# Patient Record
Sex: MALE | Race: BLACK OR AFRICAN AMERICAN | Employment: OTHER | URBAN - METROPOLITAN AREA
[De-identification: names, ages, dates, MRNs, and addresses within clinical notes are randomized per-mention and may not be internally consistent; named-entity substitution may affect disease eponyms.]

---

## 2017-01-05 ENCOUNTER — APPOINTMENT (OUTPATIENT)
Dept: GENERAL RADIOLOGY | Age: 67
End: 2017-01-05
Attending: PHYSICIAN ASSISTANT
Payer: OTHER GOVERNMENT

## 2017-01-05 LAB
ALBUMIN SERPL BCP-MCNC: 3.5 G/DL (ref 3.4–5)
ALBUMIN/GLOB SERPL: 0.8 {RATIO} (ref 0.8–1.7)
ALP SERPL-CCNC: 99 U/L (ref 45–117)
ALT SERPL-CCNC: 28 U/L (ref 16–61)
ANION GAP BLD CALC-SCNC: 9 MMOL/L (ref 3–18)
AST SERPL W P-5'-P-CCNC: 15 U/L (ref 15–37)
BASOPHILS # BLD AUTO: 0 K/UL (ref 0–0.1)
BASOPHILS # BLD: 0 % (ref 0–2)
BILIRUB SERPL-MCNC: 1.1 MG/DL (ref 0.2–1)
BNP SERPL-MCNC: 4456 PG/ML (ref 0–900)
BUN SERPL-MCNC: 13 MG/DL (ref 7–18)
BUN/CREAT SERPL: 13 (ref 12–20)
CALCIUM SERPL-MCNC: 9.7 MG/DL (ref 8.5–10.1)
CHLORIDE SERPL-SCNC: 107 MMOL/L (ref 100–108)
CK MB CFR SERPL CALC: 2.2 % (ref 0–4)
CK MB SERPL-MCNC: 1.1 NG/ML (ref 0.5–3.6)
CK SERPL-CCNC: 49 U/L (ref 39–308)
CO2 SERPL-SCNC: 26 MMOL/L (ref 21–32)
CREAT SERPL-MCNC: 0.98 MG/DL (ref 0.6–1.3)
DIFFERENTIAL METHOD BLD: ABNORMAL
EOSINOPHIL # BLD: 0.1 K/UL (ref 0–0.4)
EOSINOPHIL NFR BLD: 1 % (ref 0–5)
ERYTHROCYTE [DISTWIDTH] IN BLOOD BY AUTOMATED COUNT: 12.7 % (ref 11.6–14.5)
GLOBULIN SER CALC-MCNC: 4.5 G/DL (ref 2–4)
GLUCOSE SERPL-MCNC: 85 MG/DL (ref 74–99)
HCT VFR BLD AUTO: 41.1 % (ref 36–48)
HGB BLD-MCNC: 13.6 G/DL (ref 13–16)
INR PPP: 2.3 (ref 0.8–1.2)
LYMPHOCYTES # BLD AUTO: 22 % (ref 21–52)
LYMPHOCYTES # BLD: 1.5 K/UL (ref 0.9–3.6)
MAGNESIUM SERPL-MCNC: 1.8 MG/DL (ref 1.8–2.4)
MCH RBC QN AUTO: 31.3 PG (ref 24–34)
MCHC RBC AUTO-ENTMCNC: 33.1 G/DL (ref 31–37)
MCV RBC AUTO: 94.7 FL (ref 74–97)
MONOCYTES # BLD: 0.7 K/UL (ref 0.05–1.2)
MONOCYTES NFR BLD AUTO: 10 % (ref 3–10)
NEUTS SEG # BLD: 4.4 K/UL (ref 1.8–8)
NEUTS SEG NFR BLD AUTO: 67 % (ref 40–73)
PLATELET # BLD AUTO: 330 K/UL (ref 135–420)
PMV BLD AUTO: 12.2 FL (ref 9.2–11.8)
POTASSIUM SERPL-SCNC: 3.2 MMOL/L (ref 3.5–5.5)
PROT SERPL-MCNC: 8 G/DL (ref 6.4–8.2)
PROTHROMBIN TIME: 23.9 SEC (ref 11.5–15.2)
RBC # BLD AUTO: 4.34 M/UL (ref 4.7–5.5)
SODIUM SERPL-SCNC: 142 MMOL/L (ref 136–145)
TROPONIN I SERPL-MCNC: 0.04 NG/ML (ref 0–0.04)
WBC # BLD AUTO: 6.6 K/UL (ref 4.6–13.2)

## 2017-01-05 PROCEDURE — 85610 PROTHROMBIN TIME: CPT | Performed by: PHYSICIAN ASSISTANT

## 2017-01-05 PROCEDURE — 80053 COMPREHEN METABOLIC PANEL: CPT | Performed by: PHYSICIAN ASSISTANT

## 2017-01-05 PROCEDURE — 85025 COMPLETE CBC W/AUTO DIFF WBC: CPT | Performed by: PHYSICIAN ASSISTANT

## 2017-01-05 PROCEDURE — 82550 ASSAY OF CK (CPK): CPT | Performed by: PHYSICIAN ASSISTANT

## 2017-01-05 PROCEDURE — 83735 ASSAY OF MAGNESIUM: CPT | Performed by: PHYSICIAN ASSISTANT

## 2017-01-05 PROCEDURE — 93005 ELECTROCARDIOGRAM TRACING: CPT

## 2017-01-05 PROCEDURE — 71020 XR CHEST PA LAT: CPT

## 2017-01-05 PROCEDURE — 99285 EMERGENCY DEPT VISIT HI MDM: CPT

## 2017-01-05 PROCEDURE — 83880 ASSAY OF NATRIURETIC PEPTIDE: CPT | Performed by: PHYSICIAN ASSISTANT

## 2017-01-05 PROCEDURE — 96374 THER/PROPH/DIAG INJ IV PUSH: CPT

## 2017-01-05 NOTE — ED NOTES
I performed a brief evaluation, including history and physical, of the patient here in triage and I have determined that pt will need further treatment and evaluation from the main side ER physician. I have placed initial orders to help in expediting patients care. January 05, 2017 at 3:14 PM - MARIA DEL CARMEN Padilla        Visit Vitals    BP (!) 162/98    Pulse (!) 106    Temp 98.6 °F (37 °C)    Resp 20    Ht 6' 3\" (1.905 m)    Wt 77.6 kg (171 lb)    SpO2 100%    BMI 21.37 kg/m2        Pt with hx of HIV, COPD, HTN, CVA c/o SOB and \"not feeling right. \" Had blood thinner changed to warfarin recently, missed today's dose.

## 2017-01-05 NOTE — ED TRIAGE NOTES
C/o \"I don't feel good and I'm having some SOB and I'm tired of coming in here. \" Pt states he has hx of congestive heart failure and was seen here last week. Pt is speaking in full sentences and shows no s/s of respiratory distress.

## 2017-01-06 ENCOUNTER — HOSPITAL ENCOUNTER (EMERGENCY)
Age: 67
Discharge: HOME | End: 2017-01-06
Attending: EMERGENCY MEDICINE
Payer: OTHER GOVERNMENT

## 2017-01-06 VITALS
HEIGHT: 75 IN | SYSTOLIC BLOOD PRESSURE: 170 MMHG | RESPIRATION RATE: 29 BRPM | BODY MASS INDEX: 21.26 KG/M2 | WEIGHT: 171 LBS | DIASTOLIC BLOOD PRESSURE: 95 MMHG | TEMPERATURE: 98.4 F | HEART RATE: 103 BPM | OXYGEN SATURATION: 100 %

## 2017-01-06 DIAGNOSIS — I50.23 ACUTE ON CHRONIC SYSTOLIC CONGESTIVE HEART FAILURE (HCC): Primary | ICD-10-CM

## 2017-01-06 PROCEDURE — 74011250636 HC RX REV CODE- 250/636: Performed by: EMERGENCY MEDICINE

## 2017-01-06 RX ORDER — FUROSEMIDE 10 MG/ML
40 INJECTION INTRAMUSCULAR; INTRAVENOUS
Status: COMPLETED | OUTPATIENT
Start: 2017-01-06 | End: 2017-01-06

## 2017-01-06 RX ADMIN — FUROSEMIDE 40 MG: 10 INJECTION, SOLUTION INTRAVENOUS at 06:27

## 2017-01-06 NOTE — ED NOTES
Hourly rounding complete. Safety  Pt resting   [ x ]  On stretcher with side rails up and bed in locked position, call bell within reach  [  ]  Sitting in chair with casters locked, call bell within reach    Toileting  [ x ] pt denies need to use bathroom  [  ] pt assisted to bathroom  [  ] pt assisted with bedpan  [  ] pt independent to bathroom as needed    Ongoing Plan of Care  Plan of care and expected time for test and results reviewed with pt.     Pain Management / Comfort  [  ] dimmed lights  [  ] warm blanket provided  [x  ] pain assessed  [x  ] monitor alarms reviewed

## 2017-01-06 NOTE — ED NOTES
Pt states he has been having difficulty breathing for a few months, pt has a hx of CHF, has a defibrillator and a zoll external defibrillator.  Pt does not appear to be in distress

## 2017-01-06 NOTE — ED NOTES
Hourly rounding complete. Safety  Pt resting   [x  ]  On stretcher with side rails up and bed in locked position, call bell within reach  [  ]  Sitting in chair with casters locked, call bell within reach    Toileting  [x  ] pt denies need to use bathroom-urinal provided  [  ] pt assisted to bathroom  [  ] pt assisted with bedpan  [  ] pt independent to bathroom as needed    Ongoing Plan of Care  Plan of care and expected time for test and results reviewed with pt.     Pain Management / Comfort  [  ] dimmed lights  [  ] warm blanket provided  [x  ] pain assessed  [ x ] monitor alarms reviewed

## 2017-01-06 NOTE — ED PROVIDER NOTES
HPI Comments:   5:38 AM Arvel Paget is a 61 y.o. Male with a h/o CHF and pace maker, who presents to the ED for the evaluation of intermittent SOB, onset yesterday morning. Pt denies CP and leg swelling. There are no other reported signs or symptoms. There are no relieving or exacerbating factors. The patient has not reported taking any medication to relieve symptoms. There are no other complaints at this time. PCP: Disha Alexander MD     The history is provided by the patient. Past Medical History:   Diagnosis Date    Autoimmune disease ([address]      AIDS? HIV    Hypertension     Rectal bleeding     Stroke [facility])        No past surgical history on file. No family history on file. Social History     Social History    Marital status:      Spouse name: N/A    Number of children: N/A    Years of education: N/A     Occupational History    Not on file. Social History Main Topics    Smoking status: Current Every Day Smoker     Packs/day: 1.00     Types: Cigarettes    Smokeless tobacco: Not on file    Alcohol use 3.5 oz/week     7 Standard drinks or equivalent per week      Comment: \"drink everyday but never alone\"     Drug use: Yes     Special: Marijuana    Sexual activity: Not Currently     Other Topics Concern    Not on file     Social History Narrative         ALLERGIES: Review of patient's allergies indicates no known allergies. Review of Systems   Constitutional: Negative for activity change, appetite change, diaphoresis and fever. HENT: Negative for congestion, dental problem, ear pain, hearing loss, nosebleeds, postnasal drip, sinus pressure, sneezing and tinnitus. Eyes: Negative for photophobia, discharge, redness and visual disturbance. Respiratory: Positive for shortness of breath. Negative for cough, choking, wheezing and stridor. Cardiovascular: Negative for chest pain, palpitations and leg swelling.    Gastrointestinal: Negative for abdominal distention, abdominal pain, anal bleeding and blood in stool. Genitourinary: Negative for decreased urine volume, difficulty urinating, discharge, dysuria, frequency, hematuria, penile swelling, scrotal swelling, testicular pain and urgency. Musculoskeletal: Negative for arthralgias, back pain, gait problem, joint swelling, myalgias and neck pain. Skin: Negative for color change and pallor. Neurological: Negative for dizziness, tremors, seizures, syncope and headaches. Hematological: Negative for adenopathy. Does not bruise/bleed easily. Psychiatric/Behavioral: Negative for agitation, behavioral problems, confusion and hallucinations. The patient is not nervous/anxious. Vitals:    01/06/17 0430 01/06/17 0500 01/06/17 0600 01/06/17 0627   BP: (!) 163/95 (!) 162/108 (!) 160/109 (!) 170/95   Pulse: (!) 103 90 (!) 103 (!) 104   Resp:   28    Temp:       SpO2: 100% 100% 100%    Weight:       Height:            100% within normal limits     Physical Exam   Constitutional: He is oriented to person, place, and time. He appears well-developed and well-nourished. Life vest   HENT:   Head: Normocephalic and atraumatic. Right Ear: External ear normal.   Left Ear: External ear normal.   Nose: Nose normal.   Mouth/Throat: Oropharynx is clear and moist.   Eyes: Conjunctivae and EOM are normal. Pupils are equal, round, and reactive to light. Right eye exhibits no discharge. No scleral icterus. Neck: Normal range of motion. Neck supple. No JVD present. No thyromegaly present. Cardiovascular: Normal rate, regular rhythm and intact distal pulses. Exam reveals no gallop and no friction rub. No murmur heard. Pulmonary/Chest: No respiratory distress. He has no wheezes. He has no rales. He exhibits no tenderness. Pace maker L chest. Trace Crackles   Abdominal: He exhibits no distension and no mass. There is no tenderness. There is no rebound and no guarding. Musculoskeletal: Normal range of motion.  He exhibits no edema (trace). Lymphadenopathy:     He has no cervical adenopathy. Neurological: He is alert and oriented to person, place, and time. No cranial nerve deficit. Coordination normal.   Skin: Skin is warm and dry. No rash noted. No erythema. Psychiatric: He has a normal mood and affect. His behavior is normal. Judgment normal.   Nursing note and vitals reviewed. [facility]  ED Course       Procedures      Vitals:  Patient Vitals for the past 12 hrs:   Temp Pulse Resp BP SpO2   01/06/17 0627 - (!) 104 - (!) 170/95 -   01/06/17 0600 - (!) 103 28 (!) 160/109 100 %   01/06/17 0500 - 90 - (!) 162/108 100 %   01/06/17 0430 - (!) 103 - (!) 163/95 100 %   01/06/17 0330 - - - - 100 %   01/05/17 2318 98.4 °F (36.9 °C) 71 18 (!) 152/101 98 %   98% within normal limits     EKG interpretation by ED Physician:  Time: 1804  Rate: 105  Rhythm:Sinus Tachycardia. Pacemaker. No STEMI      X-Ray, CT or other radiology findings or impressions:  Xr Chest Pa Lat    Result Date: 1/5/2017  CHEST PA AND LATERAL CPT CODE: 66114 HISTORY: Shortness of breath, fatigue, congestive heart failure. COMPARISON: Chest x-ray December 15, 2016. FINDINGS: PA and lateral views obtained. The cardiac silhouette is mildly enlarged. No change in the right atrial nor the right ventricular leads of the left upper chest cardiac pacemaker. Cardiac monitor device projects over the lower chest. There is tortuosity of the aorta with calcified plaque. The lungs are clear. Pulmonary vascularity is normal. The costophrenic angles are sharply defined. Mild disc space narrowing with marginal spurring involves the mid and lower thoracic spine. .     IMPRESSION: Stable mild cardiomegaly. Atherosclerosis. Progress notes, Consult notes or additional Procedure notes:   6:44 AM: I have reassessed the patient and discussed their results and diagnosis. Pt states that he is breathing better after Lasix. Pt will be discharged in stable condition.   Patient understands and verbalizes agreement with plan. Disposition: Discharged home in stable ocndition  Diagnosis:   1. Acute on chronic systolic congestive heart failure ([address]        Follow Up: Follow-up Information     Follow up With Details Comments [address] MD Call in 2 days  [address] [facility] EMERGENCY DEPT  As needed, If symptoms worsen [address] [address]  [facility] [address]  Documented by: Mike Guzmán. Gini Amanda for, and in the presence of, Ezio Proctor MD 5:37 AM     Signed by: Rachel Montalvo [address], 1/6/2017   5:37 AM     PROVIDER ATTESTATION STATEMENT  I personally performed the services described in the documentation, reviewed the documentation, as recorded by the scribe in my presence, and it accurately and completely records my words and actions.   Ezio Proctor MD

## 2017-01-06 NOTE — ED NOTES
Hourly rounding complete. Safety  Pt resting   [x  ]  On stretcher with side rails up and bed in locked position, call bell within reach  [  ]  Sitting in chair with casters locked, call bell within reach    Toileting  [x  ] pt denies need to use bathroom  [  ] pt assisted to bathroom  [  ] pt assisted with bedpan  [  ] pt independent to bathroom as needed    Ongoing Plan of Care  Plan of care and expected time for test and results reviewed with pt.     Pain Management / Comfort  [  ] dimmed lights  [  ] warm blanket provided  [ x ] pain assessed  [x  ] monitor alarms reviewed

## 2017-01-06 NOTE — DISCHARGE INSTRUCTIONS
Heart Failure: Care Instructions  Your Care Instructions    Heart failure occurs when your heart does not pump as much blood as the body needs. Failure does not mean that the heart has stopped pumping but rather that it is not pumping as well as it should. Over time, this causes fluid buildup in your lungs and other parts of your body. Fluid buildup can cause shortness of breath, fatigue, swollen ankles, and other problems. By taking medicines regularly, reducing sodium (salt) in your diet, checking your weight every day, and making lifestyle changes, you can feel better and live longer. Follow-up care is a key part of your treatment and safety. Be sure to make and go to all appointments, and call your doctor if you are having problems. It's also a good idea to know your test results and keep a list of the medicines you take. How can you care for yourself at home? Medicines  · Be safe with medicines. Take your medicines exactly as prescribed. Call your doctor if you think you are having a problem with your medicine. · Do not take any vitamins, over-the-counter medicine, or herbal products without talking to your doctor first. Adilson Clement not take ibuprofen (Advil or Motrin) and naproxen (Aleve) without talking to your doctor first. They could make your heart failure worse. · You may be taking some of the following medicine. ¨ Beta-blockers can slow heart rate, decrease blood pressure, and improve your condition. Taking a beta-blocker may lower your chance of needing to be hospitalized. ¨ Angiotensin-converting enzyme inhibitors (ACEIs) reduce the heart's workload, lower blood pressure, and reduce swelling. Taking an ACEI may lower your chance of needing to be hospitalized again. ¨ Angiotensin II receptor blockers (ARBs) work like ACEIs. Your doctor may prescribe them instead of ACEIs. ¨ Diuretics, also called water pills, reduce swelling.   ¨ Potassium supplements replace this important mineral, which is sometimes lost with diuretics. ¨ Aspirin and other blood thinners prevent blood clots, which can cause a stroke or heart attack. You will get more details on the specific medicines your doctor prescribes. Diet  · Your doctor may suggest that you limit sodium to 2,000 milligrams (mg) a day or less. That is less than 1 teaspoon of salt a day, including all the salt you eat in cooking or in packaged foods. People get most of their sodium from processed foods. Fast food and restaurant meals also tend to be very high in sodium. · Ask your doctor how much liquid you can drink each day. You may have to limit liquids. Weight  · Weigh yourself without clothing at the same time each day. Record your weight. Call your doctor if you gain more than 3 pounds in 2 to 3 days. A sudden weight gain may mean that your heart failure is getting worse. Activity level  · Start light exercise (if your doctor says it is okay). Even if you can only do a small amount, exercise will help you get stronger, have more energy, and manage your weight and your stress. Walking is an easy way to get exercise. Start out by walking a little more than you did before. Bit by bit, increase the amount you walk. · When you exercise, watch for signs that your heart is working too hard. You are pushing yourself too hard if you cannot talk while you are exercising. If you become short of breath or dizzy or have chest pain, stop, sit down, and rest.  · If you feel \"wiped out\" the day after you exercise, walk slower or for a shorter distance until you can work up to a better pace. · Get enough rest at night. Sleeping with 1 or 2 pillows under your upper body and head may help you breathe easier. Lifestyle changes  · Do not smoke. Smoking can make a heart condition worse. If you need help quitting, talk to your doctor about stop-smoking programs and medicines. These can increase your chances of quitting for good.  Quitting smoking may be the most important step you can take to protect your heart. · Limit alcohol to 2 drinks a day for men and 1 drink a day for women. Too much alcohol can cause health problems. · Avoid getting sick from colds and the flu. Get a pneumococcal vaccine shot. If you have had one before, ask your doctor whether you need another dose. Get a flu shot each year. If you must be around people with colds or the flu, wash your hands often. When should you call for help? Call 911 if you have symptoms of sudden heart failure such as:  · You have severe trouble breathing. · You cough up pink, foamy mucus. · You have a new irregular or rapid heartbeat. Call your doctor now or seek immediate medical care if:  · You have new or increased shortness of breath. · You are dizzy or lightheaded, or you feel like you may faint. · You have sudden weight gain, such as 3 pounds or more in 2 to 3 days. · You have increased swelling in your legs, ankles, or feet. · You are suddenly so tired or weak that you cannot do your usual activities. Watch closely for changes in your health, and be sure to contact your doctor if:  · You develop new symptoms. Where can you learn more? Go to http://mallory-bonnie.info/. Enter Z959 in the search box to learn more about \"Heart Failure: Care Instructions. \"  Current as of: January 27, 2016  Content Version: 11.1  © 8223-6109 [employer]. Care instructions adapted under license by [employer] (which disclaims liability or warranty for this information). If you have questions about a medical condition or this instruction, always ask your healthcare professional. [place] [address] any warranty or liability for your use of this information.

## 2017-01-06 NOTE — ED NOTES
61year old gentleman with CHF, EF 15% with pacemaker and Life Vest. He presents with dyspnea. Shortness of breath noted. DDx CHF, COPD, clot, infection, hemorrhage, other etiologies. Suspect exacerbation of CHF. Will trend labs.  Lasix ordered

## 2017-01-08 LAB
ATRIAL RATE: 105 BPM
CALCULATED R AXIS, ECG10: -75 DEGREES
CALCULATED T AXIS, ECG11: 101 DEGREES
DIAGNOSIS, 93000: NORMAL
P-R INTERVAL, ECG05: 102 MS
Q-T INTERVAL, ECG07: 430 MS
QRS DURATION, ECG06: 200 MS
QTC CALCULATION (BEZET), ECG08: 568 MS
VENTRICULAR RATE, ECG03: 105 BPM

## 2017-07-02 ENCOUNTER — APPOINTMENT (OUTPATIENT)
Dept: GENERAL RADIOLOGY | Age: 67
End: 2017-07-02
Attending: EMERGENCY MEDICINE
Payer: OTHER GOVERNMENT

## 2017-07-02 ENCOUNTER — HOSPITAL ENCOUNTER (EMERGENCY)
Age: 67
Discharge: HOME | End: 2017-07-02
Attending: EMERGENCY MEDICINE
Payer: OTHER GOVERNMENT

## 2017-07-02 VITALS
TEMPERATURE: 97.6 F | HEIGHT: 74 IN | WEIGHT: 185 LBS | BODY MASS INDEX: 23.74 KG/M2 | SYSTOLIC BLOOD PRESSURE: 105 MMHG | HEART RATE: 63 BPM | DIASTOLIC BLOOD PRESSURE: 56 MMHG | OXYGEN SATURATION: 98 % | RESPIRATION RATE: 19 BRPM

## 2017-07-02 DIAGNOSIS — R06.02 SOB (SHORTNESS OF BREATH): ICD-10-CM

## 2017-07-02 DIAGNOSIS — F10.920 ACUTE ALCOHOL INTOXICATION, UNCOMPLICATED (HCC): ICD-10-CM

## 2017-07-02 DIAGNOSIS — Z91.14 NONCOMPLIANCE WITH MEDICATIONS: ICD-10-CM

## 2017-07-02 DIAGNOSIS — R55 SYNCOPE, UNSPECIFIED SYNCOPE TYPE: Primary | ICD-10-CM

## 2017-07-02 LAB
ALBUMIN SERPL BCP-MCNC: 3.5 G/DL (ref 3.4–5)
ALBUMIN/GLOB SERPL: 0.9 {RATIO} (ref 0.8–1.7)
ALP SERPL-CCNC: 132 U/L (ref 45–117)
ALT SERPL-CCNC: 26 U/L (ref 16–61)
ANION GAP BLD CALC-SCNC: 10 MMOL/L (ref 3–18)
AST SERPL W P-5'-P-CCNC: 25 U/L (ref 15–37)
ATRIAL RATE: 63 BPM
BASOPHILS # BLD AUTO: 0 K/UL (ref 0–0.1)
BASOPHILS # BLD: 0 % (ref 0–2)
BILIRUB SERPL-MCNC: 0.4 MG/DL (ref 0.2–1)
BUN SERPL-MCNC: 11 MG/DL (ref 7–18)
BUN/CREAT SERPL: 9 (ref 12–20)
CALCIUM SERPL-MCNC: 9.2 MG/DL (ref 8.5–10.1)
CALCULATED P AXIS, ECG09: -114 DEGREES
CALCULATED R AXIS, ECG10: -61 DEGREES
CALCULATED T AXIS, ECG11: 169 DEGREES
CHLORIDE SERPL-SCNC: 107 MMOL/L (ref 100–108)
CK MB CFR SERPL CALC: 1.4 % (ref 0–4)
CK MB CFR SERPL CALC: 1.4 % (ref 0–4)
CK MB CFR SERPL CALC: 2.4 % (ref 0–4)
CK MB SERPL-MCNC: 1.5 NG/ML (ref 5–25)
CK MB SERPL-MCNC: 1.5 NG/ML (ref 5–25)
CK MB SERPL-MCNC: 1.8 NG/ML (ref 5–25)
CK SERPL-CCNC: 109 U/L (ref 39–308)
CK SERPL-CCNC: 109 U/L (ref 39–308)
CK SERPL-CCNC: 74 U/L (ref 39–308)
CO2 SERPL-SCNC: 22 MMOL/L (ref 21–32)
CREAT SERPL-MCNC: 1.24 MG/DL (ref 0.6–1.3)
DIAGNOSIS, 93000: NORMAL
DIFFERENTIAL METHOD BLD: ABNORMAL
DIGOXIN SERPL-MCNC: <0.2 NG/ML (ref 0.9–2)
EOSINOPHIL # BLD: 0 K/UL (ref 0–0.4)
EOSINOPHIL NFR BLD: 0 % (ref 0–5)
ERYTHROCYTE [DISTWIDTH] IN BLOOD BY AUTOMATED COUNT: 11.7 % (ref 11.6–14.5)
ETHANOL SERPL-MCNC: 200 MG/DL (ref 0–3)
GLOBULIN SER CALC-MCNC: 3.8 G/DL (ref 2–4)
GLUCOSE SERPL-MCNC: 92 MG/DL (ref 74–99)
HCT VFR BLD AUTO: 36.8 % (ref 36–48)
HGB BLD-MCNC: 12.7 G/DL (ref 13–16)
LYMPHOCYTES # BLD AUTO: 18 % (ref 21–52)
LYMPHOCYTES # BLD: 0.9 K/UL (ref 0.9–3.6)
MAGNESIUM SERPL-MCNC: 2 MG/DL (ref 1.6–2.6)
MCH RBC QN AUTO: 31.1 PG (ref 24–34)
MCHC RBC AUTO-ENTMCNC: 34.5 G/DL (ref 31–37)
MCV RBC AUTO: 90 FL (ref 74–97)
MONOCYTES # BLD: 0.7 K/UL (ref 0.05–1.2)
MONOCYTES NFR BLD AUTO: 15 % (ref 3–10)
NEUTS SEG # BLD: 3.1 K/UL (ref 1.8–8)
NEUTS SEG NFR BLD AUTO: 67 % (ref 40–73)
P-R INTERVAL, ECG05: 294 MS
PLATELET # BLD AUTO: 257 K/UL (ref 135–420)
PMV BLD AUTO: 11.6 FL (ref 9.2–11.8)
POTASSIUM SERPL-SCNC: 3.3 MMOL/L (ref 3.5–5.5)
PROT SERPL-MCNC: 7.3 G/DL (ref 6.4–8.2)
Q-T INTERVAL, ECG07: 480 MS
QRS DURATION, ECG06: 168 MS
QTC CALCULATION (BEZET), ECG08: 491 MS
RBC # BLD AUTO: 4.09 M/UL (ref 4.7–5.5)
SODIUM SERPL-SCNC: 139 MMOL/L (ref 136–145)
TROPONIN I SERPL-MCNC: 0.04 NG/ML (ref 0–0.04)
TROPONIN I SERPL-MCNC: 0.04 NG/ML (ref 0–0.04)
TROPONIN I SERPL-MCNC: 0.05 NG/ML (ref 0–0.04)
VENTRICULAR RATE, ECG03: 63 BPM
WBC # BLD AUTO: 4.7 K/UL (ref 4.6–13.2)

## 2017-07-02 PROCEDURE — 74011250636 HC RX REV CODE- 250/636: Performed by: EMERGENCY MEDICINE

## 2017-07-02 PROCEDURE — 80307 DRUG TEST PRSMV CHEM ANLYZR: CPT | Performed by: EMERGENCY MEDICINE

## 2017-07-02 PROCEDURE — 85025 COMPLETE CBC W/AUTO DIFF WBC: CPT | Performed by: EMERGENCY MEDICINE

## 2017-07-02 PROCEDURE — 82550 ASSAY OF CK (CPK): CPT | Performed by: EMERGENCY MEDICINE

## 2017-07-02 PROCEDURE — 83735 ASSAY OF MAGNESIUM: CPT | Performed by: EMERGENCY MEDICINE

## 2017-07-02 PROCEDURE — 71010 XR CHEST PORT: CPT

## 2017-07-02 PROCEDURE — 99284 EMERGENCY DEPT VISIT MOD MDM: CPT

## 2017-07-02 PROCEDURE — 80162 ASSAY OF DIGOXIN TOTAL: CPT | Performed by: EMERGENCY MEDICINE

## 2017-07-02 PROCEDURE — 80053 COMPREHEN METABOLIC PANEL: CPT | Performed by: EMERGENCY MEDICINE

## 2017-07-02 PROCEDURE — 36415 COLL VENOUS BLD VENIPUNCTURE: CPT | Performed by: EMERGENCY MEDICINE

## 2017-07-02 PROCEDURE — 93005 ELECTROCARDIOGRAM TRACING: CPT

## 2017-07-02 RX ORDER — SODIUM CHLORIDE 9 MG/ML
100 INJECTION, SOLUTION INTRAVENOUS CONTINUOUS
Status: DISCONTINUED | OUTPATIENT
Start: 2017-07-02 | End: 2017-07-02 | Stop reason: HOSPADM

## 2017-07-02 RX ADMIN — SODIUM CHLORIDE 100 ML/HR: 9 INJECTION, SOLUTION INTRAVENOUS at 10:33
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
## 2017-07-02 NOTE — ED NOTES
Bedside shift change report given to [facility] DALIA QUIJANO RN  (oncoming nurse) by Reza Al RN  (offgoing nurse). Report included the following information SBAR, Intake/Output and Recent Results.

## 2017-07-02 NOTE — ED NOTES
Hourly rounding complete. Safety  Pt resting   [ x ]  On stretcher with side rails up and bed in locked position, call bell within reach  [  ]  Sitting in chair with casters locked, call bell within reach    Toileting  [ x ] pt denies need to use bathroom  [  ] pt assisted to bathroom  [  ] pt assisted with bedpan  [  ] pt independent to bathroom as needed    Ongoing Plan of Care  Plan of care and expected time for test and results reviewed with pt.     Pain Management / Comfort  [  ] dimmed lights  [  ] warm blanket provided  [  x] pain assessed  [ x ] monitor alarms reviewed

## 2017-07-02 NOTE — DISCHARGE INSTRUCTIONS
Resume taking your regular medications, including daily aspirin, and limit free water, salt, and alcohol intake  No strenuous activity or exercise until you complete stress testing  Return immediately for increasing pain, fever, weakness, difficulty breathing, or any other concerns  You should be called tomorrow about scheduling a stress test

## 2017-07-02 NOTE — ED NOTES
Pt in ED after passing out and loosing consciousness. Pt denies hitting head, or any head neck or chest pain.

## 2017-07-02 NOTE — ED NOTES
Hourly rounding complete. Safety  Pt resting   [  x]  On stretcher with side rails up and bed in locked position, call bell within reach  [  ]  Sitting in chair with casters locked, call bell within reach    Toileting  [ x ] pt denies need to use bathroom  [  ] pt assisted to bathroom  [  ] pt assisted with bedpan  [  ] pt independent to bathroom as needed    Ongoing Plan of Care  Plan of care and expected time for test and results reviewed with pt.     Pain Management / Comfort  [  ] dimmed lights  [  ] warm blanket provided  [x  ] pain assessed  [  x] monitor alarms reviewed
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
## 2017-07-02 NOTE — ED NOTES
PT received phone call from brother, moved stretcher in room to reach phone for patient, safety intact, will continue to monitor

## 2017-07-02 NOTE — ED NOTES
Patient turned over at 0700 pending repeat cardiac enzymes and serial examinations until clinically sober. Second set of enzyme showed minimal elevation but 3rd set was reassuring. I discussed the clinical findings and concerns with the patient and counseled him on alcohol abuse. He agreed with modifying activity until able to complete stress test and to return immediately for any worsening. Precautions given.

## 2017-07-02 NOTE — ED NOTES
Bedside report received from [facility], [facility], PT resting in bed eyes closed RR 14 even unlabored, NAD, safety intact, will continue to [facility]

## 2017-07-02 NOTE — ED NOTES
Pt provided food per MD order. Pt sitting up in bed with apple juice, jello, meatloaf and mashed potatoes. Safety intact, will continue to monitor.

## 2017-07-02 NOTE — ED NOTES
Hourly rounding complete. Safety  Pt resting   [x  ]  On stretcher with side rails up and bed in locked position, call bell within reach  [  ]  Sitting in chair with casters locked, call bell within reach    Toileting  [ x ] pt denies need to use bathroom  [  ] pt assisted to bathroom  [  ] pt assisted with bedpan  [  ] pt independent to bathroom as needed    Ongoing Plan of Care  Plan of care and expected time for test and results reviewed with pt.     Pain Management / Comfort  [  ] dimmed lights  [  ] warm blanket provided  [x  ] pain assessed  [ x ] monitor alarms reviewed

## 2017-07-15 ENCOUNTER — HOSPITAL ENCOUNTER (EMERGENCY)
Age: 67
Discharge: HOME | End: 2017-07-16
Attending: EMERGENCY MEDICINE | Admitting: EMERGENCY MEDICINE
Payer: OTHER GOVERNMENT

## 2017-07-15 ENCOUNTER — APPOINTMENT (OUTPATIENT)
Dept: GENERAL RADIOLOGY | Age: 67
End: 2017-07-15
Attending: EMERGENCY MEDICINE
Payer: OTHER GOVERNMENT

## 2017-07-15 VITALS
WEIGHT: 185 LBS | SYSTOLIC BLOOD PRESSURE: 193 MMHG | RESPIRATION RATE: 18 BRPM | HEART RATE: 96 BPM | TEMPERATURE: 99.6 F | BODY MASS INDEX: 23.75 KG/M2 | DIASTOLIC BLOOD PRESSURE: 97 MMHG | OXYGEN SATURATION: 95 %

## 2017-07-15 DIAGNOSIS — L03.113 CELLULITIS OF RIGHT UPPER EXTREMITY: ICD-10-CM

## 2017-07-15 DIAGNOSIS — M25.531 WRIST PAIN, ACUTE, RIGHT: Primary | ICD-10-CM

## 2017-07-15 LAB
ANION GAP BLD CALC-SCNC: 11 MMOL/L (ref 3–18)
BASOPHILS # BLD AUTO: 0 K/UL (ref 0–0.06)
BASOPHILS # BLD: 0 % (ref 0–3)
BUN SERPL-MCNC: 11 MG/DL (ref 7–18)
BUN/CREAT SERPL: 13 (ref 12–20)
CALCIUM SERPL-MCNC: 10.1 MG/DL (ref 8.5–10.1)
CHLORIDE SERPL-SCNC: 97 MMOL/L (ref 100–108)
CO2 SERPL-SCNC: 25 MMOL/L (ref 21–32)
CREAT SERPL-MCNC: 0.82 MG/DL (ref 0.6–1.3)
DIFFERENTIAL METHOD BLD: ABNORMAL
EOSINOPHIL # BLD: 0 K/UL (ref 0–0.4)
EOSINOPHIL NFR BLD: 0 % (ref 0–5)
ERYTHROCYTE [DISTWIDTH] IN BLOOD BY AUTOMATED COUNT: 12 % (ref 11.6–14.5)
GLUCOSE SERPL-MCNC: 82 MG/DL (ref 74–99)
HCT VFR BLD AUTO: 40.3 % (ref 36–48)
HGB BLD-MCNC: 13.9 G/DL (ref 13–16)
LYMPHOCYTES # BLD AUTO: 11 % (ref 20–51)
LYMPHOCYTES # BLD: 0.8 K/UL (ref 0.8–3.5)
MCH RBC QN AUTO: 31 PG (ref 24–34)
MCHC RBC AUTO-ENTMCNC: 34.5 G/DL (ref 31–37)
MCV RBC AUTO: 90 FL (ref 74–97)
MONOCYTES # BLD: 1.1 K/UL (ref 0–1)
MONOCYTES NFR BLD AUTO: 15 % (ref 2–9)
NEUTS SEG # BLD: 5.4 K/UL (ref 1.8–8)
NEUTS SEG NFR BLD AUTO: 74 % (ref 42–75)
PLATELET # BLD AUTO: 339 K/UL (ref 135–420)
PLATELET COMMENTS,PCOM: ABNORMAL
PMV BLD AUTO: 11.2 FL (ref 9.2–11.8)
POTASSIUM SERPL-SCNC: 3.8 MMOL/L (ref 3.5–5.5)
RBC # BLD AUTO: 4.48 M/UL (ref 4.7–5.5)
RBC MORPH BLD: ABNORMAL
SODIUM SERPL-SCNC: 133 MMOL/L (ref 136–145)
URATE SERPL-MCNC: 5.4 MG/DL (ref 2.6–7.2)
WBC # BLD AUTO: 7.3 K/UL (ref 4.6–13.2)

## 2017-07-15 PROCEDURE — 85025 COMPLETE CBC W/AUTO DIFF WBC: CPT | Performed by: EMERGENCY MEDICINE

## 2017-07-15 PROCEDURE — 99283 EMERGENCY DEPT VISIT LOW MDM: CPT

## 2017-07-15 PROCEDURE — 84550 ASSAY OF BLOOD/URIC ACID: CPT | Performed by: EMERGENCY MEDICINE

## 2017-07-15 PROCEDURE — 80048 BASIC METABOLIC PNL TOTAL CA: CPT | Performed by: EMERGENCY MEDICINE

## 2017-07-15 PROCEDURE — 74011250637 HC RX REV CODE- 250/637: Performed by: EMERGENCY MEDICINE

## 2017-07-15 PROCEDURE — 73130 X-RAY EXAM OF HAND: CPT

## 2017-07-15 RX ORDER — HYDROCODONE BITARTRATE AND ACETAMINOPHEN 5; 325 MG/1; MG/1
1 TABLET ORAL
Status: COMPLETED | OUTPATIENT
Start: 2017-07-15 | End: 2017-07-15

## 2017-07-15 RX ORDER — TRAMADOL HYDROCHLORIDE 50 MG/1
50 TABLET ORAL
Qty: 20 TAB | Refills: 0 | Status: SHIPPED | OUTPATIENT
Start: 2017-07-15 | End: 2018-02-20

## 2017-07-15 RX ORDER — CEPHALEXIN 250 MG/1
500 CAPSULE ORAL
Status: COMPLETED | OUTPATIENT
Start: 2017-07-15 | End: 2017-07-15

## 2017-07-15 RX ORDER — IBUPROFEN 600 MG/1
600 TABLET ORAL
Qty: 12 TAB | Refills: 0 | Status: SHIPPED | OUTPATIENT
Start: 2017-07-15 | End: 2017-08-08

## 2017-07-15 RX ORDER — CEPHALEXIN 500 MG/1
500 CAPSULE ORAL 2 TIMES DAILY
Qty: 14 CAP | Refills: 0 | Status: SHIPPED | OUTPATIENT
Start: 2017-07-15 | End: 2017-07-22

## 2017-07-15 RX ORDER — IBUPROFEN 400 MG/1
800 TABLET ORAL
Status: COMPLETED | OUTPATIENT
Start: 2017-07-15 | End: 2017-07-15

## 2017-07-15 RX ADMIN — IBUPROFEN 800 MG: 400 TABLET ORAL at 23:08

## 2017-07-15 RX ADMIN — CEPHALEXIN 500 MG: 250 CAPSULE ORAL at 23:08

## 2017-07-15 RX ADMIN — HYDROCODONE BITARTRATE AND ACETAMINOPHEN 1 TABLET: 5; 325 TABLET ORAL at 23:08

## 2017-07-16 NOTE — ED PROVIDER NOTES
HPI Comments: Geneva Miller is a 61 y.o. male with pertinent PMHx of pacemaker placement, HTN, and CVA, who presents to the ED c/o gradual onset non-traumatic right hand pain over the past 2 days. He states \"I think I've got gout\". He denies any previous history of gout diagnosis. He denies any fevers or chills, no wound or skin break to hand. He has not discussed his symptoms with his PMD at the [place]. No other acute symptoms or complaints were noted. The history is provided by the patient. No  was used. Past Medical History:   Diagnosis Date    Autoimmune disease ([facility] [address]     AIDS? HIV    Hypertension     Rectal bleeding     Stroke [facility])        Past Surgical History:   Procedure Laterality Date    HX ORTHOPAEDIC      back         No family history on file. Social History     Social History    Marital status:      Spouse name: N/A    Number of children: N/A    Years of education: N/A     Occupational History    Not on file. Social History Main Topics    Smoking status: Current Every Day Smoker     Packs/day: 1.00     Types: Cigarettes    Smokeless tobacco: Never Used    Alcohol use 3.5 oz/week     7 Standard drinks or equivalent per week      Comment: \"drink everyday but never alone\"     Drug use: Yes     Special: Marijuana    Sexual activity: Not Currently     Other Topics Concern    Not on file     Social History Narrative         ALLERGIES: Review of patient's allergies indicates no known allergies. Review of Systems   Constitutional: Negative for chills, diaphoresis and fever. HENT: Negative. Eyes: Negative for visual disturbance. Respiratory: Negative for shortness of breath. Cardiovascular: Negative for chest pain, palpitations and leg swelling. Gastrointestinal: Negative for abdominal pain, nausea and vomiting. Endocrine: Negative. Genitourinary: Negative for flank pain.    Musculoskeletal: Positive for arthralgias and joint swelling. Negative for back pain, myalgias and neck pain. Skin: Negative for rash and wound. Allergic/Immunologic: Negative. Neurological: Negative for headaches. Vitals:    07/15/17 2130   BP: (!) 193/97   Pulse: 96   Resp: 18   Temp: 99.6 °F (37.6 °C)   SpO2: 95%   Weight: 83.9 kg (185 lb)            Physical Exam   Constitutional: He is oriented to person, place, and time. He appears well-developed and well-nourished. No distress. Resting comfortably on stretcher   HENT:   Head: Normocephalic and atraumatic. MM moist   Eyes: Conjunctivae and EOM are normal. No scleral icterus. Sclera clear bilaterally   Neck: Normal range of motion. Neck supple. No JVD present. Non-tender to palpation   Cardiovascular: Normal rate, regular rhythm and normal heart sounds. Exam reveals no gallop and no friction rub. No murmur heard. Pulmonary/Chest: Effort normal and breath sounds normal. No respiratory distress. He has no wheezes. He has no rales. He exhibits no tenderness. No crepitance with palpation   Abdominal: Soft. He exhibits no distension. There is no tenderness. Genitourinary:   Genitourinary Comments: No CVA tenderness   Musculoskeletal: He exhibits tenderness. Erythema and mild edema noted to dorsal aspect of R hand and wrist.  No induration appreciated. No pain with flexion of wrist but some pain with extension. No pain with ROM all MCPs. No tenderness or erythema noted to flexor compartments. Lymphadenopathy:     He has no cervical adenopathy. Neurological: He is alert and oriented to person, place, and time. No cranial nerve deficit. He exhibits normal muscle tone. Normal motor and sensation bilaterally to upper and lower extremities   Skin: Skin is warm and dry. No rash noted. He is not diaphoretic. There is erythema. Psychiatric:   Normal mood and affect. Vitals reviewed.        MDM  Number of Diagnoses or Management Options  Diagnosis management comments: Pt with non-traumatic pain, erythema and edema to dorsum of R hand and wrist.  No associated infectious symptoms and no known gout history. No evidence of flexor tenosynovitis or septic arthritis on exam.  Will check labs and XR wrist/hand. Reassess. Normal XR and labs. Will Rx for keflex and pain medications. Advised pt to elevate and rest wrist, follow up with PMD on Monday. Pt is in agreement with discharge and follow up plans. Amount and/or Complexity of Data Reviewed  Clinical lab tests: ordered and reviewed  Tests in the radiology section of CPT®: ordered and reviewed  Decide to obtain previous medical records or to obtain history from someone other than the patient: yes  Obtain history from someone other than the patient: yes  Independent visualization of images, tracings, or specimens: yes    Risk of Complications, Morbidity, and/or Mortality  Presenting problems: moderate  Management options: moderate    Patient Progress  Patient progress: stable    ED Course       Procedures    XR:  No acute bony process          IAlfredito, acting as a scribe for and in the presence of Dr. Jazzmine Rowe MD    Provider Attestation  I personally performed the services described in the documentation, reviewed the documentation, as recorded by the scribe in my presence, and it accurately and completely records my words and actions.      Signed By: Alfredito Cates, scribe for Dr. Jazzmine Rowe MD

## 2017-07-16 NOTE — DISCHARGE INSTRUCTIONS
Joint Pain: Care Instructions  Your Care Instructions  Many people have small aches and pains from overuse or injury to muscles and joints. Joint injuries often happen during sports or recreation, work tasks, or projects around the home. An overuse injury can happen when you put too much stress on a joint or when you do an activity that stresses the joint over and over, such as using the computer or rowing a boat. You can take action at home to help your muscles and joints get better. You should feel better in 1 to 2 weeks, but it can take 3 months or more to heal completely. Follow-up care is a key part of your treatment and safety. Be sure to make and go to all appointments, and call your doctor if you are having problems. It's also a good idea to know your test results and keep a list of the medicines you take. How can you care for yourself at home? · Do not put weight on the injured joint for at least a day or two. · For the first day or two after an injury, do not take hot showers or baths, and do not use hot packs. The heat could make swelling worse. · Put ice or a cold pack on the sore joint for 10 to 20 minutes at a time. Try to do this every 1 to 2 hours for the next 3 days (when you are awake) or until the swelling goes down. Put a thin cloth between the ice and your skin. · Wrap the injury in an elastic bandage. Do not wrap it too tightly because this can cause more swelling. · Prop up the sore joint on a pillow when you ice it or anytime you sit or lie down during the next 3 days. Try to keep it above the level of your heart. This will help reduce swelling. · Take an over-the-counter pain medicine, such as acetaminophen (Tylenol), ibuprofen (Advil, Motrin), or naproxen (Aleve). Read and follow all instructions on the label. · After 1 or 2 days of rest, begin moving the joint gently.  While the joint is still healing, you can begin to exercise using activities that do not strain or hurt the painful joint. When should you call for help? Call your doctor now or seek immediate medical care if:  · You have signs of infection, such as:  ¨ Increased pain, swelling, warmth, and redness. ¨ Red streaks leading from the joint. ¨ A fever. Watch closely for changes in your health, and be sure to contact your doctor if:  · Your movement or symptoms are not getting better after 1 to 2 weeks of home treatment. Where can you learn more? Go to http://mallory-bonnie.info/. Enter P205 in the search box to learn more about \"Joint Pain: Care Instructions. \"  Current as of: March 21, 2017  Content Version: 11.3  © 4000-2716 [employer]. Care instructions adapted under license by [employer] (which disclaims liability or warranty for this information). If you have questions about a medical condition or this instruction, always ask your healthcare professional. [place] [address] any warranty or liability for your use of this information. Cellulitis: Care Instructions  Your Care Instructions    Cellulitis is a skin infection. It often occurs after a break in the skin from a scrape, cut, bite, or puncture, or after a rash. The doctor has checked you carefully, but problems can develop later. If you notice any problems or new symptoms, get medical treatment right away. Follow-up care is a key part of your treatment and safety. Be sure to make and go to all appointments, and call your doctor if you are having problems. It's also a good idea to know your test results and keep a list of the medicines you take. How can you care for yourself at home? · Take your antibiotics as directed. Do not stop taking them just because you feel better. You need to take the full course of antibiotics. · Prop up the infected area on pillows to reduce pain and swelling. Try to keep the area above the level of your heart as often as you can.   · If your doctor told you how to care for your wound, follow your doctor's instructions. If you did not get instructions, follow this general advice:  ¨ Wash the wound with clean water 2 times a day. Don't use hydrogen peroxide or alcohol, which can slow healing. ¨ You may cover the wound with a thin layer of petroleum jelly, such as Vaseline, and a nonstick bandage. ¨ Apply more petroleum jelly and replace the bandage as needed. · Be safe with medicines. Take pain medicines exactly as directed. ¨ If the doctor gave you a prescription medicine for pain, take it as prescribed. ¨ If you are not taking a prescription pain medicine, ask your doctor if you can take an over-the-counter medicine. To prevent cellulitis in the future  · Try to prevent cuts, scrapes, or other injuries to your skin. Cellulitis most often occurs where there is a break in the skin. · If you get a scrape, cut, mild burn, or bite, wash the wound with clean water as soon as you can to help avoid infection. Don't use hydrogen peroxide or alcohol, which can slow healing. · If you have swelling in your legs (edema), support stockings and good skin care may help prevent leg sores and cellulitis. · Take care of your feet, especially if you have diabetes or other conditions that increase the risk of infection. Wear shoes and socks. Do not go barefoot. If you have athlete's foot or other skin problems on your feet, talk to your doctor about how to treat them. When should you call for help? Call your doctor now or seek immediate medical care if:  · You have signs that your infection is getting worse, such as:  ¨ Increased pain, swelling, warmth, or redness. ¨ Red streaks leading from the area. ¨ Pus draining from the area. ¨ A fever. · You get a rash. Watch closely for changes in your health, and be sure to contact your doctor if:  · You are not getting better after 1 day (24 hours). · You do not get better as expected. Where can you learn more?   Go to http://mallory-bonnie.info/. [place] in the search box to learn more about \"Cellulitis: Care Instructions. \"  Current as of: October 13, 2016  Content Version: 11.3  © 4208-8738 [employer]. Care instructions adapted under license by [employer] (which disclaims liability or warranty for this information). If you have questions about a medical condition or this instruction, always ask your healthcare professional. [place] [address] any warranty or liability for your use of this information.

## 2017-08-08 ENCOUNTER — HOSPITAL ENCOUNTER (EMERGENCY)
Age: 67
Discharge: HOME | End: 2017-08-08
Attending: EMERGENCY MEDICINE
Payer: OTHER GOVERNMENT

## 2017-08-08 ENCOUNTER — APPOINTMENT (OUTPATIENT)
Dept: GENERAL RADIOLOGY | Age: 67
End: 2017-08-08
Attending: EMERGENCY MEDICINE
Payer: OTHER GOVERNMENT

## 2017-08-08 VITALS
DIASTOLIC BLOOD PRESSURE: 91 MMHG | OXYGEN SATURATION: 95 % | WEIGHT: 159 LBS | TEMPERATURE: 97.9 F | BODY MASS INDEX: 20.41 KG/M2 | HEART RATE: 63 BPM | HEIGHT: 74 IN | SYSTOLIC BLOOD PRESSURE: 149 MMHG | RESPIRATION RATE: 28 BRPM
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
## 2017-08-08 DIAGNOSIS — M62.838 NECK MUSCLE SPASM: Primary | ICD-10-CM

## 2017-08-08 DIAGNOSIS — S39.012A LUMBAR STRAIN, INITIAL ENCOUNTER: ICD-10-CM

## 2017-08-08 DIAGNOSIS — I10 ELEVATED BLOOD PRESSURE READING WITH DIAGNOSIS OF HYPERTENSION: ICD-10-CM

## 2017-08-08 LAB
ALBUMIN SERPL BCP-MCNC: 3.4 G/DL (ref 3.4–5)
ALBUMIN/GLOB SERPL: 0.6 {RATIO} (ref 0.8–1.7)
ALP SERPL-CCNC: 124 U/L (ref 45–117)
ALT SERPL-CCNC: 25 U/L (ref 16–61)
ANION GAP BLD CALC-SCNC: 4 MMOL/L (ref 3–18)
AST SERPL W P-5'-P-CCNC: 16 U/L (ref 15–37)
BASOPHILS # BLD AUTO: 0 K/UL (ref 0–0.1)
BASOPHILS # BLD: 0 % (ref 0–2)
BILIRUB SERPL-MCNC: 0.8 MG/DL (ref 0.2–1)
BUN SERPL-MCNC: 8 MG/DL (ref 7–18)
BUN/CREAT SERPL: 11 (ref 12–20)
CALCIUM SERPL-MCNC: 10.3 MG/DL (ref 8.5–10.1)
CHLORIDE SERPL-SCNC: 103 MMOL/L (ref 100–108)
CK MB CFR SERPL CALC: 1.9 % (ref 0–4)
CK MB SERPL-MCNC: 1.1 NG/ML (ref 5–25)
CK SERPL-CCNC: 59 U/L (ref 39–308)
CO2 SERPL-SCNC: 29 MMOL/L (ref 21–32)
CREAT SERPL-MCNC: 0.76 MG/DL (ref 0.6–1.3)
DIFFERENTIAL METHOD BLD: ABNORMAL
EOSINOPHIL # BLD: 0.1 K/UL (ref 0–0.4)
EOSINOPHIL NFR BLD: 1 % (ref 0–5)
ERYTHROCYTE [DISTWIDTH] IN BLOOD BY AUTOMATED COUNT: 11.7 % (ref 11.6–14.5)
GLOBULIN SER CALC-MCNC: 5.8 G/DL (ref 2–4)
GLUCOSE SERPL-MCNC: 75 MG/DL (ref 74–99)
HCT VFR BLD AUTO: 40.2 % (ref 36–48)
HGB BLD-MCNC: 13.8 G/DL (ref 13–16)
LYMPHOCYTES # BLD AUTO: 18 % (ref 21–52)
LYMPHOCYTES # BLD: 1.4 K/UL (ref 0.9–3.6)
MAGNESIUM SERPL-MCNC: 1.8 MG/DL (ref 1.6–2.6)
MCH RBC QN AUTO: 30.8 PG (ref 24–34)
MCHC RBC AUTO-ENTMCNC: 34.3 G/DL (ref 31–37)
MCV RBC AUTO: 89.7 FL (ref 74–97)
MONOCYTES # BLD: 0.8 K/UL (ref 0.05–1.2)
MONOCYTES NFR BLD AUTO: 11 % (ref 3–10)
NEUTS SEG # BLD: 5.2 K/UL (ref 1.8–8)
NEUTS SEG NFR BLD AUTO: 70 % (ref 40–73)
PLATELET # BLD AUTO: 318 K/UL (ref 135–420)
PMV BLD AUTO: 11.7 FL (ref 9.2–11.8)
POTASSIUM SERPL-SCNC: 3.8 MMOL/L (ref 3.5–5.5)
PROT SERPL-MCNC: 9.2 G/DL (ref 6.4–8.2)
RBC # BLD AUTO: 4.48 M/UL (ref 4.7–5.5)
SODIUM SERPL-SCNC: 136 MMOL/L (ref 136–145)
TROPONIN I SERPL-MCNC: 0.02 NG/ML (ref 0–0.04)
WBC # BLD AUTO: 7.4 K/UL (ref 4.6–13.2)

## 2017-08-08 PROCEDURE — 82550 ASSAY OF CK (CPK): CPT | Performed by: EMERGENCY MEDICINE

## 2017-08-08 PROCEDURE — 93005 ELECTROCARDIOGRAM TRACING: CPT

## 2017-08-08 PROCEDURE — 74011250636 HC RX REV CODE- 250/636: Performed by: EMERGENCY MEDICINE

## 2017-08-08 PROCEDURE — 99284 EMERGENCY DEPT VISIT MOD MDM: CPT

## 2017-08-08 PROCEDURE — 85025 COMPLETE CBC W/AUTO DIFF WBC: CPT | Performed by: EMERGENCY MEDICINE

## 2017-08-08 PROCEDURE — 71010 XR CHEST PORT: CPT

## 2017-08-08 PROCEDURE — 80053 COMPREHEN METABOLIC PANEL: CPT | Performed by: EMERGENCY MEDICINE

## 2017-08-08 PROCEDURE — 96374 THER/PROPH/DIAG INJ IV PUSH: CPT

## 2017-08-08 PROCEDURE — 72100 X-RAY EXAM L-S SPINE 2/3 VWS: CPT

## 2017-08-08 PROCEDURE — 83735 ASSAY OF MAGNESIUM: CPT | Performed by: EMERGENCY MEDICINE

## 2017-08-08 RX ORDER — METHOCARBAMOL 750 MG/1
750 TABLET, FILM COATED ORAL
Qty: 12 TAB | Refills: 0 | Status: ON HOLD | OUTPATIENT
Start: 2017-08-08 | End: 2018-06-14

## 2017-08-08 RX ORDER — DIAZEPAM 10 MG/2ML
5 INJECTION INTRAMUSCULAR ONCE
Status: COMPLETED | OUTPATIENT
Start: 2017-08-08 | End: 2017-08-08

## 2017-08-08 RX ADMIN — DIAZEPAM 5 MG: 5 INJECTION, SOLUTION INTRAMUSCULAR; INTRAVENOUS at 18:50

## 2017-08-08 NOTE — ED TRIAGE NOTES
Pt reports taking BP at home: 190/109; I just don't feel good, I don't feel right for couple of days.  My back and neck stiff x 2 days

## 2017-08-08 NOTE — ED PROVIDER NOTES
HPI Comments: 5:18 PM  Gene Espinoza is a 61 y.o. male presents to the ED C/O neck pain and right hip pain X 1 day with associated lower back pain that worsens with sitting up. States he lives alone with dog, he slipped on steps and fell in hip yesterday, was on the ground for 10-15 minutes before receiving help from neighbors, woke up this AM with right hip pain. No LOC or head injury. Pt states he has not been taking his blood pressure medication like he should PMHx include HTN, stroke, autoimmune disease, and rectal bleeding. Pt denies fever, cough. No other acute symptoms or complaints were noted. Patient is a 61 y.o. male presenting with hypertension. The history is provided by the EMS personnel. Hypertension    Associated symptoms include neck pain. Pertinent negatives include no chest pain, no headaches, no shortness of breath and no nausea. Past Medical History:   Diagnosis Date    Autoimmune disease ([facility] [address]     AIDS? HIV    Hypertension     Rectal bleeding     Stroke [facility])        Past Surgical History:   Procedure Laterality Date    HX ORTHOPAEDIC      back         History reviewed. No pertinent family history. Social History     Social History    Marital status:      Spouse name: N/A    Number of children: N/A    Years of education: N/A     Occupational History    Not on file. Social History Main Topics    Smoking status: Current Every Day Smoker     Packs/day: 1.00     Types: Cigarettes    Smokeless tobacco: Never Used    Alcohol use 3.5 oz/week     7 Standard drinks or equivalent per week      Comment: \"drink everyday but never alone\"     Drug use: Yes     Special: Marijuana    Sexual activity: Not Currently     Other Topics Concern    Not on file     Social History Narrative         ALLERGIES: Review of patient's allergies indicates no known allergies. Review of Systems   Constitutional: Negative for fever. HENT: Negative for sore throat.     Eyes: Negative for redness and visual disturbance. Respiratory: Negative for cough, shortness of breath and wheezing. Cardiovascular: Negative for chest pain. Gastrointestinal: Negative for abdominal pain and nausea. Endocrine: Negative for polyuria. Genitourinary: Negative for dysuria. Musculoskeletal: Positive for back pain and neck pain. Negative for neck stiffness. Positive right hip pain   Skin: Negative for rash. Neurological: Negative for headaches. All other systems reviewed and are negative. Vitals:    08/08/17 1730 08/08/17 1745 08/08/17 1900 08/08/17 1930   BP: 186/83 (!) 172/94 (!) 172/95 (!) 184/99   Pulse: 61 61 63 62   Resp: 23 28 (!) 31 15   Temp:       SpO2: 100% 97% 96% 96%   Weight:       Height:                Physical Exam   Constitutional: He is oriented to person, place, and time. He appears well-developed and well-nourished. No distress. HENT:   Head: Normocephalic and atraumatic. Mouth/Throat: Oropharynx is clear and moist.   Eyes: Conjunctivae are normal. Pupils are equal, round, and reactive to light. No scleral icterus. Neck: Neck supple. Has decreased ROM to the left. Better movement to right. No midline spinal tenderness to palpations. Cardiovascular: Intact distal pulses. Capillary refill < 3 seconds   Pulmonary/Chest: Effort normal and breath sounds normal. No respiratory distress. He has no wheezes. Pt has a defibrillator on left side of chest wall    Abdominal: Soft. Bowel sounds are normal. He exhibits no distension and no mass. There is no tenderness. There is no rebound and no guarding. Musculoskeletal: Normal range of motion. He exhibits no edema. Lymphadenopathy:     He has no cervical adenopathy. Neurological: He is alert and oriented to person, place, and time. He has normal reflexes. No cranial nerve deficit. He exhibits normal muscle tone. Coordination normal.   Has 5/5 stength   Skin: Skin is warm and dry. No rash noted.  He is not diaphoretic. Psychiatric: He has a normal mood and affect. His behavior is normal.   Nursing note and vitals reviewed. MDM  Number of Diagnoses or Management Options  Elevated blood pressure reading with diagnosis of hypertension:   Fall, initial encounter:   Lumbar strain, initial encounter:   Neck muscle spasm:   Diagnosis management comments: ddx strain, spasms, metabolic, HTN, cardiac, contusion    No hip tenderness, neuro grossly intact    Check labs, lumbar spine, ekg, gave valium    Wbc wnl  Cr wnl    I have reassessed the patient. I have discussed the workup, results and plan with the patient and patient is in agreement. Patient is feeling better. Patient will be prescribed robaxin. Patient was discharge in stable condition. Patient was given outpatient follow up. Patient is to return to emergency department if any new or worsening condition.         Amount and/or Complexity of Data Reviewed  Clinical lab tests: ordered and reviewed  Tests in the radiology section of CPT®: ordered and reviewed  Tests in the medicine section of CPT®: ordered and reviewed  Review and summarize past medical records: yes  Independent visualization of images, tracings, or specimens: yes    Risk of Complications, Morbidity, and/or Mortality  Presenting problems: moderate  Diagnostic procedures: low  Management options: low    Patient Progress  Patient progress: improved    ED Course       Procedures      Vitals:  Patient Vitals for the past 12 hrs:   Temp Pulse Resp BP SpO2   08/08/17 2015 - 63 28 (!) 149/91 95 %   08/08/17 1930 - 62 15 (!) 184/99 96 %   08/08/17 1900 - 63 (!) 31 (!) 172/95 96 %   08/08/17 1745 - 61 28 (!) 172/94 97 %   08/08/17 1730 - 61 23 186/83 100 %   08/08/17 1723 - 63 15 188/86 99 %   08/08/17 1527 - - - (!) 176/91 -   08/08/17 1439 97.9 °F (36.6 °C) 67 16 (!) 174/92 99 %       Medications Ordered:  Medications   diazePAM (VALIUM) injection 5 mg (5 mg IntraVENous Given 8/8/17 1850) Lab Findings:  Recent Results (from the past 12 hour(s))   EKG, 12 LEAD, INITIAL    Collection Time: 08/08/17  4:05 PM   Result Value Ref Range    Ventricular Rate 66 BPM    Atrial Rate 71 BPM    QRS Duration 170 ms    Q-T Interval 428 ms    QTC Calculation (Bezet) 448 ms    Calculated R Axis -72 degrees    Calculated T Axis 96 degrees    Diagnosis       Electronic ventricular pacemaker  When compared with ECG of 02-JUL-2017 03:09,  Vent. rate has increased BY   3 BPM     CBC WITH AUTOMATED DIFF    Collection Time: 08/08/17  5:20 PM   Result Value Ref Range    WBC 7.4 4.6 - 13.2 K/uL    RBC 4.48 (L) 4.70 - 5.50 M/uL    HGB 13.8 13.0 - 16.0 g/dL    HCT 40.2 36.0 - 48.0 %    MCV 89.7 74.0 - 97.0 FL    MCH 30.8 24.0 - 34.0 PG    MCHC 34.3 31.0 - 37.0 g/dL    RDW 11.7 11.6 - 14.5 %    PLATELET 112 755 - 320 K/uL    MPV 11.7 9.2 - 11.8 FL    NEUTROPHILS 70 40 - 73 %    LYMPHOCYTES 18 (L) 21 - 52 %    MONOCYTES 11 (H) 3 - 10 %    EOSINOPHILS 1 0 - 5 %    BASOPHILS 0 0 - 2 %    ABS. NEUTROPHILS 5.2 1.8 - 8.0 K/UL    ABS. LYMPHOCYTES 1.4 0.9 - 3.6 K/UL    ABS. MONOCYTES 0.8 0.05 - 1.2 K/UL    ABS. EOSINOPHILS 0.1 0.0 - 0.4 K/UL    ABS. BASOPHILS 0.0 0.0 - 0.1 K/UL    DF AUTOMATED     METABOLIC PANEL, COMPREHENSIVE    Collection Time: 08/08/17  5:20 PM   Result Value Ref Range    Sodium 136 136 - 145 mmol/L    Potassium 3.8 3.5 - 5.5 mmol/L    Chloride 103 100 - 108 mmol/L    CO2 29 21 - 32 mmol/L    Anion gap 4 3.0 - 18 mmol/L    Glucose 75 74 - 99 mg/dL    BUN 8 7.0 - 18 MG/DL    Creatinine 0.76 0.6 - 1.3 MG/DL    BUN/Creatinine ratio 11 (L) 12 - 20      GFR est AA >60 >60 ml/min/1.73m2    GFR est non-AA >60 >60 ml/min/1.73m2    Calcium 10.3 (H) 8.5 - 10.1 MG/DL    Bilirubin, total 0.8 0.2 - 1.0 MG/DL    ALT (SGPT) 25 16 - 61 U/L    AST (SGOT) 16 15 - 37 U/L    Alk.  phosphatase 124 (H) 45 - 117 U/L    Protein, total 9.2 (H) 6.4 - 8.2 g/dL    Albumin 3.4 3.4 - 5.0 g/dL    Globulin 5.8 (H) 2.0 - 4.0 g/dL    A-G Ratio 0.6 (L) 0.8 - 1.7     CARDIAC PANEL,(CK, CKMB & TROPONIN)    Collection Time: 08/08/17  5:20 PM   Result Value Ref Range    CK 59 39 - 308 U/L    CK - MB 1.1 <3.6 ng/ml    CK-MB Index 1.9 0.0 - 4.0 %    Troponin-I, Qt. 0.02 0.0 - 0.045 NG/ML   MAGNESIUM    Collection Time: 08/08/17  5:20 PM   Result Value Ref Range    Magnesium 1.8 1.6 - 2.6 mg/dL       EKG Interpretation by ED physician:  4:06 PM: rhythm is paced with rhythm of 66    X-ray, CT or radiology findings or impressions:  Xr Chest Port    Result Date: 8/8/2017  EXAM:  XR CHEST PORT INDICATION:  weakness COMPARISON: July 2, 2017. FINDINGS: A single frontal view of the chest was obtained. AICD is unchanged. Cardiac silhouette is borderline in size. The lungs are clear. The costophrenic angles are sharp. Bony structures appeared intact. IMPRESSION: 1. No acute infiltrates or pleural effusion. 2. Borderline cardiomegaly. 3. AICD is unchanged. XR Lumbar spine: Dr. Za Gu prelim read: No fracture. Does have degenerative changes. Diagnosis:   1. Neck muscle spasm    2. Lumbar strain, initial encounter    3. Fall, initial encounter    4. Elevated blood pressure reading with diagnosis of hypertension          Disposition: discharged    Follow-up Information     Follow up With Details Comments [address] [place] Call in 1 day  [address] [facility] EMERGENCY DEPT  As needed, If symptoms worsen [address] [place] [address] [place] [id number]  [phone]          Patient's Medications   Start Taking    METHOCARBAMOL (ROBAXIN-750) 750 MG TABLET    Take 1 Tab by mouth three (3) times daily as needed. Indications: pain; muscle spasms   Continue Taking    ALBUTEROL (PROVENTIL HFA) 90 MCG/ACTUATION INHALER    Take 1 Puff by inhalation every four (4) hours as needed for Wheezing.     ALUMINUM-MAGNESIUM HYDROXIDE (MAALOX) 200-200 MG/5 ML SUSPENSION    Take 15 mL by mouth every six (6) hours as needed for Indigestion. APIXABAN (ELIQUIS) 5 MG TABLET    Take 1 Tab by mouth every twelve (12) hours. ASPIRIN 81 MG TABLET    Take 81 mg by mouth daily. CARVEDILOL (COREG) 25 MG TABLET    Take 0.5 Tabs by mouth two (2) times daily (with meals). DARUNAVIR (PREZISTA) 600 MG TABLET    Take 600 mg by mouth. EMTRICITABINE (EMTRIVA) 200 MG CAPSULE    Take 200 mg by mouth daily. FOLIC ACID (FOLVITE) 1 MG TABLET    Take 1 mg by mouth daily. FUROSEMIDE (LASIX) 40 MG TABLET    40 mg po daily    GABAPENTIN (NEURONTIN) 300 MG CAPSULE    Take 300 mg by mouth three (3) times daily. LISINOPRIL (PRINIVIL, ZESTRIL) 10 MG TABLET    Take 1 Tab by mouth daily. MAGNESIUM OXIDE 400 MG CAP    Take 400 mg by mouth daily. OTHER    BMP, MG in 1 week  Dx: CHF  Fax results to PCP at [address] [place] [address] (K-DUR, KLOR-CON) 20 MEQ TABLET    Take 1 Tab by mouth two (2) times a day. PRAVASTATIN (PRAVACHOL) 40 MG TABLET    Take 40 mg by mouth nightly. PYRIDOXINE, VITAMIN B6, (VITAMIN B-6) 50 MG TABLET    Take 50 mg by mouth daily. RITONAVIR (NORVIR) 100 MG CAPSULE    Take 100 mg by mouth daily (with breakfast). TRAMADOL (ULTRAM) 50 MG TABLET    Take 1 Tab by mouth every six (6) hours as needed for Pain. Max Daily Amount: 200 mg. TRIMETHOPRIM-SULFAMETHOXAZOLE (BACTRIM DS) 160-800 MG PER TABLET    Take 1 Tab by mouth now. These Medications have changed    No medications on file   Stop Taking    DOXYCYCLINE (MONODOX) 100 MG CAPSULE    Take 1 Cap by mouth two (2) times a day. IBUPROFEN (MOTRIN) 600 MG TABLET    Take 1 Tab by mouth every eight (8) hours as needed for Pain. OXYCODONE-ACETAMINOPHEN (PERCOCET) 5-325 MG PER TABLET    Take 1 Tab by mouth every six (6) hours as needed for Pain. Max Daily Amount: 4 Tabs.          Scribe Attestation  Kim Keita for and in the presence of Aleks Lei DO (08/08/17)      Physician Attestation  I personally performed the services described in this documentation, reviewed and edited the documentation which was dictated to the scribe in my presence, and it accurately records my words and actions.     Judy Pineda DO (08/08/17)      Signed by: Gunnar Gallegos, August 08, 2017 at 5:21 PM

## 2017-08-08 NOTE — ED NOTES
Bedside shift change report given to Kory Spence RN (oncoming nurse) by Venkata Swenson RN (offgoing nurse). Report included the following information SBAR, ED Summary, Intake/Output, MAR and Recent Results.

## 2017-08-08 NOTE — LETTER
8/10/2017 3:55 PM 
 
Mr. [facility] [address] [facility] [address] Dear Mr. Romero Poll: The results of your lab work performed in our office were abnormal and we have had difficulty reaching you by telephone. Please contact our office as soon as possible to discuss these results. Sincerely, MARIA DEL CARMEN Jim

## 2017-08-09 LAB
ATRIAL RATE: 71 BPM
CALCULATED R AXIS, ECG10: -72 DEGREES
CALCULATED T AXIS, ECG11: 96 DEGREES
DIAGNOSIS, 93000: NORMAL
Q-T INTERVAL, ECG07: 428 MS
QRS DURATION, ECG06: 170 MS
QTC CALCULATION (BEZET), ECG08: 448 MS
VENTRICULAR RATE, ECG03: 66 BPM

## 2017-08-09 NOTE — DISCHARGE INSTRUCTIONS
Back Strain: Care Instructions  Your Care Instructions    Back strain happens when you overstretch, or pull, a muscle in your back. You may hurt your back in an accident or when you exercise or lift something. Most back pain will get better with rest and time. You can take care of yourself at home to help your back heal.  Follow-up care is a key part of your treatment and safety. Be sure to make and go to all appointments, and call your doctor if you are having problems. It's also a good idea to know your test results and keep a list of the medicines you take. How can you care for yourself at home? · Try to stay as active as you can, but stop or reduce any activity that causes pain. · Put ice or a cold pack on the sore muscle for 10 to 20 minutes at a time to stop swelling. Try this every 1 to 2 hours for 3 days (when you are awake) or until the swelling goes down. Put a thin cloth between the ice pack and your skin. · After 2 or 3 days, apply a heating pad on low or a warm cloth to your back. Some doctors suggest that you go back and forth between hot and cold treatments. · Take pain medicines exactly as directed. ¨ If the doctor gave you a prescription medicine for pain, take it as prescribed. ¨ If you are not taking a prescription pain medicine, ask your doctor if you can take an over-the-counter medicine. · Try sleeping on your side with a pillow between your legs. Or put a pillow under your knees when you lie on your back. These measures can ease pain in your lower back. · Return to your usual level of activity slowly. When should you call for help? Call 911 anytime you think you may need emergency care. For example, call if:  · You are unable to move a leg at all. Call your doctor now or seek immediate medical care if:  · You have new or worse symptoms in your legs, belly, or buttocks. Symptoms may include:  ¨ Numbness or tingling. ¨ Weakness. ¨ Pain.   · You lose bladder or bowel control. Watch closely for changes in your health, and be sure to contact your doctor if you are not getting better as expected. Where can you learn more? Go to http://mallory-bonnie.info/. Enter R966 in the search box to learn more about \"Back Strain: Care Instructions. \"  Current as of: March 21, 2017  Content Version: 11.3  © 2384-9724 [employer]. Care instructions adapted under license by [employer] (which disclaims liability or warranty for this information). If you have questions about a medical condition or this instruction, always ask your healthcare professional. [place] [address] any warranty or liability for your use of this information. High Blood Pressure: Care Instructions  Your Care Instructions  If your blood pressure is usually above 140/90, you have high blood pressure, or hypertension. That means the top number is 140 or higher or the bottom number is 90 or higher, or both. Despite what a lot of people think, high blood pressure usually doesn't cause headaches or make you feel dizzy or lightheaded. It usually has no symptoms. But it does increase your risk for heart attack, stroke, and kidney or eye damage. The higher your blood pressure, the more your risk increases. Your doctor will give you a goal for your blood pressure. Your goal will be based on your health and your age. An example of a goal is to keep your blood pressure below 140/90. Lifestyle changes, such as eating healthy and being active, are always important to help lower blood pressure. You might also take medicine to reach your blood pressure goal.  Follow-up care is a key part of your treatment and safety. Be sure to make and go to all appointments, and call your doctor if you are having problems. It's also a good idea to know your test results and keep a list of the medicines you take. How can you care for yourself at home?   Medical treatment  · If you stop taking your medicine, your blood pressure will go back up. You may take one or more types of medicine to lower your blood pressure. Be safe with medicines. Take your medicine exactly as prescribed. Call your doctor if you think you are having a problem with your medicine. · Talk to your doctor before you start taking aspirin every day. Aspirin can help certain people lower their risk of a heart attack or stroke. But taking aspirin isn't right for everyone, because it can cause serious bleeding. · See your doctor regularly. You may need to see the doctor more often at first or until your blood pressure comes down. · If you are taking blood pressure medicine, talk to your doctor before you take decongestants or anti-inflammatory medicine, such as ibuprofen. Some of these medicines can raise blood pressure. · Learn how to check your blood pressure at home. Lifestyle changes  · Stay at a healthy weight. This is especially important if you put on weight around the waist. Losing even 10 pounds can help you lower your blood pressure. · If your doctor recommends it, get more exercise. Walking is a good choice. Bit by bit, increase the amount you walk every day. Try for at least 30 minutes on most days of the week. You also may want to swim, bike, or do other activities. · Avoid or limit alcohol. Talk to your doctor about whether you can drink any alcohol. · Try to limit how much sodium you eat to less than 2,300 milligrams (mg) a day. Your doctor may ask you to try to eat less than 1,500 mg a day. · Eat plenty of fruits (such as bananas and oranges), vegetables, legumes, whole grains, and low-fat dairy products. · Lower the amount of saturated fat in your diet. Saturated fat is found in animal products such as milk, cheese, and meat. Limiting these foods may help you lose weight and also lower your risk for heart disease. · Do not smoke. Smoking increases your risk for heart attack and stroke.  If you need help quitting, talk to your doctor about stop-smoking programs and medicines. These can increase your chances of quitting for good. When should you call for help? Call 911 anytime you think you may need emergency care. This may mean having symptoms that suggest that your blood pressure is causing a serious heart or blood vessel problem. Your blood pressure may be over 180/110. For example, call 911 if:  · You have symptoms of a heart attack. These may include:  ¨ Chest pain or pressure, or a strange feeling in the chest.  ¨ Sweating. ¨ Shortness of breath. ¨ Nausea or vomiting. ¨ Pain, pressure, or a strange feeling in the back, neck, jaw, or upper belly or in one or both shoulders or arms. ¨ Lightheadedness or sudden weakness. ¨ A fast or irregular heartbeat. · You have symptoms of a stroke. These may include:  ¨ Sudden numbness, tingling, weakness, or loss of movement in your face, arm, or leg, especially on only one side of your body. ¨ Sudden vision changes. ¨ Sudden trouble speaking. ¨ Sudden confusion or trouble understanding simple statements. ¨ Sudden problems with walking or balance. ¨ A sudden, severe headache that is different from past headaches. · You have severe back or belly pain. Do not wait until your blood pressure comes down on its own. Get help right away. Call your doctor now or seek immediate care if:  · Your blood pressure is much higher than normal (such as 180/110 or higher), but you don't have symptoms. · You think high blood pressure is causing symptoms, such as:  ¨ Severe headache. ¨ Blurry vision. Watch closely for changes in your health, and be sure to contact your doctor if:  · Your blood pressure measures 140/90 or higher at least 2 times. That means the top number is 140 or higher or the bottom number is 90 or higher, or both. · You think you may be having side effects from your blood pressure medicine.   · Your blood pressure is usually normal, but it goes above normal at least 2 times. Where can you learn more? Go to http://mallory-bonnie.info/. Enter H929 in the search box to learn more about \"High Blood Pressure: Care Instructions. \"  Current as of: August 8, 2016  Content Version: 11.3  © 5783-4276 [employer]. Care instructions adapted under license by [employer] (which disclaims liability or warranty for this information). If you have questions about a medical condition or this instruction, always ask your healthcare professional. [address] any warranty or liability for your use of this information. Preventing Falls: Care Instructions  Your Care Instructions  Getting around your home safely can be a challenge if you have injuries or health problems that make it easy for you to fall. Loose rugs and furniture in walkways are among the dangers for many older people who have problems walking or who have poor eyesight. People who have conditions such as arthritis, osteoporosis, or dementia also have to be careful not to fall. You can make your home safer with a few simple measures. Follow-up care is a key part of your treatment and safety. Be sure to make and go to all appointments, and call your doctor if you are having problems. It's also a good idea to know your test results and keep a list of the medicines you take. How can you care for yourself at home? Taking care of yourself  · You may get dizzy if you do not drink enough water. To prevent dehydration, drink plenty of fluids, enough so that your urine is light yellow or clear like water. Choose water and other caffeine-free clear liquids. If you have kidney, heart, or liver disease and have to limit fluids, talk with your doctor before you increase the amount of fluids you drink. · Exercise regularly to improve your strength, muscle tone, and balance. Walk if you can. Swimming may be a good choice if you cannot walk easily.   · Have your vision and hearing checked each year or any time you notice a change. If you have trouble seeing and hearing, you might not be able to avoid objects and could lose your balance. · Know the side effects of the medicines you take. Ask your doctor or pharmacist whether the medicines you take can affect your balance. Sleeping pills or sedatives can affect your balance. · Limit the amount of alcohol you drink. Alcohol can impair your balance and other senses. · Ask your doctor whether calluses or corns on your feet need to be removed. If you wear loose-fitting shoes because of calluses or corns, you can lose your balance and fall. · Talk to your doctor if you have numbness in your feet. Preventing falls at home  · Remove raised doorway thresholds, throw rugs, and clutter. Repair loose carpet or raised areas in the floor. · Move furniture and electrical cords to keep them out of walking paths. · Use nonskid floor wax, and wipe up spills right away, especially on ceramic tile floors. · If you use a walker or cane, put rubber tips on it. If you use crutches, clean the bottoms of them regularly with an abrasive pad, such as steel wool. · Keep your house well lit, especially [facility], and outside walkways. Use night-lights in areas such as hallways and bathrooms. Add extra light switches or use remote switches (such as switches that go on or off when you clap your hands) to make it easier to turn lights on if you have to get up during the night. · Install sturdy handrails on stairways. · Move items in your cabinets so that the things you use a lot are on the lower shelves (about waist level). · Keep a cordless phone and a flashlight with new batteries by your bed. If possible, put a phone in each of the main rooms of your house, or carry a cell phone in case you fall and cannot reach a phone. Or, you can wear a device around your neck or wrist. You push a button that sends a signal for help.   · Wear low-heeled shoes that fit well and give your feet good support. Use footwear with nonskid soles. Check the heels and soles of your shoes for wear. Repair or replace worn heels or soles. · Do not wear socks without shoes on wood floors. · Walk on the grass when the sidewalks are slippery. If you live in an area that gets snow and ice in the winter, sprinkle salt on slippery steps and sidewalks. Preventing falls in the bath  · Install grab bars and nonskid mats inside and outside your shower or tub and near the toilet and sinks. · Use shower chairs and bath benches. · Use a hand-held shower head that will allow you to sit while showering. · Get into a tub or shower by putting the weaker leg in first. Get out of a tub or shower with your strong side first.  · Repair loose toilet seats and consider installing a raised toilet seat to make getting on and off the toilet easier. · Keep your bathroom door unlocked while you are in the shower. Where can you learn more? Go to http://malloryJobs2Web.info/. Enter 0476 [id number] in the search box to learn more about \"Preventing Falls: Care Instructions. \"  Current as of: August 4, 2016  Content Version: 11.3  © 8622-2801 [employer]. Care instructions adapted under license by [employer] (which disclaims liability or warranty for this information). If you have questions about a medical condition or this instruction, always ask your healthcare professional. [place] [address] any warranty or liability for your use of this information. Neck Spasm: Care Instructions  Your Care Instructions  A neck spasm is sudden tightness and pain in your neck muscles. A spasm may be caused by some activities or repeated movements. For example, you may be more likely to have a neck spasm if you slouch, paint a ceiling, work at a computer, or sleep with your neck twisted. But the cause isn't always clear.   Home treatment includes using heat or ice, taking over-the-counter (OTC) pain medicines, and avoiding activities that may lead to neck pain. Gentle stretching, or treatments such as massage or manipulation, may also help ease a neck spasm. For a neck spasm that doesn't get better with home care, your doctor may prescribe medicine. He or she may also suggest exercise or physical therapy to help strengthen or relax your neck muscles. Follow-up care is a key part of your treatment and safety. Be sure to make and go to all appointments, and call your doctor if you are having problems. It's also a good idea to know your test results and keep a list of the medicines you take. How can you care for yourself at home? · To relieve pain, use heat or ice (whichever feels better) on the affected area. ¨ Put a warm water bottle, a heating pad set on low, or a warm cloth on your neck. Put a thin cloth between the heating pad and your skin. Do not go to sleep with a heating pad on your skin. ¨ Try ice or a cold pack on the area for 10 to 20 minutes at a time. Put a thin cloth between the ice and your skin. · Ask your doctor if you can take acetaminophen (such as Tylenol) or nonsteroidal anti-inflammatory drugs, such as ibuprofen or naproxen. Your doctor can prescribe stronger medicines if needed. Be safe with medicines. Read and follow all instructions on the label. · Stretch your muscles every day, especially before and after exercise and at bedtime. Regular stretching can help relax your muscles. · Try to find a pillow and a position in bed that help improve your night's rest.  · Try to stay active. It's best to start activity slowly. If an exercise makes your pain worse, stop doing it. When should you call for help? Call 911 anytime you think you may need emergency care. For example, call if:  · You are unable to move an arm or a leg at all.   Call your doctor now or seek immediate medical care if:  · You have new or worse symptoms in your arms, legs, belly, or buttocks. Symptoms may include:  ¨ Numbness or tingling. ¨ Weakness. ¨ Pain. · You lose bladder or bowel control. Watch closely for changes in your health, and be sure to contact your doctor if:  · You do not get better as expected. Where can you learn more? Go to http://mallory-bonnie.info/. Enter L722 in the search box to learn more about \"Neck Spasm: Care Instructions. \"  Current as of: March 21, 2017  Content Version: 11.3  © 1484-6118 [employer]. Care instructions adapted under license by [employer] (which disclaims liability or warranty for this information). If you have questions about a medical condition or this instruction, always ask your healthcare professional. [address] any warranty or liability for your use of this information.

## 2017-08-10 NOTE — ED NOTES
Dr. Brooke Fung called patient regarding L spine xray read, left a message for him to return call. Letter sent.

## 2017-08-10 NOTE — ED NOTES
8/10/17  3:48 PM    I called pt on his listed cell/home phone number.  I left him a message to return to the ER for reevaluation of back pain, and that possible abnormality on lumbar xray per official read by radiologist.  Lucia Paul, DO

## 2017-08-11 ENCOUNTER — APPOINTMENT (OUTPATIENT)
Dept: CT IMAGING | Age: 67
End: 2017-08-11
Attending: EMERGENCY MEDICINE
Payer: COMMERCIAL

## 2017-08-11 ENCOUNTER — HOSPITAL ENCOUNTER (EMERGENCY)
Age: 67
Discharge: HOME | End: 2017-08-11
Attending: EMERGENCY MEDICINE
Payer: COMMERCIAL

## 2017-08-11 VITALS
HEIGHT: 74 IN | DIASTOLIC BLOOD PRESSURE: 82 MMHG | TEMPERATURE: 98.2 F | SYSTOLIC BLOOD PRESSURE: 158 MMHG | BODY MASS INDEX: 18.87 KG/M2 | OXYGEN SATURATION: 99 % | RESPIRATION RATE: 15 BRPM | WEIGHT: 147 LBS | HEART RATE: 65 BPM

## 2017-08-11 DIAGNOSIS — R51.9 HEADACHE, UNSPECIFIED HEADACHE TYPE: Primary | ICD-10-CM

## 2017-08-11 LAB
APTT PPP: 33.5 SEC (ref 23–36.4)
INR PPP: 1.1 (ref 0.8–1.2)
PROTHROMBIN TIME: 14.1 SEC (ref 11.5–15.2)

## 2017-08-11 PROCEDURE — 99285 EMERGENCY DEPT VISIT HI MDM: CPT

## 2017-08-11 PROCEDURE — 74011250637 HC RX REV CODE- 250/637: Performed by: EMERGENCY MEDICINE

## 2017-08-11 PROCEDURE — 96360 HYDRATION IV INFUSION INIT: CPT

## 2017-08-11 PROCEDURE — 93005 ELECTROCARDIOGRAM TRACING: CPT

## 2017-08-11 PROCEDURE — 85730 THROMBOPLASTIN TIME PARTIAL: CPT | Performed by: EMERGENCY MEDICINE

## 2017-08-11 PROCEDURE — 74011250636 HC RX REV CODE- 250/636: Performed by: EMERGENCY MEDICINE

## 2017-08-11 PROCEDURE — 70450 CT HEAD/BRAIN W/O DYE: CPT

## 2017-08-11 PROCEDURE — 85610 PROTHROMBIN TIME: CPT | Performed by: EMERGENCY MEDICINE

## 2017-08-11 RX ORDER — HYDROCODONE BITARTRATE AND ACETAMINOPHEN 7.5; 325 MG/1; MG/1
1 TABLET ORAL ONCE
Status: COMPLETED | OUTPATIENT
Start: 2017-08-11 | End: 2017-08-11

## 2017-08-11 RX ORDER — BUTALBITAL, ACETAMINOPHEN AND CAFFEINE 300; 40; 50 MG/1; MG/1; MG/1
1 CAPSULE ORAL
Qty: 12 CAP | Refills: 0 | Status: ON HOLD | OUTPATIENT
Start: 2017-08-11 | End: 2018-06-14

## 2017-08-11 RX ADMIN — HYDROCODONE BITARTRATE AND ACETAMINOPHEN 1 TABLET: 7.5; 325 TABLET ORAL at 19:03

## 2017-08-11 RX ADMIN — SODIUM CHLORIDE 1000 ML: 900 INJECTION, SOLUTION INTRAVENOUS at 19:04

## 2017-08-11 NOTE — PROGRESS NOTES
responded to Code S for  Aamir Martinez, who is a 61 y.o.,male,     The  provided the following Interventions:  Provided crisis pastoral care and pastoral support. Offered prayers on behalf for the patient. Chart reviewed. The following outcomes were achieved:  Patient was determined not to have experienced a stroke. Patient tearful. Seemed to benefit from sharing stories of his mother's kenan and recollections of times when he believed God had done the miraculous for him/family. Assessment:  There are no spiritual or Moravian issues which require intervention at this time. Plan:  Chaplains will continue to follow and will provide pastoral care on an as needed/requested basis.  recommends bedside caregivers page  on duty if patient shows signs of acute spiritual or emotional distress. Chasity Fonseca.  [id number] [phone]

## 2017-08-11 NOTE — DISCHARGE INSTRUCTIONS

## 2017-08-11 NOTE — ED PROVIDER NOTES
HPI Comments: 5:39 PM Rick Angelo is a 61 y.o. male who presents to the ED, via EMS,c/o HA. Pt states that he fell 4 days ago and was evaluated in the ED. Pt woke up with the HA 1 day post evaluation and notes that the sx has been gradually worsening. Pt has a L sided facial droop that he states is new. Pt is currently taking Eloquis and notes that he missed 1 dose 2 days ago. Pt has a place defibrillator and pace maker. Pt has no other sx or complaints. Pt is a poor historian and hx is limited          Past Medical History:   Diagnosis Date    Autoimmune disease ([facility] [address]     AIDS? HIV    Hypertension     Rectal bleeding     Stroke [facility])        Past Surgical History:   Procedure Laterality Date    HX ORTHOPAEDIC      back         History reviewed. No pertinent family history. Social History     Social History    Marital status:      Spouse name: N/A    Number of children: N/A    Years of education: N/A     Occupational History    Not on file. Social History Main Topics    Smoking status: Current Every Day Smoker     Packs/day: 1.00     Types: Cigarettes    Smokeless tobacco: Never Used    Alcohol use 3.5 oz/week     7 Standard drinks or equivalent per week      Comment: \"drink everyday but never alone\"     Drug use: Yes     Special: Marijuana    Sexual activity: Not Currently     Other Topics Concern    Not on file     Social History Narrative         ALLERGIES: Review of patient's allergies indicates no known allergies. Review of Systems   Unable to perform ROS: Other       Vitals:    08/11/17 1845 08/11/17 1900 08/11/17 1915 08/11/17 1930   BP: 150/76 156/78 171/75 158/82   Pulse: 60 60 61 65   Resp: 20 15 18 15   Temp:       SpO2: 96% 97% 97% 99%   Weight:       Height:                Physical Exam   Constitutional: He appears well-developed and well-nourished. No distress. HENT:   Head: Normocephalic and atraumatic.    Eyes: Conjunctivae and EOM are normal. Pupils are equal, round, and reactive to light. No scleral icterus. Neck: Normal range of motion. Neck supple. No JVD present. No thyromegaly present. Cardiovascular: Normal rate, regular rhythm, S1 normal and S2 normal.  Exam reveals no gallop and no friction rub. No murmur heard. Pulmonary/Chest: Effort normal and breath sounds normal. No accessory muscle usage. No respiratory distress. Abdominal: Soft. Normal appearance. He exhibits no distension. There is no tenderness. There is no rigidity, no rebound and no guarding. Musculoskeletal: Normal range of motion. He exhibits no edema or tenderness. Neurological: He is alert. Skin: Skin is warm and intact. No rash noted. Vitals reviewed.        [facility]  ED Course       Procedures      Vitals:  Patient Vitals for the past 12 hrs:   Temp Pulse Resp BP SpO2   08/11/17 1930 - 65 15 158/82 99 %   08/11/17 1915 - 61 18 171/75 97 %   08/11/17 1900 - 60 15 156/78 97 %   08/11/17 1845 - 60 20 150/76 96 %   08/11/17 1830 - 60 22 139/63 97 %   08/11/17 1815 - 60 15 147/76 100 %   08/11/17 1800 - 60 19 142/80 97 %   08/11/17 1534 98.2 °F (36.8 °C) 68 15 130/73 99 %       Medications ordered:   Medications   HYDROcodone-acetaminophen (NORCO) 7.5-325 mg per tablet 1 Tab (1 Tab Oral Given 8/11/17 1903)   sodium chloride 0.9 % bolus infusion 1,000 mL (0 mL IntraVENous IV Completed 8/11/17 2030)         Lab findings:  Recent Results (from the past 12 hour(s))   PTT    Collection Time: 08/11/17  5:37 PM   Result Value Ref Range    aPTT 33.5 23.0 - 36.4 SEC   PROTHROMBIN TIME + INR    Collection Time: 08/11/17  5:37 PM   Result Value Ref Range    Prothrombin time 14.1 11.5 - 15.2 sec    INR 1.1 0.8 - 1.2     EKG, 12 LEAD, INITIAL    Collection Time: 08/11/17  6:13 PM   Result Value Ref Range    Ventricular Rate 63 BPM    Atrial Rate 59 BPM    QRS Duration 170 ms    Q-T Interval 464 ms    QTC Calculation (Bezet) 474 ms    Calculated P Axis 144 degrees    Calculated R Axis -66 degrees Calculated T Axis 96 degrees    Diagnosis       Electronic ventricular pacemaker  When compared with ECG of 08-AUG-2017 16:05,  Electronic ventricular pacemaker has replaced Atrial flutter         EKG interpretation by ED Physician:    X-Ray, CT or other radiology findings or impressions:  CT HEAD WO CONT   IMPRESSION: No acute intracranial abnormalities by CT. No intracranial  hemorrhage. Advanced presumed small vessel ischemic disease. As interpreted by RAD       Progress notes, Consult notes or additional Procedure notes:   5:53 PM, 8/11/2017   Consult:  Discussed care with [facility], tele-neurology. Standard discussion; including history of patients chief complaint, available diagnostic results, and treatment course. Agrees to evaluate  6:15 PM, 8/11/2017   Consult:  Discussed care with TALIB Escobar. Standard discussion; including history of patients chief complaint, available diagnostic results, and treatment course. Negative for acute bleed  6:15 PM, 8/11/2017   Consult:  Discussed care with Dr. [facility], tele-neurology. Standard discussion; including history of patients chief complaint, available diagnostic results, and treatment course. States he does not see a need for intervention or tPA; control HA    -Re-evaluted the patient - improvement with headache sx control  -Results including reassuring CT head were discussed and reviewed with pt who understood the implications. Otherwise reassuring results     -We discussed the diagnosis, treatment, and plan. Next steps in close outpt care include: pain control as outpt and close PMD follow up at the [place]. -They verbally convey understanding and agreement of the signs, symptoms, diagnosis, treatment and prognosis and additionally agree to follow up as discussed. All questions were answered, and we reviewed pertinent return precautions as seen in the discharge paperwork.  Pt understood follow up instructions, and would return to the ED if any worsening or concerns. Jamal Aceves MD  8:35 PM      Disposition:  Diagnosis:   1. Headache, unspecified headache type        Disposition: HOME    Follow-up Information     Follow up With Details Comments Contact Info    Misty Stevenson MD Schedule an appointment as soon as possible for a visit  [address] [place],[address]  [phone]             Discharge Medication List as of 8/11/2017  7:56 PM      START taking these medications    Details   butalbital-acetaminophen-caff (FIORICET) -40 mg per capsule Take 1 Cap by mouth every four (4) hours as needed for Pain or Headache. Max Daily Amount: 6 Caps., Print, Disp-12 Cap, R-0         CONTINUE these medications which have NOT CHANGED    Details   methocarbamol (ROBAXIN-750) 750 mg tablet Take 1 Tab by mouth three (3) times daily as needed. Indications: pain; muscle spasms, Print, Disp-12 Tab, R-0      traMADol (ULTRAM) 50 mg tablet Take 1 Tab by mouth every six (6) hours as needed for Pain. Max Daily Amount: 200 mg., Print, Disp-20 Tab, R-0      albuterol (PROVENTIL HFA) 90 mcg/actuation inhaler Take 1 Puff by inhalation every four (4) hours as needed for Wheezing., Print, Disp-1 Inhaler, R-0      furosemide (LASIX) 40 mg tablet 40 mg po daily, Print, Disp-30 Tab, R-0      potassium chloride (K-DUR, KLOR-CON) 20 mEq tablet Take 1 Tab by mouth two (2) times a day., Print, Disp-30 Tab, R-0      magnesium oxide 400 mg cap Take 400 mg by mouth daily. , Print, Disp-20 Each, R-0      OTHER BMP, MG in 1 week  Dx: CHF  Fax results to PCP at [address] [facility] ASAP, Print, Disp-1 Each, R-0      apixaban (ELIQUIS) 5 mg tablet Take 1 Tab by mouth every twelve (12) hours. , Print, Disp-60 Tab, R-0      carvedilol (COREG) 25 mg tablet Take 0.5 Tabs by mouth two (2) times daily (with meals). , Print, Disp-60 Tab, R-0      lisinopril (PRINIVIL, ZESTRIL) 10 mg tablet Take 1 Tab by mouth daily. , Print, Disp-30 Tab, R-0      pyridoxine, vitamin B6, (VITAMIN B-6) 50 mg tablet Take 50 mg by mouth daily. , Historical Med      aluminum-magnesium hydroxide (MAALOX) 200-200 mg/5 mL suspension Take 15 mL by mouth every six (6) hours as needed for Indigestion. , Print, [id number]-709 mL, R-0      folic acid (FOLVITE) 1 mg tablet Take 1 mg by mouth daily. , Historical Med      aspirin 81 mg tablet Take 81 mg by mouth daily. , Historical Med      trimethoprim-sulfamethoxazole (BACTRIM DS) 160-800 mg per tablet Take 1 Tab by mouth now.  , Historical Med      gabapentin (NEURONTIN) 300 mg capsule Take 300 mg by mouth three (3) times daily. , Historical Med      Darunavir (PREZISTA) 600 mg tablet Take 600 mg by mouth.  , Historical Med      pravastatin (PRAVACHOL) 40 mg tablet Take 40 mg by mouth nightly.  , Historical Med      ritonavir (NORVIR) 100 mg capsule Take 100 mg by mouth daily (with breakfast). , Historical Med      emtricitabine (EMTRIVA) 200 mg capsule Take 200 mg by mouth daily. , Historical Med               Scribe Attestation:   Ahsan Green am scribing for and in the presence of Elaine Morales MD on this day 08/11/17 at 5:39 PM   anthony Piña    Provider Attestation:  I personally performed the services described in the documentation, reviewed the documentation, as recorded by the scribe in my presence, and it accurately and completely records my words and actions.   Ealine Morales MD. 5:39 PM      Signed by: Anthony Piña, 5:39 PM

## 2017-08-12 NOTE — ED NOTES
I have reviewed discharge instructions with the patient. The patient verbalized understanding. Discharge medications reviewed with patient and appropriate educational materials and side effects teaching were provided. Patient armband removed and shredded. Pt signed paper discharge instructions and ambulated using cane with out distress or discomfort.

## 2017-08-13 LAB
ATRIAL RATE: 59 BPM
CALCULATED P AXIS, ECG09: 144 DEGREES
CALCULATED R AXIS, ECG10: -66 DEGREES
CALCULATED T AXIS, ECG11: 96 DEGREES
DIAGNOSIS, 93000: NORMAL
Q-T INTERVAL, ECG07: 464 MS
QRS DURATION, ECG06: 170 MS
QTC CALCULATION (BEZET), ECG08: 474 MS
VENTRICULAR RATE, ECG03: 63 BPM

## 2017-11-09 ENCOUNTER — HOSPITAL ENCOUNTER (EMERGENCY)
Age: 67
Discharge: HOME | End: 2017-11-09
Attending: EMERGENCY MEDICINE
Payer: OTHER GOVERNMENT

## 2017-11-09 VITALS
DIASTOLIC BLOOD PRESSURE: 76 MMHG | TEMPERATURE: 99.1 F | SYSTOLIC BLOOD PRESSURE: 148 MMHG | HEART RATE: 66 BPM | OXYGEN SATURATION: 100 % | RESPIRATION RATE: 16 BRPM

## 2017-11-09 DIAGNOSIS — M79.10 MYALGIA: ICD-10-CM

## 2017-11-09 DIAGNOSIS — E87.6 HYPOKALEMIA: Primary | ICD-10-CM

## 2017-11-09 LAB
ANION GAP SERPL CALC-SCNC: 4 MMOL/L (ref 3–18)
BASOPHILS # BLD: 0 K/UL (ref 0–0.1)
BASOPHILS NFR BLD: 0 % (ref 0–2)
BUN SERPL-MCNC: 9 MG/DL (ref 7–18)
BUN/CREAT SERPL: 15 (ref 12–20)
CALCIUM SERPL-MCNC: 9.7 MG/DL (ref 8.5–10.1)
CHLORIDE SERPL-SCNC: 105 MMOL/L (ref 100–108)
CO2 SERPL-SCNC: 30 MMOL/L (ref 21–32)
CREAT SERPL-MCNC: 0.62 MG/DL (ref 0.6–1.3)
DIFFERENTIAL METHOD BLD: ABNORMAL
EOSINOPHIL # BLD: 0 K/UL (ref 0–0.4)
EOSINOPHIL NFR BLD: 1 % (ref 0–5)
ERYTHROCYTE [DISTWIDTH] IN BLOOD BY AUTOMATED COUNT: 11.7 % (ref 11.6–14.5)
FLUAV AG NPH QL IA: NEGATIVE
FLUBV AG NOSE QL IA: NEGATIVE
GLUCOSE SERPL-MCNC: 84 MG/DL (ref 74–99)
HCT VFR BLD AUTO: 35.3 % (ref 36–48)
HGB BLD-MCNC: 11.8 G/DL (ref 13–16)
LYMPHOCYTES # BLD: 1 K/UL (ref 0.9–3.6)
LYMPHOCYTES NFR BLD: 17 % (ref 21–52)
MAGNESIUM SERPL-MCNC: 1.7 MG/DL (ref 1.6–2.6)
MCH RBC QN AUTO: 29.6 PG (ref 24–34)
MCHC RBC AUTO-ENTMCNC: 33.4 G/DL (ref 31–37)
MCV RBC AUTO: 88.7 FL (ref 74–97)
MONOCYTES # BLD: 0.9 K/UL (ref 0.05–1.2)
MONOCYTES NFR BLD: 15 % (ref 3–10)
NEUTS SEG # BLD: 4 K/UL (ref 1.8–8)
NEUTS SEG NFR BLD: 67 % (ref 40–73)
PLATELET # BLD AUTO: 275 K/UL (ref 135–420)
PMV BLD AUTO: 11.6 FL (ref 9.2–11.8)
POTASSIUM SERPL-SCNC: 3.3 MMOL/L (ref 3.5–5.5)
RBC # BLD AUTO: 3.98 M/UL (ref 4.7–5.5)
SODIUM SERPL-SCNC: 139 MMOL/L (ref 136–145)
WBC # BLD AUTO: 5.9 K/UL (ref 4.6–13.2)

## 2017-11-09 PROCEDURE — 80048 BASIC METABOLIC PNL TOTAL CA: CPT | Performed by: EMERGENCY MEDICINE

## 2017-11-09 PROCEDURE — 74011250636 HC RX REV CODE- 250/636: Performed by: EMERGENCY MEDICINE

## 2017-11-09 PROCEDURE — 83735 ASSAY OF MAGNESIUM: CPT | Performed by: EMERGENCY MEDICINE

## 2017-11-09 PROCEDURE — 99284 EMERGENCY DEPT VISIT MOD MDM: CPT

## 2017-11-09 PROCEDURE — 74011250637 HC RX REV CODE- 250/637: Performed by: EMERGENCY MEDICINE

## 2017-11-09 PROCEDURE — 85025 COMPLETE CBC W/AUTO DIFF WBC: CPT | Performed by: EMERGENCY MEDICINE

## 2017-11-09 PROCEDURE — 96360 HYDRATION IV INFUSION INIT: CPT

## 2017-11-09 PROCEDURE — 87804 INFLUENZA ASSAY W/OPTIC: CPT | Performed by: EMERGENCY MEDICINE

## 2017-11-09 RX ORDER — POTASSIUM CHLORIDE 20 MEQ/1
40 TABLET, EXTENDED RELEASE ORAL
Status: COMPLETED | OUTPATIENT
Start: 2017-11-09 | End: 2017-11-09

## 2017-11-09 RX ADMIN — POTASSIUM CHLORIDE 40 MEQ: 20 TABLET, EXTENDED RELEASE ORAL at 17:42

## 2017-11-09 RX ADMIN — SODIUM CHLORIDE 500 ML: 9 INJECTION, SOLUTION INTRAVENOUS at 17:47

## 2017-11-09 NOTE — ED PROVIDER NOTES
HPI Comments: Bigg Seo is a 74 y.o. male with a history of HTN, CVA, and HIV, who presents to the ED via EMS with complaint of generalized body aches and fatigue which began yesterday. Patient states that his pain is similar to previous pain associated with his gout. Patient reports intermittent chills over the last few days and neck soreness. He denies recent fever, rhinorrhea, sore throat, nausea, vomiting, diarrhea, and cough. He did not get his flu shot this season. Patient has no known allergies. The history is provided by the patient. Past Medical History:   Diagnosis Date    Autoimmune disease ([facility] [address]     AIDS? HIV    Hypertension     Rectal bleeding     Stroke [facility])        Past Surgical History:   Procedure Laterality Date    HX ORTHOPAEDIC      back         No family history on file. Social History     Social History    Marital status:      Spouse name: N/A    Number of children: N/A    Years of education: N/A     Occupational History    Not on file. Social History Main Topics    Smoking status: Current Every Day Smoker     Packs/day: 1.00     Types: Cigarettes    Smokeless tobacco: Never Used    Alcohol use 3.5 oz/week     7 Standard drinks or equivalent per week      Comment: \"drink everyday but never alone\"     Drug use: Yes     Special: Marijuana    Sexual activity: Not Currently     Other Topics Concern    Not on file     Social History Narrative         ALLERGIES: Review of patient's allergies indicates no known allergies. Review of Systems   Constitutional: Positive for chills and fatigue. Negative for fever. General malaise. HENT: Negative for rhinorrhea and sore throat. Eyes: Negative for redness and visual disturbance. Respiratory: Negative for cough, shortness of breath and wheezing. Cardiovascular: Negative for chest pain. Gastrointestinal: Negative for abdominal pain, diarrhea, nausea and vomiting.    Endocrine: Negative for polyuria. Genitourinary: Negative for dysuria. Musculoskeletal: Positive for neck stiffness (\"soreness\"). Negative for arthralgias. Skin: Negative for rash. Neurological: Negative for headaches. All other systems reviewed and are negative. Vitals:    11/09/17 1415 11/09/17 1417   BP: 130/71 148/76   Pulse: 62 66   Resp: 17 16   Temp:  99.1 °F (37.3 °C)   SpO2: 100% 100%            Physical Exam   Constitutional: He is oriented to person, place, and time. He appears well-developed and well-nourished. No distress. HENT:   Head: Normocephalic and atraumatic. Mouth/Throat: Oropharynx is clear and moist.   Eyes: Conjunctivae are normal. Pupils are equal, round, and reactive to light. No scleral icterus. Neck: Normal range of motion. Neck supple. Tenderness to right trapezius. Tight, knot with palpation. Cardiovascular: Normal rate, regular rhythm and intact distal pulses. Capillary refill < 3 seconds   Pulmonary/Chest: Effort normal and breath sounds normal. No apnea. No respiratory distress. He has no wheezes. Abdominal: Soft. Bowel sounds are normal. He exhibits no distension. There is no tenderness. Musculoskeletal: Normal range of motion. He exhibits no edema. Lymphadenopathy:     He has no cervical adenopathy. Neurological: He is alert and oriented to person, place, and time. No cranial nerve deficit. Skin: Skin is warm and dry. He is not diaphoretic. Nursing note and vitals reviewed. MDM  Number of Diagnoses or Management Options  Hypokalemia:   Myalgia:   Diagnosis management comments: ddx infectious, metabolic, musculoskeletal. dehydration    Labs, IVF    K low, will replete with PO K  Labs otw reassurin  (-)influenza    Will dc home with fu. Return to ED If any worsening sx's.         Amount and/or Complexity of Data Reviewed  Clinical lab tests: ordered and reviewed  Tests in the medicine section of CPT®: ordered and reviewed    Patient Progress  Patient progress: stable    ED Course       Procedures   Vitals:  Patient Vitals for the past 12 hrs:   Temp Pulse Resp BP SpO2   11/09/17 1417 99.1 °F (37.3 °C) 66 16 148/76 100 %   11/09/17 1415 - 62 17 130/71 100 %       Medications Ordered:  Medications   sodium chloride 0.9 % bolus infusion 500 mL (not administered)   potassium chloride (K-DUR, KLOR-CON) SR tablet 40 mEq (40 mEq Oral Given 11/9/17 1742)       Lab Findings:  Recent Results (from the past 12 hour(s))   INFLUENZA A & B AG (RAPID TEST)    Collection Time: 11/09/17  4:11 PM   Result Value Ref Range    Influenza A Antigen NEGATIVE  NEG      Influenza B Antigen NEGATIVE  NEG     CBC WITH AUTOMATED DIFF    Collection Time: 11/09/17  4:21 PM   Result Value Ref Range    WBC 5.9 4.6 - 13.2 K/uL    RBC 3.98 (L) 4.70 - 5.50 M/uL    HGB 11.8 (L) 13.0 - 16.0 g/dL    HCT 35.3 (L) 36.0 - 48.0 %    MCV 88.7 74.0 - 97.0 FL    MCH 29.6 24.0 - 34.0 PG    MCHC 33.4 31.0 - 37.0 g/dL    RDW 11.7 11.6 - 14.5 %    PLATELET 448 127 - 063 K/uL    MPV 11.6 9.2 - 11.8 FL    NEUTROPHILS 67 40 - 73 %    LYMPHOCYTES 17 (L) 21 - 52 %    MONOCYTES 15 (H) 3 - 10 %    EOSINOPHILS 1 0 - 5 %    BASOPHILS 0 0 - 2 %    ABS. NEUTROPHILS 4.0 1.8 - 8.0 K/UL    ABS. LYMPHOCYTES 1.0 0.9 - 3.6 K/UL    ABS. MONOCYTES 0.9 0.05 - 1.2 K/UL    ABS. EOSINOPHILS 0.0 0.0 - 0.4 K/UL    ABS.  BASOPHILS 0.0 0.0 - 0.1 K/UL    DF AUTOMATED     METABOLIC PANEL, BASIC    Collection Time: 11/09/17  4:21 PM   Result Value Ref Range    Sodium 139 136 - 145 mmol/L    Potassium 3.3 (L) 3.5 - 5.5 mmol/L    Chloride 105 100 - 108 mmol/L    CO2 30 21 - 32 mmol/L    Anion gap 4 3.0 - 18 mmol/L    Glucose 84 74 - 99 mg/dL    BUN 9 7.0 - 18 MG/DL    Creatinine 0.62 0.6 - 1.3 MG/DL    BUN/Creatinine ratio 15 12 - 20      GFR est AA >60 >60 ml/min/1.73m2    GFR est non-AA >60 >60 ml/min/1.73m2    Calcium 9.7 8.5 - 10.1 MG/DL   MAGNESIUM    Collection Time: 11/09/17  4:21 PM   Result Value Ref Range    Magnesium 1.7 1.6 - 2.6 mg/dL Diagnosis:   1. Hypokalemia    2. Myalgia        Disposition: Discharge. Follow-up Information     Follow up With Details Comments [address] MD Schedule an appointment as soon as possible for a visit  [address] [facility] EMERGENCY DEPT  As needed, If symptoms worsen [address] [place] [address]  [phone]           Patient's Medications   Start Taking    No medications on file   Continue Taking    ALBUTEROL (PROVENTIL HFA) 90 MCG/ACTUATION INHALER    Take 1 Puff by inhalation every four (4) hours as needed for Wheezing. ALUMINUM-MAGNESIUM HYDROXIDE (MAALOX) 200-200 MG/5 ML SUSPENSION    Take 15 mL by mouth every six (6) hours as needed for Indigestion. APIXABAN (ELIQUIS) 5 MG TABLET    Take 1 Tab by mouth every twelve (12) hours. ASPIRIN 81 MG TABLET    Take 81 mg by mouth daily. BUTALBITAL-ACETAMINOPHEN-CAFF (FIORICET) -40 MG PER CAPSULE    Take 1 Cap by mouth every four (4) hours as needed for Pain or Headache. Max Daily Amount: 6 Caps. CARVEDILOL (COREG) 25 MG TABLET    Take 0.5 Tabs by mouth two (2) times daily (with meals). DARUNAVIR (PREZISTA) 600 MG TABLET    Take 600 mg by mouth. EMTRICITABINE (EMTRIVA) 200 MG CAPSULE    Take 200 mg by mouth daily. FOLIC ACID (FOLVITE) 1 MG TABLET    Take 1 mg by mouth daily. FUROSEMIDE (LASIX) 40 MG TABLET    40 mg po daily    GABAPENTIN (NEURONTIN) 300 MG CAPSULE    Take 300 mg by mouth three (3) times daily. LISINOPRIL (PRINIVIL, ZESTRIL) 10 MG TABLET    Take 1 Tab by mouth daily. MAGNESIUM OXIDE 400 MG CAP    Take 400 mg by mouth daily. METHOCARBAMOL (ROBAXIN-750) 750 MG TABLET    Take 1 Tab by mouth three (3) times daily as needed.  Indications: pain; muscle spasms    OTHER    BMP, MG in 1 week  Dx: CHF  Fax results to PCP at [address] [facility] (K-DUR, KLOR-CON) 20 MEQ TABLET    Take 1 Tab by mouth two (2) times a day. PRAVASTATIN (PRAVACHOL) 40 MG TABLET    Take 40 mg by mouth nightly. PYRIDOXINE, VITAMIN B6, (VITAMIN B-6) 50 MG TABLET    Take 50 mg by mouth daily. RITONAVIR (NORVIR) 100 MG CAPSULE    Take 100 mg by mouth daily (with breakfast). TRAMADOL (ULTRAM) 50 MG TABLET    Take 1 Tab by mouth every six (6) hours as needed for Pain. Max Daily Amount: 200 mg. TRIMETHOPRIM-SULFAMETHOXAZOLE (BACTRIM DS) 160-800 MG PER TABLET    Take 1 Tab by mouth now. These Medications have changed    No medications on file   Stop Taking    No medications on file       Scribe Attestation:     Nikolas Villalobos, acting as a scribe for and in the presence of Ramiro Moore DO      November 09, 2017 at 3:42 PM       Provider Attestation:      I personally performed the services described in the documentation, reviewed the documentation, as recorded by the scribe in my presence, and it accurately and completely records my words and actions.  November 09, 2017 at 3:42 PM - Ramiro Moore DO

## 2017-11-09 NOTE — DISCHARGE INSTRUCTIONS
Hypokalemia: Care Instructions  Your Care Instructions    Hypokalemia (say \"ae-wl-ewt-PAT-kristin-uh\") is a low level of potassium. The heart, muscles, kidneys, and nervous system all need potassium to work well. This problem has many different causes. Kidney problems, diet, and medicines like diuretics and laxatives can cause it. So can vomiting or diarrhea. In some cases, cancer is the cause. Your doctor may do tests to find the cause of your low potassium levels. You may need medicines to bring your potassium levels back to normal. You may also need regular blood tests to check your potassium. If you have very low potassium, you may need intravenous (IV) medicines. You also may need tests to check the electrical activity of your heart. Heart problems caused by low potassium levels can be very serious. Follow-up care is a key part of your treatment and safety. Be sure to make and go to all appointments, and call your doctor if you are having problems. It's also a good idea to know your test results and keep a list of the medicines you take. How can you care for yourself at home? · If your doctor recommends it, eat foods that have a lot of potassium. These include fresh fruits, juices, and vegetables. They also include nuts, beans, and milk. · Be safe with medicines. If your doctor prescribes medicines or potassium supplements, take them exactly as directed. Call your doctor if you have any problems with your medicines. · Get your potassium levels tested as often as your doctor tells you. When should you call for help? Call 911 anytime you think you may need emergency care. For example, call if:  ? · You feel like your heart is missing beats. Heart problems caused by low potassium can cause death. ? · You passed out (lost consciousness). ? · You have a seizure. ?Call your doctor now or seek immediate medical care if:  ? · You feel weak or unusually tired. ? · You have severe arm or leg cramps. ? · You have tingling or numbness. ? · You feel sick to your stomach, or you vomit. ? · You have belly cramps. ? · You feel bloated or constipated. ? · You have to urinate a lot. ? · You feel very thirsty most of the time. ? · You are dizzy or lightheaded, or you feel like you may faint. ? · You feel depressed, or you lose touch with reality. ? Watch closely for changes in your health, and be sure to contact your doctor if:  ? · You do not get better as expected. Where can you learn more? Go to http://mallory-bonnie.info/. Enter G358 in the search box to learn more about \"Hypokalemia: Care Instructions. \"  Current as of: May 12, 2017  Content Version: 11.4  © 2364-3891 [employer]. Care instructions adapted under license by [employer] (which disclaims liability or warranty for this information). If you have questions about a medical condition or this instruction, always ask your healthcare professional. [address] any warranty or liability for your use of this information. Muscle Aches: Care Instructions  Your Care Instructions    Muscle aches have many possible causes. Some common ones are overuse, tension, and injuries such as a strained muscle. An infection such as the flu can cause muscle aches. Or the aches may be caused by some medicines, such as statins. Muscle aches may also be a symptom of a disease like lupus or fibromyalgia. Myalgia is the medical term for muscle aches. The doctor will do a physical exam and ask questions to try to find what is causing your pain. You may also have tests such as blood tests or imaging tests like X-rays. These can help find or rule out serious problems. The doctor has checked you carefully, but problems can develop later. If you notice any problems or new symptoms, get medical treatment right away. Follow-up care is a key part of your treatment and safety.  Be sure to make and go to all appointments, and call your doctor if you are having problems. It's also a good idea to know your test results and keep a list of the medicines you take. How can you care for yourself at home? · Rest the area that hurts. You may need to stop or reduce the activity that causes your symptoms. Then you can return to it slowly. · Put ice or a cold pack on the area for 10 to 20 minutes at a time to ease pain. Put a thin cloth between the ice and your skin. · Take an over-the-counter pain medicine, such as acetaminophen (Tylenol), ibuprofen (Advil, Motrin), or naproxen (Aleve). Be safe with medicines. Read and follow all instructions on the label. When should you call for help? Call your doctor now or seek immediate medical care if:  ? · Your pain gets worse. ? · You have new symptoms, such as a fever, swelling, or a rash. ? Watch closely for changes in your health, and be sure to contact your doctor if:  ? · You do not get better as expected. Where can you learn more? Go to http://mallory-bonnie.info/. Enter G355 in the search box to learn more about \"Muscle Aches: Care Instructions. \"  Current as of: October 14, 2016  Content Version: 11.4  © 8749-5128 [employer]. Care instructions adapted under license by [employer] (which disclaims liability or warranty for this information). If you have questions about a medical condition or this instruction, always ask your healthcare professional. [place] [address] any warranty or liability for your use of this information.

## 2017-11-09 NOTE — ED TRIAGE NOTES
PT arrived via EMS, patient complains of all over body pain and possible gout pain to wrist, c/o of being tired and not feeling well, Ax4, safety intact, family at bedside, will continue to monitor

## 2018-02-20 ENCOUNTER — HOSPITAL ENCOUNTER (EMERGENCY)
Age: 68
Discharge: HOME | End: 2018-02-20
Attending: EMERGENCY MEDICINE
Payer: COMMERCIAL

## 2018-02-20 ENCOUNTER — APPOINTMENT (OUTPATIENT)
Dept: GENERAL RADIOLOGY | Age: 68
End: 2018-02-20
Attending: PHYSICIAN ASSISTANT
Payer: COMMERCIAL

## 2018-02-20 VITALS
TEMPERATURE: 99.4 F | DIASTOLIC BLOOD PRESSURE: 73 MMHG | RESPIRATION RATE: 18 BRPM | OXYGEN SATURATION: 97 % | SYSTOLIC BLOOD PRESSURE: 118 MMHG | HEART RATE: 63 BPM

## 2018-02-20 DIAGNOSIS — J20.9 ACUTE BRONCHITIS, UNSPECIFIED ORGANISM: Primary | ICD-10-CM

## 2018-02-20 PROCEDURE — 71046 X-RAY EXAM CHEST 2 VIEWS: CPT

## 2018-02-20 PROCEDURE — 99281 EMR DPT VST MAYX REQ PHY/QHP: CPT

## 2018-02-20 RX ORDER — BENZONATATE 100 MG/1
100 CAPSULE ORAL
Qty: 30 CAP | Refills: 0 | Status: SHIPPED | OUTPATIENT
Start: 2018-02-20 | End: 2018-02-27

## 2018-02-20 RX ORDER — ALBUTEROL SULFATE 90 UG/1
1 AEROSOL, METERED RESPIRATORY (INHALATION)
Qty: 1 INHALER | Refills: 0 | Status: ON HOLD | OUTPATIENT
Start: 2018-02-20 | End: 2018-06-14

## 2018-02-20 RX ORDER — AMOXICILLIN 500 MG/1
500 TABLET, FILM COATED ORAL 3 TIMES DAILY
Qty: 30 TAB | Refills: 0 | Status: SHIPPED | OUTPATIENT
Start: 2018-02-20 | End: 2018-06-06 | Stop reason: ALTCHOICE

## 2018-02-20 RX ORDER — GUAIFENESIN 600 MG/1
600 TABLET, EXTENDED RELEASE ORAL 2 TIMES DAILY
Qty: 12 TAB | Refills: 0 | Status: ON HOLD | OUTPATIENT
Start: 2018-02-20 | End: 2018-06-14

## 2018-02-20 NOTE — DISCHARGE INSTRUCTIONS
Bronchitis: Care Instructions  Your Care Instructions    Bronchitis is inflammation of the bronchial tubes, which carry air to the lungs. The tubes swell and produce mucus, or phlegm. The mucus and inflamed bronchial tubes make you cough. You may have trouble breathing. Most cases of bronchitis are caused by viruses like those that cause colds. Antibiotics usually do not help and they may be harmful. Bronchitis usually develops rapidly and lasts about 2 to 3 weeks in otherwise healthy people. Follow-up care is a key part of your treatment and safety. Be sure to make and go to all appointments, and call your doctor if you are having problems. It's also a good idea to know your test results and keep a list of the medicines you take. How can you care for yourself at home? · Take all medicines exactly as prescribed. Call your doctor if you think you are having a problem with your medicine. · Get some extra rest.  · Take an over-the-counter pain medicine, such as acetaminophen (Tylenol), ibuprofen (Advil, Motrin), or naproxen (Aleve) to reduce fever and relieve body aches. Read and follow all instructions on the label. · Do not take two or more pain medicines at the same time unless the doctor told you to. Many pain medicines have acetaminophen, which is Tylenol. Too much acetaminophen (Tylenol) can be harmful. · Take an over-the-counter cough medicine that contains dextromethorphan to help quiet a dry, hacking cough so that you can sleep. Avoid cough medicines that have more than one active ingredient. Read and follow all instructions on the label. · Breathe moist air from a humidifier, hot shower, or sink filled with hot water. The heat and moisture will thin mucus so you can cough it out. · Do not smoke. Smoking can make bronchitis worse. If you need help quitting, talk to your doctor about stop-smoking programs and medicines. These can increase your chances of quitting for good.   When should you call for help? Call 911 anytime you think you may need emergency care. For example, call if:  ? · You have severe trouble breathing. ?Call your doctor now or seek immediate medical care if:  ? · You have new or worse trouble breathing. ? · You cough up dark brown or bloody mucus (sputum). ? · You have a new or higher fever. ? · You have a new rash. ? Watch closely for changes in your health, and be sure to contact your doctor if:  ? · You cough more deeply or more often, especially if you notice more mucus or a change in the color of your mucus. ? · You are not getting better as expected. Where can you learn more? Go to http://mallory-bonnie.info/. Enter H333 in the search box to learn more about \"Bronchitis: Care Instructions. \"  Current as of: May 12, 2017  Content Version: 11.4  © 8953-5359 [employer]. Care instructions adapted under license by [employer] (which disclaims liability or warranty for this information). If you have questions about a medical condition or this instruction, always ask your healthcare professional. [address] any warranty or liability for your use of this information.

## 2018-02-20 NOTE — ED TRIAGE NOTES
C/o cough and generalized body aches that started this past Saturday, states coughing up yellow sputum

## 2018-02-20 NOTE — ED PROVIDER NOTES
EMERGENCY DEPARTMENT HISTORY AND PHYSICAL EXAM    Date: 2/20/2018  Patient Name: Bernardino Seo    History of Presenting Illness     Chief Complaint   Patient presents with    Cough    Generalized Body Aches         History Provided By: Patient    Chief Complaint: cough  Duration: 4-5 Days  Timing:  Gradual and Worsening  Location: chest  Quality: Tightness  Severity: Moderate  Modifying Factors: + sick contacts  Associated Symptoms: body aches, chills      Additional History (Context): Bernardino Seo is a 74 y.o. male with diabetes, hypertension, hyperlipidemia and obesity who presents with C/O cough for the past 4-5 days with associated chills and body aches. Admits to sick contact. Denies SOB, chest pain, NVD, abdominal pain. PCP: Ace Gr MD    Current Outpatient Prescriptions   Medication Sig Dispense Refill    albuterol (PROVENTIL HFA) 90 mcg/actuation inhaler Take 1 Puff by inhalation every four (4) hours as needed for Wheezing. 1 Inhaler 0    benzonatate (TESSALON PERLES) 100 mg capsule Take 1 Cap by mouth three (3) times daily as needed for Cough for up to 7 days. 30 Cap 0    amoxicillin 500 mg tab Take 500 mg by mouth three (3) times daily. 30 Tab 0    guaiFENesin ER (MUCINEX) 600 mg ER tablet Take 1 Tab by mouth two (2) times a day. 12 Tab 0    butalbital-acetaminophen-caff (FIORICET) -40 mg per capsule Take 1 Cap by mouth every four (4) hours as needed for Pain or Headache. Max Daily Amount: 6 Caps. 12 Cap 0    methocarbamol (ROBAXIN-750) 750 mg tablet Take 1 Tab by mouth three (3) times daily as needed. Indications: pain; muscle spasms 12 Tab 0    furosemide (LASIX) 40 mg tablet 40 mg po daily 30 Tab 0    potassium chloride (K-DUR, KLOR-CON) 20 mEq tablet Take 1 Tab by mouth two (2) times a day. 30 Tab 0    magnesium oxide 400 mg cap Take 400 mg by mouth daily.  20 Each 0    OTHER BMP, MG in 1 week  Dx: CHF  Fax results to PCP at [address] [facility] ASAP 1 Each 0    apixaban (ELIQUIS) 5 mg tablet Take 1 Tab by mouth every twelve (12) hours. 60 Tab 0    carvedilol (COREG) 25 mg tablet Take 0.5 Tabs by mouth two (2) times daily (with meals). 60 Tab 0    lisinopril (PRINIVIL, ZESTRIL) 10 mg tablet Take 1 Tab by mouth daily. 30 Tab 0    pyridoxine, vitamin B6, (VITAMIN B-6) 50 mg tablet Take 50 mg by mouth daily.  aluminum-magnesium hydroxide (MAALOX) 200-200 mg/5 mL suspension Take 15 mL by mouth every six (6) hours as needed for Indigestion. 928 mL 0    folic acid (FOLVITE) 1 mg tablet Take 1 mg by mouth daily.  aspirin 81 mg tablet Take 81 mg by mouth daily.  gabapentin (NEURONTIN) 300 mg capsule Take 300 mg by mouth three (3) times daily.  Darunavir (PREZISTA) 600 mg tablet Take 600 mg by mouth.  pravastatin (PRAVACHOL) 40 mg tablet Take 40 mg by mouth nightly.  ritonavir (NORVIR) 100 mg capsule Take 100 mg by mouth daily (with breakfast).  emtricitabine (EMTRIVA) 200 mg capsule Take 200 mg by mouth daily. Past History     Past Medical History:  Past Medical History:   Diagnosis Date    Autoimmune disease ([facility] [address]     AIDS? HIV    Hypertension     Rectal bleeding     Stroke [facility])        Past Surgical History:  Past Surgical History:   Procedure Laterality Date    HX ORTHOPAEDIC      back       Family History:  History reviewed. No pertinent family history. Social History:  Social History   Substance Use Topics    Smoking status: Current Every Day Smoker     Packs/day: 1.00     Types: Cigarettes    Smokeless tobacco: Never Used    Alcohol use 3.5 oz/week     7 Standard drinks or equivalent per week      Comment: \"drink everyday but never alone\"        Allergies:  No Known Allergies      Review of Systems   Review of Systems   Constitutional: Positive for chills. Negative for fever. HENT: Negative. Respiratory: Positive for cough. Negative for chest tightness, shortness of breath and wheezing.     Cardiovascular: Negative for chest pain and palpitations. Genitourinary: Negative. Musculoskeletal: Negative. All other systems reviewed and are negative. Physical Exam     Vitals:    02/20/18 1458   BP: 118/73   Pulse: 63   Resp: 18   Temp: 99.4 °F (37.4 °C)   SpO2: 97%     Physical Exam   Constitutional: He is oriented to person, place, and time. He appears well-developed and well-nourished. No distress. HENT:   Head: Normocephalic and atraumatic. Eyes: EOM are normal. Pupils are equal, round, and reactive to light. No scleral icterus. Neck: Neck supple. No tracheal deviation present. Cardiovascular: Normal rate, regular rhythm and normal heart sounds. Exam reveals no gallop and no friction rub. No murmur heard. Pulmonary/Chest: Effort normal and breath sounds normal. No respiratory distress. He has no wheezes. He has no rales. + hacking cough   Lymphadenopathy:     He has no cervical adenopathy. Neurological: He is alert and oriented to person, place, and time. No cranial nerve deficit. Skin: Skin is warm and dry. No rash noted. He is not diaphoretic. No erythema. No pallor. Psychiatric: He has a normal mood and affect. His behavior is normal.   Nursing note and vitals reviewed. Diagnostic Study Results     Labs -   No results found for this or any previous visit (from the past 12 hour(s)). Radiologic Studies -   XR CHEST PA LAT   Final Result      CHEST PA AND LATERAL     CPT CODE: 59155     HISTORY: Upper respiratory infection with cough and generalized body aches ,  cough.      COMPARISON: Chest x-ray August 8, 2017.     FINDINGS:      PA and lateral views obtained. Left upper chest AICD demonstrated with one right  atrial and 3 right ventricular leads. The cardiac and mediastinal silhouette is  normal.  The lungs are clear and mildly hyperinflated  The costophrenic angles  are sharply defined.  Pulmonary vascularity is normal. No bony abnormalities are  seen.     IMPRESSION  IMPRESSION:     Stable mild hyperinflation. CT Results  (Last 48 hours)    None        CXR Results  (Last 48 hours)    None            Medical Decision Making   I am the first provider for this patient. I reviewed the vital signs, available nursing notes, past medical history, past surgical history, family history and social history. Vital Signs-Reviewed the patient's vital signs. Records Reviewed: Nursing Notes    Procedures:  Procedures    Provider Notes (Medical Decision Making):   Pt to the ED with 4-5 days of cough, body aches, chills. He has had + sick contact. Negative CXR. Plan to cross cover for an early PNA given his symptoms and PMH. He is out of the range for treatment of tamiflu. Discussed with patient and he agrees. MARIA DEL CARMEN Gore     MED RECONCILIATION:  No current facility-administered medications for this encounter. Current Outpatient Prescriptions   Medication Sig    albuterol (PROVENTIL HFA) 90 mcg/actuation inhaler Take 1 Puff by inhalation every four (4) hours as needed for Wheezing.  benzonatate (TESSALON PERLES) 100 mg capsule Take 1 Cap by mouth three (3) times daily as needed for Cough for up to 7 days.  amoxicillin 500 mg tab Take 500 mg by mouth three (3) times daily.  guaiFENesin ER (MUCINEX) 600 mg ER tablet Take 1 Tab by mouth two (2) times a day.  butalbital-acetaminophen-caff (FIORICET) -40 mg per capsule Take 1 Cap by mouth every four (4) hours as needed for Pain or Headache. Max Daily Amount: 6 Caps.  methocarbamol (ROBAXIN-750) 750 mg tablet Take 1 Tab by mouth three (3) times daily as needed. Indications: pain; muscle spasms    furosemide (LASIX) 40 mg tablet 40 mg po daily    potassium chloride (K-DUR, KLOR-CON) 20 mEq tablet Take 1 Tab by mouth two (2) times a day.  magnesium oxide 400 mg cap Take 400 mg by mouth daily.     OTHER BMP, MG in 1 week  Dx: CHF  F[facility] results to [facility]CP at [facility]    apixaban (ELIQUIS) 5 mg tablet Take 1 Tab by mouth every twelve (12) hours.  carvedilol (COREG) 25 mg tablet Take 0.5 Tabs by mouth two (2) times daily (with meals).  lisinopril (PRINIVIL, ZESTRIL) 10 mg tablet Take 1 Tab by mouth daily.  pyridoxine, vitamin B6, (VITAMIN B-6) 50 mg tablet Take 50 mg by mouth daily.  aluminum-magnesium hydroxide (MAALOX) 200-200 mg/5 mL suspension Take 15 mL by mouth every six (6) hours as needed for Indigestion.  folic acid (FOLVITE) 1 mg tablet Take 1 mg by mouth daily.  aspirin 81 mg tablet Take 81 mg by mouth daily.  gabapentin (NEURONTIN) 300 mg capsule Take 300 mg by mouth three (3) times daily.  Darunavir (PREZISTA) 600 mg tablet Take 600 mg by mouth.  pravastatin (PRAVACHOL) 40 mg tablet Take 40 mg by mouth nightly.  ritonavir (NORVIR) 100 mg capsule Take 100 mg by mouth daily (with breakfast).  emtricitabine (EMTRIVA) 200 mg capsule Take 200 mg by mouth daily. Disposition:  Discharged    DISCHARGE NOTE:     Pt has been reexamined. Patient has no new complaints, changes, or physical findings. Care plan outlined and precautions discussed. Results of CXR were reviewed with the patient. All medications were reviewed with the patient; will d/c home with ABx, tessalon, and albuterol, Robitussin DM. All of pt's questions and concerns were addressed. Patient was instructed and agrees to follow up with PCP, as well as to return to the ED upon further deterioration. Patient is ready to go home.     Follow-up Information     Follow up With Details Comments Contact Info    [facility] EMERGENCY DEPT  If symptoms worsen [address], MD In 1 week  [address] [facility],[address]  [phone]            Discharge Medication List as of 2/20/2018  4:38 PM      START taking these medications    Details   benzonatate (TESSALON PERLES) 100 mg capsule Take 1 Cap by mouth three (3) times daily as needed for Cough for up to 7 days. , Print, Disp-30 Cap, R-0      amoxicillin 500 mg tab Take 500 mg by mouth three (3) times daily. , Print, Disp-30 Tab, R-0      guaiFENesin ER (MUCINEX) 600 mg ER tablet Take 1 Tab by mouth two (2) times a day., Print, Disp-12 Tab, R-0         CONTINUE these medications which have CHANGED    Details   albuterol (PROVENTIL HFA) 90 mcg/actuation inhaler Take 1 Puff by inhalation every four (4) hours as needed for Wheezing., Print, Disp-1 Inhaler, R-0         CONTINUE these medications which have NOT CHANGED    Details   butalbital-acetaminophen-caff (FIORICET) -40 mg per capsule Take 1 Cap by mouth every four (4) hours as needed for Pain or Headache. Max Daily Amount: 6 Caps., Print, Disp-12 Cap, R-0      methocarbamol (ROBAXIN-750) 750 mg tablet Take 1 Tab by mouth three (3) times daily as needed. Indications: pain; muscle spasms, Print, Disp-12 Tab, R-0      furosemide (LASIX) 40 mg tablet 40 mg po daily, Print, Disp-30 Tab, R-0      potassium chloride (K-DUR, KLOR-CON) 20 mEq tablet Take 1 Tab by mouth two (2) times a day., Print, Disp-30 Tab, R-0      magnesium oxide 400 mg cap Take 400 mg by mouth daily. , Print, Disp-20 Each, R-0      OTHER BMP, MG in 1 week  Dx: CHF  Fax results to PCP at [facility], Print, Disp-1 Each, R-0      apixaban (ELIQUIS) 5 mg tablet Take 1 Tab by mouth every twelve (12) hours. , Print, Disp-60 Tab, R-0      carvedilol (COREG) 25 mg tablet Take 0.5 Tabs by mouth two (2) times daily (with meals). , Print, Disp-60 Tab, R-0      lisinopril (PRINIVIL, ZESTRIL) 10 mg tablet Take 1 Tab by mouth daily. , Print, Disp-30 Tab, R-0      pyridoxine, vitamin B6, (VITAMIN B-6) 50 mg tablet Take 50 mg by mouth daily. , Historical Med      aluminum-magnesium hydroxide (MAALOX) 200-200 mg/5 mL suspension Take 15 mL by mouth every six (6) hours as needed for Indigestion. , Print, Disp-300 mL, R-0 folic acid (FOLVITE) 1 mg tablet Take 1 mg by mouth daily. , Historical Med      aspirin 81 mg tablet Take 81 mg by mouth daily. , Historical Med      gabapentin (NEURONTIN) 300 mg capsule Take 300 mg by mouth three (3) times daily. , Historical Med      Darunavir (PREZISTA) 600 mg tablet Take 600 mg by mouth.  , Historical Med      pravastatin (PRAVACHOL) 40 mg tablet Take 40 mg by mouth nightly.  , Historical Med      ritonavir (NORVIR) 100 mg capsule Take 100 mg by mouth daily (with breakfast). , Historical Med      emtricitabine (EMTRIVA) 200 mg capsule Take 200 mg by mouth daily. , Historical Med         STOP taking these medications       traMADol (ULTRAM) 50 mg tablet Comments:   Reason for Stopping:         trimethoprim-sulfamethoxazole (BACTRIM DS) 160-800 mg per tablet Comments:   Reason for Stopping:                 Diagnosis     Clinical Impression:   1.  Acute bronchitis, unspecified organism

## 2018-06-05 ENCOUNTER — HOSPITAL ENCOUNTER (INPATIENT)
Age: 68
LOS: 8 days | Discharge: HOME HEALTH | DRG: 558 | End: 2018-06-14
Attending: EMERGENCY MEDICINE | Admitting: INTERNAL MEDICINE
Payer: OTHER GOVERNMENT

## 2018-06-05 ENCOUNTER — APPOINTMENT (OUTPATIENT)
Dept: GENERAL RADIOLOGY | Age: 68
DRG: 558 | End: 2018-06-05
Attending: PHYSICIAN ASSISTANT
Payer: OTHER GOVERNMENT

## 2018-06-05 ENCOUNTER — APPOINTMENT (OUTPATIENT)
Dept: GENERAL RADIOLOGY | Age: 68
DRG: 558 | End: 2018-06-05
Attending: EMERGENCY MEDICINE
Payer: OTHER GOVERNMENT

## 2018-06-05 DIAGNOSIS — R06.09 DYSPNEA ON EXERTION: ICD-10-CM

## 2018-06-05 DIAGNOSIS — E87.6 HYPOKALEMIA: ICD-10-CM

## 2018-06-05 DIAGNOSIS — M19.90 INFLAMMATORY ARTHRITIS: Primary | ICD-10-CM

## 2018-06-05 DIAGNOSIS — R77.8 ELEVATED TROPONIN: ICD-10-CM

## 2018-06-05 DIAGNOSIS — Z91.14 NONCOMPLIANCE WITH MEDICATIONS: ICD-10-CM

## 2018-06-05 DIAGNOSIS — B20 HIV (HUMAN IMMUNODEFICIENCY VIRUS INFECTION) (HCC): ICD-10-CM

## 2018-06-05 LAB
ALBUMIN SERPL-MCNC: 3.4 G/DL (ref 3.4–5)
ALBUMIN/GLOB SERPL: 0.5 {RATIO} (ref 0.8–1.7)
ALP SERPL-CCNC: 160 U/L (ref 45–117)
ALT SERPL-CCNC: 16 U/L (ref 16–61)
ANION GAP SERPL CALC-SCNC: 2 MMOL/L (ref 3–18)
AST SERPL-CCNC: 15 U/L (ref 15–37)
BASOPHILS # BLD: 0 K/UL (ref 0–0.06)
BASOPHILS NFR BLD: 0 % (ref 0–2)
BILIRUB SERPL-MCNC: 0.6 MG/DL (ref 0.2–1)
BUN SERPL-MCNC: 10 MG/DL (ref 7–18)
BUN/CREAT SERPL: 10 (ref 12–20)
CALCIUM SERPL-MCNC: 10.2 MG/DL (ref 8.5–10.1)
CHLORIDE SERPL-SCNC: 104 MMOL/L (ref 100–108)
CK MB CFR SERPL CALC: ABNORMAL % (ref 0–4)
CK MB SERPL-MCNC: <1 NG/ML (ref 5–25)
CK SERPL-CCNC: 57 U/L (ref 39–308)
CO2 SERPL-SCNC: 34 MMOL/L (ref 21–32)
CREAT SERPL-MCNC: 0.96 MG/DL (ref 0.6–1.3)
DIFFERENTIAL METHOD BLD: ABNORMAL
EOSINOPHIL # BLD: 0 K/UL (ref 0–0.4)
EOSINOPHIL NFR BLD: 1 % (ref 0–5)
ERYTHROCYTE [DISTWIDTH] IN BLOOD BY AUTOMATED COUNT: 11.7 % (ref 11.6–14.5)
GLOBULIN SER CALC-MCNC: 6.6 G/DL (ref 2–4)
GLUCOSE SERPL-MCNC: 131 MG/DL (ref 74–99)
HCT VFR BLD AUTO: 39 % (ref 36–48)
HGB BLD-MCNC: 13 G/DL (ref 13–16)
LACTATE BLD-SCNC: 1.7 MMOL/L (ref 0.4–2)
LYMPHOCYTES # BLD: 0.8 K/UL (ref 0.9–3.6)
LYMPHOCYTES NFR BLD: 15 % (ref 21–52)
MAGNESIUM SERPL-MCNC: 1.9 MG/DL (ref 1.6–2.6)
MCH RBC QN AUTO: 29.4 PG (ref 24–34)
MCHC RBC AUTO-ENTMCNC: 33.3 G/DL (ref 31–37)
MCV RBC AUTO: 88.2 FL (ref 74–97)
MONOCYTES # BLD: 1 K/UL (ref 0.05–1.2)
MONOCYTES NFR BLD: 18 % (ref 3–10)
NEUTS SEG # BLD: 3.6 K/UL (ref 1.8–8)
NEUTS SEG NFR BLD: 66 % (ref 40–73)
PLATELET # BLD AUTO: 309 K/UL (ref 135–420)
PMV BLD AUTO: 12.1 FL (ref 9.2–11.8)
POTASSIUM SERPL-SCNC: 3.2 MMOL/L (ref 3.5–5.5)
PROT SERPL-MCNC: 10 G/DL (ref 6.4–8.2)
RBC # BLD AUTO: 4.42 M/UL (ref 4.7–5.5)
SODIUM SERPL-SCNC: 140 MMOL/L (ref 136–145)
TROPONIN I SERPL-MCNC: 0.06 NG/ML (ref 0–0.04)
URATE SERPL-MCNC: 4.4 MG/DL (ref 2.6–7.2)
WBC # BLD AUTO: 5.4 K/UL (ref 4.6–13.2)

## 2018-06-05 PROCEDURE — 74011000250 HC RX REV CODE- 250: Performed by: EMERGENCY MEDICINE

## 2018-06-05 PROCEDURE — 74011250636 HC RX REV CODE- 250/636: Performed by: EMERGENCY MEDICINE

## 2018-06-05 PROCEDURE — 85025 COMPLETE CBC W/AUTO DIFF WBC: CPT | Performed by: PHYSICIAN ASSISTANT

## 2018-06-05 PROCEDURE — 74011250637 HC RX REV CODE- 250/637: Performed by: EMERGENCY MEDICINE

## 2018-06-05 PROCEDURE — 99285 EMERGENCY DEPT VISIT HI MDM: CPT

## 2018-06-05 PROCEDURE — 83605 ASSAY OF LACTIC ACID: CPT

## 2018-06-05 PROCEDURE — 73110 X-RAY EXAM OF WRIST: CPT

## 2018-06-05 PROCEDURE — 96374 THER/PROPH/DIAG INJ IV PUSH: CPT

## 2018-06-05 PROCEDURE — 71046 X-RAY EXAM CHEST 2 VIEWS: CPT

## 2018-06-05 PROCEDURE — 87040 BLOOD CULTURE FOR BACTERIA: CPT | Performed by: EMERGENCY MEDICINE

## 2018-06-05 PROCEDURE — 80053 COMPREHEN METABOLIC PANEL: CPT | Performed by: PHYSICIAN ASSISTANT

## 2018-06-05 PROCEDURE — 96375 TX/PRO/DX INJ NEW DRUG ADDON: CPT

## 2018-06-05 PROCEDURE — 93005 ELECTROCARDIOGRAM TRACING: CPT

## 2018-06-05 PROCEDURE — 83735 ASSAY OF MAGNESIUM: CPT | Performed by: PHYSICIAN ASSISTANT

## 2018-06-05 PROCEDURE — 82550 ASSAY OF CK (CPK): CPT | Performed by: PHYSICIAN ASSISTANT

## 2018-06-05 PROCEDURE — 84550 ASSAY OF BLOOD/URIC ACID: CPT | Performed by: PHYSICIAN ASSISTANT

## 2018-06-05 RX ORDER — OXYCODONE AND ACETAMINOPHEN 5; 325 MG/1; MG/1
1 TABLET ORAL
Status: COMPLETED | OUTPATIENT
Start: 2018-06-05 | End: 2018-06-05

## 2018-06-05 RX ADMIN — VANCOMYCIN HYDROCHLORIDE 1500 MG: 500 INJECTION, POWDER, LYOPHILIZED, FOR SOLUTION INTRAVENOUS at 23:30

## 2018-06-05 RX ADMIN — WATER 2 G: 1 INJECTION INTRAMUSCULAR; INTRAVENOUS; SUBCUTANEOUS at 22:44

## 2018-06-05 RX ADMIN — OXYCODONE HYDROCHLORIDE AND ACETAMINOPHEN 1 TABLET: 5; 325 TABLET ORAL at 22:44

## 2018-06-05 NOTE — IP AVS SNAPSHOT
[address] [address] Cheryl Lawrence Patient: Jarred Licea MRN: [id number] [facility]:[birth year]/4377 A check lit indicates which time of day the medication should be taken. My Medications START taking these medications Instructions Each Dose to Equal  
 Morning Noon Evening Bedtime  
 acetaminophen 500 mg tablet Commonly known as:  TYLENOL Your last dose was: Your next dose is: Take 1 Tab by mouth every six (6) hours as needed. Indications: Pain 500 mg  
    
   
   
   
  
 azithromycin 250 mg tablet Commonly known as:  Garth Marshal Start taking on:  6/15/2018 Your last dose was: Your next dose is:    
   
   
 500 MG PO DAILY X 2 DAYS  
     
   
   
   
  
 budesonide-formoterol 160-4.5 mcg/actuation Hfaa Commonly known as:  SYMBICORT Your last dose was: Your next dose is: Take 2 Puffs by inhalation two (2) times a day. 2 Puff  
    
   
   
   
  
 diclofenac 1 % Gel Commonly known as:  VOLTAREN Your last dose was: Your next dose is:    
   
   
 Apply 2 g to affected area three (3) times daily as needed for Pain. Apply dose (2 gm or 4 gm) to each location. If treatment site is the hands, patients should wait at least one (1) hour to wash their hands. APPLY TO right wrist  
 2 g  
    
   
   
   
  
 nicotine 14 mg/24 hr patch Commonly known as:  Caio Lynch Your last dose was: Your next dose is:    
   
   
 1 Patch by TransDERmal route daily for 30 days. 1 Patch CHANGE how you take these medications Instructions Each Dose to Equal  
 Morning Noon Evening Bedtime * amLODIPine 5 mg tablet Commonly known as:  Corby Rings What changed:  Another medication with the same name was added. Make sure you understand how and when to take each. Your last dose was: Your next dose is: Take 5 mg by mouth two (2) times a day. 5 mg * amLODIPine 10 mg tablet Commonly known as:  Myra De La Vega Start taking on:  6/15/2018 What changed: You were already taking a medication with the same name, and this prescription was added. Make sure you understand how and when to take each. Your last dose was: Your next dose is: Take 1 Tab by mouth daily. 10 mg  
    
   
   
   
  
 * Notice: This list has 2 medication(s) that are the same as other medications prescribed for you. Read the directions carefully, and ask your doctor or other care provider to review them with you. CONTINUE taking these medications Instructions Each Dose to Equal  
 Morning Noon Evening Bedtime  
 albuterol 90 mcg/actuation inhaler Commonly known as:  PROVENTIL HFA Your last dose was: Your next dose is: Take 1 Puff by inhalation every four (4) hours as needed for Wheezing. 1 Puff  
    
   
   
   
  
 apixaban 5 mg tablet Commonly known as:  Lamont Vincent Your last dose was: Your next dose is: Take 1 Tab by mouth every twelve (12) hours. 5 mg  
    
   
   
   
  
 carvedilol 25 mg tablet Commonly known as:  Wesley Prieto Your last dose was: Your next dose is: Take 0.5 Tabs by mouth two (2) times daily (with meals). 12.5 mg  
    
   
   
   
  
 dapsone 100 mg tablet Your last dose was: Your next dose is: Take 1 Tab by mouth daily. 100 mg  
    
   
   
   
  
 dolutegravir 50 mg Tab tablet Commonly known as:  Steven Hendricks Your last dose was: Your next dose is: Take 1 Tab by mouth daily. 50 mg  
    
   
   
   
  
 emtricitabine-tenofovir alafen tablet Commonly known as:  DESCOVY Your last dose was: Your next dose is: Take 1 Tab by mouth daily. 1 Tab folic acid 1 mg tablet Commonly known as:  [employer] Your last dose was: Your next dose is: Take 1 Tab by mouth daily. 1 mg  
    
   
   
   
  
 magnesium oxide 400 mg Cap Your last dose was: Your next dose is: Take 400 mg by mouth daily. 400 mg  
    
   
   
   
  
 pravastatin 40 mg tablet Commonly known as:  PRAVACHOL Your last dose was: Your next dose is: Take 1 Tab by mouth nightly. 40 mg  
    
   
   
   
  
 pyridoxine (vitamin B6) 50 mg tablet Commonly known as:  VITAMIN B-6 Your last dose was: Your next dose is: Take 1 Tab by mouth daily. 50 mg  
    
   
   
   
  
  
STOP taking these medications   
 amoxicillin 500 mg Tab  
   
  
 digoxin 0.25 mg tablet Commonly known as:  LANOXIN  
   
  
 furosemide 40 mg tablet Commonly known as:  LASIX  
   
  
 lisinopril 10 mg tablet Commonly known as:  PRINIVIL, ZESTRIL  
   
  
 potassium chloride 20 mEq tablet Commonly known as:  K-DUR, KLOR-CON  
   
  
 spironolactone 25 mg tablet Commonly known as:  ALDACTONE Where to Get Your Medications Information on where to get these meds will be given to you by the nurse or doctor. ! Ask your nurse or doctor about these medications  
  acetaminophen 500 mg tablet  
 albuterol 90 mcg/actuation inhaler  
 amLODIPine 10 mg tablet  
 apixaban 5 mg tablet  
 azithromycin 250 mg tablet  
 budesonide-formoterol 160-4.5 mcg/actuation Hfaa  
 carvedilol 25 mg tablet  
 dapsone 100 mg tablet  
 diclofenac 1 % Gel  
 dolutegravir 50 mg Tab tablet  
 emtricitabine-tenofovir alafen tablet  
 folic acid 1 mg tablet  
 magnesium oxide 400 mg Cap  
 nicotine 14 mg/24 hr patch  
 pravastatin 40 mg tablet  
 pyridoxine (vitamin B6) 50 mg tablet

## 2018-06-05 NOTE — IP AVS SNAPSHOT
Summary of Care Report The Summary of Care report has been created to help improve care coordination. Users with access to [employer]Made or [facility] (Web-based application) may access additional patient information including the Discharge Summary. If you are not currently a [address] user and need more information, please call the number listed below in the [place] [address] section and ask to be connected with Medical Records. Facility Information Name Address Phone [address] [facility] [address] [phone] Patient Information Patient Name Sex  Bernie Zhao ([id number]) Male 1950 Discharge Information Admitting Provider Service Area Unit Li Everett MD / [address] [facility] / [phone] Discharge Provider Discharge Date/Time Discharge Disposition Destination (none) 2018 (Pending) [facility] (none) Patient Language Language ENGLISH [13] Hospital Problems as of 2018  Reviewed: 2018  2:56 AM by Li Everett MD  
  
  
  
 Class Noted - Resolved Last Modified POA Active Problems HIV (human immunodeficiency virus infection) ([facility] [address]  2012 - Present 2018 by Cristofer Roman MD Unknown Entered by Clementine Peoples Essential hypertension, benign  2012 - Present 2018 by Li Everett MD Yes Entered by Clementine Peoples Noncompliance  2012 - Present 2018 by Li Everett MD Yes Entered by Clementine Peoples Atrial flutter ([facility] [address]  2016 - Present 2018 by Li Everett MD Yes Entered by Avinash Nassar NP  
  Joint pain  2018 - Present 2018 by Cristofer Roman MD Unknown Entered by Cristofer Roman MD  
  Elevated troponin  2018 - Present 2018 by Cristofer Roman MD Unknown Entered by Krishna Staples MD  
  * (Principal)Right forearm cellulitis  6/6/2018 - Present 6/6/2018 by Judi Ruiz MD Unknown Entered by Judi Ruiz MD  
  Chronic anticoagulation  6/6/2018 - Present 6/6/2018 by Judi Ruiz MD Unknown Entered by Judi Ruiz MD  
  
Non-Hospital Problems as of 6/14/2018  Reviewed: 6/6/2018  2:56 AM by Judi Ruiz MD  
  
  
  
 Class Noted - Resolved Last Modified Active Problems Syncope and collapse  9/12/2012 - Present 11/2/2016 by Gómez Basurto NP Entered by Bisi Mota Pacemaker  9/12/2012 - Present 12/11/2016 by Serena Hinds MD  
  Entered by Bisi Mota History of CVA (cerebrovascular accident)  9/12/2012 - Present 12/11/2016 by Serena Hinds MD  
  Entered by Bisi Mota Special screening for malignant neoplasms, colon  4/9/2015 - Present 4/9/2015 by Avel Akins MD  
  Entered by Avel Akins MD  
  Congestive heart failure (CHF) ([facility] [address]  11/1/2016 - Present 12/11/2016 by Serena Hinds MD  
  Entered by Irma Lizarraga MD  
  Tobacco abuse  11/2/2016 - Present 12/11/2016 by Serena Hinds MD  
  Entered by Gómez Basurto NP  
  SOB (shortness of breath)  11/2/2016 - Present 11/2/2016 by Gómez Basurto NP Entered by Gómez Basurto NP Alcohol use  11/2/2016 - Present 11/2/2016 by Gómez Basurto NP Entered by Gómez Basurto NP Hx of cocaine abuse  11/2/2016 - Present 12/11/2016 by Serena Hinds MD  
  Entered by Gómez Basurto NP Defibrillator discharge  12/11/2016 - Present 12/11/2016 by Serena Hinds MD  
  Entered by Myles Wisdom MD  
  
You are allergic to the following No active allergies Current Discharge Medication List  
  
START taking these medications Dose & Instructions Dispensing Information Comments  
 acetaminophen 500 mg tablet Commonly known as:  TYLENOL  Dose:  500 mg  
 Take 1 Tab by mouth every six (6) hours as needed. Indications: Pain Quantity:  20 Tab Refills:  0  
   
 azithromycin 250 mg tablet Commonly known as:  Maria Esther Flaherty Start taking on:  6/15/2018  
 500 MG PO DAILY X 2 DAYS Quantity:  4 Tab Refills:  0  
   
 budesonide-formoterol 160-4.5 mcg/actuation Hfaa Commonly known as:  SYMBICORT Dose:  2 Puff Take 2 Puffs by inhalation two (2) times a day. Quantity:  1 Inhaler Refills:  1  
   
 diclofenac 1 % Gel Commonly known as:  VOLTAREN Dose:  2 g Apply 2 g to affected area three (3) times daily as needed for Pain. Apply dose (2 gm or 4 gm) to each location. If treatment site is the hands, patients should wait at least one (1) hour to wash their hands. APPLY TO right wrist  
 Quantity:  100 g Refills:  0  
   
 nicotine 14 mg/24 hr patch Commonly known as:  Keysha Argelia Dose:  1 Patch 1 Patch by TransDERmal route daily for 30 days. Quantity:  30 Patch Refills:  0 CONTINUE these medications which have CHANGED Dose & Instructions Dispensing Information Comments * amLODIPine 5 mg tablet Commonly known as:  Nir Garcia What changed:  Another medication with the same name was added. Make sure you understand how and when to take each. Dose:  5 mg Take 5 mg by mouth two (2) times a day. Refills:  0  
   
 * amLODIPine 10 mg tablet Commonly known as:  Nir Hailech Start taking on:  6/15/2018 What changed: You were already taking a medication with the same name, and this prescription was added. Make sure you understand how and when to take each. Dose:  10 mg Take 1 Tab by mouth daily. Quantity:  30 Tab Refills:  0  
   
 * Notice: This list has 2 medication(s) that are the same as other medications prescribed for you. Read the directions carefully, and ask your doctor or other care provider to review them with you. CONTINUE these medications which have NOT CHANGED Dose & Instructions Dispensing Information Comments  
 albuterol 90 mcg/actuation inhaler Commonly known as:  PROVENTIL HFA Dose:  1 Puff Take 1 Puff by inhalation every four (4) hours as needed for Wheezing. Quantity:  1 Inhaler Refills:  0  
   
 apixaban 5 mg tablet Commonly known as:  Valeria Ramus Dose:  5 mg Take 1 Tab by mouth every twelve (12) hours. Quantity:  60 Tab Refills:  0  
   
 carvedilol 25 mg tablet Commonly known as:  Damien Mood Dose:  12.5 mg Take 0.5 Tabs by mouth two (2) times daily (with meals). Quantity:  30 Tab Refills:  0  
   
 dapsone 100 mg tablet Dose:  100 mg Take 1 Tab by mouth daily. Quantity:  30 Tab Refills:  0  
   
 dolutegravir 50 mg Tab tablet Commonly known as:  Elena Bernard Dose:  50 mg Take 1 Tab by mouth daily. Quantity:  30 Tab Refills:  0  
   
 emtricitabine-tenofovir alafen tablet Commonly known as:  DESCOVY Dose:  1 Tab Take 1 Tab by mouth daily. Quantity:  30 Tab Refills:  0  
   
 folic acid 1 mg tablet Commonly known as:  Google Dose:  1 mg Take 1 Tab by mouth daily. Quantity:  30 Tab Refills:  0  
   
 magnesium oxide 400 mg Cap Dose:  400 mg Take 400 mg by mouth daily. Quantity:  30 Cap Refills:  0  
   
 pravastatin 40 mg tablet Commonly known as:  PRAVACHOL Dose:  40 mg Take 1 Tab by mouth nightly. Quantity:  30 Tab Refills:  0  
   
 pyridoxine (vitamin B6) 50 mg tablet Commonly known as:  VITAMIN B-6 Dose:  50 mg Take 1 Tab by mouth daily. Quantity:  30 Tab Refills:  0 STOP taking these medications Comments  
 amoxicillin 500 mg Tab  
   
   
 digoxin 0.25 mg tablet Commonly known as:  LANOXIN  
   
   
 furosemide 40 mg tablet Commonly known as:  LASIX  
   
   
 lisinopril 10 mg tablet Commonly known as:  PRINIVIL, ZESTRIL  
   
   
 potassium chloride 20 mEq tablet Commonly known as:  K-DUR, KLOR-CON  
   
   
 spironolactone 25 mg tablet Commonly known as:  ALDACTONE Follow-up Information Follow up With Details Comments Contact Info Jahaira Mireles MD  [facility] [address] 
[phone] Weston Sigala MD Go on 7/10/2018 follow up @1:45pm [address] [address] [facility] [address] 
[phone] Discharge Instructions Acquired Immunodeficiency Syndrome (AIDS): Care Instructions Your Care Instructions Human immunodeficiency virus (HIV) is a virus that attacks the body's immune system. This makes it hard for the body to fight infection and disease. HIV causes acquired immunodeficiency syndrome (AIDS). AIDS is the last and most severe stage of the HIV infection. HIV attacks and destroys a type of white blood cell called CD4+ cells, or helper cells. These cells are an important part of the immune system. You have AIDS when one or both of the following are true: 
· Your CD4+ cell count is below 200 cells per microliter (µL) of blood. · You get certain infections or cancers that are usually only seen in people who have problems with their immune system. Follow-up care is a key part of your treatment and safety. Be sure to make and go to all appointments, and call your doctor if you are having problems. It's also a good idea to know your test results and keep a list of the medicines you take. How can you care for yourself at home? · Take your medicines exactly as prescribed. Call your doctor if you think you are having a problem with your medicine. · Get the vaccines and medicine you need to prevent infections such as pneumonia or tuberculosis. · Learn more about HIV and AIDS so you can be active in your health care decisions. · Join a support group. These let you share experiences and seek support from others in the same situation. · Do not smoke. People with HIV have an increased risk of heart attacks and lung cancer. Smoking increases these risks even more. If you need help quitting, talk to your doctor about stop-smoking programs and medicines. These can increase your chances of quitting for good. · Do not use illegal drugs. And limit your use of alcohol. · Eat a healthy, balanced diet. This keeps your immune system as strong as possible. · Exercise regularly. It can reduce your stress, increase your energy, and lift your mood. · If you have not already done so, prepare a list of advance directives. These tell your doctor and family members what kind of care you want if you become unable to speak for yourself. Helping a partner with AIDS If your partner has AIDS, you can help provide emotional, physical, and medical care that will improve his or her quality of life. · Give emotional support. Listen to and encourage your partner. · Protect yourself and others against HIV infection and other infections by: ¨ Not sharing needles. ¨ Always using condoms during sex. · Protect your partner by staying away from him or her when you are sick. · Take care of yourself. Share your experiences with others and get help when you need it. · Learn how to give medicines, and know where to get help in an emergency. When should you call for help? Call 911 anytime you think you may need emergency care. For example, call if: 
? · You passed out (lost consciousness). ? · You have severe shortness of breath. ? · You have symptoms of a stroke. These may include: 
¨ Sudden numbness, tingling, weakness, or loss of movement in your face, arm, or leg, especially on only one side of your body. ¨ Sudden vision changes. ¨ Sudden trouble speaking. ¨ Sudden confusion or trouble understanding simple statements. ¨ Sudden problems with walking or balance. ¨ A sudden, severe headache that is different from past headaches. ?Call your doctor now or seek immediate medical care if: 
? · You have signs of a new or worse problem from HIV, such as: ¨ A fever. ¨ Coughing. ¨ Diarrhea. ¨ Skin changes. ¨ Bleeding. ¨ Confusion or not thinking clearly. ? Watch closely for changes in your health, and be sure to contact your doctor if you have any problems. Where can you learn more? Go to http://mallory-bonnie.info/. Enter J452 in the search box to learn more about \"Acquired Immunodeficiency Syndrome (AIDS): Care Instructions. \" Current as of: March 3, 2017 Content Version: 11.4 © 0267-2892 [employer]. Care instructions adapted under license by [employer] (which disclaims liability or warranty for this information). If you have questions about a medical condition or this instruction, always ask your healthcare professional. [address] any warranty or liability for your use of this information. Atrial Fibrillation: Care Instructions Your Care Instructions Atrial fibrillation is an irregular and often fast heartbeat. Treating this condition is important for several reasons. It can cause blood clots, which can travel from your heart to your brain and cause a stroke. If you have a fast heartbeat, you may feel lightheaded, dizzy, and weak. An irregular heartbeat can also increase your risk for heart failure. Atrial fibrillation is often the result of another heart condition, such as high blood pressure or coronary artery disease. Making changes to improve your heart condition will help you stay healthy and active. Follow-up care is a key part of your treatment and safety. Be sure to make and go to all appointments, and call your doctor if you are having problems. It's also a good idea to know your test results and keep a list of the medicines you take. How can you care for yourself at home? Medicines ? · Take your medicines exactly as prescribed. Call your doctor if you think you are having a problem with your medicine. You will get more details on the specific medicines your doctor prescribes. ? · If your doctor has given you a blood thinner to prevent a stroke, be sure you get instructions about how to take your medicine safely. Blood thinners can cause serious bleeding problems. ? · Do not take any vitamins, over-the-counter drugs, or herbal products without talking to your doctor first. ? Lifestyle changes ? · Do not smoke. Smoking can increase your chance of a stroke and heart attack. If you need help quitting, talk to your doctor about stop-smoking programs and medicines. These can increase your chances of quitting for good. ? · Eat a heart-healthy diet. ? · Stay at a healthy weight. Lose weight if you need to.  
? · Limit alcohol to 2 drinks a day for men and 1 drink a day for women. Too much alcohol can cause health problems. ? · Avoid colds and flu. Get a pneumococcal vaccine shot. If you have had one before, ask your doctor whether you need another dose. Get a flu shot every year. If you must be around people with colds or flu, wash your hands often. Activity ? · If your doctor recommends it, get more exercise. Walking is a good choice. Bit by bit, increase the amount you walk every day. Try for at least 30 minutes on most days of the week. You also may want to swim, bike, or do other activities. Your doctor may suggest that you join a cardiac rehabilitation program so that you can have help increasing your physical activity safely. ? · Start light exercise if your doctor says it is okay. Even a small amount will help you get stronger, have more energy, and manage stress. Walking is an easy way to get exercise. Start out by walking a little more than you did in the hospital. Gradually increase the amount you walk.   
? · When you exercise, watch for signs that your heart is working too hard. You are pushing too hard if you cannot talk while you are exercising. If you become short of breath or dizzy or have chest pain, sit down and rest immediately. ? · Check your pulse regularly. Place two fingers on the artery at the palm side of your wrist, in line with your thumb. If your heartbeat seems uneven or fast, talk to your doctor. When should you call for help? Call 911 anytime you think you may need emergency care. For example, call if: 
? · You have symptoms of a heart attack. These may include: ¨ Chest pain or pressure, or a strange feeling in the chest. 
¨ Sweating. ¨ Shortness of breath. ¨ Nausea or vomiting. ¨ Pain, pressure, or a strange feeling in the back, neck, jaw, or upper belly or in one or both shoulders or arms. ¨ Lightheadedness or sudden weakness. ¨ A fast or irregular heartbeat. After you call 911, the  may tell you to chew 1 adult-strength or 2 to 4 low-dose aspirin. Wait for an ambulance. Do not try to drive yourself. ? · You have symptoms of a stroke. These may include: 
¨ Sudden numbness, tingling, weakness, or loss of movement in your face, arm, or leg, especially on only one side of your body. ¨ Sudden vision changes. ¨ Sudden trouble speaking. ¨ Sudden confusion or trouble understanding simple statements. ¨ Sudden problems with walking or balance. ¨ A sudden, severe headache that is different from past headaches. ? · You passed out (lost consciousness). ?Call your doctor now or seek immediate medical care if: 
? · You have new or increased shortness of breath. ? · You feel dizzy or lightheaded, or you feel like you may faint. ? · Your heart rate becomes irregular. ? · You can feel your heart flutter in your chest or skip heartbeats. Tell your doctor if these symptoms are new or worse. ? Watch closely for changes in your health, and be sure to contact your doctor if you have any problems. Where can you learn more? Go to http://mallory-bonnie.info/. Enter U020 in the search box to learn more about \"Atrial Fibrillation: Care Instructions. \" Current as of: September 21, 2016 Content Version: 11.4 © 0598-7138 [employer]. Care instructions adapted under license by [employer] (which disclaims liability or warranty for this information). If you have questions about a medical condition or this instruction, always ask your healthcare professional. [address] [place] [address] any warranty or liability for your use of this information. Learning About Atrial Fibrillation What is atrial fibrillation? Atrial fibrillation (say \"AY-tree-caleb zhn-texr-KLU-shun\") is the most common type of irregular heartbeat (arrhythmia). Normally, the heart beats in a strong, steady rhythm. In atrial fibrillation, a problem with the heart's electrical system causes the two upper parts of the heart (the atria) to quiver, or fibrillate. Your heart rate also may be faster than normal. 
Atrial fibrillation can be dangerous because if the heartbeat isn't strong and steady, blood can collect, or pool, in the atria. And pooled blood is more likely to form clots. Clots can travel to the brain, block blood flow, and cause a stroke. Atrial fibrillation can also lead to heart failure. Treatment for atrial fibrillation helps prevent stroke and heart failure. It also helps relieve symptoms. Atrial fibrillation is often caused by another heart problem. It may happen after heart surgery. It may also be caused by other problems, such as an overactive thyroid gland or lung disease. Many people with atrial fibrillation are able to live full and active lives. What are the symptoms? Some people feel symptoms when they have episodes of atrial fibrillation. But other people don't notice any symptoms. If you have symptoms, you may feel: · A fluttering, racing, or pounding feeling in your chest called palpitations. · Weak or tired. · Dizzy or lightheaded. · Short of breath. · Chest pain. · Confused. You may notice signs of atrial fibrillation when you check your pulse. Your pulse may seem uneven or fast. 
What can you expect when you have atrial fibrillation? At first, spells of atrial fibrillation may come on suddenly and last a short time. It may go away on its own or it goes away after treatment. This is called paroxysmal atrial fibrillation. Over time, the spells may last longer and occur more often. They often don't go away on their own. How is it treated? Treatments can help you feel better and prevent future problems, especially stroke and heart failure. The main types of treatment slow the heart rate, control the heart rhythm, and help prevent stroke. Your treatment will depend on the cause of your atrial fibrillation, your symptoms, and your risk for stroke. · Heart rate treatment. Medicine may be used to slow your heart rate. Your heartbeat may still be irregular. But these medicines keep your heart from beating too fast. They may also help relieve your symptoms. · Heart rhythm treatment. Different treatments may be used to try to stop atrial fibrillation and keep it from returning. They can also relieve symptoms. These treatments include medicine, electrical cardioversion to shock the heart back to a normal rhythm, a procedure called catheter ablation, and heart surgery. · Stroke prevention. You and your doctor can decide how to lower your risk. You may decide to take a blood-thinning medicine, such as aspirin or an anticoagulant. How can you live well with it? You can live well and help manage atrial fibrillation by having a heart-healthy lifestyle. To have a heart-healthy lifestyle: · Don't smoke. · Eat heart-healthy foods. · Be active. Talk to your doctor about what type and level of exercise is safe for you. · Stay at a healthy weight. Lose weight if you need to. · Manage stress. · Avoid alcohol if it triggers symptoms. · Manage other health problems such as high blood pressure, high cholesterol, and diabetes. · Avoid getting sick from the flu. Get a flu shot every year. Where can you learn more? Go to http://mallory-bonnie.info/. Enter [phone] in the search box to learn more about \"Learning About Atrial Fibrillation. \" Current as of: September 21, 2016 Content Version: 11.4 © 0404-1750 [employer]. Care instructions adapted under license by [employer] (which disclaims liability or warranty for this information). If you have questions about a medical condition or this instruction, always ask your healthcare professional. [address] any warranty or liability for your use of this information. Atrial Flutter: Care Instructions Your Care Instructions Atrial flutter is a type of heartbeat problem (arrhythmia) that usually causes a fast heart rate. In atrial flutter, a problem with the heart's electrical system causes the two upper parts of the heart (the right atrium and the left atrium) to flutter, or beat very fast. Atrial flutter might be diagnosed using an an electrocardiogram (EKG). An EKG translates the heart's electrical activity into line tracings on paper. Treating atrial flutter is important for several reasons. The change in heartbeat can cause blood clots. The clots can travel from your heart to your brain and cause a stroke. A fast heartbeat can make you feel lightheaded, dizzy, and weak. And over time, it can also increase your risk for heart failure. Atrial flutter is often the result of another heart condition, such as coronary artery disease or some other heart rhythm problems. Making changes to improve your heart health will help you stay healthy and active. Your doctor may prescribe medicines to help slow down your heartbeat.  You may also take medicine to help prevent a stroke. In some cases, a procedure called catheter ablation is done to stop atrial flutter. Follow-up care is a key part of your treatment and safety. Be sure to make and go to all appointments, and call your doctor if you are having problems. It's also a good idea to know your test results and keep a list of the medicines you take. How can you care for yourself at home? Medicines ? · Take your medicines exactly as prescribed. Call your doctor if you think you are having a problem with your medicine. You will get more details on the specific medicines your doctor prescribes. ? · If your doctor has given you a blood thinner to prevent a stroke, be sure you get instructions about how to take your medicine safely. Blood thinners can cause serious bleeding problems. ? · Do not take any vitamins, over-the-counter drugs, or herbal products without talking to your doctor first. ? Lifestyle changes ? · Do not smoke. Smoking can increase your chance of a stroke and heart attack. If you need help quitting, talk to your doctor about stop-smoking programs and medicines. These can increase your chances of quitting for good. ? · Eat a heart-healthy diet. ? · Stay at a healthy weight. Lose weight if you need to.  
? · Limit alcohol to 2 drinks a day for men and 1 drink a day for women. Too much alcohol can cause health problems. ? · Avoid colds and flu. Get a pneumococcal vaccine shot. If you have had one before, ask your doctor whether you need another dose. Get a flu shot every year. If you must be around people with colds or flu, wash your hands often. Activity ? · Talk to your doctor about what type and level of exercise is safe for you. Start light exercise if your doctor says it is okay. Walking is a good choice. Try for at least 30 minutes on most days of the week. You also may want to swim, bike, or do other activities. ? · When you exercise, watch for signs that your heart is working too hard. You are pushing too hard if you can't talk while you exercise. If you become short of breath or dizzy or have chest pain, sit down and rest right away. When should you call for help? Call 911 anytime you think you may need emergency care. For example, call if: 
? · You have symptoms of a stroke. These may include: 
¨ Sudden numbness, tingling, weakness, or loss of movement in your face, arm, or leg, especially on only one side of your body. ¨ Sudden vision changes. ¨ Sudden trouble speaking. ¨ Sudden confusion or trouble understanding simple statements. ¨ Sudden problems with walking or balance. ¨ A sudden, severe headache that is different from past headaches. ? · You passed out (lost consciousness). ?Call your doctor now or seek immediate medical care if: 
? · You have new or increased shortness of breath. ? · You feel dizzy or lightheaded, or you feel like you may faint. ? · Your heart rate becomes irregular. ? · You can feel your heart flutter in your chest or skip heartbeats. Tell your doctor if these symptoms are new or worse. ? Watch closely for changes in your health, and be sure to contact your doctor if you have any problems. Where can you learn more? Go to http://mallory-bonnie.info/. Enter P251 in the search box to learn more about \"Atrial Flutter: Care Instructions. \" Current as of: September 21, 2016 Content Version: 11.4 © 7913-2279 [employer]. Care instructions adapted under license by [employer] (which disclaims liability or warranty for this information). If you have questions about a medical condition or this instruction, always ask your healthcare professional. [address] any warranty or liability for your use of this information. Arthritis: Care Instructions Your Care Instructions Arthritis, also called osteoarthritis, is a breakdown of the cartilage that cushions your joints. When the cartilage wears down, your bones rub against each other. This causes pain and stiffness. Many people have some arthritis as they age. Arthritis most often affects the joints of the spine, hands, hips, knees, or feet. You can take simple measures to protect your joints, ease your pain, and help you stay active. Follow-up care is a key part of your treatment and safety. Be sure to make and go to all appointments, and call your doctor if you are having problems. It's also a good idea to know your test results and keep a list of the medicines you take. How can you care for yourself at home? · Stay at a healthy weight. Being overweight puts extra strain on your joints. · Talk to your doctor or physical therapist about exercises that will help ease joint pain. ¨ Stretch. You may enjoy gentle forms of yoga to help keep your joints and muscles flexible. ¨ Walk instead of jog. Other types of exercise that are less stressful on the joints include riding a bicycle, swimming, holly chi, or water exercise. ¨ Lift weights. Strong muscles help reduce stress on your joints. Stronger thigh muscles, for example, take some of the stress off of the knees and hips. Learn the right way to lift weights so you do not make joint pain worse. · Take your medicines exactly as prescribed. Call your doctor if you think you are having a problem with your medicine. · Take pain medicines exactly as directed. ¨ If the doctor gave you a prescription medicine for pain, take it as prescribed. ¨ If you are not taking a prescription pain medicine, ask your doctor if you can take an over-the-counter medicine. · Use a cane, crutch, walker, or another device if you need help to get around. These can help rest your joints. You also can use other things to make life easier, such as a higher toilet seat and padded handles on kitchen utensils. · Do not sit in low chairs, which can make it hard to get up. · Put heat or cold on your sore joints as needed. Use whichever helps you most. You also can take turns with hot and cold packs. ¨ Apply heat 2 or 3 times a day for 20 to 30 minutes-using a heating pad, hot shower, or hot pack-to relieve pain and stiffness. ¨ Put ice or a cold pack on your sore joint for 10 to 20 minutes at a time. Put a thin cloth between the ice and your skin. When should you call for help? Call your doctor now or seek immediate medical care if: 
? · You have sudden swelling, warmth, or pain in any joint. ? · You have joint pain and a fever or rash. ? · You have such bad pain that you cannot use a joint. ? Watch closely for changes in your health, and be sure to contact your doctor if: 
? · You have mild joint symptoms that continue even with more than 6 weeks of care at home. ? · You have stomach pain or other problems with your medicine. Where can you learn more? Go to http://mallory-bonnie.info/. Enter C330 in the search box to learn more about \"Arthritis: Care Instructions. \" Current as of: October 31, 2016 Content Version: 11.4 © 9185-7438 [employer]. Care instructions adapted under license by [employer] (which disclaims liability or warranty for this information). If you have questions about a medical condition or this instruction, always ask your healthcare professional. [place] [address] any warranty or liability for your use of this information. DASH Diet: Care Instructions Your Care Instructions The DASH diet is an eating plan that can help lower your blood pressure. DASH stands for Dietary Approaches to Stop Hypertension. Hypertension is high blood pressure. The DASH diet focuses on eating foods that are high in calcium, potassium, and magnesium. These nutrients can lower blood pressure.  The foods that are highest in these nutrients are fruits, vegetables, low-fat dairy products, nuts, seeds, and legumes. But taking calcium, potassium, and magnesium supplements instead of eating foods that are high in those nutrients does not have the same effect. The DASH diet also includes whole grains, fish, and poultry. The DASH diet is one of several lifestyle changes your doctor may recommend to lower your high blood pressure. Your doctor may also want you to decrease the amount of sodium in your diet. Lowering sodium while following the DASH diet can lower blood pressure even further than just the DASH diet alone. Follow-up care is a key part of your treatment and safety. Be sure to make and go to all appointments, and call your doctor if you are having problems. It's also a good idea to know your test results and keep a list of the medicines you take. How can you care for yourself at home? Following the DASH diet · Eat 4 to 5 servings of fruit each day. A serving is 1 medium-sized piece of fruit, ½ cup chopped or canned fruit, 1/4 cup dried fruit, or 4 ounces (½ cup) of fruit juice. Choose fruit more often than fruit juice. · Eat 4 to 5 servings of vegetables each day. A serving is 1 cup of lettuce or raw leafy vegetables, ½ cup of chopped or cooked vegetables, or 4 ounces (½ cup) of vegetable juice. Choose vegetables more often than vegetable juice. · Get 2 to 3 servings of low-fat and fat-free dairy each day. A serving is 8 ounces of milk, 1 cup of yogurt, or 1 ½ ounces of cheese. · Eat 6 to 8 servings of grains each day. A serving is 1 slice of bread, 1 ounce of dry cereal, or ½ cup of cooked rice, pasta, or cooked cereal. Try to choose whole-grain products as much as possible. · Limit lean meat, poultry, and fish to 2 servings each day. A serving is 3 ounces, about the size of a deck of cards.  
· Eat 4 to 5 servings of nuts, seeds, and legumes (cooked dried beans, lentils, and split peas) each week. A serving is 1/3 cup of nuts, 2 tablespoons of seeds, or ½ cup of cooked beans or peas. · Limit fats and oils to 2 to 3 servings each day. A serving is 1 teaspoon of vegetable oil or 2 tablespoons of salad dressing. · Limit sweets and added sugars to 5 servings or less a week. A serving is 1 tablespoon jelly or jam, ½ cup sorbet, or 1 cup of lemonade. · Eat less than 2,300 milligrams (mg) of sodium a day. If you limit your sodium to 1,500 mg a day, you can lower your blood pressure even more. Tips for success · Start small. Do not try to make dramatic changes to your diet all at once. You might feel that you are missing out on your favorite foods and then be more likely to not follow the plan. Make small changes, and stick with them. Once those changes become habit, add a few more changes. · Try some of the following: ¨ Make it a goal to eat a fruit or vegetable at every meal and at snacks. This will make it easy to get the recommended amount of fruits and vegetables each day. ¨ Try yogurt topped with fruit and nuts for a snack or healthy dessert. ¨ Add lettuce, tomato, cucumber, and onion to sandwiches. ¨ Combine a ready-made pizza crust with low-fat mozzarella cheese and lots of vegetable toppings. Try using tomatoes, squash, spinach, broccoli, carrots, cauliflower, and onions. ¨ Have a variety of cut-up vegetables with a low-fat dip as an appetizer instead of chips and dip. ¨ Sprinkle sunflower seeds or chopped almonds over salads. Or try adding chopped walnuts or almonds to cooked vegetables. ¨ Try some vegetarian meals using beans and peas. Add garbanzo or kidney beans to salads. Make burritos and tacos with mashed bautista beans or black beans. Where can you learn more? Go to http://mallory-bonnie.info/. Enter F031 in the search box to learn more about \"DASH Diet: Care Instructions. \" Current as of: September 21, 2016 Content Version: 11.4 © 7430-6357 "[employer]. Care instructions adapted under license by [employer] (which disclaims liability or warranty for this information). If you have questions about a medical condition or this instruction, always ask your healthcare professional. [address] any warranty or liability for your use of this information. Electrolyte Imbalance: Care Instructions Your Care Instructions Electrolytes are minerals in your blood. They include sodium, potassium, calcium, and magnesium. When they are not at the right levels, you can feel very ill. You may not know what is causing it, but you know something is wrong. You may feel weak or numb, have muscle spasms, or twitch. Your heart may beat fast. Symptoms are different with each mineral. Too much is as bad as too little. Minerals help keep your body working as it should. Vomiting, diarrhea, and fever can cause you to lose minerals. A problem with your kidneys can tip a mineral out of balance. So can taking certain medicines. Your doctor may do more tests. He or she may change your medicine and diet. If you are low in one or more minerals, they may be given through a tube into your vein (IV). Your doctor may have you take or drink special fluids or pills. If your kidneys are failing, your blood may be filtered. This is called dialysis. It can put the minerals back in balance. Follow-up care is a key part of your treatment and safety. Be sure to make and go to all appointments, and call your doctor if you are having problems. It's also a good idea to know your test results and keep a list of the medicines you take. How can you care for yourself at home? · Take your medicines exactly as prescribed. Call your doctor if you have any problems with your medicines. You will get more details on the specific medicines your doctor prescribes.  
· Do not take any medicine without talking to your doctor first. This includes prescription, over-the-counter, and herbal medicines. · If you have kidney, heart, or liver disease and have to limit fluids, talk with your doctor before you increase the amount of fluids you drink. · Your doctor or dietitian may give you a diet plan to help balance your minerals. Follow the diet carefully. When should you call for help? Call 911 anytime you think you may need emergency care. For example, call if: 
? · You passed out (lost consciousness). ? · Your heartbeat seems to be irregular. It might be speeding up and then slowing down or skipping beats. ?Call your doctor now or seek immediate medical care if: 
? · You have muscle aches. ? · You feel very weak. ? · You are confused or cannot think clearly. ? Watch closely for changes in your health, and be sure to contact your doctor if: 
? · You do not get better as expected. Where can you learn more? Go to http://mallory-bonnie.info/. Enter U344 in the search box to learn more about \"Electrolyte Imbalance: Care Instructions. \" Current as of: May 12, 2017 Content Version: 11.4 © 6304-4995 [employer]. Care instructions adapted under license by [employer] (which disclaims liability or warranty for this information). If you have questions about a medical condition or this instruction, always ask your healthcare professional. [address] any warranty or liability for your use of this information. Osteoarthritis: Care Instructions Your Care Instructions Arthritis is a common health problem in which the joints are inflamed. There are several kinds of arthritis. Osteoarthritis is caused by a breakdown of cartilage, the hard, thick tissue that cushions the joints. It causes pain, stiffness, and swelling, often in the spine, fingers, hips, and knees. Osteoarthritis can happen at any age, but it is most common in older people. Osteoarthritis never goes away completely, but it can be controlled. Medicine and home treatment can reduce the pain and prevent the arthritis from getting worse. Follow-up care is a key part of your treatment and safety. Be sure to make and go to all appointments, and call your doctor if you are having problems. It's also a good idea to know your test results and keep a list of the medicines you take. How can you care for yourself at home? · Take a warm shower or bath in the morning to relieve stiffness. Avoid sitting still afterwards. · If the joint is not swollen, use moist heat, like a warm, damp towel, for 20 to 30 minutes, 2 or 3 times a day. Do not use heat on a swollen joint. · If the joint is swollen, use ice or cold packs for 10 to 20 minutes, once an hour. Cold will help relieve pain and reduce inflammation. Put a thin cloth between the ice and your skin. · To prevent stiffness, gently move the joint through its full range of motion several times a day. · If the joint hurts, avoid activities that put a strain on it for a few days. Take rest breaks throughout the day. · Get regular exercise. Walking, swimming, yoga, biking, holly chi, and water aerobics are good exercises that are gentle on the joints. · Reach and stay at a healthy weight. If you need to lose or maintain weight, regular exercise and a healthy diet will help. Extra weight can strain the joints, especially the knees and hips, and make the pain worse. Losing even a few pounds may help. · Take pain medicines exactly as directed. ¨ If the doctor gave you a prescription medicine for pain, take it as prescribed. ¨ If you are not taking a prescription pain medicine, ask your doctor if you can take an over-the-counter medicine. When should you call for help? Call your doctor now or seek immediate medical care if: 
? · The pain is so bad that you cannot use the joint. ? · You have sudden back pain with weakness in your legs or loss of bowel or bladder control. ? · Your stools are black and tarlike or have streaks of blood. ? · You have severe pain and swelling in more than one joint. ? Watch closely for changes in your health, and be sure to contact your doctor if: 
? · You have side effects from the medicines, like belly pain, ongoing heartburn, or nausea. ? · Joint pain continues for more than 6 weeks, and home treatment is not helping. Where can you learn more? Go to http://mallory-bonnie.info/. Enter S884 in the search box to learn more about \"Osteoarthritis: Care Instructions. \" Current as of: October 31, 2016 Content Version: 11.4 © 4505-5294 [employer]. Care instructions adapted under license by [employer] (which disclaims liability or warranty for this information). If you have questions about a medical condition or this instruction, always ask your healthcare professional. [place] [address] any warranty or liability for your use of this information. Chart Review Routing History Recipient Method Report Sent By Cezar Quinonez MD [[id number]] 2/1/2012  4:20 PM 02/01/2012 Tremayne Woodward MD  
Phone: [phone] In Kaycee Aktino, [address] [place] [address] [[id number]] 9/14/2012  7:43 PM 09/14/2012 Nicanor Fuentes MD  
Phone: [phone] In [place], [address] [place] [address] [[id number]] 9/14/2012  7:43 PM 09/14/2012 Capri Reyes MD  
[facility] [address] Phone: [phone] Mail IP Auto Routed [place]  [id number] 11/4/2016  8:09 AM 11/04/2016 Saima Weaver MD  
Phone: [phone] In Basket IP Auto Routed [place] MD [[id number]] 11/4/2016  8:09 AM 11/04/2016 Izzy Montgomery MD  
Phone: [phone] In Basket IP Auto Routed Em Christensen MD [[id number]] 11/4/2016  8:09 AM 11/04/2016 Amador Murillo MD  
Phone: [phone] In Basket IP Auto Routed Em Christensen MD [[id number]] 11/4/2016  8:09 AM 11/04/2016 Kita Blackwood MD  
[address] [employer]  [address] [employer] [address] [facility] Phone: [phone] Mail IP Auto Routed MD Joe [[id number]] 12/23/2016  5:43 PM 12/23/2016 Jean-Claude Moran MD  
Phone: [phone] In Basket IP Auto Routed [employer], MD [[id number]] 6/11/2018  1:58 PM 06/11/2018 Zan Lezama MD  
Phone: [phone] In Basket IP Auto Routed Em Christensen MD [[id number]] 6/11/2018  1:58 PM 06/11/2018 Paula Hdz MD  
Phone: [phone] In Basket IP Auto Routed [employer], MD [[id number]] 6/11/2018  1:58 PM 06/11/2018 Kita Blackwood MD  
[address] [employer]  [address] [employer] [address] [place] Phone: [phone] Mail Cezar Christensen  [id number] 6/11/2018  1:58 PM 06/11/2018

## 2018-06-05 NOTE — IP AVS SNAPSHOT
[address] [id number] [facility] [address] Patient: Aamir Martinez MRN: [id number] DO About your hospitalization You were admitted on:  2018 You last received care in the:  [facility] [address] You were discharged on:  2018 Why you were hospitalized Your primary diagnosis was:  Right Forearm Cellulitis Your diagnoses also included:  Hiv (Human Immunodeficiency Virus Infection) (Hcc), Joint Pain, Elevated Troponin, Essential Hypertension, Benign, Noncompliance, Chronic Anticoagulation, Atrial Flutter (Hcc) Follow-up Information Follow up With Details Comments Contact Info Radhika Pastor MD  [facility] is a [facility] patient [facility] [email or web address] [address] [facility] 
[phone] Tata More MD Go on 7/10/2018 follow up @1:45pm [address] [place] [address] [address] [facility] [address] [phone] Your Scheduled Appointments Tuesday July 10, 2018  1:45 PM EDT Office Visit with Tata More MD  
[facility] [address] [place] [address] 102 [facility] [id number] [phone] Discharge Orders None A check lit indicates which time of day the medication should be taken. My Medications START taking these medications Instructions Each Dose to Equal  
 Morning Noon Evening Bedtime  
 acetaminophen 500 mg tablet Commonly known as:  TYLENOL Your last dose was: Your next dose is: Take 1 Tab by mouth every six (6) hours as needed. Indications: Pain 500 mg  
    
   
   
   
  
 azithromycin 250 mg tablet Commonly known as:  Mahala Radha Start taking on:  6/15/2018 Your last dose was: Your next dose is:    
   
   
 500 MG PO DAILY X 2 DAYS budesonide-formoterol 160-4.5 mcg/actuation Hfaa Commonly known as:  SYMBICORT Your last dose was: Your next dose is: Take 2 Puffs by inhalation two (2) times a day. 2 Puff  
    
   
   
   
  
 diclofenac 1 % Gel Commonly known as:  VOLTAREN Your last dose was: Your next dose is:    
   
   
 Apply 2 g to affected area three (3) times daily as needed for Pain. Apply dose (2 gm or 4 gm) to each location. If treatment site is the hands, patients should wait at least one (1) hour to wash their hands. APPLY TO right wrist  
 2 g  
    
   
   
   
  
 nicotine 14 mg/24 hr patch Commonly known as:  Yajaira De Los Santos Your last dose was: Your next dose is:    
   
   
 1 Patch by TransDERmal route daily for 30 days. 1 Patch CHANGE how you take these medications Instructions Each Dose to Equal  
 Morning Noon Evening Bedtime * amLODIPine 5 mg tablet Commonly known as:  Jalil Josee What changed:  Another medication with the same name was added. Make sure you understand how and when to take each. Your last dose was: Your next dose is: Take 5 mg by mouth two (2) times a day. 5 mg * amLODIPine 10 mg tablet Commonly known as:  Jalil Josee Start taking on:  6/15/2018 What changed: You were already taking a medication with the same name, and this prescription was added. Make sure you understand how and when to take each. Your last dose was: Your next dose is: Take 1 Tab by mouth daily. 10 mg  
    
   
   
   
  
 * Notice: This list has 2 medication(s) that are the same as other medications prescribed for you. Read the directions carefully, and ask your doctor or other care provider to review them with you. CONTINUE taking these medications Instructions Each Dose to Equal  
 Morning Noon Evening Bedtime albuterol 90 mcg/actuation inhaler Commonly known as:  PROVENTIL HFA Your last dose was: Your next dose is: Take 1 Puff by inhalation every four (4) hours as needed for Wheezing. 1 Puff  
    
   
   
   
  
 apixaban 5 mg tablet Commonly known as:  Rula Yarbrough Your last dose was: Your next dose is: Take 1 Tab by mouth every twelve (12) hours. 5 mg  
    
   
   
   
  
 carvedilol 25 mg tablet Commonly known as:  Dom [place] Your last dose was: Your next dose is: Take 0.5 Tabs by mouth two (2) times daily (with meals). 12.5 mg  
    
   
   
   
  
 dapsone 100 mg tablet Your last dose was: Your next dose is: Take 1 Tab by mouth daily. 100 mg  
    
   
   
   
  
 dolutegravir 50 mg Tab tablet Commonly known as:  Janeen Soria Your last dose was: Your next dose is: Take 1 Tab by mouth daily. 50 mg  
    
   
   
   
  
 emtricitabine-tenofovir alafen tablet Commonly known as:  DESCOVY Your last dose was: Your next dose is: Take 1 Tab by mouth daily. 1 Tab  
    
   
   
   
  
 folic acid 1 mg tablet Commonly known as:  Chary Your last dose was: Your next dose is: Take 1 Tab by mouth daily. 1 mg  
    
   
   
   
  
 magnesium oxide 400 mg Cap Your last dose was: Your next dose is: Take 400 mg by mouth daily. 400 mg  
    
   
   
   
  
 pravastatin 40 mg tablet Commonly known as:  PRAVACHOL Your last dose was: Your next dose is: Take 1 Tab by mouth nightly. 40 mg  
    
   
   
   
  
 pyridoxine (vitamin B6) 50 mg tablet Commonly known as:  VITAMIN B-6 Your last dose was: Your next dose is: Take 1 Tab by mouth daily. 50 mg  
    
   
   
   
  
  
STOP taking these medications   
 amoxicillin 500 mg Tab digoxin 0.25 mg tablet Commonly known as:  LANOXIN  
   
  
 furosemide 40 mg tablet Commonly known as:  LASIX  
   
  
 lisinopril 10 mg tablet Commonly known as:  PRINIVIL, ZESTRIL  
   
  
 potassium chloride 20 mEq tablet Commonly known as:  K-DUR KLOR-CON  
   
  
 spironolactone 25 mg tablet Commonly known as:  ALDACTONE Where to Get Your Medications Information on where to get these meds will be given to you by the nurse or doctor. ! Ask your nurse or doctor about these medications  
  acetaminophen 500 mg tablet  
 albuterol 90 mcg/actuation inhaler  
 amLODIPine 10 mg tablet  
 apixaban 5 mg tablet  
 azithromycin 250 mg tablet  
 budesonide-formoterol 160-4.5 mcg/actuation Hfaa  
 carvedilol 25 mg tablet  
 dapsone 100 mg tablet  
 diclofenac 1 % Gel  
 dolutegravir 50 mg Tab tablet  
 emtricitabine-tenofovir alafen tablet  
 folic acid 1 mg tablet  
 magnesium oxide 400 mg Cap  
 nicotine 14 mg/24 hr patch  
 pravastatin 40 mg tablet  
 pyridoxine (vitamin B6) 50 mg tablet Discharge Instructions Acquired Immunodeficiency Syndrome (AIDS): Care Instructions Your Care Instructions Human immunodeficiency virus (HIV) is a virus that attacks the body's immune system. This makes it hard for the body to fight infection and disease. HIV causes acquired immunodeficiency syndrome (AIDS). AIDS is the last and most severe stage of the HIV infection. HIV attacks and destroys a type of white blood cell called CD4+ cells, or helper cells. These cells are an important part of the immune system. You have AIDS when one or both of the following are true: 
· Your CD4+ cell count is below 200 cells per microliter (µL) of blood. · You get certain infections or cancers that are usually only seen in people who have problems with their immune system. Follow-up care is a key part of your treatment and safety.  Be sure to make and go to all appointments, and call your doctor if you are having problems. It's also a good idea to know your test results and keep a list of the medicines you take. How can you care for yourself at home? · Take your medicines exactly as prescribed. Call your doctor if you think you are having a problem with your medicine. · Get the vaccines and medicine you need to prevent infections such as pneumonia or tuberculosis. · Learn more about HIV and AIDS so you can be active in your health care decisions. · Join a support group. These let you share experiences and seek support from others in the same situation. · Do not smoke. People with HIV have an increased risk of heart attacks and lung cancer. Smoking increases these risks even more. If you need help quitting, talk to your doctor about stop-smoking programs and medicines. These can increase your chances of quitting for good. · Do not use illegal drugs. And limit your use of alcohol. · Eat a healthy, balanced diet. This keeps your immune system as strong as possible. · Exercise regularly. It can reduce your stress, increase your energy, and lift your mood. · If you have not already done so, prepare a list of advance directives. These tell your doctor and family members what kind of care you want if you become unable to speak for yourself. Helping a partner with AIDS If your partner has AIDS, you can help provide emotional, physical, and medical care that will improve his or her quality of life. · Give emotional support. Listen to and encourage your partner. · Protect yourself and others against HIV infection and other infections by: ¨ Not sharing needles. ¨ Always using condoms during sex. · Protect your partner by staying away from him or her when you are sick. · Take care of yourself. Share your experiences with others and get help when you need it. · Learn how to give medicines, and know where to get help in an emergency. When should you call for help? Call 911 anytime you think you may need emergency care. For example, call if: 
? · You passed out (lost consciousness). ? · You have severe shortness of breath. ? · You have symptoms of a stroke. These may include: 
¨ Sudden numbness, tingling, weakness, or loss of movement in your face, arm, or leg, especially on only one side of your body. ¨ Sudden vision changes. ¨ Sudden trouble speaking. ¨ Sudden confusion or trouble understanding simple statements. ¨ Sudden problems with walking or balance. ¨ A sudden, severe headache that is different from past headaches. ?Call your doctor now or seek immediate medical care if: 
? · You have signs of a new or worse problem from HIV, such as: ¨ A fever. ¨ Coughing. ¨ Diarrhea. ¨ Skin changes. ¨ Bleeding. ¨ Confusion or not thinking clearly. ? Watch closely for changes in your health, and be sure to contact your doctor if you have any problems. Where can you learn more? Go to http://mallory-bonnie.info/. Enter Q948 in the search box to learn more about \"Acquired Immunodeficiency Syndrome (AIDS): Care Instructions. \" Current as of: March 3, 2017 Content Version: 11.4 © 6585-5493 [employer]ard[employer]Way. Care instructions adapted under license by [employer] (which disclaims liability or warranty for this information). If you have questions about a medical condition or this instruction, always ask your healthcare professional. [place] [address] any warranty or liability for your use of this information. Atrial Fibrillation: Care Instructions Your Care Instructions Atrial fibrillation is an irregular and often fast heartbeat. Treating this condition is important for several reasons. It can cause blood clots, which can travel from your heart to your brain and cause a stroke. If you have a fast heartbeat, you may feel lightheaded, dizzy, and weak.  An irregular heartbeat can also increase your risk for heart failure. Atrial fibrillation is often the result of another heart condition, such as high blood pressure or coronary artery disease. Making changes to improve your heart condition will help you stay healthy and active. Follow-up care is a key part of your treatment and safety. Be sure to make and go to all appointments, and call your doctor if you are having problems. It's also a good idea to know your test results and keep a list of the medicines you take. How can you care for yourself at home? Medicines ? · Take your medicines exactly as prescribed. Call your doctor if you think you are having a problem with your medicine. You will get more details on the specific medicines your doctor prescribes. ? · If your doctor has given you a blood thinner to prevent a stroke, be sure you get instructions about how to take your medicine safely. Blood thinners can cause serious bleeding problems. ? · Do not take any vitamins, over-the-counter drugs, or herbal products without talking to your doctor first. ? Lifestyle changes ? · Do not smoke. Smoking can increase your chance of a stroke and heart attack. If you need help quitting, talk to your doctor about stop-smoking programs and medicines. These can increase your chances of quitting for good. ? · Eat a heart-healthy diet. ? · Stay at a healthy weight. Lose weight if you need to.  
? · Limit alcohol to 2 drinks a day for men and 1 drink a day for women. Too much alcohol can cause health problems. ? · Avoid colds and flu. Get a pneumococcal vaccine shot. If you have had one before, ask your doctor whether you need another dose. Get a flu shot every year. If you must be around people with colds or flu, wash your hands often. Activity ? · If your doctor recommends it, get more exercise. Walking is a good choice. Bit by bit, increase the amount you walk every day.  Try for at least 30 minutes on most days of the week. You also may want to swim, bike, or do other activities. Your doctor may suggest that you join a cardiac rehabilitation program so that you can have help increasing your physical activity safely. ? · Start light exercise if your doctor says it is okay. Even a small amount will help you get stronger, have more energy, and manage stress. Walking is an easy way to get exercise. Start out by walking a little more than you did in the hospital. Gradually increase the amount you walk. ? · When you exercise, watch for signs that your heart is working too hard. You are pushing too hard if you cannot talk while you are exercising. If you become short of breath or dizzy or have chest pain, sit down and rest immediately. ? · Check your pulse regularly. Place two fingers on the artery at the palm side of your wrist, in line with your thumb. If your heartbeat seems uneven or fast, talk to your doctor. When should you call for help? Call 911 anytime you think you may need emergency care. For example, call if: 
? · You have symptoms of a heart attack. These may include: ¨ Chest pain or pressure, or a strange feeling in the chest. 
¨ Sweating. ¨ Shortness of breath. ¨ Nausea or vomiting. ¨ Pain, pressure, or a strange feeling in the back, neck, jaw, or upper belly or in one or both shoulders or arms. ¨ Lightheadedness or sudden weakness. ¨ A fast or irregular heartbeat. After you call 911, the  may tell you to chew 1 adult-strength or 2 to 4 low-dose aspirin. Wait for an ambulance. Do not try to drive yourself. ? · You have symptoms of a stroke. These may include: 
¨ Sudden numbness, tingling, weakness, or loss of movement in your face, arm, or leg, especially on only one side of your body. ¨ Sudden vision changes. ¨ Sudden trouble speaking. ¨ Sudden confusion or trouble understanding simple statements. ¨ Sudden problems with walking or balance. ¨ A sudden, severe headache that is different from past headaches. ? · You passed out (lost consciousness). ?Call your doctor now or seek immediate medical care if: 
? · You have new or increased shortness of breath. ? · You feel dizzy or lightheaded, or you feel like you may faint. ? · Your heart rate becomes irregular. ? · You can feel your heart flutter in your chest or skip heartbeats. Tell your doctor if these symptoms are new or worse. ? Watch closely for changes in your health, and be sure to contact your doctor if you have any problems. Where can you learn more? Go to http://mlalory-bonnie.info/. Enter U020 in the search box to learn more about \"Atrial Fibrillation: Care Instructions. \" Current as of: September 21, 2016 Content Version: 11.4 © 2737-3764 [employer]opp[employer]. Care instructions adapted under license by [employer] (which disclaims liability or warranty for this information). If you have questions about a medical condition or this instruction, always ask your healthcare professional. [address] [address] any warranty or liability for your use of this information. Learning About Atrial Fibrillation What is atrial fibrillation? Atrial fibrillation (say \"AY-tree-caleb hvg-xhfn-DNL-shun\") is the most common type of irregular heartbeat (arrhythmia). Normally, the heart beats in a strong, steady rhythm. In atrial fibrillation, a problem with the heart's electrical system causes the two upper parts of the heart (the atria) to quiver, or fibrillate. Your heart rate also may be faster than normal. 
Atrial fibrillation can be dangerous because if the heartbeat isn't strong and steady, blood can collect, or pool, in the atria. And pooled blood is more likely to form clots. Clots can travel to the brain, block blood flow, and cause a stroke. Atrial fibrillation can also lead to heart failure. Treatment for atrial fibrillation helps prevent stroke and heart failure. It also helps relieve symptoms. Atrial fibrillation is often caused by another heart problem. It may happen after heart surgery. It may also be caused by other problems, such as an overactive thyroid gland or lung disease. Many people with atrial fibrillation are able to live full and active lives. What are the symptoms? Some people feel symptoms when they have episodes of atrial fibrillation. But other people don't notice any symptoms. If you have symptoms, you may feel: · A fluttering, racing, or pounding feeling in your chest called palpitations. · Weak or tired. · Dizzy or lightheaded. · Short of breath. · Chest pain. · Confused. You may notice signs of atrial fibrillation when you check your pulse. Your pulse may seem uneven or fast. 
What can you expect when you have atrial fibrillation? At first, spells of atrial fibrillation may come on suddenly and last a short time. It may go away on its own or it goes away after treatment. This is called paroxysmal atrial fibrillation. Over time, the spells may last longer and occur more often. They often don't go away on their own. How is it treated? Treatments can help you feel better and prevent future problems, especially stroke and heart failure. The main types of treatment slow the heart rate, control the heart rhythm, and help prevent stroke. Your treatment will depend on the cause of your atrial fibrillation, your symptoms, and your risk for stroke. · Heart rate treatment. Medicine may be used to slow your heart rate. Your heartbeat may still be irregular. But these medicines keep your heart from beating too fast. They may also help relieve your symptoms. · Heart rhythm treatment. Different treatments may be used to try to stop atrial fibrillation and keep it from returning. They can also relieve symptoms.  These treatments include medicine, electrical cardioversion to shock the heart back to a normal rhythm, a procedure called catheter ablation, and heart surgery. · Stroke prevention. You and your doctor can decide how to lower your risk. You may decide to take a blood-thinning medicine, such as aspirin or an anticoagulant. How can you live well with it? You can live well and help manage atrial fibrillation by having a heart-healthy lifestyle. To have a heart-healthy lifestyle: · Don't smoke. · Eat heart-healthy foods. · Be active. Talk to your doctor about what type and level of exercise is safe for you. · Stay at a healthy weight. Lose weight if you need to. · Manage stress. · Avoid alcohol if it triggers symptoms. · Manage other health problems such as high blood pressure, high cholesterol, and diabetes. · Avoid getting sick from the flu. Get a flu shot every year. Where can you learn more? Go to http://malloryDKT Technologybonnie.info/. Enter [phone] in the search box to learn more about \"Learning About Atrial Fibrillation. \" Current as of: September 21, 2016 Content Version: 11.4 © 0089-7672 ChatLingual. Care instructions adapted under license by Origo.by (which disclaims liability or warranty for this information). If you have questions about a medical condition or this instruction, always ask your healthcare professional. [address] any warranty or liability for your use of this information. Atrial Flutter: Care Instructions Your Care Instructions Atrial flutter is a type of heartbeat problem (arrhythmia) that usually causes a fast heart rate. In atrial flutter, a problem with the heart's electrical system causes the two upper parts of the heart (the right atrium and the left atrium) to flutter, or beat very fast. Atrial flutter might be diagnosed using an an electrocardiogram (EKG). An EKG translates the heart's electrical activity into line tracings on paper. Treating atrial flutter is important for several reasons. The change in heartbeat can cause blood clots. The clots can travel from your heart to your brain and cause a stroke. A fast heartbeat can make you feel lightheaded, dizzy, and weak. And over time, it can also increase your risk for heart failure. Atrial flutter is often the result of another heart condition, such as coronary artery disease or some other heart rhythm problems. Making changes to improve your heart health will help you stay healthy and active. Your doctor may prescribe medicines to help slow down your heartbeat. You may also take medicine to help prevent a stroke. In some cases, a procedure called catheter ablation is done to stop atrial flutter. Follow-up care is a key part of your treatment and safety. Be sure to make and go to all appointments, and call your doctor if you are having problems. It's also a good idea to know your test results and keep a list of the medicines you take. How can you care for yourself at home? Medicines ? · Take your medicines exactly as prescribed. Call your doctor if you think you are having a problem with your medicine. You will get more details on the specific medicines your doctor prescribes. ? · If your doctor has given you a blood thinner to prevent a stroke, be sure you get instructions about how to take your medicine safely. Blood thinners can cause serious bleeding problems. ? · Do not take any vitamins, over-the-counter drugs, or herbal products without talking to your doctor first. ? Lifestyle changes ? · Do not smoke. Smoking can increase your chance of a stroke and heart attack. If you need help quitting, talk to your doctor about stop-smoking programs and medicines. These can increase your chances of quitting for good. ? · Eat a heart-healthy diet. ? · Stay at a healthy weight. Lose weight if you need to.  
? · Limit alcohol to 2 drinks a day for men and 1 drink a day for women. Too much alcohol can cause health problems. ? · Avoid colds and flu. Get a pneumococcal vaccine shot. If you have had one before, ask your doctor whether you need another dose. Get a flu shot every year. If you must be around people with colds or flu, wash your hands often. Activity ? · Talk to your doctor about what type and level of exercise is safe for you. Start light exercise if your doctor says it is okay. Walking is a good choice. Try for at least 30 minutes on most days of the week. You also may want to swim, bike, or do other activities. ? · When you exercise, watch for signs that your heart is working too hard. You are pushing too hard if you can't talk while you exercise. If you become short of breath or dizzy or have chest pain, sit down and rest right away. When should you call for help? Call 911 anytime you think you may need emergency care. For example, call if: 
? · You have symptoms of a stroke. These may include: 
¨ Sudden numbness, tingling, weakness, or loss of movement in your face, arm, or leg, especially on only one side of your body. ¨ Sudden vision changes. ¨ Sudden trouble speaking. ¨ Sudden confusion or trouble understanding simple statements. ¨ Sudden problems with walking or balance. ¨ A sudden, severe headache that is different from past headaches. ? · You passed out (lost consciousness). ?Call your doctor now or seek immediate medical care if: 
? · You have new or increased shortness of breath. ? · You feel dizzy or lightheaded, or you feel like you may faint. ? · Your heart rate becomes irregular. ? · You can feel your heart flutter in your chest or skip heartbeats. Tell your doctor if these symptoms are new or worse. ? Watch closely for changes in your health, and be sure to contact your doctor if you have any problems. Where can you learn more? Go to http://mallory-bonnie.info/. Enter S996 in the search box to learn more about \"Atrial Flutter: Care Instructions. \" Current as of: September 21, 2016 Content Version: 11.4 © 0462-8410 Post-A-Vox. Care instructions adapted under license by Form[employer] (which disclaims liability or warranty for this information). If you have questions about a medical condition or this instruction, always ask your healthcare professional. [place] [address] any warranty or liability for your use of this information. Arthritis: Care Instructions Your Care Instructions Arthritis, also called osteoarthritis, is a breakdown of the cartilage that cushions your joints. When the cartilage wears down, your bones rub against each other. This causes pain and stiffness. Many people have some arthritis as they age. Arthritis most often affects the joints of the spine, hands, hips, knees, or feet. You can take simple measures to protect your joints, ease your pain, and help you stay active. Follow-up care is a key part of your treatment and safety. Be sure to make and go to all appointments, and call your doctor if you are having problems. It's also a good idea to know your test results and keep a list of the medicines you take. How can you care for yourself at home? · Stay at a healthy weight. Being overweight puts extra strain on your joints. · Talk to your doctor or physical therapist about exercises that will help ease joint pain. ¨ Stretch. You may enjoy gentle forms of yoga to help keep your joints and muscles flexible. ¨ Walk instead of jog. Other types of exercise that are less stressful on the joints include riding a bicycle, swimming, holly chi, or water exercise. ¨ Lift weights. Strong muscles help reduce stress on your joints. Stronger thigh muscles, for example, take some of the stress off of the knees and hips. Learn the right way to lift weights so you do not make joint pain worse. · Take your medicines exactly as prescribed. Call your doctor if you think you are having a problem with your medicine. · Take pain medicines exactly as directed. ¨ If the doctor gave you a prescription medicine for pain, take it as prescribed. ¨ If you are not taking a prescription pain medicine, ask your doctor if you can take an over-the-counter medicine. · Use a cane, crutch, walker, or another device if you need help to get around. These can help rest your joints. You also can use other things to make life easier, such as a higher toilet seat and padded handles on kitchen utensils. · Do not sit in low chairs, which can make it hard to get up. · Put heat or cold on your sore joints as needed. Use whichever helps you most. You also can take turns with hot and cold packs. ¨ Apply heat 2 or 3 times a day for 20 to 30 minutes-using a heating pad, hot shower, or hot pack-to relieve pain and stiffness. ¨ Put ice or a cold pack on your sore joint for 10 to 20 minutes at a time. Put a thin cloth between the ice and your skin. When should you call for help? Call your doctor now or seek immediate medical care if: 
? · You have sudden swelling, warmth, or pain in any joint. ? · You have joint pain and a fever or rash. ? · You have such bad pain that you cannot use a joint. ? Watch closely for changes in your health, and be sure to contact your doctor if: 
? · You have mild joint symptoms that continue even with more than 6 weeks of care at home. ? · You have stomach pain or other problems with your medicine. Where can you learn more? Go to http://mallory-bonnie.info/. Enter V857 in the search box to learn more about \"Arthritis: Care Instructions. \" Current as of: October 31, 2016 Content Version: 11.4 © 9946-4652 avocadostore.  Care instructions adapted under license by [employer] Folio (which disclaims liability or warranty for this information). If you have questions about a medical condition or this instruction, always ask your healthcare professional. [address] any warranty or liability for your use of this information. DASH Diet: Care Instructions Your Care Instructions The DASH diet is an eating plan that can help lower your blood pressure. DASH stands for Dietary Approaches to Stop Hypertension. Hypertension is high blood pressure. The DASH diet focuses on eating foods that are high in calcium, potassium, and magnesium. These nutrients can lower blood pressure. The foods that are highest in these nutrients are fruits, vegetables, low-fat dairy products, nuts, seeds, and legumes. But taking calcium, potassium, and magnesium supplements instead of eating foods that are high in those nutrients does not have the same effect. The DASH diet also includes whole grains, fish, and poultry. The DASH diet is one of several lifestyle changes your doctor may recommend to lower your high blood pressure. Your doctor may also want you to decrease the amount of sodium in your diet. Lowering sodium while following the DASH diet can lower blood pressure even further than just the DASH diet alone. Follow-up care is a key part of your treatment and safety. Be sure to make and go to all appointments, and call your doctor if you are having problems. It's also a good idea to know your test results and keep a list of the medicines you take. How can you care for yourself at home? Following the DASH diet · Eat 4 to 5 servings of fruit each day. A serving is 1 medium-sized piece of fruit, ½ cup chopped or canned fruit, 1/4 cup dried fruit, or 4 ounces (½ cup) of fruit juice. Choose fruit more often than fruit juice. · Eat 4 to 5 servings of vegetables each day.  A serving is 1 cup of lettuce or raw leafy vegetables, ½ cup of chopped or cooked vegetables, or 4 ounces (½ cup) of vegetable juice. Choose vegetables more often than vegetable juice. · Get 2 to 3 servings of low-fat and fat-free dairy each day. A serving is 8 ounces of milk, 1 cup of yogurt, or 1 ½ ounces of cheese. · Eat 6 to 8 servings of grains each day. A serving is 1 slice of bread, 1 ounce of dry cereal, or ½ cup of cooked rice, pasta, or cooked cereal. Try to choose whole-grain products as much as possible. · Limit lean meat, poultry, and fish to 2 servings each day. A serving is 3 ounces, about the size of a deck of cards. · Eat 4 to 5 servings of nuts, seeds, and legumes (cooked dried beans, lentils, and split peas) each week. A serving is 1/3 cup of nuts, 2 tablespoons of seeds, or ½ cup of cooked beans or peas. · Limit fats and oils to 2 to 3 servings each day. A serving is 1 teaspoon of vegetable oil or 2 tablespoons of salad dressing. · Limit sweets and added sugars to 5 servings or less a week. A serving is 1 tablespoon jelly or jam, ½ cup sorbet, or 1 cup of lemonade. · Eat less than 2,300 milligrams (mg) of sodium a day. If you limit your sodium to 1,500 mg a day, you can lower your blood pressure even more. Tips for success · Start small. Do not try to make dramatic changes to your diet all at once. You might feel that you are missing out on your favorite foods and then be more likely to not follow the plan. Make small changes, and stick with them. Once those changes become habit, add a few more changes. · Try some of the following: ¨ Make it a goal to eat a fruit or vegetable at every meal and at snacks. This will make it easy to get the recommended amount of fruits and vegetables each day. ¨ Try yogurt topped with fruit and nuts for a snack or healthy dessert. ¨ Add lettuce, tomato, cucumber, and onion to sandwiches. ¨ Combine a ready-made pizza crust with low-fat mozzarella cheese and lots of vegetable toppings.  Try using tomatoes, squash, spinach, broccoli, carrots, cauliflower, and onions. ¨ Have a variety of cut-up vegetables with a low-fat dip as an appetizer instead of chips and dip. ¨ Sprinkle sunflower seeds or chopped almonds over salads. Or try adding chopped walnuts or almonds to cooked vegetables. ¨ Try some vegetarian meals using beans and peas. Add garbanzo or kidney beans to salads. Make burritos and tacos with mashed bautista beans or black beans. Where can you learn more? Go to http://mallorySocial Pulsebonnie.info/. Enter W238 in the search box to learn more about \"DASH Diet: Care Instructions. \" Current as of: September 21, 2016 Content Version: 11.4 © 7660-0985 RF nano. Care instructions adapted under license by NoDaysOff (which disclaims liability or warranty for this information). If you have questions about a medical condition or this instruction, always ask your healthcare professional. [place] [address] any warranty or liability for your use of this information. Electrolyte Imbalance: Care Instructions Your Care Instructions Electrolytes are minerals in your blood. They include sodium, potassium, calcium, and magnesium. When they are not at the right levels, you can feel very ill. You may not know what is causing it, but you know something is wrong. You may feel weak or numb, have muscle spasms, or twitch. Your heart may beat fast. Symptoms are different with each mineral. Too much is as bad as too little. Minerals help keep your body working as it should. Vomiting, diarrhea, and fever can cause you to lose minerals. A problem with your kidneys can tip a mineral out of balance. So can taking certain medicines. Your doctor may do more tests. He or she may change your medicine and diet. If you are low in one or more minerals, they may be given through a tube into your vein (IV).  Your doctor may have you take or drink special fluids or pills. If your kidneys are failing, your blood may be filtered. This is called dialysis. It can put the minerals back in balance. Follow-up care is a key part of your treatment and safety. Be sure to make and go to all appointments, and call your doctor if you are having problems. It's also a good idea to know your test results and keep a list of the medicines you take. How can you care for yourself at home? · Take your medicines exactly as prescribed. Call your doctor if you have any problems with your medicines. You will get more details on the specific medicines your doctor prescribes. · Do not take any medicine without talking to your doctor first. This includes prescription, over-the-counter, and herbal medicines. · If you have kidney, heart, or liver disease and have to limit fluids, talk with your doctor before you increase the amount of fluids you drink. · Your doctor or dietitian may give you a diet plan to help balance your minerals. Follow the diet carefully. When should you call for help? Call 911 anytime you think you may need emergency care. For example, call if: 
? · You passed out (lost consciousness). ? · Your heartbeat seems to be irregular. It might be speeding up and then slowing down or skipping beats. ?Call your doctor now or seek immediate medical care if: 
? · You have muscle aches. ? · You feel very weak. ? · You are confused or cannot think clearly. ? Watch closely for changes in your health, and be sure to contact your doctor if: 
? · You do not get better as expected. Where can you learn more? Go to http://mallory-bonnie.info/. Enter F817 in the search box to learn more about \"Electrolyte Imbalance: Care Instructions. \" Current as of: May 12, 2017 Content Version: 11.4 © 7260-7985 Rhapsody.  Care instructions adapted under license by Actimize (which disclaims liability or warranty for this information). If you have questions about a medical condition or this instruction, always ask your healthcare professional. [address] any warranty or liability for your use of this information. Osteoarthritis: Care Instructions Your Care Instructions Arthritis is a common health problem in which the joints are inflamed. There are several kinds of arthritis. Osteoarthritis is caused by a breakdown of cartilage, the hard, thick tissue that cushions the joints. It causes pain, stiffness, and swelling, often in the spine, fingers, hips, and knees. Osteoarthritis can happen at any age, but it is most common in older people. Osteoarthritis never goes away completely, but it can be controlled. Medicine and home treatment can reduce the pain and prevent the arthritis from getting worse. Follow-up care is a key part of your treatment and safety. Be sure to make and go to all appointments, and call your doctor if you are having problems. It's also a good idea to know your test results and keep a list of the medicines you take. How can you care for yourself at home? · Take a warm shower or bath in the morning to relieve stiffness. Avoid sitting still afterwards. · If the joint is not swollen, use moist heat, like a warm, damp towel, for 20 to 30 minutes, 2 or 3 times a day. Do not use heat on a swollen joint. · If the joint is swollen, use ice or cold packs for 10 to 20 minutes, once an hour. Cold will help relieve pain and reduce inflammation. Put a thin cloth between the ice and your skin. · To prevent stiffness, gently move the joint through its full range of motion several times a day. · If the joint hurts, avoid activities that put a strain on it for a few days. Take rest breaks throughout the day. · Get regular exercise. Walking, swimming, yoga, biking, holly chi, and water aerobics are good exercises that are gentle on the joints. · Reach and stay at a healthy weight. If you need to lose or maintain weight, regular exercise and a healthy diet will help. Extra weight can strain the joints, especially the knees and hips, and make the pain worse. Losing even a few pounds may help. · Take pain medicines exactly as directed. ¨ If the doctor gave you a prescription medicine for pain, take it as prescribed. ¨ If you are not taking a prescription pain medicine, ask your doctor if you can take an over-the-counter medicine. When should you call for help? Call your doctor now or seek immediate medical care if: 
? · The pain is so bad that you cannot use the joint. ? · You have sudden back pain with weakness in your legs or loss of bowel or bladder control. ? · Your stools are black and tarlike or have streaks of blood. ? · You have severe pain and swelling in more than one joint. ? Watch closely for changes in your health, and be sure to contact your doctor if: 
? · You have side effects from the medicines, like belly pain, ongoing heartburn, or nausea. ? · Joint pain continues for more than 6 weeks, and home treatment is not helping. Where can you learn more? Go to http://mallory-bonnie.info/. Enter B749 in the search box to learn more about \"Osteoarthritis: Care Instructions. \" Current as of: October 31, 2016 Content Version: 11.4 © 7691-2674 Deep Fiber Solutions. Care instructions adapted under license by b3 bio (which disclaims liability or warranty for this information). If you have questions about a medical condition or this instruction, always ask your healthcare professional. [place] [address] any warranty or liability for your use of this information. [email or web address] Announcement We are excited to announce that we are making your provider's discharge notes available to you in [email or web address].   You will see these notes when they are completed and signed by the physician that discharged you from your recent hospital stay. If you have any questions or concerns about any information you see in NanoCompound, please call the Health Information Department where you were seen or reach out to your Primary Care Provider for more information about your plan of care. Introducing [facility]! Evangelina Aden introduces NanoCompound patient portal. Now you can access parts of your medical record, email your doctor's office, and request medication refills online. 1. In your internet browser, go to [email or web address] 2. Click on the First Time User? Click Here link in the Sign In box. You will see the New Member Sign Up page. 3. Enter your NanoCompound Access Code exactly as it appears below. You will not need to use this code after youve completed the sign-up process. If you do not sign up before the expiration date, you must request a new code. · NanoCompound Access Code: [email or web address] Expires: 9/3/2018  6:13 PM 
 
4. Enter the last four digits of your Social Security Number (xxxx) and Date of Birth (mm/dd/yyyy) as indicated and click Submit. You will be taken to the next sign-up page. 5. Create a NanoCompound ID. This will be your NanoCompound login ID and cannot be changed, so think of one that is secure and easy to remember. 6. Create a NanoCompound password. You can change your password at any time. 7. Enter your Password Reset Question and Answer. This can be used at a later time if you forget your password. 8. Enter your e-mail address. You will receive e-mail notification when new information is available in [address] 9. Click Sign Up. You can now view and download portions of your medical record. 10. Click the Download Summary menu link to download a portable copy of your medical information.  
 
If you have questions, please visit the Frequently Asked Questions section of the Rhythm NewMedia. Remember, MyChart is NOT to be used for urgent needs. For medical emergencies, dial 911. Now available from your iPhone and Android! Introducing Yash Duron As a [employer] patient, I wanted to make you aware of our electronic visit tool called Yash Duron. [employer] 24/7 allows you to connect within minutes with a medical provider 24 hours a day, seven days a week via a mobile device or tablet or logging into a secure website from your computer. You can access Yash Duron from anywhere in the [place]. A virtual visit might be right for you when you have a simple condition and feel like you just dont want to get out of bed, or cant get away from work for an appointment, when your regular [employer] provider is not available (evenings, weekends or holidays), or when youre out of town and need minor care. Electronic visits cost only $49 and if the [employer] 24/7 provider determines a prescription is needed to treat your condition, one can be electronically transmitted to a nearby pharmacy*. Please take a moment to enroll today if you have not already done so. The enrollment process is free and takes just a few minutes. To enroll, please download the [employer] 24/7 rochelle to your tablet or phone, or visit [email or web address] to enroll on your computer. And, as an [address] [place] [address] patient with a Moximed account, the results of your visits will be scanned into your electronic medical record and your primary care provider will be able to view the scanned results. We urge you to continue to see your regular [employer] provider for your ongoing medical care. And while your primary care provider may not be the one available when you seek a Yash Duron virtual visit, the peace of mind you get from getting a real diagnosis real time can be priceless. For more information on Yash Duron, view our Frequently Asked Questions (FAQs) at [email or web address]. Sincerely, 
 
Jaylin Lebron MD 
Chief Medical Officer Kaylee Yan *:  certain medications cannot be prescribed via Yash Duron Unresulted tests-please follow up with your PCP on these results Procedure/Test Authorizing Provider  2D ECHO COMPLETE ADULT (TTE) W OR WO CONTR Moises Austin MD  
 C. DIFFICILE/EPI PCR Lars Jurado MD  
 CANNABINOIDS CONFIRM., UR Lars Jurado MD  
 CARDIAC PANEL,(CK, CKMB & TROPONIN) Lars Jurado MD  
 CARDIAC PANEL,(CK, CKMB & TROPONIN) Krishna Staples MD  
 CARDIAC PANEL,(CK, CKMB & TROPONIN) Judi Ruiz MD  
 CARDIAC PANEL,(CK, CKMB & TROPONIN) Bossman Rocha PA-C  
 CBC WITH AUTOMATED DIFF Lars Jurado MD  
 CBC WITH AUTOMATED DIFF Toney [place] MD  
 CBC WITH AUTOMATED DIFF Bossman Rocha PA-C  
 COCAINE, UR, CONFIRM Moises Austin MD  
 CREATININE, UR, RANDOM Toney [place] MD  
 CT CHEST WO CONT Lars Jurado MD  
 CT HEAD WO CONT Lars Jurado MD  
 CULTURE, BLOOD Krishna Staples MD  
 CULTURE, BLOOD Krishna Staples MD  
 CULTURE, URINE Toney [place] MD  
 [facility], MD  
 [facility] [address], MD  
 DRUG [facility] Odilia Austin MD  
 EKG, 12 LEAD, INITIAL Bossman Rocha PA-C  
 EKG, 12 LEAD, Bev Galvez MD  
 EKG, 12 LEAD, SUBSEQUENT MD Angela Crenshaw MD Marcelline Rong, MD  
 HGB & HCT Candido Puls, MD Rosena Manzanilla, MD Herbert Goodpasture, MD Herbert Goodpasture, MD Herbert Goodpasture, MD Herbert Goodpasture, MD Orpha Irwin, MD Stefano Peal, MD  
 Kash Quarles PA-C  
 METABOLIC PANEL, BASIC Izzy Montgomery MD  
 METABOLIC PANEL, BASIC Izzy Montgomery MD  
 METABOLIC PANEL, [id number] MD Javier  
 METABOLIC PANEL, BASIC Izzy Montgomery MD  
 METABOLIC PANEL, [address] MD Javier  
 METABOLIC PANEL, BASIC Fidel Leal MD  
 METABOLIC PANEL, COMPREHENSIVE EMILY LiuC O+P EXAM, FORMALIN ONLY Izzy Montgomery MD  
 OVA AND PARASITE, STOOL, REFLEX Izzy Montgomery MD  
 PHOSPHORUS Izzy Montgomery MD  
 PHOSPHORUS Fidel Leal MD  
 Regkirby Rodriguez MD  
 POTASSIUM Izzy Montgomery MD  
 POTASSIUM Izzy Montgomery MD  
 PROTEIN URINE, RANDOM Fidel Leal MD  
 PTH INTACT Fidel Leal MD  
 SODIUM, UR, Db Dougherty MD  
 SPEP AND JEFE, SERUM Fidel Leal MD  
 T4, Angela Oreilly MD  
 TSH 3RD Tony Rodriguez MD  
 URIC ACID Jaqueline White PA-C  
 URINALYSIS W/ RFLX MICROSCOPIC Izzy Montgomery MD  
 URINALYSIS W/MICROSCOPIC Fidel Leal MD  
 URINE MICROSCOPIC ONLY Izzy Montgomery MD  
 US EXT Soco James MD  
 US RETROPERITONEUM COMP MD Dean Paredes, [facility], MD  
 VANCOMYCIN, Liam Diamond MD  
 XR CHEST PA LAT Jaqueline White PA-C  
 XR WRIST RT AP/LAT/OBL MIN 3V Td Lowe MD  
  
Providers Seen During Your Hospitalization Provider Specialty Primary office phone Td Lowe MD Emergency Medicine [phone] Kristen Lara MD Hospitalist [phone] Izzy Montgomery MD Internal Medicine [phone] Your Primary Care Physician (PCP) Primary Care Physician Office Phone Office Fax Ginonikki Gordon [phone] You are allergic to the following No active allergies Recent Documentation Height Weight BMI Smoking Status 1.905 m 76.1 kg 20.97 kg/m2 Current Every Day Smoker Emergency Contacts Name Discharge Info Relation Home Work Mobile Gage Watters DISCHARGE CAREGIVER [3] Other Relative [6] [phone] [id number] Patient Belongings The following personal items are in your possession at time of discharge: 
  Dental Appliances: None  Visual Aid: None      Home Medications: Kept at bedside   Jewelry: None  Clothing: At bedside, Shirt, Slippers, Pants, Jacket/Coat    Other Valuables: [employer], Cigarettes, JULIEN, At bedside Discharge Instructions Attachments/References SOB (SHORTNESS OF BREATH) (ENGLISH) ABSCESS: SKIN (ENGLISH) CELLULITIS (ENGLISH) ACETAMINOPHEN (BY MOUTH) (ENGLISH) AZITHROMYCIN (BY MOUTH) (ENGLISH) BUDESONIDE (BY BREATHING) (ENGLISH) DICLOFENAC (ON THE SKIN) (ENGLISH) NICOTINE (ABSORBED THROUGH THE SKIN) (ENGLISH) Patient Handouts Shortness of Breath: Care Instructions Your Care Instructions Shortness of breath has many causes. Sometimes conditions such as anxiety can lead to shortness of breath. Some people get mild shortness of breath when they exercise. Trouble breathing also can be a symptom of a serious problem, such as asthma, lung disease, emphysema, heart problems, and pneumonia. If your shortness of breath continues, you may need tests and treatment. Watch for any changes in your breathing and other symptoms. Follow-up care is a key part of your treatment and safety. Be sure to make and go to all appointments, and call your doctor if you are having problems. It's also a good idea to know your test results and keep a list of the medicines you take. How can you care for yourself at home? · Do not smoke or allow others to smoke around you. If you need help quitting, talk to your doctor about stop-smoking programs and medicines. These can increase your chances of quitting for good. · Get plenty of rest and sleep. · Take your medicines exactly as prescribed. Call your doctor if you think you are having a problem with your medicine. · Find healthy ways to deal with stress. ¨ Exercise daily. ¨ Get plenty of sleep. ¨ Eat regularly and well. When should you call for help? Call 911 anytime you think you may need emergency care. For example, call if: 
? · You have severe shortness of breath. ? · You have symptoms of a heart attack. These may include: ¨ Chest pain or pressure, or a strange feeling in the chest. 
¨ Sweating. ¨ Shortness of breath. ¨ Nausea or vomiting. ¨ Pain, pressure, or a strange feeling in the back, neck, jaw, or upper belly or in one or both shoulders or arms. ¨ Lightheadedness or sudden weakness. ¨ A fast or irregular heartbeat. After you call 911, the  may tell you to chew 1 adult-strength or 2 to 4 low-dose aspirin. Wait for an ambulance. Do not try to drive yourself. ?Call your doctor now or seek immediate medical care if: 
? · Your shortness of breath gets worse or you start to wheeze. Wheezing is a high-pitched sound when you breathe. ? · You wake up at night out of breath or have to prop your head up on several pillows to breathe. ? · You are short of breath after only light activity or while at rest. ? Watch closely for changes in your health, and be sure to contact your doctor if: 
? · You do not get better over the next 1 to 2 days. Where can you learn more? Go to http://mallory-bonnie.info/. Enter S780 in the search box to learn more about \"Shortness of Breath: Care Instructions. \" Current as of: May 12, 2017 Content Version: 11.4 © 0562-7395 [employer]. Care instructions adapted under license by [employer] (which disclaims liability or warranty for this information).  If you have questions about a medical condition or this instruction, always ask your healthcare professional. Julienne Nassar, [facility] disclaims any warranty or liability for your use of this information. Skin Abscess: Care Instructions Your Care Instructions A skin abscess is a bacterial infection that forms a pocket of pus. A boil is a kind of skin abscess. The doctor may have cut an opening in the abscess so that the pus can drain out. You may have gauze in the cut so that the abscess will stay open and keep draining. You may need antibiotics. You will need to follow up with your doctor to make sure the infection has gone away. The doctor has checked you carefully, but problems can develop later. If you notice any problems or new symptoms, get medical treatment right away. Follow-up care is a key part of your treatment and safety. Be sure to make and go to all appointments, and call your doctor if you are having problems. It's also a good idea to know your test results and keep a list of the medicines you take. How can you care for yourself at home? · Apply warm and dry compresses, a heating pad set on low, or a hot water bottle 3 or 4 times a day for pain. Keep a cloth between the heat source and your skin. · If your doctor prescribed antibiotics, take them as directed. Do not stop taking them just because you feel better. You need to take the full course of antibiotics. · Take pain medicines exactly as directed. ¨ If the doctor gave you a prescription medicine for pain, take it as prescribed. ¨ If you are not taking a prescription pain medicine, ask your doctor if you can take an over-the-counter medicine. · Keep your bandage clean and dry. Change the bandage whenever it gets wet or dirty, or at least one time a day. · If the abscess was packed with gauze: 
¨ Keep follow-up appointments to have the gauze changed or removed. If the doctor instructed you to remove the gauze, gently pull out all of the gauze when your doctor tells you to. ¨ After the gauze is removed, soak the area in warm water for 15 to 20 minutes 2 times a day, until the wound closes. When should you call for help? Call your doctor now or seek immediate medical care if: 
? · You have signs of worsening infection, such as: 
¨ Increased pain, swelling, warmth, or redness. ¨ Red streaks leading from the infected skin. ¨ Pus draining from the wound. ¨ A fever. ? Watch closely for changes in your health, and be sure to contact your doctor if: 
? · You do not get better as expected. Where can you learn more? Go to http://mallory-bonnie.info/. Enter D671 in the search box to learn more about \"Skin Abscess: Care Instructions. \" Current as of: October 13, 2016 Content Version: 11.4 © 8224-5829 [employer]. Care instructions adapted under license by [employer] (which disclaims liability or warranty for this information). If you have questions about a medical condition or this instruction, always ask your healthcare professional. [place] [address] any warranty or liability for your use of this information. Cellulitis: Care Instructions Your Care Instructions Cellulitis is a skin infection. It often occurs after a break in the skin from a scrape, cut, bite, or puncture, or after a rash. The doctor has checked you carefully, but problems can develop later. If you notice any problems or new symptoms, get medical treatment right away. Follow-up care is a key part of your treatment and safety. Be sure to make and go to all appointments, and call your doctor if you are having problems. It's also a good idea to know your test results and keep a list of the medicines you take. How can you care for yourself at home? · Take your antibiotics as directed. Do not stop taking them just because you feel better. You need to take the full course of antibiotics. · Prop up the infected area on pillows to reduce pain and swelling. Try to keep the area above the level of your heart as often as you can. · If your doctor told you how to care for your wound, follow your doctor's instructions. If you did not get instructions, follow this general advice: ¨ Wash the wound with clean water 2 times a day. Don't use hydrogen peroxide or alcohol, which can slow healing. ¨ You may cover the wound with a thin layer of petroleum jelly, such as Vaseline, and a nonstick bandage. ¨ Apply more petroleum jelly and replace the bandage as needed. · Be safe with medicines. Take pain medicines exactly as directed. ¨ If the doctor gave you a prescription medicine for pain, take it as prescribed. ¨ If you are not taking a prescription pain medicine, ask your doctor if you can take an over-the-counter medicine. To prevent cellulitis in the future · Try to prevent cuts, scrapes, or other injuries to your skin. Cellulitis most often occurs where there is a break in the skin. · If you get a scrape, cut, mild burn, or bite, wash the wound with clean water as soon as you can to help avoid infection. Don't use hydrogen peroxide or alcohol, which can slow healing. · If you have swelling in your legs (edema), support stockings and good skin care may help prevent leg sores and cellulitis. · Take care of your feet, especially if you have diabetes or other conditions that increase the risk of infection. Wear shoes and socks. Do not go barefoot. If you have athlete's foot or other skin problems on your feet, talk to your doctor about how to treat them. When should you call for help? Call your doctor now or seek immediate medical care if: 
? · You have signs that your infection is getting worse, such as: 
¨ Increased pain, swelling, warmth, or redness. ¨ Red streaks leading from the area. ¨ Pus draining from the area. ¨ A fever. ? · You get a rash. ?Watch closely for changes in your health, and be sure to contact your doctor if: 
? · You are not getting better after 1 day (24 hours). ? · You do not get better as expected. Where can you learn more? Go to http://mallory-bonnie.info/. Che [place] in the search box to learn more about \"Cellulitis: Care Instructions. \" Current as of: October 13, 2016 Content Version: 11.4 © 1639-8366 [employer]. Care instructions adapted under license by [employer] (which disclaims liability or warranty for this information). If you have questions about a medical condition or this instruction, always ask your healthcare professional. [place] [address] any warranty or liability for your use of this information. Acetaminophen (By mouth) Acetaminophen (x-sbmf-s-MIN-oh-fen) Treats minor aches and pain and reduces fever. Brand Name(s): 8 Hour Pain Relief, Acetaminophen Children's, Acetaminophen Infant's, Arthritis Pain Formula, Arthritis Pain Relief, Cetafen, Cetafen Extra, Children's Acetaminophen, Children's Dye Free Pain and Fever, Children's Mapap, Children's Pain & Fever, Children's Pain Relief, Children's Pain Reliever, Children's Tylenol, Comtrex Sore Throat Relief There may be other brand names for this medicine. When This Medicine Should Not Be Used: This medicine is not right for everyone. Do not use it if you had an allergic reaction to acetaminophen. How to Use This Medicine:  
Capsule, Liquid Filled Capsule, Liquid, Powder, Tablet, Chewable Tablet, Dissolving Tablet, Fizzy Tablet, Long Acting Tablet · Your doctor will tell you how much medicine to use. Do not use more than directed. · If you are taking this medicine without the advice of your doctor, carefully read and follow the Drug Facts label and dosing instructions on the medicine package. Ask your doctor or pharmacist if you are not sure how to use this medicine. · Do not take this medicine for more than 10 days in a row, unless directed by your doctor. · The chewable tablet should be chewed or crushed before you swallow it. · Oral liquids: Measure the oral liquid medicine with a marked measuring spoon, oral syringe, or medicine cup. Do not use a spoon, syringe, or cup that came with a different medicine. · Oral liquid (with syringe): ¨ Shake the bottle well before each use. ¨ Remove the cap. Attach the syringe to the flow restrictor. Turn the bottle upside down. ¨ Pull back the syringe plunger until it is filled with the correct dose. Ask your doctor or pharmacist if you are not sure how much medicine to use. ¨ Slowly give the medicine into your child's mouth. Point the syringe so the medicine goes toward the inner cheek. · Oral liquid (with dropper): ¨ Shake the bottle well before each use. ¨ Remove the cap. Insert the dropper into the bottle. Withdraw the correct dose. Ask your doctor or pharmacist if you are not sure how much medicine to use. ¨ Slowly give the medicine into your child's mouth. Point the dropper so the medicine goes toward the inner cheek. · Extended-release tablet: Swallow the extended-release tablet whole. Do not crush, break, or chew it. Take this medicine with a full glass of water. · Use only the brand of medicine your doctor prescribed. Other brands may not work the same way. · Missed dose: Take a dose as soon as you remember. If it is almost time for your next dose, wait until then and take a regular dose. Do not take extra medicine to make up for a missed dose. · Store the medicine in a closed container at room temperature, away from heat, moisture, and direct light. Drugs and Foods to Avoid: Ask your doctor or pharmacist before using any other medicine, including over-the-counter medicines, vitamins, and herbal products. · Some medicines and foods can affect how acetaminophen works.  Tell your doctor if you are taking a blood thinner, such as warfarin. · Do not drink alcohol while you are using this medicine. Acetaminophen can damage your liver, and alcohol can increase this risk. Do not take acetaminophen without asking your doctor if you have 3 or more drinks of alcohol every day. Warnings While Using This Medicine: · Tell your doctor if you are pregnant or breastfeeding, or if you have kidney or liver disease. Tell your doctor if you have phenylketonuria (PKU). Some brands of acetaminophen contain aspartame, which can make PKU worse. · This medicine contains acetaminophen. Read the labels of all other medicines you are using to see if they also contain acetaminophen, or ask your doctor or pharmacist. Raciel Renteriat not use more than 4 grams (4,000 milligrams) total of acetaminophen in one day. For Tylenol® Extra Strength, it is not safe to take more than 3 grams (3,000 milligrams) in 1 day. · Call your doctor if your symptoms do not improve or if they get worse. · Tell any doctor or dentist who treats you that you are using this medicine. This medicine may affect certain medical test results. · Keep all medicine out of the reach of children. Never share your medicine with anyone. Possible Side Effects While Using This Medicine:  
Call your doctor right away if you notice any of these side effects: · Allergic reaction: Itching or hives, swelling in your face or hands, swelling or tingling in your mouth or throat, chest tightness, trouble breathing · Bloody or black, tarry stools · Dark urine or pale stools, nausea, vomiting, loss of appetite, severe stomach pain, yellow skin or eyes · Fever or a sore throat that lasts longer than 3 days, or pain that lasts longer than 5 days · Lightheadedness, fainting, sweating, or weakness · Unusual bleeding or bruising · Vomiting blood or material that looks like coffee grounds If you notice other side effects that you think are caused by this medicine, tell your doctor. Call your doctor for medical advice about side effects. You may report side effects to FDA at 5-884-UBO-4311 © 2017 [address] Batista Information is for End User's use only and may not be sold, redistributed or otherwise used for commercial purposes. The above information is an  only. It is not intended as medical advice for individual conditions or treatments. Talk to your doctor, nurse or pharmacist before following any medical regimen to see if it is safe and effective for you. Azithromycin (By mouth) Azithromycin (xt-sjkr-fdd-MYE-sin) Treats infections. This medicine is a macrolide antibiotic. Brand Name(s): Zithromax, Zithromax Tri-Diego, Zithromax Z-Diego, Zmax There may be other brand names for this medicine. When This Medicine Should Not Be Used: This medicine is not right for everyone. Do not use it if you had an allergic reaction to azithromycin, erythromycin, or similar medicines, or you have a history of liver problems caused by azithromycin. How to Use This Medicine:  
Capsule, Liquid, Packet, Powder, Tablet · Your doctor will tell you how much medicine to use. Do not use more than directed. · Take all of the medicine in your prescription to clear up your infection, even if you feel better after the first few doses. · Read and follow the patient instructions that come with this medicine. Talk to your doctor or pharmacist if you have any questions. · Multiple dose (Zithromax® oral liquid or tablets): ¨ You may take this medicine with or without food. ¨ Shake the bottle well before you measure the medicine. Measure the oral liquid medicine with a marked measuring spoon, oral syringe, or medicine cup. · Single dose (Zmax® extended-release oral liquid or powder):  
¨ Liquid: § Take this medicine on an empty stomach at least 1 hour before you eat, or 2 hours after you eat. § Call your doctor right away if you vomit within 1 hour after you take the medicine. § You must take the liquid within 12 hours after the pharmacist gives it to you. § Shake the bottle well before you measure the medicine. Measure your dose with a marked measuring spoon, cup, or syringe. ¨ Powder:  
§ Open 1 packet and pour all of the medicine into a glass with about 2 ounces (¼ cup) of water. Mix well and drink the medicine right away. Pour another 2 ounces of water into the same glass, and drink the remaining medicine. · Missed dose: If you are taking multiple doses, take the dose as soon as you remember. If it is almost time for your next dose, wait until then to take a regular dose. Do not use extra medicine to make up for a missed dose. · Store the medicine in a closed container at room temperature, away from heat, moisture, and direct light. · Extended-release oral liquid: Do not refrigerate or freeze. · Oral liquid for 1 dose only: Store at room temperature. Do not store in the refrigerator or allow the medicine to freeze. · Oral liquid for multiple doses: Store at room temperature or in the refrigerator. Use it within 10 days of filling the prescription. Drugs and Foods to Avoid: Ask your doctor or pharmacist before using any other medicine, including over-the-counter medicines, vitamins, and herbal products. · Some medicines can affect how azithromycin works. Tell your doctor if you are also using any of the following: ¨ Cyclosporine, digoxin, nelfinavir, or phenytoin ¨ Blood thinner ¨ Ergot medicine ¨ Medicine for a heart rhythm problem (including amiodarone, dofetilide, procainamide, quinidine, sotalol) · Zithromax® for multiple doses: Do not take an antacid that contains magnesium or aluminum at the same time you take Zithromax®. An antacid will affect how the medicine works. Antacids will not affect Zmax® for single dose. Warnings While Using This Medicine: · Tell your doctor if you are pregnant or breastfeeding, or if you have kidney disease, liver disease, heart disease, heart rhythm problems, heart failure, or myasthenia gravis. Tell your doctor if anyone in your family has heart rhythm problems. · This medicine may cause the following problems:  
¨ Serious skin reactions ¨ Liver problems ¨ Infantile hypertrophic pyloric stenosis ¨ Heart rhythm problems · This medicine can cause diarrhea. Call your doctor if the diarrhea becomes severe, does not stop, or is bloody. Do not take any medicine to stop diarrhea until you have talked to your doctor. Diarrhea can occur 2 months or more after you stop taking this medicine. It may occur 2 months or more after you stop using this medicine. · Call your doctor if your symptoms do not improve or if they get worse. · Keep all medicine out of the reach of children. Never share your medicine with anyone. Possible Side Effects While Using This Medicine:  
Call your doctor right away if you notice any of these side effects: · Allergic reaction: Itching or hives, swelling in your face or hands, swelling or tingling in your mouth or throat, chest tightness, trouble breathing · Blistering, peeling, red skin rash · Dark urine, pale stools, nausea, vomiting, loss of appetite, stomach pain, yellow skin or eyes · Double vision, tiredness or weakness · Fainting, dizziness, lightheadedness · Fast, pounding, or uneven heartbeat, chest pain · Feeling irritable or vomits after feeding (in babies) · Severe diarrhea that may contain blood, stomach cramps, fever If you notice these less serious side effects, talk with your doctor: · Mild diarrhea, nausea, vomiting, stomach pain If you notice other side effects that you think are caused by this medicine, tell your doctor. Call your doctor for medical advice about side effects. You may report side effects to FDA at 7-961-PBG-2001 © 2017 26[address] Zack  Information is for End User's use only and may not be sold, redistributed or otherwise used for commercial purposes. The above information is an  only. It is not intended as medical advice for individual conditions or treatments. Talk to your doctor, nurse or pharmacist before following any medical regimen to see if it is safe and effective for you. Budesonide (By breathing) Budesonide (fls-VMG-pz-nide) Prevents asthma attacks. This medicine is a steroid. Brand Name(s): Pulmicort Flex, Pulmicort Flexhaler, Pulmicort Respules There may be other brand names for this medicine. When This Medicine Should Not Be Used: You should not use this medicine if you or your child have had an allergic reaction to budesonide. This medicine is not to be used during an asthma attack. How to Use This Medicine:  
Liquid Under Pressure, Powder Under Pressure, Liquid · Take your medicine as directed. Your dose may need to be changed several times to find what works best for you. · Pulmicort Respules®: 
¨ This medicine is a liquid that is used in children who are 12 months to 6years of age. The medicine is breathed in with a device called a nebulizer. The nebulizer turns the medicine into a fine mist that the child breathes in through the mouth and into the lungs. Your child's doctor will show you how to use a nebulizer. ¨ Pulmicort Respules® is provided as a strip of five small plastic containers with sealed caps. Each container holds one dose. The strip of containers is sealed inside a foil pouch. ¨ Wash your child's face after using the Pulmicort Respules® to avoid skin irritation. ¨ Read and follow the patient instructions that come with this medicine. Talk to your doctor or pharmacist if you have any questions.  
· Pulmicort Flexhaler: 
¨ This medicine is a powder that is used in adults and children who are 6 years of age and older. It is breathed in with a special inhaler device that comes with the medicine. The powder is stored inside this inhaler. Before each use, you will turn the brown  at the bottom of the inhaler to load a new dose of the powder into the air chamber. Do not use a spacer with the Pulmicort Flexhaler. ¨ When you use the Pulmicort Flexhaler for the first time, it may not deliver the right amount of medicine with the first puff. Before using this medicine, test or prime it. Hold the inhaler so that the white cover points up, then twist the cover and lift it off. Hold the inhaler upright (mouthpiece up) using the brown . Twist the middle of the inhaler fully in one direction as far as it will go, and then back again in the other direction. You will hear a click. Repeat this process one more time. You do not have to prime it again after this, even if you have not used it for a long time. ¨ The Pulmicort Flexhaler has a dose indicator that keeps track of how many times you can use the inhaler before you need to open a new one. Check the dose indicator just below the mouthpiece. The dose indicator usually starts with either the number 60 or 120 when full. ¨ When you have finished all your inhalations, rinse your mouth out with water. Do not swallow the water after rinsing. ¨ Read and follow the patient instructions that come with this medicine. Talk to your doctor or pharmacist if you have any questions. ¨ Keep the inhaler clean and dry at all times. Carefully follow the patient instructions about cleaning and caring for your inhaler. If a dose is missed: · Take a dose as soon as you remember. If it is almost time for your next dose, wait until then and take a regular dose. Do not take extra medicine to make up for a missed dose. How to Store and Dispose of This Medicine:  
· Pulmicort Respules®: Keep any unused containers inside the pouch.  Store the pouch in an upright position at room temperature, away from heat and direct light. Do not freeze. Once you have opened a foil pouch, the containers will only be good for 2 weeks. Throw away any unused containers if it has been longer than 2 weeks since you opened the pouch they came in. · Pulmicort Flexhaler: Store the inhaler at room temperature, away from heat, moisture, and direct light. Throw away the whole inhaler when all doses have been used. The number zero will appear in the dose indicator window. You will be given a new inhaler each time you buy more medicine. · Ask your pharmacist, doctor, or health caregiver about the best way to dispose of the used medicine container and any leftover medicine. You will also need to throw away old medicine after the expiration date has passed. · Keep all medicine out of the reach of children. Never share your medicine with anyone. Drugs and Foods to Avoid: Ask your doctor or pharmacist before using any other medicine, including over-the-counter medicines, vitamins, and herbal products. · Make sure your doctor knows if you are also using cimetidine (Tagamet®), clarithromycin (Biaxin®), erythromycin (Eryc®, Parveen-Tab®), itraconazole (Sporanox®), or ketoconazole ([place] Dante). Tell your doctor if you are also using other steroid medicines (such as dexamethasone, prednisolone, prednisone, or Medrol®). Warnings While Using This Medicine: · Make sure your doctor knows if you are pregnant or breastfeeding, or if you have bone problems (osteoporosis), cataracts, glaucoma, or any type of infection, especially a lung infection such as tuberculosis (TB) or a herpes infection in the eye. · Tell your doctor if you or your child have an allergy to milk proteins. This medicine contains small amounts of lactose, which has milk sugar with milk proteins. · This medicine will not stop an asthma attack that has already started. Your doctor may prescribe another medicine for you to use in case of an acute asthma attack. · If any of your asthma medicines do not seem to be working as well as usual, call your doctor right away. Do not change your doses or stop using your medicines without asking your doctor. · You may get infections more easily while using this medicine. Avoid people who are sick or have infections. Tell your doctor right away if you or your child have been exposed to someone with chickenpox or measles. · If you or your child develop a skin rash, hives, or any allergic reaction (including anaphylaxis) to this medicine, stop using the medicine and check with your doctor as soon as possible. · This medicine may also increase your risk of having infections or sores in your mouth or throat. Check with your doctor right away if you or your child notice any signs of a throat infection. · This medicine may decrease bone mineral density when used for a long time. A low bone mineral density can cause weak bones or osteoporosis. If you have any questions about this, ask your doctor. · This medicine may cause children to grow more slowly than normal. This would cause a child not to gain weight or get taller. Talk with your doctor if you think this is a problem. · This medicine may increase your risk of having an adrenal gland that is less active than normal. This is more likely for people who use steroids for a long time. Check with your doctor right away if you or your child have more than one of the following symptoms: darkening of the skin, diarrhea, dizziness, fainting, loss of appetite, mental depression, nausea, skin rash, unusual tiredness or weakness, or vomiting. · Check with your doctor right away if you or your child have blurred vision, difficulty with reading, or any other change in vision while using this medicine. Your doctor may want you to have your eyes checked by an ophthalmologist (eye doctor). · Your doctor may want you to carry a medical identification card stating that you or your child are using this medicine and that you or your child may need additional medicine during an emergency, a severe asthma attack or other illness, or unusual stress. · Your doctor will check your progress and the effects of this medicine at regular visits. Keep all appointments. Eye exams may be needed to check for unwanted effects. · Do not stop using this medicine suddenly. Your doctor will need to slowly decrease your dose before you stop it completely. · Call your doctor if your symptoms do not improve or if they get worse. Possible Side Effects While Using This Medicine:  
Call your doctor right away if you notice any of these side effects: · Allergic reaction: Itching or hives, swelling in your face or hands, swelling or tingling in your mouth or throat, chest tightness, trouble breathing · Changes in vision. · Chest pain or tightness. · Fever, chills, sore throat, cough, runny or stuffy nose, and body aches. · Lightheadedness, dizziness, or fainting. · Sores or white patches in your mouth or throat. · Unusual tiredness or weakness. · Worsening of asthma symptoms. If you notice these less serious side effects, talk with your doctor: · Dry mouth, hoarseness, or voice changes. · Joint pain. · Pain, redness, or swelling in the ear. · Upset stomach, diarrhea, nausea, or vomiting. If you notice other side effects that you think are caused by this medicine, tell your doctor. Call your doctor for medical advice about side effects. You may report side effects to FDA at 6-983-MIT-4568 © 2017 [facility] Information is for End User's use only and may not be sold, redistributed or otherwise used for commercial purposes. The above information is an  only. It is not intended as medical advice for individual conditions or treatments.  Talk to your doctor, nurse or pharmacist before following any medical regimen to see if it is safe and effective for you. Diclofenac (On the skin) Diclofenac (dye-KLOE-fen-ak) Treats actinic keratoses. Also treats pain and swelling caused by arthritis. This is an NSAID. Brand Name(s): DS Prep Diego, DST Plus Diego, DermacinRx [employer], Diclo Gel, Diclofex DC, Diclozor, Inflamma-K, Klofensaid II, Lexixryl, Lorvatus, NuDiclo SoluPAK, Pennsaid, Solaraze, Sure Result DSS Premium Pack, Voltaren Gel There may be other brand names for this medicine. When This Medicine Should Not Be Used: This medicine is not right for everyone. Do not use it if you had an allergic reaction to diclofenac, aspirin or another NSAID medication. How to Use This Medicine:  
Gel/Jelly, Liquid · Use your medicine as directed. There are several brands of this medicine. Make sure you understand how to use the brand you have been prescribed. Ask your doctor if you have any questions. · The Voltaren® gel comes with a dosing card to measure the correct dose. If you do not receive or misplace your dosing card, call your pharmacist to ask for a new one. · Use this medicine only on your skin. Rinse it off right away if it gets on a cut or scrape. Do not get the medicine in your eyes, nose, or mouth. · Wash your hands with soap and water before and after you use this medicine. · Apply a thin layer of the medicine to the affected area. Rub it in gently. · Do not shower, bathe, or wash the affected area for at least 30 minutes after you apply Pennsaid® or Solaraze® or 1 hour after you apply Voltaren®. · Wait until the medicine dries before you cover the treated skin with gloves or clothing. Do not let the treated skin touch any other person's skin until the medicine is completely dry. · Do not use external heat or bandages on the treated skin or joint. · This medicine should come with a Medication Guide.  Ask your pharmacist for a copy if you do not have one. · Missed dose: Apply a dose as soon as you can. If it is almost time for your next dose, wait until then and apply a regular dose. Do not apply extra medicine to make up for a missed dose. · Store the medicine in a closed container at room temperature, away from heat, moisture, and direct light. Drugs and Foods to Avoid: Ask your doctor or pharmacist before using any other medicine, including over-the-counter medicines, vitamins, and herbal products. · Do not use any other NSAID unless your doctor says it is okay. Some other NSAIDs are aspirin, diflunisal, ibuprofen, naproxen, or salsalate. · Some foods and medicines can affect how diclofenac works. Tell your doctor if you are using any of the following: ¨ Acetaminophen, cyclosporine, digoxin, lithium, methotrexate, pemetrexed ¨ Blood pressure medicine ¨ Blood thinner (including warfarin) ¨ Diuretic (water pill) ¨ Medicine to treat depression ¨ Steroids (including dexamethasone, hydrocortisone, methylprednisolone, prednisolone, prednisone) · Do not put cosmetics or skin care products on the treated skin. You may use sunscreen, insect repellant, lotion, or other topical medicines after using Pennsaid®. However, wait until the medicine is completely dry before you apply anything else. Warnings While Using This Medicine: · Tell your doctor if you are pregnant. It is not safe to use this medicine during the later part of pregnancy. · Tell your doctor if you are breastfeeding, or if you have kidney disease, liver disease, asthma, bleeding problems, heart failure, high blood pressure, other heart or blood vessel problems, a recent heart attack, or a history of stomach ulcers or bleeding. Tell your doctor if you drink alcohol. · This medicine may cause the following problems: 
¨ Higher risk of blood clot, heart attack, heart failure, or stroke ¨ Bleeding in your stomach or intestines ¨ Liver problems ¨ Kidney problems and high potassium levels ¨ Serious skin reactions · This medicine may make your skin more sensitive to sunlight. Wear sunscreen. Do not use sunlamps or tanning beds. · Your doctor will do lab tests at regular visits to check on the effects of this medicine. Keep all appointments. · Keep all medicine out of the reach of children. Never share your medicine with anyone. Possible Side Effects While Using This Medicine:  
Call your doctor right away if you notice any of these side effects: · Allergic reaction: Itching or hives, swelling in your face or hands, swelling or tingling in your mouth or throat, chest tightness, trouble breathing · Blistering, peeling, or red skin rash · Bloody or black, tarry stools, severe stomach pain, vomiting blood or material that looks like coffee grounds · Change in how much or how often you urinate · Chest pain that may spread to your arms, jaw, back, or neck, trouble breathing, unusual sweating, faintness · Dark urine or pale stools, nausea, vomiting, loss of appetite, stomach pain, yellow skin or eyes · Numbness or weakness in your arm or leg, or on one side of your body, pain in your lower leg, sudden or severe headache, or problems with vision, speech, or walking · Rapid weight gain, swelling in your hands, ankles, or feet · Unusual bleeding, bruising, or weakness If you notice these less serious side effects, talk with your doctor: · Dry, flaky, or scaly skin · Mild headache · Mild skin rash, itching, or redness If you notice other side effects that you think are caused by this medicine, tell your doctor. Call your doctor for medical advice about side effects. You may report side effects to FDA at 9-469-FDA-9439 © 2017 [address] Information is for End User's use only and may not be sold, redistributed or otherwise used for commercial purposes. The above information is an  only.  It is not intended as medical advice for individual conditions or treatments. Talk to your doctor, nurse or pharmacist before following any medical regimen to see if it is safe and effective for you. Nicotine (Absorbed through the skin) Nicotine (ASHLEIGH-oh-teen) Helps you quit smoking. Brand Name(s): [employer] Nicotine Transdermal System, [employer] Nicotine Transdermal System, Leader Nicotine Transdermal System, Nicoderm CQ, Nicoderm CQ Clear, Nicotrol, [employer] Nicotine, [employer] Nicotine Transdermal System There may be other brand names for this medicine. When This Medicine Should Not Be Used: This medicine is not right for everyone. Do not use it if you had an allergic reaction to nicotine. How to Use This Medicine:  
Patch · Follow the instructions on the medicine label if you are using this medicine without a prescription. · Read and follow the patient instructions that come with this medicine. Talk to your doctor or pharmacist if you have any questions. · Wash your hands with soap and water before and after applying a patch. · Leave the patch in its sealed wrapper until you are ready to put it on. Tear the wrapper open carefully. NEVER CUT the wrapper or the patch with scissors. Do not use any patch that has been cut by accident. · NicoDerm® CQ:  
¨ This is a 3-step program. If you smoke more than 10 cigarettes per day, start with step 1 followed by step 2 and step 3. If you smoke 10 or fewer cigarettes per day, start with step 2 followed by step 3. ¨ Begin using the patch on the morning of your quit day, even if you are not able to stop smoking immediately. ¨ Apply the patch at about the same time every day to skin that is clean, dry, and free of hair. ¨ The patient instructions will show the body areas where you can wear the patch. When putting on each new patch, choose a different place within these areas.  Do not put the new patch on the same place you wore the last one. Be sure to remove the old patch before applying a new one. ¨ Do not put the patch over irritated skin. Do not use creams or lotions, including sunscreen, on the skin where you apply the patch, because it may not stick well. ¨ Apply a new patch if one falls off. ¨ If you have vivid dreams or sleep problems, remove the patch at bedtime and apply a new one in the morning. · Keep using this medicine for the full treatment time. If you feel you need to use this medicine for a longer period of time, talk to your doctor. · Store the patches at room temperature in a closed container, away from heat, moisture, and direct light. · Fold the used patch in half with the sticky sides together. Throw any used patch away so that children or pets cannot get to it. You will also need to throw away old patches after the expiration date has passed. Drugs and Foods to Avoid: Ask your doctor or pharmacist before using any other medicine, including over-the-counter medicines, vitamins, and herbal products. Warnings While Using This Medicine: · Pregnant or breastfeeding women should only use this medicine as directed by a doctor. Smoking can seriously harm your unborn child. Try to stop smoking without using medicine. Although this medicine is believed to be safer than smoking, the risks of its use during pregnancy are not fully known. · Tell your doctor if you have heart disease, heart rhythm problems, high blood pressure, or you had a recent heart attack. Tell your doctor if you have an allergy to adhesive tape. · This medicine may cause the following problems: 
¨ High blood pressure ¨ Increase in heart rate · The opaque NicoDerm® CQ patch may cause skin burns if you have a procedure called a magnetic resonance imaging (MRI) scan. You must remove the patch before an MRI. · Keep all medicine out of the reach of children. Never share your medicine with anyone. Possible Side Effects While Using This Medicine: Call your doctor right away if you notice any of these side effects: · Allergic reaction: Itching or hives, swelling in your face or hands, swelling or tingling in your mouth or throat, chest tightness, trouble breathing · Dizziness, headache, upset stomach, drooling, vomiting, diarrhea, cold sweats, blurred vision, trouble hearing, confusion, fainting, or weakness · Fast, slow, pounding, or uneven heartbeat If you notice these less serious side effects, talk with your doctor: · Mild skin redness, itching, burning, or tingling where you wear the patch · Vivid dreams or trouble sleeping If you notice other side effects that you think are caused by this medicine, tell your doctor. Call your doctor for medical advice about side effects. You may report side effects to FDA at 3-431-FDA-6860 © 2017 [address] Information is for End User's use only and may not be sold, redistributed or otherwise used for commercial purposes. The above information is an  only. It is not intended as medical advice for individual conditions or treatments. Talk to your doctor, nurse or pharmacist before following any medical regimen to see if it is safe and effective for you. Please provide this summary of care documentation to your next provider. Signatures-by signing, you are acknowledging that this After Visit Summary has been reviewed with you and you have received a copy. Patient Signature:  ____________________________________________________________ Date:  ____________________________________________________________  
  
Ely Gotti Provider Signature:  ____________________________________________________________ Date:  ____________________________________________________________

## 2018-06-06 ENCOUNTER — APPOINTMENT (OUTPATIENT)
Dept: ULTRASOUND IMAGING | Age: 68
DRG: 558 | End: 2018-06-06
Attending: INTERNAL MEDICINE
Payer: OTHER GOVERNMENT

## 2018-06-06 PROBLEM — M25.50 JOINT PAIN: Status: ACTIVE | Noted: 2018-06-06

## 2018-06-06 PROBLEM — R77.8 ELEVATED TROPONIN: Status: ACTIVE | Noted: 2018-06-06

## 2018-06-06 PROBLEM — L03.113 RIGHT FOREARM CELLULITIS: Status: ACTIVE | Noted: 2018-06-06

## 2018-06-06 PROBLEM — Z79.01 CHRONIC ANTICOAGULATION: Status: ACTIVE | Noted: 2018-06-06

## 2018-06-06 LAB
APPEARANCE UR: ABNORMAL
ATRIAL RATE: 105 BPM
ATRIAL RATE: 139 BPM
ATRIAL RATE: 74 BPM
BACTERIA URNS QL MICRO: ABNORMAL /HPF
BILIRUB UR QL: ABNORMAL
CALCULATED P AXIS, ECG09: 85 DEGREES
CALCULATED P AXIS, ECG09: 93 DEGREES
CALCULATED R AXIS, ECG10: -65 DEGREES
CALCULATED R AXIS, ECG10: -73 DEGREES
CALCULATED R AXIS, ECG10: -78 DEGREES
CALCULATED T AXIS, ECG11: -104 DEGREES
CALCULATED T AXIS, ECG11: 165 DEGREES
CALCULATED T AXIS, ECG11: 95 DEGREES
CK MB CFR SERPL CALC: ABNORMAL % (ref 0–4)
CK MB CFR SERPL CALC: ABNORMAL % (ref 0–4)
CK MB SERPL-MCNC: <1 NG/ML (ref 5–25)
CK MB SERPL-MCNC: <1 NG/ML (ref 5–25)
CK SERPL-CCNC: 47 U/L (ref 39–308)
CK SERPL-CCNC: 49 U/L (ref 39–308)
COLOR UR: ABNORMAL
DIAGNOSIS, 93000: NORMAL
DIGOXIN SERPL-MCNC: <0.2 NG/ML (ref 0.9–2)
EPITH CASTS URNS QL MICRO: ABNORMAL /LPF (ref 0–5)
GLUCOSE BLD STRIP.AUTO-MCNC: 89 MG/DL (ref 70–110)
GLUCOSE UR STRIP.AUTO-MCNC: NEGATIVE MG/DL
HGB UR QL STRIP: ABNORMAL
KETONES UR QL STRIP.AUTO: ABNORMAL MG/DL
LEUKOCYTE ESTERASE UR QL STRIP.AUTO: ABNORMAL
NITRITE UR QL STRIP.AUTO: POSITIVE
P-R INTERVAL, ECG05: 168 MS
P-R INTERVAL, ECG05: 260 MS
PH UR STRIP: 6 [PH] (ref 5–8)
PROT UR STRIP-MCNC: 100 MG/DL
Q-T INTERVAL, ECG07: 310 MS
Q-T INTERVAL, ECG07: 402 MS
Q-T INTERVAL, ECG07: 444 MS
QRS DURATION, ECG06: 162 MS
QRS DURATION, ECG06: 162 MS
QRS DURATION, ECG06: 168 MS
QTC CALCULATION (BEZET), ECG08: 471 MS
QTC CALCULATION (BEZET), ECG08: 492 MS
QTC CALCULATION (BEZET), ECG08: 526 MS
RBC #/AREA URNS HPF: ABNORMAL /HPF (ref 0–5)
SP GR UR REFRACTOMETRY: 1.03 (ref 1–1.03)
TROPONIN I SERPL-MCNC: 0.08 NG/ML (ref 0–0.04)
TROPONIN I SERPL-MCNC: 0.09 NG/ML (ref 0–0.04)
UROBILINOGEN UR QL STRIP.AUTO: 1 EU/DL (ref 0.2–1)
VENTRICULAR RATE, ECG03: 103 BPM
VENTRICULAR RATE, ECG03: 139 BPM
VENTRICULAR RATE, ECG03: 74 BPM
WBC URNS QL MICRO: ABNORMAL /HPF (ref 0–4)

## 2018-06-06 PROCEDURE — 74011250636 HC RX REV CODE- 250/636: Performed by: INTERNAL MEDICINE

## 2018-06-06 PROCEDURE — 94640 AIRWAY INHALATION TREATMENT: CPT

## 2018-06-06 PROCEDURE — 65660000000 HC RM CCU STEPDOWN

## 2018-06-06 PROCEDURE — 77030010545

## 2018-06-06 PROCEDURE — 93005 ELECTROCARDIOGRAM TRACING: CPT

## 2018-06-06 PROCEDURE — 74011250636 HC RX REV CODE- 250/636: Performed by: EMERGENCY MEDICINE

## 2018-06-06 PROCEDURE — 74011250637 HC RX REV CODE- 250/637: Performed by: INTERNAL MEDICINE

## 2018-06-06 PROCEDURE — 80353 DRUG SCREENING COCAINE: CPT | Performed by: INTERNAL MEDICINE

## 2018-06-06 PROCEDURE — 82550 ASSAY OF CK (CPK): CPT | Performed by: EMERGENCY MEDICINE

## 2018-06-06 PROCEDURE — 74011000250 HC RX REV CODE- 250: Performed by: EMERGENCY MEDICINE

## 2018-06-06 PROCEDURE — 80307 DRUG TEST PRSMV CHEM ANLYZR: CPT | Performed by: INTERNAL MEDICINE

## 2018-06-06 PROCEDURE — 74011000250 HC RX REV CODE- 250: Performed by: INTERNAL MEDICINE

## 2018-06-06 PROCEDURE — 82962 GLUCOSE BLOOD TEST: CPT

## 2018-06-06 PROCEDURE — 76882 US LMTD JT/FCL EVL NVASC XTR: CPT

## 2018-06-06 PROCEDURE — 82542 COL CHROMOTOGRAPHY QUAL/QUAN: CPT | Performed by: INTERNAL MEDICINE

## 2018-06-06 PROCEDURE — 74011250637 HC RX REV CODE- 250/637

## 2018-06-06 PROCEDURE — 36415 COLL VENOUS BLD VENIPUNCTURE: CPT | Performed by: INTERNAL MEDICINE

## 2018-06-06 PROCEDURE — 81001 URINALYSIS AUTO W/SCOPE: CPT | Performed by: INTERNAL MEDICINE

## 2018-06-06 PROCEDURE — 77030027138 HC INCENT SPIROMETER -A

## 2018-06-06 PROCEDURE — 80162 ASSAY OF DIGOXIN TOTAL: CPT | Performed by: INTERNAL MEDICINE

## 2018-06-06 RX ORDER — SPIRONOLACTONE 25 MG/1
37.5 TABLET ORAL DAILY
COMMUNITY
End: 2018-06-14

## 2018-06-06 RX ORDER — EMTRICITABINE 200 MG/1
200 CAPSULE ORAL DAILY
Status: DISCONTINUED | OUTPATIENT
Start: 2018-06-06 | End: 2018-06-07

## 2018-06-06 RX ORDER — RITONAVIR 100 MG/1
100 TABLET ORAL
Status: DISCONTINUED | OUTPATIENT
Start: 2018-06-06 | End: 2018-06-07

## 2018-06-06 RX ORDER — AMLODIPINE BESYLATE 5 MG/1
5 TABLET ORAL 2 TIMES DAILY
COMMUNITY
End: 2021-12-28

## 2018-06-06 RX ORDER — DIGOXIN 250 MCG
TABLET ORAL DAILY
COMMUNITY
End: 2018-06-14

## 2018-06-06 RX ORDER — DILTIAZEM HYDROCHLORIDE 5 MG/ML
5 INJECTION INTRAVENOUS ONCE
Status: COMPLETED | OUTPATIENT
Start: 2018-06-06 | End: 2018-06-06

## 2018-06-06 RX ORDER — CARVEDILOL 6.25 MG/1
12.5 TABLET ORAL 2 TIMES DAILY WITH MEALS
Status: DISCONTINUED | OUTPATIENT
Start: 2018-06-06 | End: 2018-06-14 | Stop reason: HOSPADM

## 2018-06-06 RX ORDER — PRAVASTATIN SODIUM 20 MG/1
40 TABLET ORAL
Status: DISCONTINUED | OUTPATIENT
Start: 2018-06-06 | End: 2018-06-14 | Stop reason: HOSPADM

## 2018-06-06 RX ORDER — OXYCODONE AND ACETAMINOPHEN 5; 325 MG/1; MG/1
1 TABLET ORAL
Status: DISCONTINUED | OUTPATIENT
Start: 2018-06-06 | End: 2018-06-11

## 2018-06-06 RX ORDER — ACETAMINOPHEN 325 MG/1
650 TABLET ORAL
Status: DISCONTINUED | OUTPATIENT
Start: 2018-06-06 | End: 2018-06-14 | Stop reason: HOSPADM

## 2018-06-06 RX ORDER — DILTIAZEM HYDROCHLORIDE 5 MG/ML
10 INJECTION INTRAVENOUS ONCE
Status: DISCONTINUED | OUTPATIENT
Start: 2018-06-06 | End: 2018-06-06

## 2018-06-06 RX ORDER — ASPIRIN 325 MG
TABLET ORAL
Status: COMPLETED
Start: 2018-06-06 | End: 2018-06-06

## 2018-06-06 RX ORDER — SODIUM CHLORIDE 9 MG/ML
100 INJECTION, SOLUTION INTRAVENOUS CONTINUOUS
Status: DISCONTINUED | OUTPATIENT
Start: 2018-06-06 | End: 2018-06-06

## 2018-06-06 RX ORDER — DILTIAZEM HYDROCHLORIDE 30 MG/1
5 TABLET, FILM COATED ORAL
Status: DISCONTINUED | OUTPATIENT
Start: 2018-06-06 | End: 2018-06-06

## 2018-06-06 RX ORDER — ONDANSETRON 2 MG/ML
4 INJECTION INTRAMUSCULAR; INTRAVENOUS
Status: DISCONTINUED | OUTPATIENT
Start: 2018-06-06 | End: 2018-06-14 | Stop reason: HOSPADM

## 2018-06-06 RX ORDER — LANOLIN ALCOHOL/MO/W.PET/CERES
50 CREAM (GRAM) TOPICAL DAILY
Status: DISCONTINUED | OUTPATIENT
Start: 2018-06-06 | End: 2018-06-07

## 2018-06-06 RX ORDER — SPIRONOLACTONE 25 MG/1
25 TABLET ORAL DAILY
Status: DISCONTINUED | OUTPATIENT
Start: 2018-06-07 | End: 2018-06-07

## 2018-06-06 RX ORDER — NITROGLYCERIN 0.4 MG/1
TABLET SUBLINGUAL
Status: DISPENSED
Start: 2018-06-06 | End: 2018-06-06

## 2018-06-06 RX ORDER — AMLODIPINE BESYLATE 5 MG/1
5 TABLET ORAL DAILY
Status: DISCONTINUED | OUTPATIENT
Start: 2018-06-06 | End: 2018-06-06

## 2018-06-06 RX ORDER — IBUPROFEN 200 MG
1 TABLET ORAL DAILY
Status: DISCONTINUED | OUTPATIENT
Start: 2018-06-07 | End: 2018-06-14 | Stop reason: HOSPADM

## 2018-06-06 RX ORDER — POTASSIUM CHLORIDE 20 MEQ/1
20 TABLET, EXTENDED RELEASE ORAL 2 TIMES DAILY
Status: DISCONTINUED | OUTPATIENT
Start: 2018-06-06 | End: 2018-06-07

## 2018-06-06 RX ORDER — FOLIC ACID 1 MG/1
1 TABLET ORAL DAILY
Status: DISCONTINUED | OUTPATIENT
Start: 2018-06-06 | End: 2018-06-14 | Stop reason: HOSPADM

## 2018-06-06 RX ORDER — SODIUM CHLORIDE 9 MG/ML
50 INJECTION, SOLUTION INTRAVENOUS CONTINUOUS
Status: DISCONTINUED | OUTPATIENT
Start: 2018-06-06 | End: 2018-06-09

## 2018-06-06 RX ORDER — FUROSEMIDE 40 MG/1
40 TABLET ORAL DAILY
Status: DISCONTINUED | OUTPATIENT
Start: 2018-06-06 | End: 2018-06-07

## 2018-06-06 RX ORDER — IPRATROPIUM BROMIDE AND ALBUTEROL SULFATE 2.5; .5 MG/3ML; MG/3ML
3 SOLUTION RESPIRATORY (INHALATION)
Status: DISCONTINUED | OUTPATIENT
Start: 2018-06-06 | End: 2018-06-10

## 2018-06-06 RX ORDER — DOCUSATE SODIUM 100 MG/1
100 CAPSULE, LIQUID FILLED ORAL
Status: DISCONTINUED | OUTPATIENT
Start: 2018-06-06 | End: 2018-06-14 | Stop reason: HOSPADM

## 2018-06-06 RX ORDER — DILTIAZEM HYDROCHLORIDE 5 MG/ML
5 INJECTION INTRAVENOUS
Status: COMPLETED | OUTPATIENT
Start: 2018-06-06 | End: 2018-06-06

## 2018-06-06 RX ORDER — DICLOFENAC SODIUM 10 MG/G
2 GEL TOPICAL 3 TIMES DAILY
Status: DISCONTINUED | OUTPATIENT
Start: 2018-06-06 | End: 2018-06-08

## 2018-06-06 RX ORDER — MORPHINE SULFATE 4 MG/ML
2 INJECTION, SOLUTION INTRAMUSCULAR; INTRAVENOUS
Status: DISCONTINUED | OUTPATIENT
Start: 2018-06-06 | End: 2018-06-06

## 2018-06-06 RX ORDER — SPIRONOLACTONE 25 MG/1
37.5 TABLET ORAL DAILY
Status: DISCONTINUED | OUTPATIENT
Start: 2018-06-06 | End: 2018-06-06

## 2018-06-06 RX ORDER — NALOXONE HYDROCHLORIDE 0.4 MG/ML
0.4 INJECTION, SOLUTION INTRAMUSCULAR; INTRAVENOUS; SUBCUTANEOUS AS NEEDED
Status: DISCONTINUED | OUTPATIENT
Start: 2018-06-06 | End: 2018-06-14 | Stop reason: HOSPADM

## 2018-06-06 RX ORDER — DILTIAZEM HYDROCHLORIDE 5 MG/ML
5 INJECTION INTRAVENOUS
Status: DISCONTINUED | OUTPATIENT
Start: 2018-06-06 | End: 2018-06-06

## 2018-06-06 RX ORDER — ASPIRIN 81 MG/1
81 TABLET ORAL DAILY
Status: DISCONTINUED | OUTPATIENT
Start: 2018-06-06 | End: 2018-06-07

## 2018-06-06 RX ORDER — ASPIRIN 325 MG
325 TABLET ORAL
Status: COMPLETED | OUTPATIENT
Start: 2018-06-06 | End: 2018-06-06

## 2018-06-06 RX ORDER — DIGOXIN 125 MCG
0.25 TABLET ORAL DAILY
Status: DISCONTINUED | OUTPATIENT
Start: 2018-06-06 | End: 2018-06-08

## 2018-06-06 RX ORDER — LISINOPRIL 10 MG/1
10 TABLET ORAL DAILY
Status: DISCONTINUED | OUTPATIENT
Start: 2018-06-06 | End: 2018-06-07

## 2018-06-06 RX ADMIN — POTASSIUM CHLORIDE 20 MEQ: 20 TABLET, EXTENDED RELEASE ORAL at 18:12

## 2018-06-06 RX ADMIN — EMTRICITABINE 200 MG: 200 CAPSULE ORAL at 10:20

## 2018-06-06 RX ADMIN — SODIUM CHLORIDE 100 ML/HR: 900 INJECTION, SOLUTION INTRAVENOUS at 04:57

## 2018-06-06 RX ADMIN — APIXABAN 5 MG: 5 TABLET, FILM COATED ORAL at 21:57

## 2018-06-06 RX ADMIN — FUROSEMIDE 40 MG: 40 TABLET ORAL at 10:20

## 2018-06-06 RX ADMIN — DARUNAVIR 600 MG: 600 TABLET, FILM COATED ORAL at 10:25

## 2018-06-06 RX ADMIN — CARVEDILOL 12.5 MG: 6.25 TABLET, FILM COATED ORAL at 10:24

## 2018-06-06 RX ADMIN — APIXABAN 5 MG: 5 TABLET, FILM COATED ORAL at 10:24

## 2018-06-06 RX ADMIN — IPRATROPIUM BROMIDE AND ALBUTEROL SULFATE 3 ML: 2.5; .5 SOLUTION RESPIRATORY (INHALATION) at 20:39

## 2018-06-06 RX ADMIN — CARVEDILOL 12.5 MG: 6.25 TABLET, FILM COATED ORAL at 18:12

## 2018-06-06 RX ADMIN — POTASSIUM CHLORIDE 20 MEQ: 20 TABLET, EXTENDED RELEASE ORAL at 10:20

## 2018-06-06 RX ADMIN — PYRIDOXINE HCL TAB 50 MG 50 MG: 50 TAB at 10:23

## 2018-06-06 RX ADMIN — WATER 2 G: 1 INJECTION INTRAMUSCULAR; INTRAVENOUS; SUBCUTANEOUS at 13:36

## 2018-06-06 RX ADMIN — Medication 325 MG: at 06:08

## 2018-06-06 RX ADMIN — ACETAMINOPHEN 650 MG: 325 TABLET ORAL at 10:19

## 2018-06-06 RX ADMIN — LISINOPRIL 10 MG: 10 TABLET ORAL at 10:38

## 2018-06-06 RX ADMIN — SODIUM CHLORIDE 50 ML/HR: 900 INJECTION, SOLUTION INTRAVENOUS at 18:53

## 2018-06-06 RX ADMIN — WATER 2 G: 1 INJECTION INTRAMUSCULAR; INTRAVENOUS; SUBCUTANEOUS at 04:54

## 2018-06-06 RX ADMIN — IPRATROPIUM BROMIDE AND ALBUTEROL SULFATE 3 ML: 2.5; .5 SOLUTION RESPIRATORY (INHALATION) at 16:03

## 2018-06-06 RX ADMIN — ASPIRIN 325 MG ORAL TABLET 325 MG: 325 PILL ORAL at 06:08

## 2018-06-06 RX ADMIN — DICLOFENAC 2 G: 10 GEL TOPICAL at 21:58

## 2018-06-06 RX ADMIN — RITONAVIR 100 MG: 100 TABLET, FILM COATED ORAL at 10:21

## 2018-06-06 RX ADMIN — DICLOFENAC 2 G: 10 GEL TOPICAL at 16:02

## 2018-06-06 RX ADMIN — ASPIRIN 81 MG: 81 TABLET, COATED ORAL at 10:23

## 2018-06-06 RX ADMIN — SODIUM CHLORIDE 1000 MG: 900 INJECTION, SOLUTION INTRAVENOUS at 22:06

## 2018-06-06 RX ADMIN — AMLODIPINE BESYLATE 5 MG: 5 TABLET ORAL at 10:21

## 2018-06-06 RX ADMIN — DILTIAZEM HYDROCHLORIDE 5 MG: 5 INJECTION INTRAVENOUS at 06:06

## 2018-06-06 RX ADMIN — DILTIAZEM HYDROCHLORIDE 5 MG: 5 INJECTION INTRAVENOUS at 06:31

## 2018-06-06 RX ADMIN — ACETAMINOPHEN 650 MG: 325 TABLET ORAL at 18:11

## 2018-06-06 RX ADMIN — SODIUM CHLORIDE 1000 MG: 900 INJECTION, SOLUTION INTRAVENOUS at 10:25

## 2018-06-06 RX ADMIN — SPIRONOLACTONE 37.5 MG: 25 TABLET ORAL at 10:21

## 2018-06-06 RX ADMIN — PRAVASTATIN SODIUM 40 MG: 20 TABLET ORAL at 22:05

## 2018-06-06 RX ADMIN — FOLIC ACID 1 MG: 1 TABLET ORAL at 10:23

## 2018-06-06 RX ADMIN — DIGOXIN 0.25 MG: 0.12 TABLET ORAL at 10:23

## 2018-06-06 NOTE — ROUTINE PROCESS
TRANSFER - OUT REPORT:    Verbal report given to Aliya Trujillo RN(name) on Jodell Romberg  being transferred to [address] [place] [address](unit) for routine progression of care       Report consisted of patients Situation, Background, Assessment and   Recommendations(SBAR). Information from the following report(s) SBAR, ED Summary, Procedure Summary, Intake/Output, MAR and Recent Results was reviewed with the receiving nurse. Lines:       Opportunity for questions and clarification was provided.       Patient transported with:   Monitor  Registered Nurse

## 2018-06-06 NOTE — ED NOTES
Pt accidentally pulled out IV from left ac, pt was attempting to climb out of bed to use the urinal.

## 2018-06-06 NOTE — ROUTINE PROCESS
TRANSFER - IN REPORT:    Verbal report received from Krishan(name) on Manjit Purvis  being received from ED(unit) for routine progression of care      Report consisted of patients Situation, Background, Assessment and   Recommendations(SBAR). Information from the following report(s) SBAR, ED Summary, Intake/Output, MAR, Recent Results and Cardiac Rhythm . was reviewed with the receiving nurse. Opportunity for questions and clarification was provided. Assessment completed upon patients arrival to unit and care assumed.

## 2018-06-06 NOTE — PROGRESS NOTES
06/06/18 0518   Vitals   Temp (!) 102.6 °F (39.2 °C)   Temp Source Oral   Pulse (Heart Rate) 98   Heart Rate Source Brachial   Resp Rate 24   O2 Sat (%) 94 %   Level of Consciousness Alert   /58   MAP (Calculated) 77   Cardiac Rhythm Paced   MEWS Score 4     Pt has a MEWS score of a 4 due to elevated temperature. Pt is complaining of pain and was admitted with fever. Pt has tylenol on med list [facility]. Will continue to modify. Writer and primary nurse at bedside.

## 2018-06-06 NOTE — H&P
History & Physical    Patient: Geneva Miller MRN: [id number]  CSN: [id number]    YOB: 1950  Age: 74 y.o. Sex: male      DOA: 6/5/2018    Chief Complaint:   Chief Complaint   Patient presents with    Back Pain    Shortness of Breath    Wrist Pain          HPI:     Geneva Miller is a 74 y.o.  male who has PMH of HIV, HTN, a-fib/flutter on Eliquis presents to ER with Rt dorsal wrest pain, swelling, induration and a fever. Pt looked generally weak and fragile and started to feel better on sepsis protocol ( Hypotensive and tachycardiac )  Several attempts to withdraw fluid from his hand failed and might need IR if must done as per Ortho. Pt states that swelling started 4-5 days ago and denies Hx of trauma or injections in his hand   While Being evaluated, pt developed an episode of a-fib with RVR/flutter with HR > 140. Pt responded well to Cardizem IV and EKG normalized  Denies CP PTA and during the episodes. Denies HIV related infection and has no rash/oral thrush. Stated that he ran out of meds last week and awaiting his meds by mail. Found to have subtherapeutic Digoxin level      Past Medical History:   Diagnosis Date    Autoimmune disease ([facility] [address]     AIDS? HIV    Hypertension     Rectal bleeding     Stroke [facility])        Past Surgical History:   Procedure Laterality Date    HX ORTHOPAEDIC      back       No family history on file.     Social History     Social History    Marital status:      Spouse name: N/A    Number of children: N/A    Years of education: N/A     Social History Main Topics    Smoking status: Current Every Day Smoker     Packs/day: 1.00     Types: Cigarettes    Smokeless tobacco: Never Used    Alcohol use 3.5 oz/week     7 Standard drinks or equivalent per week      Comment: \"drink everyday but never alone\"     Drug use: Yes     Special: Marijuana    Sexual activity: Not Currently     Other Topics Concern    Not on file     Social History Narrative       Prior to Admission medications    Medication Sig Start Date End Date Taking? Authorizing Provider   spironolactone (ALDACTONE) 25 mg tablet Take 37.5 mg by mouth daily. Yes Historical Provider   amLODIPine (NORVASC) 5 mg tablet Take 5 mg by mouth daily. Yes Historical Provider   digoxin (LANOXIN) 0.25 mg tablet Take  by mouth daily. Yes Historical Provider   albuterol (PROVENTIL HFA) 90 mcg/actuation inhaler Take 1 Puff by inhalation every four (4) hours as needed for Wheezing. 2/20/18  Yes MARIA DEL CARMEN Aburto   guaiFENesin ER (MUCINEX) 600 mg ER tablet Take 1 Tab by mouth two (2) times a day. 2/20/18  Yes MARIA DEL CARMEN Aburto   butalbital-acetaminophen-caff (FIORICET) -40 mg per capsule Take 1 Cap by mouth every four (4) hours as needed for Pain or Headache. Max Daily Amount: 6 Caps. 8/11/17  Yes Doug Lopez MD   methocarbamol (ROBAXIN-750) 750 mg tablet Take 1 Tab by mouth three (3) times daily as needed. Indications: pain; muscle spasms 8/8/17  Yes Piero Wooten DO   furosemide (LASIX) 40 mg tablet 40 mg po daily 12/13/16  Yes Hari Arreola MD   potassium chloride (K-DUR, KLOR-CON) 20 mEq tablet Take 1 Tab by mouth two (2) times a day. 12/13/16  Yes Hari Arreola MD   magnesium oxide 400 mg cap Take 400 mg by mouth daily. 12/13/16  Yes Hari Arreola MD   OTHER BMP, MG in 1 week  Dx: CHF  Fax results to PCP at [facility] 12/13/16  Yes Hari Arreola MD   apixaban (ELIQUIS) 5 mg tablet Take 1 Tab by mouth every twelve (12) hours. 11/3/16  Yes Hamlet Dc MD   carvedilol (COREG) 25 mg tablet Take 0.5 Tabs by mouth two (2) times daily (with meals). 11/3/16  Yes Hamlet Dc MD   lisinopril (PRINIVIL, ZESTRIL) 10 mg tablet Take 1 Tab by mouth daily. 11/3/16  Yes Hamlet Dc MD   pyridoxine, vitamin B6, (VITAMIN B-6) 50 mg tablet Take 50 mg by mouth daily.    Yes Historical Provider   aluminum-magnesium hydroxide (MAALOX) 200-200 mg/5 mL suspension Take 15 mL by mouth every six (6) hours as needed for Indigestion. 1/28/16  Yes FRANCINE Mendenhall   folic acid (FOLVITE) 1 mg tablet Take 1 mg by mouth daily. Yes Historical Provider   aspirin 81 mg tablet Take 81 mg by mouth daily. Yes Historical Provider   gabapentin (NEURONTIN) 300 mg capsule Take 300 mg by mouth three (3) times daily. Yes Historical Provider   Darunavir (PREZISTA) 600 mg tablet Take 600 mg by mouth. Yes Historical Provider   pravastatin (PRAVACHOL) 40 mg tablet Take 40 mg by mouth nightly. Yes Historical Provider   ritonavir (NORVIR) 100 mg capsule Take 100 mg by mouth daily (with breakfast). Yes Historical Provider   emtricitabine (EMTRIVA) 200 mg capsule Take 200 mg by mouth daily. Yes Historical Provider       No Known Allergies      Review of Systems  GENERAL: Patient alert, awake and oriented times 3, able to communicate full sentences. Distressed   HEENT: No change in vision, no earache, tinnitus, sore throat or sinus congestion. NECK: No pain or stiffness. PULMONARY: No shortness of breath, cough or wheeze. Cardiovascular: no pnd / orthopnea, no CP  GASTROINTESTINAL: No abdominal pain, nausea, vomiting or diarrhea, melena or bright red blood per rectum. GENITOURINARY: No urinary frequency, urgency, hesitancy or dysuria. MUSCULOSKELETAL: Rt wrist pain and swelling   DERMATOLOGIC: as above  ENDOCRINE: No polyuria, polydipsia, no heat or cold intolerance. No recent change in weight. HEMATOLOGICAL: No anemia or easy bruising or bleeding. NEUROLOGIC: No headache, seizures, numbness, tingling .  Generally weak    Physical Exam:     Physical Exam:  Visit Vitals    /77 (BP 1 Location: Left arm, BP Patient Position: At rest)    Pulse 99    Temp (!) 101.9 °F (38.8 °C)    Resp 20    Ht 6' 3\" (1.905 m)    Wt 72 kg (158 lb 11.2 oz)    SpO2 96%    BMI 19.84 kg/m2      O2 Device: Room air    Temp (24hrs), Av.2 °F (38.4 °C), Min:99.4 °F (37.4 °C), Max:102.6 °F (39.2 °C)    701 - 1900  In: -   Out: 225 [Urine:225]   1901 -  07  In: -   Out: 200 [Urine:200]    General:  Alert, cooperative, no distress, appears stated age. Looks disheveled with poor hygiene               Head: Normocephalic, without obvious abnormality, atraumatic. Eyes:  Conjunctivae/corneas clear. PERRL, EOMs intact. Nose: Nares normal. No drainage or sinus tenderness. Neck: Supple, symmetrical, trachea midline, no adenopathy, thyroid: no enlargement, no carotid bruit and no JVD. Lungs:   Decrease BS w/out wheezing   Heart:  Regular rate and rhythm, S1, S2 normal.     Abdomen: Soft, non-tender. Bowel sounds normal.    Extremities: Dorsum of Rt hand swelling with induration and tenderness    Pulses: 2+ and symmetric all extremities. Skin:  No rashes or lesions   Neurologic: AAOx3, No focal motor or sensory deficit. Labs Reviewed: All lab results for the last 24 hours reviewed.   CXR and EKG    Procedures/imaging: see electronic medical records for all procedures/Xrays and details which were not copied into this note but were reviewed prior to creation of Plan      Assessment/Plan     Principal Problem:    Right forearm cellulitis (2018)    Active Problems:    HIV (human immunodeficiency virus infection) ([facility] [address] (2012)      Essential hypertension, benign (2012)      Noncompliance (2012)      Atrial flutter ([address] (2016)      Joint pain (2018)      Elevated troponin (2018)      Chronic anticoagulation (2018)       Pt is admitted for multiple morbid conditions including sepsis with high grade fever mostly 2 ry to Rt hand cellulitis and possible abscess   immunosuppression 2ry to HIV and non compliance with meds >> stated that he takes his HIV meds but ran out x 1 week     Case d/W Ortho >> no intervention >> might need aspiration for wound aspiration   Developed mild elevation of troponin 0.09 with a-flutter episode that showed LBBB w/out pacing (Pt has AICD)  HR was controlled and Pt was pacing and LBBB resolved   HIV with Unknown Viral load    Will start Pt on Vanc and Cefepime  Continue home HIV meds recorded in EPic   ID consult in AM    A-fib/Fllutter with mild elevation of troponi w/out Chest pain   Brief LBBB that returned to normal pacing after  Continue Coreg and Digoxin (very low level)  Continue Eliquis     Cardiology consult    HTN >> continue Norvasc and Lisinopril     Home meds are reconciled      DVT/GI Prophylaxis: Eliquis     Plan of care is discussed in details with Patient/Family at bedside and agreed upon    Reese Youssef MD  6/6/2018 2:55 AM

## 2018-06-06 NOTE — PROGRESS NOTES
06/06/18 0557   Vitals   Temp (!) 101.3 °F (38.5 °C)   Temp Source Oral   Pulse (Heart Rate) (!) 140   Heart Rate Source Brachial   Resp Rate 22   O2 Sat (%) 95 %   Level of Consciousness Alert   /76   MAP (Calculated) 89   BP 1 Location Left arm   BP 1 Method Automatic   MEWS Score 7   Pt has a MEWS score of a 7 due to elevated temperature, abnormal rhythm and heart rate. Pt has a pacemaker. Rapid response called, then cancelled per physician recommendation. Physician at bedside with primary nurse and writer (MD Shemar Monroe). MD assessing pt and verbalizing orders, ekg complete. Intervention and orders being complete with MD at bedside. Will continue to monitor. Underlying rhythm appears to be aflutter per MD and tele tech. MD will speak with cardiology. Pt has no chest pain.

## 2018-06-06 NOTE — CDMP QUERY
Patient is noted to have a BMI of 19.8. Please clarify if this patient is:     =>Underweight  =>Cachexia  =>Failure to Thrive  => Normal Habitus   =>Other explanation of clinical findings  =>Clinically Undetermined (no explanation for clinical findings)    Presentation: , 6'3\"158 lbs = BMI 19.8    Please clarify and document your clinical opinion in the progress notes and discharge summary, including the definitive and or presumptive diagnosis, (suspected or probable), related to the above clinical findings. Please include clinical findings supporting your diagnosis.    Thank Kaylin Carmona RN ext [phone]

## 2018-06-06 NOTE — CONSULTS
Patient: Greyson Galeano                MRN:@           SSN: [id number]   YOB: 1950         AGE: 74 y.o. SEX: male       Greyson Galeano seen and examined this AM.    DDx:    1. Inflammatory OA right wrist (as he as h/o right wrist gout also)  2. Septic OA right wrist    Plan:    1. I recommend: US guided right wrist assessment for fluid.  Send fluid for cell count/gram stain/cultures (bacterial/fungal)        Pravin Franco MD  6/6/2018  1:12 AM

## 2018-06-06 NOTE — PROGRESS NOTES
conducted an initial consultation and Spiritual Assessment for Constance Nelson, who is a 74 y. o.,male. Patients Primary Language is: [place]. According to the patients EMR Adventism Affiliation is: [place]. The reason the Patient came to the hospital is:   Patient Active Problem List    Diagnosis Date Noted    Joint pain 06/06/2018    Elevated troponin 06/06/2018    Right forearm cellulitis 06/06/2018    Chronic anticoagulation 06/06/2018    Defibrillator discharge 12/11/2016    Tobacco abuse 11/02/2016    SOB (shortness of breath) 11/02/2016    Alcohol use 11/02/2016    Hx of cocaine abuse 11/02/2016    Atrial flutter ([facility] [address] 11/02/2016    Congestive heart failure (CHF) ([facility] [address] 11/01/2016    Special screening for malignant neoplasms, colon 04/09/2015    Syncope and collapse 09/12/2012    HIV (human immunodeficiency virus infection) ([facility] [address] 09/12/2012    Pacemaker 09/12/2012    Essential hypertension, benign 09/12/2012    History of CVA (cerebrovascular accident) 09/12/2012    Noncompliance 09/12/2012        The  provided the following Interventions:  Initiated a relationship of care and support. Explored issues of kenan, spirituality and/or Orthodoxy needs while hospitalized. Listened empathically. Provided chaplaincy education. Provided information about Spiritual Care Services. Offered prayer and assurance of continued prayers on patient's behalf. Chart reviewed. The following outcomes were achieved:  Patient shared some information about their medical narrative and spiritual journey/beliefs. Patient processed feeling about current hospitalization. Patient expressed gratitude for the 's visit. Assessment:  Patient did not indicate any spiritual or Orthodoxy issues which require Spiritual Care Services interventions at this time.    Patient does not have any Orthodoxy/cultural needs that will affect patients preferences in health care.    Plan:  Chaplains will continue to follow and will provide pastoral care on an as needed or requested basis.  recommends bedside caregivers page  on duty if patient shows signs of acute spiritual or emotional distress.   Brendan Grande, [address] [place] [address]  Spiritual Care  [phone]

## 2018-06-06 NOTE — ED PROVIDER NOTES
HPI Comments: Janis Taylor is a 74 y.o. male with a history of HTN, CVA, defibrillator placement, gout, and HIV/AIDS, who presents to the ED with complaint of right wrist pain and left lower back pain onset two days. Patient describes his wrist pain as \"hot,\" with limited range of motion. He reports that his pain is similar to gout flare ups. Patient also reports non-radiating left lower back pain which he describes as \"terrible. \" Patient reports chronic, intermittent shortness of breath, but denies new or worsening shortness of breath. Patient denies associated fever, chills, cough, other joint pain, nausea, vomiting, diarrhea, chest pain, saddle numbness, focal weakness, headache, or vision changes. Patient reports that he has been off his HIV medications since 5/31/18 because he needs refills. Patient cannot remember his last CD4 count, but states that it was \"low. \" Patient denies history of DVT and PE. Patient denies recent sick contact. He denies IV drug use. Patient is followed by Dr. Lupillo Buckley (Infectious Disease) at the [facility].    The history is provided by the patient. Past Medical History:   Diagnosis Date    Autoimmune disease ([facility] [address]     AIDS? HIV    Hypertension     Rectal bleeding     Stroke [facility])        Past Surgical History:   Procedure Laterality Date    HX ORTHOPAEDIC      back         No family history on file. Social History     Social History    Marital status:      Spouse name: N/A    Number of children: N/A    Years of education: N/A     Occupational History    Not on file.      Social History Main Topics    Smoking status: Current Every Day Smoker     Packs/day: 1.00     Types: Cigarettes    Smokeless tobacco: Never Used    Alcohol use 3.5 oz/week     7 Standard drinks or equivalent per week      Comment: \"drink everyday but never alone\"     Drug use: Yes     Special: Marijuana    Sexual activity: Not Currently     Other Topics Concern    Not on file Social History Narrative         ALLERGIES: Review of patient's allergies indicates no known allergies. Review of Systems   Constitutional: Negative. Negative for chills and fever. HENT: Negative. Negative for congestion. Eyes: Negative. Negative for visual disturbance. Respiratory: Positive for shortness of breath. Cardiovascular: Negative. Negative for chest pain and leg swelling. Gastrointestinal: Negative for abdominal pain, diarrhea, nausea and vomiting. Genitourinary: Negative. Negative for dysuria. Musculoskeletal: Positive for arthralgias (right wrist) and back pain (left lower). Negative for myalgias. Skin: Negative. Negative for rash and wound. Neurological: Negative. Negative for dizziness, weakness, light-headedness, numbness and headaches. Psychiatric/Behavioral: Negative. Negative for self-injury. All other systems reviewed and are negative. Vitals:    06/05/18 2030 06/05/18 2130 06/05/18 2200 06/05/18 2245   BP: (!) 180/152 149/68 (!) 157/105 155/88   Pulse: 84  67 74   Resp: 26 18 22 28   Temp: 99.6 °F (37.6 °C)      SpO2: 98% 98% 99% 98%   Weight:       Height:                Physical Exam   Constitutional: He is oriented to person, place, and time. He appears well-developed and well-nourished. No distress. Thin. HENT:   Head: Normocephalic and atraumatic. Mouth/Throat: Mucous membranes are dry. Eyes: Conjunctivae and EOM are normal. Pupils are equal, round, and reactive to light. No scleral icterus. Neck: Normal range of motion. Neck supple. No JVD present. No thyromegaly present. Cardiovascular: Normal rate, regular rhythm, S1 normal and S2 normal.  Exam reveals no gallop and no friction rub. No murmur heard. Pulmonary/Chest: Effort normal and breath sounds normal. No accessory muscle usage. No respiratory distress. Abdominal: Soft. Normal appearance. He exhibits no distension. There is no tenderness.  There is no rigidity, no rebound and no guarding. Musculoskeletal: Normal range of motion. He exhibits no edema or tenderness. Reproducible tenderness of the left lateral lumbar musculature. No midline point tenderness, step-offs, or deformity. Redness and warm right wrist with swelling and decreased ROM secondary to pain. Neurovascularly intact distally. Neurological: He is alert and oriented to person, place, and time. He has normal strength. No cranial nerve deficit or sensory deficit. Coordination normal.   Skin: Skin is warm and intact. No rash noted. Psychiatric: He has a normal mood and affect. His speech is normal and behavior is normal.   Vitals reviewed. MDM  Number of Diagnoses or Management Options  Dyspnea on exertion:   Elevated troponin:   HIV (human immunodeficiency virus infection) ([facility] [address]): Hypokalemia:   Inflammatory arthritis:   Noncompliance with medications:   Diagnosis management comments: Martina Barnett is a 74 y.o. Male with HIV and medication noncompliance coming in with an inflamed right wrist. Ddx includes gout and septic arthritis. Tmax here was 99.9 orally. ALC is 810- 96% specific for AIDS. Do not think that I will be able to preform arthrocentesis, ortho consulted and covered empirically for septic arthritis. Does not appear to be septic or systemically affected. Lumbar pain appears muscular in nature. No red flags for discitis or epidural abscess. Trop elevated, EKG only shows V paced. No CP recently or here. SOB chronic in nature, clear CXR and normal cardiorespiratory exam. No evidence of PE or aortic pathology. Will have cardiology see as an inpatient as well.          ED Course       Procedures    Vitals:  Patient Vitals for the past 12 hrs:   Temp Pulse Resp BP SpO2   06/05/18 2245 - 74 28 155/88 98 %   06/05/18 2200 - 67 22 (!) 157/105 99 %   06/05/18 2130 - - 18 149/68 98 %   06/05/18 2030 99.6 °F (37.6 °C) 84 26 (!) 180/152 98 %   06/05/18 2015 - 84 26 (!) 190/107 98 %   06/05/18 1817 99.4 °F (37.4 °C) 76 20 (!) 197/108 97 %       Medications Ordered:  Medications   cefepime (MAXIPIME) 2 g in sterile water (preservative free) 10 mL IV syringe (2 g IntraVENous Given 6/5/18 2244)   vancomycin (VANCOCIN) 1,000 mg in 0.9% sodium chloride (MBP/ADV) 250 mL adv (not administered)   oxyCODONE-acetaminophen (PERCOCET) 5-325 mg per tablet 1 Tab (1 Tab Oral Given 6/5/18 2244)   vancomycin (VANCOCIN) 1,500 mg in 0.9% sodium chloride 500 mL IVPB (1,500 mg IntraVENous New Bag 6/5/18 2330)       Lab Findings:  Recent Results (from the past 12 hour(s))   EKG, 12 LEAD, INITIAL    Collection Time: 06/05/18  7:54 PM   Result Value Ref Range    Ventricular Rate 74 BPM    Atrial Rate 74 BPM    P-R Interval 260 ms    QRS Duration 162 ms    Q-T Interval 444 ms    QTC Calculation (Bezet) 492 ms    Calculated P Axis 93 degrees    Calculated R Axis -73 degrees    Calculated T Axis -104 degrees    Diagnosis       Electronic ventricular pacemaker  When compared with ECG of 11-AUG-2017 18:13,  Electronic ventricular pacemaker has replaced Atrial flutter     CBC WITH AUTOMATED DIFF    Collection Time: 06/05/18  8:34 PM   Result Value Ref Range    WBC 5.4 4.6 - 13.2 K/uL    RBC 4.42 (L) 4.70 - 5.50 M/uL    HGB 13.0 13.0 - 16.0 g/dL    HCT 39.0 36.0 - 48.0 %    MCV 88.2 74.0 - 97.0 FL    MCH 29.4 24.0 - 34.0 PG    MCHC 33.3 31.0 - 37.0 g/dL    RDW 11.7 11.6 - 14.5 %    PLATELET 110 614 - 801 K/uL    MPV 12.1 (H) 9.2 - 11.8 FL    NEUTROPHILS 66 40 - 73 %    LYMPHOCYTES 15 (L) 21 - 52 %    MONOCYTES 18 (H) 3 - 10 %    EOSINOPHILS 1 0 - 5 %    BASOPHILS 0 0 - 2 %    ABS. NEUTROPHILS 3.6 1.8 - 8.0 K/UL    ABS. LYMPHOCYTES 0.8 (L) 0.9 - 3.6 K/UL    ABS. MONOCYTES 1.0 0.05 - 1.2 K/UL    ABS. EOSINOPHILS 0.0 0.0 - 0.4 K/UL    ABS.  BASOPHILS 0.0 0.0 - 0.06 K/UL    DF AUTOMATED     METABOLIC PANEL, COMPREHENSIVE    Collection Time: 06/05/18  8:34 PM   Result Value Ref Range    Sodium 140 136 - 145 mmol/L    Potassium 3.2 (L) 3.5 - 5.5 mmol/L    Chloride 104 100 - 108 mmol/L    CO2 34 (H) 21 - 32 mmol/L    Anion gap 2 (L) 3.0 - 18 mmol/L    Glucose 131 (H) 74 - 99 mg/dL    BUN 10 7.0 - 18 MG/DL    Creatinine 0.96 0.6 - 1.3 MG/DL    BUN/Creatinine ratio 10 (L) 12 - 20      GFR est AA >60 >60 ml/min/1.73m2    GFR est non-AA >60 >60 ml/min/1.73m2    Calcium 10.2 (H) 8.5 - 10.1 MG/DL    Bilirubin, total 0.6 0.2 - 1.0 MG/DL    ALT (SGPT) 16 16 - 61 U/L    AST (SGOT) 15 15 - 37 U/L    Alk.  phosphatase 160 (H) 45 - 117 U/L    Protein, total 10.0 (H) 6.4 - 8.2 g/dL    Albumin 3.4 3.4 - 5.0 g/dL    Globulin 6.6 (H) 2.0 - 4.0 g/dL    A-G Ratio 0.5 (L) 0.8 - 1.7     CARDIAC PANEL,(CK, CKMB & TROPONIN)    Collection Time: 06/05/18  8:34 PM   Result Value Ref Range    CK 57 39 - 308 U/L    CK - MB <1.0 <3.6 ng/ml    CK-MB Index  0.0 - 4.0 %     CALCULATION NOT PERFORMED WHEN RESULT IS BELOW LINEAR LIMIT    Troponin-I, Qt. 0.06 (H) 0.0 - 0.045 NG/ML   URIC ACID    Collection Time: 06/05/18  8:34 PM   Result Value Ref Range    Uric acid 4.4 2.6 - 7.2 MG/DL   MAGNESIUM    Collection Time: 06/05/18  8:34 PM   Result Value Ref Range    Magnesium 1.9 1.6 - 2.6 mg/dL   POC LACTIC ACID    Collection Time: 06/05/18  8:50 PM   Result Value Ref Range    Lactic Acid (POC) 1.7 0.4 - 2.0 mmol/L   CARDIAC PANEL,(CK, CKMB & TROPONIN)    Collection Time: 06/06/18 12:38 AM   Result Value Ref Range    CK 47 39 - 308 U/L    CK - MB <1.0 <3.6 ng/ml    CK-MB Index  0.0 - 4.0 %     CALCULATION NOT PERFORMED WHEN RESULT IS BELOW LINEAR LIMIT    Troponin-I, Qt. 0.08 (H) 0.0 - 0.045 NG/ML       EKG Interpretation by ED physician:  Ventricular paced, rate 74 bpm. No STEMI. 7:54 PM    X-ray, CT or radiology findings or impressions:  XR WRIST RT AP/LAT/OBL MIN 3V   Final Result      XR CHEST PA LAT   Final Result      US GUIDE INJ/ASP/ARTHRO  INTERMED JNT / BURSA    (Results Pending)     Radiologist's interpretation of Chest X-Ray (Read by Dr. Arlyn Jeffery):  No acute finding    Radiologist's interpretation of Right Wrist X-Ray (Read by Dr. Laureen Valdovinos):  Mild dorsal soft tissue swelling at the wrist. Mild degenerative arthritis at  the proximal joint    Progress notes, consult notes, or additional procedure notes:  Consult:  Discussed care with Dr. [place] Cl[place], [facility]. Standard discussion; including history of patients chief complaint, available diagnostic results, and treatment course. Will check for bed availability and return call. 10:11 PM, 6/5/2018      Discussed with Dr. Saint Clair, [facility] to accept patient for transfer as there are no Step-Down beds available and feel that patient is not stable for the floor. 10:21 PM     Consult:  Discussed care with Dr. Tatyana Wynne. Standard discussion; including history of patients chief complaint, available diagnostic results, and treatment course. Will come down to attempt aspiration of the wrist and evaluate the patient. 12:16 AM, 6/5/2018      Discussed with Dr. Tatyana Wynne, Orthopedics. Evaluated patient; feels that IR should consult for imaging-guided aspiration. Recommending admission to Hospitalist. Will evaluate in the morning. 12:49 AM     Consult:  Discussed care with Dr. Joseline Vargas, Hospitalist. Standard discussion; including history of patients chief complaint, available diagnostic results, and treatment course. Accepts patient for admission. 1:41 AM, 6/5/2018      Diagnosis:   1. Inflammatory arthritis    2. Dyspnea on exertion    3. Elevated troponin    4. HIV (human immunodeficiency virus infection) ([facility] [address]    5. Noncompliance with medications    6.  Hypokalemia        Disposition: Admission      Scribe Attestation:     Roberto Bess, acting as a scribe for and in the presence of Kay Serrano MD      June 05, 2018 at 8:06 PM       Provider Attestation:      I personally performed the services described in the documentation, reviewed the documentation, as recorded by the scribe in my presence, and it accurately and completely records my words and actions.  June 05, 2018 at 8:06 PM - Pavithra Faith MD

## 2018-06-06 NOTE — PROGRESS NOTES
06/06/18 0703   Vital Signs   Temp (!) 101.9 °F (38.8 °C)   Temp Source Oral   Pulse (Heart Rate) 99   Heart Rate Source Monitor   Resp Rate 20   O2 Sat (%) 96 %   Level of Consciousness Alert   /77   MAP (Calculated) 96   BP 1 Method Automatic   BP 1 Location Left arm   BP Patient Position At rest   MEWS Score 3       Patient MEWS score 3 related to temp. MD notified. No new orders received, patient already on Abx therapy.

## 2018-06-06 NOTE — PROGRESS NOTES
conducted an initial consultation and Spiritual Assessment for Gene Espinoza, who is a 74 y. o.,male. Patients Primary Language is: [place]. According to the patients EMR Sabianism Affiliation is: Ashley [place]. The reason the Patient came to the hospital is:   Patient Active Problem List    Diagnosis Date Noted    Joint pain 06/06/2018    Elevated troponin 06/06/2018    Right forearm cellulitis 06/06/2018    Chronic anticoagulation 06/06/2018    Defibrillator discharge 12/11/2016    Tobacco abuse 11/02/2016    SOB (shortness of breath) 11/02/2016    Alcohol use 11/02/2016    Hx of cocaine abuse 11/02/2016    Atrial flutter ([facility] [address] 11/02/2016    Congestive heart failure (CHF) ([facility] [address] 11/01/2016    Special screening for malignant neoplasms, colon 04/09/2015    Syncope and collapse 09/12/2012    HIV (human immunodeficiency virus infection) ([facility] [address] 09/12/2012    Pacemaker 09/12/2012    Essential hypertension, benign 09/12/2012    History of CVA (cerebrovascular accident) 09/12/2012    Noncompliance 09/12/2012        The  provided the following Interventions:  Initiated a relationship of care and support. Explored issues of kenan, spirituality and/or Synagogue needs while hospitalized. Listened empathically. Provided chaplaincy education. Provided information about Spiritual Care Services. Offered prayer and assurance of continued prayers on patient's behalf. Chart reviewed. The following outcomes were achieved:  Patient shared some information about their medical narrative and spiritual journey/beliefs. Patient processed feeling about current hospitalization. Patient expressed gratitude for the 's visit. Assessment:  Patient did not indicate any spiritual or Synagogue issues which require Spiritual Care Services interventions at this time.    Patient does not have any Synagogue/cultural needs that will affect patients preferences in health care.    Plan:  Chaplains will continue to follow and will provide pastoral care on an as needed or requested basis.  recommends bedside caregivers page  on duty if patient shows signs of acute spiritual or emotional distress.   Ana Rosa Hoffman, [address] [place] [address]  Spiritual Care  [phone]

## 2018-06-06 NOTE — PROGRESS NOTES
Reason for Admission:   Joint pain, elevated troponin, HIV                  RRAT Score:     14             Do you (patient/family) have any concerns for transition/discharge? no                 Plan for utilizing home health:     no    Likelihood of readmission? Yellow/moderate            Transition of Care Plan:      Pt is a . Will need to fax the [place] transfer form. Pt states he is waiting for his son to see the form and sign for him. Left the form with pt to give to son. 1525:  Pt states his son had come and gone. 1640:  Called pt's son Bev [place] [phone] about the [facility] form and he states he will come back to sign it.

## 2018-06-06 NOTE — CONSULTS
Infectious Disease Consultation Note    Requested by; Dr. Paula gonzalez    Reason: right wrist septic arthritis/sepsis    Current abx Prior abx   Cefepime, vancomycin since 6/6/18      Lines:       Assessment :    74 y.o.  male who has PMH of HIV/AIDS (on emtricitabine/tenofovir/dolutegravir), HTN, a-fib/flutter on Eliquis presented to ER on 6/6/18 with Rt dorsal wrist pain, swelling, induration and a fever. Highly complex clinical picture. Difficult to determine exact etiology of right wrist pain since presentation is atypical. It could be due to evolving right wrist cellulitis versus gout. High grade fevers, a.fib with RVR and generalized malaise on admission concerning for sepsis due to right wrist cellulitis. Hence, agree with abx use. Immunosuppressed state can mask the full clinical presentation. No clinical evidence to suggest right wrist septic arthritis on today's exam.     Recommendations:    1. Continue vancomycin. D/c cefepime since acuity of illness would support gram positive infection  2. F/u blood cultures  3. F/u ortho recommendations  4. Resume antiretrovirals as outpatient - we don't have dolutegravir in house which is part of patient's outpatient ART regimen. Advance Care planning: full code: discussed  with patient/surrogate decision maker:Gage Watters: [phone]    Thank you for consultation request. Above plan was discussed in details with patient,  and dr Jad Mcnair. Please call me if any further questions or concerns. Will continue to participate in the care of this patient. HPI:    74 y.o.  male who has PMH of HIV/AIDS (on emtricitabine/tenofovir/dolutegravir), HTN, a-fib/flutter on Eliquis presented to ER on 6/6/18 with Rt dorsal wrist pain, swelling, induration and a fever. Pt states that he hasn't taken his HIV meds for about a week since he ran out of it. [facility] Doesn't recollect his last cd4 count.  He started having increased pain and swelling of right wrist about 2 days ago. He came to ed on 6/6/18 with these complaints. Per ed notes, he looked generally weak and fragile and started to feel better on sepsis protocol ( Hypotensive and tachycardiac ). Several attempts to withdraw fluid from his hand failed. While Being evaluated, pt developed an episode of a-fib with RVR/flutter with HR > 140. Pt responded well to Cardizem IV and EKG normalized. He was started on cefepime/vancomycin since he had temp of 102 and concerns for sepsis. I have been consulted for further recommendations. Of note, patient had USG right wrist which showed minimal effusion, no soft tissue collection. Xray 6/5 revealed Mild dorsal soft tissue swelling at the wrist. Patient states that his right wrist pain is 100% better today. Patient denies subjective fever, headaches, visual disturbances, sore throat, runny nose, earaches, cp, sob, chills, cough, abdominal pain, diarrhea, burning micturition, pain or weakness in extremities. He denies back pain/flank pain. No known h/o MRSA colonization or infection in the past.             Past Medical History:   Diagnosis Date    Autoimmune disease ([facility] [address]     AIDS? HIV    Hypertension     Rectal bleeding     Stroke [facility])        Past Surgical History:   Procedure Laterality Date    HX ORTHOPAEDIC      back       home Medication List    Details   spironolactone (ALDACTONE) 25 mg tablet Take 37.5 mg by mouth daily. amLODIPine (NORVASC) 5 mg tablet Take 5 mg by mouth daily. digoxin (LANOXIN) 0.25 mg tablet Take  by mouth daily. albuterol (PROVENTIL HFA) 90 mcg/actuation inhaler Take 1 Puff by inhalation every four (4) hours as needed for Wheezing. Qty: 1 Inhaler, Refills: 0      guaiFENesin ER (MUCINEX) 600 mg ER tablet Take 1 Tab by mouth two (2) times a day.   Qty: 12 Tab, Refills: 0      butalbital-acetaminophen-caff (FIORICET) -40 mg per capsule Take 1 Cap by mouth every four (4) hours as needed for Pain or Headache. Max Daily Amount: 6 Caps. Qty: 12 Cap, Refills: 0      methocarbamol (ROBAXIN-750) 750 mg tablet Take 1 Tab by mouth three (3) times daily as needed. Indications: pain; muscle spasms  Qty: 12 Tab, Refills: 0      furosemide (LASIX) 40 mg tablet 40 mg po daily  Qty: 30 Tab, Refills: 0      potassium chloride (K-DUR, KLOR-CON) 20 mEq tablet Take 1 Tab by mouth two (2) times a day. Qty: 30 Tab, Refills: 0      magnesium oxide 400 mg cap Take 400 mg by mouth daily. Qty: 20 Each, Refills: 0      OTHER BMP, MG in 1 week  Dx: CHF  Fax results to PCP at [facility]  Qty: 1 Each, Refills: 0      apixaban (ELIQUIS) 5 mg tablet Take 1 Tab by mouth every twelve (12) hours. Qty: 60 Tab, Refills: 0      carvedilol (COREG) 25 mg tablet Take 0.5 Tabs by mouth two (2) times daily (with meals). Qty: 60 Tab, Refills: 0      lisinopril (PRINIVIL, ZESTRIL) 10 mg tablet Take 1 Tab by mouth daily. Qty: 30 Tab, Refills: 0      pyridoxine, vitamin B6, (VITAMIN B-6) 50 mg tablet Take 50 mg by mouth daily. aluminum-magnesium hydroxide (MAALOX) 200-200 mg/5 mL suspension Take 15 mL by mouth every six (6) hours as needed for Indigestion. Qty: 300 mL, Refills: 0      folic acid (FOLVITE) 1 mg tablet Take 1 mg by mouth daily. aspirin 81 mg tablet Take 81 mg by mouth daily. gabapentin (NEURONTIN) 300 mg capsule Take 300 mg by mouth three (3) times daily. Darunavir (PREZISTA) 600 mg tablet Take 600 mg by mouth.        pravastatin (PRAVACHOL) 40 mg tablet Take 40 mg by mouth nightly. ritonavir (NORVIR) 100 mg capsule Take 100 mg by mouth daily (with breakfast). emtricitabine (EMTRIVA) 200 mg capsule Take 200 mg by mouth daily.          Current Facility-Administered Medications   Medication Dose Route Frequency    apixaban (ELIQUIS) tablet 5 mg  5 mg Oral Q12H    aspirin delayed-release tablet 81 mg  81 mg Oral DAILY    carvedilol (COREG) tablet 12.5 mg  12.5 mg Oral BID WITH MEALS    darunavir ethanolate (PREZISTA) tablet 600 mg  600 mg Oral DAILY WITH BREAKFAST    digoxin (LANOXIN) tablet 0.25 mg  0.25 mg Oral DAILY    emtricitabine (EMTRIVA) capsule 200 mg  200 mg Oral DAILY    folic acid (FOLVITE) tablet 1 mg  1 mg Oral DAILY    furosemide (LASIX) tablet 40 mg  40 mg Oral DAILY    lisinopril (PRINIVIL, ZESTRIL) tablet 10 mg  10 mg Oral DAILY    potassium chloride (K-DUR, KLOR-CON) SR tablet 20 mEq  20 mEq Oral BID    pravastatin (PRAVACHOL) tablet 40 mg  40 mg Oral QHS    pyridoxine (vitamin B6) (VITAMIN B-6) tablet 50 mg  50 mg Oral DAILY    acetaminophen (TYLENOL) tablet 650 mg  650 mg Oral Q6H PRN    oxyCODONE-acetaminophen (PERCOCET) 5-325 mg per tablet 1 Tab  1 Tab Oral Q6H PRN    naloxone (NARCAN) injection 0.4 mg  0.4 mg IntraVENous PRN    ondansetron (ZOFRAN) injection 4 mg  4 mg IntraVENous Q6H PRN    docusate sodium (COLACE) capsule 100 mg  100 mg Oral BID PRN    nitroglycerin (NITROSTAT) 0.4 mg tablet        ritonavir (NORVIR) tablet 100 mg  100 mg Oral DAILY WITH BREAKFAST    [START ON 6/7/2018] VANCOMYCIN TROUGH DUE   Other Daily    [START ON 6/7/2018] spironolactone (ALDACTONE) tablet 25 mg  25 mg Oral DAILY    albuterol-ipratropium (DUO-NEB) 2.5 MG-0.5 MG/3 ML  3 mL Nebulization Q6H RT    diclofenac (VOLTAREN) 1 % topical gel 2 g  2 g Topical TID    [START ON 6/7/2018] nicotine (NICODERM CQ) 14 mg/24 hr patch 1 Patch  1 Patch TransDERmal DAILY    cefepime (MAXIPIME) 2 g in sterile water (preservative free) 10 mL IV syringe  2 g IntraVENous Q8H    vancomycin (VANCOCIN) 1,000 mg in 0.9% sodium chloride (MBP/ADV) 250 mL adv  1,000 mg IntraVENous Q12H       Allergies: Review of patient's allergies indicates no known allergies. No family history on file.   Social History     Social History    Marital status:      Spouse name: N/A    Number of children: N/A    Years of education: N/A     Occupational History  Not on file. Social History Main Topics    Smoking status: Current Every Day Smoker     Packs/day: 1.00     Types: Cigarettes    Smokeless tobacco: Never Used    Alcohol use 3.5 oz/week     7 Standard drinks or equivalent per week      Comment: \"drink everyday but never alone\"     Drug use: Yes     Special: Marijuana    Sexual activity: Not Currently     Other Topics Concern    Not on file     Social History Narrative     History   Smoking Status    Current Every Day Smoker    Packs/day: 1.00    Types: Cigarettes   Smokeless Tobacco    Never Used        Temp (24hrs), Av.7 °F (38.2 °C), Min:97.6 °F (36.4 °C), Max:102.6 °F (39.2 °C)    Visit Vitals    /63 (BP 1 Location: Left arm, BP Patient Position: Sitting)    Pulse 70    Temp 97.6 °F (36.4 °C)    Resp 18    Ht 6' 3\" (1.905 m)    Wt 72 kg (158 lb 11.2 oz)    SpO2 94%    BMI 19.84 kg/m2       ROS: 12 point ROS obtained in details. Pertinent positives as mentioned in HPI,   otherwise negative    Physical Exam:    Constitutional: He is oriented to person, place, and time. He appears well-developed and well-nourished. No distress. Thin. HENT:   Head: Normocephalic and atraumatic. Mouth/Throat: Mucous membranes moist   Eyes: Conjunctivae and EOM are normal. Pupils are equal, round, and reactive to light. No scleral icterus. Neck: Normal range of motion. Neck supple. No JVD present. No thyromegaly present. Cardiovascular: Normal rate, regular rhythm, S1 normal and S2 normal.  Exam reveals no gallop and no friction rub. No murmur heard. Pulmonary/Chest: Effort normal and breath sounds normal. No accessory muscle usage. No respiratory distress. Abdominal: Soft. Normal appearance. He exhibits no distension. There is no tenderness. There is no rigidity, no rebound and no guarding. Musculoskeletal: Normal range of motion. He exhibits no edema or tenderness. Reproducible tenderness of the left lateral lumbar musculature. No midline point tenderness, step-offs, or deformity. Present   warmth right wrist without overlying tenderness. No pain on active/passive movements of right wrist.   Neurological: He is alert and oriented to person, place, and time. He has normal strength. No cranial nerve deficit or sensory deficit. Coordination normal.   Skin: Skin is warm and intact. No rash noted. Psychiatric: He has a normal mood and affect. His speech is normal and behavior is normal.   Vitals reviewed.          Labs: Results:   Chemistry Recent Labs      06/05/18 2034   GLU  131*   NA  140   K  3.2*   CL  104   CO2  34*   BUN  10   CREA  0.96   CA  10.2*   AGAP  2*   BUCR  10*   AP  160*   TP  10.0*   ALB  3.4   GLOB  6.6*   AGRAT  0.5*      CBC w/Diff Recent Labs      06/05/18 2034   WBC  5.4   RBC  4.42*   HGB  13.0   HCT  39.0   PLT  309   GRANS  66   LYMPH  15*   EOS  1      Microbiology Recent Labs      06/05/18 2034 06/05/18 2019   CULT  NO GROWTH AFTER 11 HOURS  NO GROWTH AFTER 11 HOURS          RADIOLOGY:    All available imaging studies/reports in [facility] for this admission were reviewed    Dr. Angelica Moseley, Infectious Disease Specialist  [phone]  June 6, 2018  1:36 PM

## 2018-06-06 NOTE — ROUTINE PROCESS
Bedside shift change report given to Linda (oncoming nurse) by Lilia Officer (offgoing nurse). Report included the following information SBAR, Intake/Output, MAR, Recent Results and Cardiac Rhythm . Rosa Cuellar

## 2018-06-06 NOTE — CONSULTS
[facility] - Consult Note    Date of  Admission: 6/5/2018  7:26 PM     Primary Care Physician:  Sharonda Landaverde MD     Plan:     1. Sepsis - right forearm cellulitis - improving, treatment per primary team  2. Non ischemic cardiomyopathy - Echo 2016 with EF 20% - continue optimum medical therapy with Coreg, lisinopril, aldactone, digoxin. Repeat echo  3. AICD -CRT in place - denies discharge. Check with [facility]  4. HIV - medications per ID  5. Atrial flutter - rates controlled with Coreg and has demand V Pacing  6. Chronic anticoagulation - continue Eliquis  7. Hypertension - controlled   8. Hypokalemia - replete  9. H/O non-compliance - encourage compliance with medications and f/u    D/w pt & son in room  Get ICD check  incr coreg and acei as tolerated. Consider entresto as he stabilizes       Assessment:     Hospital Problems  Date Reviewed: 6/6/2018          Codes Class Noted POA    Joint pain ICD-10-CM: M25.50  ICD-9-CM: 719.40  6/6/2018 Unknown        Elevated troponin ICD-10-CM: R74.8  ICD-9-CM: 790.6  6/6/2018 Unknown        * (Principal)Right forearm cellulitis ICD-10-CM: R76.010  ICD-9-CM: 682.3  6/6/2018 Unknown        Chronic anticoagulation ICD-10-CM: Z79.01  ICD-9-CM: V58.61  6/6/2018 Unknown        Atrial flutter ([facility] ICD-10-CM: I48.92  ICD-9-CM: 427.32  11/2/2016 Yes        HIV (human immunodeficiency virus infection) ([facility] ICD-10-CM: B20  ICD-9-CM: V08  9/12/2012 Unknown        Essential hypertension, benign ICD-10-CM: I10  ICD-9-CM: 401.1  9/12/2012 Yes        Noncompliance ICD-10-CM: Z91.19  ICD-9-CM: V15.81  9/12/2012 Yes                 History of Present Illness:     Mr. Remi Carlos is a 73 y/o male seen in consult for optimization of cardiac medications. He has PMH of nonischemic cardiomyopathy with EF 20%, AICD, HIV, Hypertension, CVA, atrial flutter. [facility] but [facility] not [facility] cardi[facility] in over 6 months. He has been out of all medications x 1-2 weeks. He presented to ER with c/o right wrist pain. The right wrist developed warmth with swelling over the last 2 days. He c/o associated fever, and generalized weakness. He denies chest pain, sob, recent orthopnea, palpitations or dizziness. In ER he developed AFlutter with RVR and is now rate controlled with Cardizem. Past Medical History:     Past Medical History:   Diagnosis Date    Autoimmune disease ([address]     AIDS? HIV    Hypertension     Rectal bleeding     Stroke [facility])          Social History:     Social History     Social History    Marital status:      Spouse name: N/A    Number of children: N/A    Years of education: N/A     Social History Main Topics    Smoking status: Current Every Day Smoker     Packs/day: 1.00     Types: Cigarettes    Smokeless tobacco: Never Used    Alcohol use 3.5 oz/week     7 Standard drinks or equivalent per week      Comment: \"drink everyday but never alone\"     Drug use: Yes     Special: Marijuana    Sexual activity: Not Currently     Other Topics Concern    Not on file     Social History Narrative        Family History:   No family history on file.      Medications:   No Known Allergies     Current Facility-Administered Medications   Medication Dose Route Frequency    apixaban (ELIQUIS) tablet 5 mg  5 mg Oral Q12H    aspirin delayed-release tablet 81 mg  81 mg Oral DAILY    carvedilol (COREG) tablet 12.5 mg  12.5 mg Oral BID WITH MEALS    darunavir ethanolate (PREZISTA) tablet 600 mg  600 mg Oral DAILY WITH BREAKFAST    digoxin (LANOXIN) tablet 0.25 mg  0.25 mg Oral DAILY    emtricitabine (EMTRIVA) capsule 200 mg  200 mg Oral DAILY    folic acid (FOLVITE) tablet 1 mg  1 mg Oral DAILY    furosemide (LASIX) tablet 40 mg  40 mg Oral DAILY    lisinopril (PRINIVIL, ZESTRIL) tablet 10 mg  10 mg Oral DAILY    potassium chloride (K-DUR, KLOR-CON) SR tablet 20 mEq  20 mEq Oral BID    pravastatin (PRAVACHOL) tablet 40 mg  40 mg Oral QHS    pyridoxine (vitamin B6) (VITAMIN B-6) tablet 50 mg  50 mg Oral DAILY    acetaminophen (TYLENOL) tablet 650 mg  650 mg Oral Q6H PRN    oxyCODONE-acetaminophen (PERCOCET) 5-325 mg per tablet 1 Tab  1 Tab Oral Q6H PRN    naloxone (NARCAN) injection 0.4 mg  0.4 mg IntraVENous PRN    ondansetron (ZOFRAN) injection 4 mg  4 mg IntraVENous Q6H PRN    docusate sodium (COLACE) capsule 100 mg  100 mg Oral BID PRN    nitroglycerin (NITROSTAT) 0.4 mg tablet        ritonavir (NORVIR) tablet 100 mg  100 mg Oral DAILY WITH BREAKFAST    [START ON 6/7/2018] VANCOMYCIN TROUGH DUE   Other Daily    [START ON 6/7/2018] spironolactone (ALDACTONE) tablet 25 mg  25 mg Oral DAILY    albuterol-ipratropium (DUO-NEB) 2.5 MG-0.5 MG/3 ML  3 mL Nebulization Q6H RT    diclofenac (VOLTAREN) 1 % topical gel 2 g  2 g Topical TID    [START ON 6/7/2018] nicotine (NICODERM CQ) 14 mg/24 hr patch 1 Patch  1 Patch TransDERmal DAILY    cefepime (MAXIPIME) 2 g in sterile water (preservative free) 10 mL IV syringe  2 g IntraVENous Q8H    vancomycin (VANCOCIN) 1,000 mg in 0.9% sodium chloride (MBP/ADV) 250 mL adv  1,000 mg IntraVENous Q12H        Review Of Systems:       Constitutional: + fever, weakness no chills, no weight loss, no night sweats   HEENT: No epistaxis, no nasal drainage, no difficulty in swallowing, no redness in eyes  Respiratory: Negative for cough, sputum, hemoptysis, pleurisy/chest pain, wheezing, dyspnea on exertion or emphysema  Cardiovascular: no chest pain, no chest pressure, no dyspnea, no pnd, no claudication no palpitations, no chronic leg edema, no syncope  Gastrointestinal: no abd pain, no vomiting, no diarrhea, no bleeding symptoms  Genitourinary: No urinary symptoms or hematuria  Integument/breast: No ulcers or rashes  Musculoskeletal: + right wrist pain and swelling  Neurological: No focal weakness, no seizures, no headaches  Behvioral/Psych: No anxiety, no depression         Physical Exam:     Visit Vitals    /63 (BP 1 Location: Left arm, BP Patient Position: Sitting)    Pulse 70    Temp 97.6 °F (36.4 °C)    Resp 18    Ht 6' 3\" (1.905 m)    Wt 72 kg (158 lb 11.2 oz)    SpO2 94%    BMI 19.84 kg/m2     BP Readings from Last 3 Encounters:   06/06/18 116/63   02/20/18 118/73   11/09/17 148/76     Pulse Readings from Last 3 Encounters:   06/06/18 70   02/20/18 63   11/09/17 66     Wt Readings from Last 3 Encounters:   06/06/18 72 kg (158 lb 11.2 oz)   08/11/17 66.7 kg (147 lb)   08/08/17 72.1 kg (159 lb)       General:  alert, cooperative, no distress, appears stated age  Skin: Warm and dry, acyanotic, normal color. Head: Normocephalic, atraumatic. Eyes: Sclerae anicteric, conjunctivae without injection. Neck:  nontender, no nuchal rigidity, no masses, no stridor, no carotid bruit, no JVD  Lungs:  clear to auscultation bilaterally, rhonchi , diminished breath sounds R base, L base  Heart:  regular rate and rhythm, S1, S2 normal, no murmur, click, rub or gallop  Abdomen:  abdomen is soft without significant tenderness, masses, organomegaly or guarding  Extremities:  extremities normal, atraumatic, no cyanosis or edema  Neurological: grossly intact. No focal abnormalities, moves all extremities well. Psychiatric Affect: The patient is awake, alert and oriented x3. Bassam Zavala is interactive and appropriate.    Data Review:     Recent Results (from the past 48 hour(s))   EKG, 12 LEAD, INITIAL    Collection Time: 06/05/18  7:54 PM   Result Value Ref Range    Ventricular Rate 74 BPM    Atrial Rate 74 BPM    P-R Interval 260 ms    QRS Duration 162 ms    Q-T Interval 444 ms    QTC Calculation (Bezet) 492 ms    Calculated P Axis 93 degrees    Calculated R Axis -73 degrees    Calculated T Axis -104 degrees    Diagnosis       Electronic ventricular pacemaker  When compared with ECG of 11-AUG-2017 18:13,  Electronic ventricular pacemaker has replaced Atrial flutter     CULTURE, BLOOD    Collection Time: 06/05/18  8:19 PM   Result Value Ref Range    Special Requests: NO SPECIAL REQUESTS      Culture result: NO GROWTH AFTER 11 HOURS     CBC WITH AUTOMATED DIFF    Collection Time: 06/05/18  8:34 PM   Result Value Ref Range    WBC 5.4 4.6 - 13.2 K/uL    RBC 4.42 (L) 4.70 - 5.50 M/uL    HGB 13.0 13.0 - 16.0 g/dL    HCT 39.0 36.0 - 48.0 %    MCV 88.2 74.0 - 97.0 FL    MCH 29.4 24.0 - 34.0 PG    MCHC 33.3 31.0 - 37.0 g/dL    RDW 11.7 11.6 - 14.5 %    PLATELET 530 381 - 107 K/uL    MPV 12.1 (H) 9.2 - 11.8 FL    NEUTROPHILS 66 40 - 73 %    LYMPHOCYTES 15 (L) 21 - 52 %    MONOCYTES 18 (H) 3 - 10 %    EOSINOPHILS 1 0 - 5 %    BASOPHILS 0 0 - 2 %    ABS. NEUTROPHILS 3.6 1.8 - 8.0 K/UL    ABS. LYMPHOCYTES 0.8 (L) 0.9 - 3.6 K/UL    ABS. MONOCYTES 1.0 0.05 - 1.2 K/UL    ABS. EOSINOPHILS 0.0 0.0 - 0.4 K/UL    ABS. BASOPHILS 0.0 0.0 - 0.06 K/UL    DF AUTOMATED     METABOLIC PANEL, COMPREHENSIVE    Collection Time: 06/05/18  8:34 PM   Result Value Ref Range    Sodium 140 136 - 145 mmol/L    Potassium 3.2 (L) 3.5 - 5.5 mmol/L    Chloride 104 100 - 108 mmol/L    CO2 34 (H) 21 - 32 mmol/L    Anion gap 2 (L) 3.0 - 18 mmol/L    Glucose 131 (H) 74 - 99 mg/dL    BUN 10 7.0 - 18 MG/DL    Creatinine 0.96 0.6 - 1.3 MG/DL    BUN/Creatinine ratio 10 (L) 12 - 20      GFR est AA >60 >60 ml/min/1.73m2    GFR est non-AA >60 >60 ml/min/1.73m2    Calcium 10.2 (H) 8.5 - 10.1 MG/DL    Bilirubin, total 0.6 0.2 - 1.0 MG/DL    ALT (SGPT) 16 16 - 61 U/L    AST (SGOT) 15 15 - 37 U/L    Alk.  phosphatase 160 (H) 45 - 117 U/L    Protein, total 10.0 (H) 6.4 - 8.2 g/dL    Albumin 3.4 3.4 - 5.0 g/dL    Globulin 6.6 (H) 2.0 - 4.0 g/dL    A-G Ratio 0.5 (L) 0.8 - 1.7     CARDIAC PANEL,(CK, CKMB & TROPONIN)    Collection Time: 06/05/18  8:34 PM   Result Value Ref Range    CK 57 39 - 308 U/L    CK - MB <1.0 <3.6 ng/ml    CK-MB Index  0.0 - 4.0 %     CALCULATION NOT PERFORMED WHEN RESULT IS BELOW LINEAR LIMIT    Troponin-I, Qt. 0.06 (H) 0.0 - 0.045 NG/ML   URIC ACID    Collection Time: 06/05/18 8:34 PM   Result Value Ref Range    Uric acid 4.4 2.6 - 7.2 MG/DL   CULTURE, BLOOD    Collection Time: 06/05/18  8:34 PM   Result Value Ref Range    Special Requests: NO SPECIAL REQUESTS      Culture result: NO GROWTH AFTER 11 HOURS     MAGNESIUM    Collection Time: 06/05/18  8:34 PM   Result Value Ref Range    Magnesium 1.9 1.6 - 2.6 mg/dL   POC LACTIC ACID    Collection Time: 06/05/18  8:50 PM   Result Value Ref Range    Lactic Acid (POC) 1.7 0.4 - 2.0 mmol/L   CARDIAC PANEL,(CK, CKMB & TROPONIN)    Collection Time: 06/06/18 12:38 AM   Result Value Ref Range    CK 47 39 - 308 U/L    CK - MB <1.0 <3.6 ng/ml    CK-MB Index  0.0 - 4.0 %     CALCULATION NOT PERFORMED WHEN RESULT IS BELOW LINEAR LIMIT    Troponin-I, Qt. 0.08 (H) 0.0 - 0.045 NG/ML   GLUCOSE, POC    Collection Time: 06/06/18  5:36 AM   Result Value Ref Range    Glucose (POC) 89 70 - 110 mg/dL   EKG, 12 LEAD, SUBSEQUENT    Collection Time: 06/06/18  5:41 AM   Result Value Ref Range    Ventricular Rate 139 BPM    Atrial Rate 139 BPM    P-R Interval 168 ms    QRS Duration 162 ms    Q-T Interval 310 ms    QTC Calculation (Bezet) 471 ms    Calculated P Axis 85 degrees    Calculated R Axis -65 degrees    Calculated T Axis 165 degrees    Diagnosis       Sinus tachycardia  Left axis deviation  Left bundle branch block  Abnormal ECG  When compared with ECG of 05-JUN-2018 19:54,  Sinus rhythm has replaced Electronic ventricular pacemaker  Vent.  rate has increased BY  65 BPM     DIGOXIN    Collection Time: 06/06/18  5:50 AM   Result Value Ref Range    Digoxin level <0.2 (L) 0.9 - 2.0 NG/ML   CARDIAC PANEL,(CK, CKMB & TROPONIN)    Collection Time: 06/06/18  5:50 AM   Result Value Ref Range    CK 49 39 - 308 U/L    CK - MB <1.0 <3.6 ng/ml    CK-MB Index  0.0 - 4.0 %     CALCULATION NOT PERFORMED WHEN RESULT IS BELOW LINEAR LIMIT    Troponin-I, Qt. 0.09 (H) 0.0 - 0.045 NG/ML   EKG, 12 LEAD, SUBSEQUENT    Collection Time: 06/06/18  6:14 AM   Result Value Ref Range Ventricular Rate 103 BPM    Atrial Rate 105 BPM    QRS Duration 168 ms    Q-T Interval 402 ms    QTC Calculation (Bezet) 526 ms    Calculated R Axis -78 degrees    Calculated T Axis 95 degrees    Diagnosis       Poor data quality, interpretation may be adversely affected  Electronic ventricular pacemaker  When compared with ECG of 06-JUN-2018 05:41,  Electronic ventricular pacemaker has replaced Sinus rhythm           Intake/Output Summary (Last 24 hours) at 06/06/18 1318  Last data filed at 06/06/18 0811   Gross per 24 hour   Intake                0 ml   Output              525 ml   Net             -525 ml       Cardiographics:     ECG: V paced    Echocardiogram: 11/2016  SUMMARY:  Left ventricle: Systolic function was severely reduced by EF (biplane  method of disks). Ejection fraction was estimated in the range of 15 % to  20 %. There was severe diffuse hypokinesis with regional variations. Wall  thickness was moderately increased. Left atrium: The atrium was severely dilated. Mitral valve: There was mild regurgitation. COMPARISONS:  Comparison was made with the previous study of 13-Sep-2012. LV overall  function has decreased from 60 % to 20 %. Signed By: Nikolay Leal NP     June 6, 2018      Patient seen independently  Discussed the details with NP and patient.  Please see orders & recommendations  Lorna San MD

## 2018-06-06 NOTE — PROGRESS NOTES
0067:  Patient arrived to unit. CHG bath given in preparation for procedure. 0518:  MEWS 4     0528:  MEWS score 8. HR now sustained in 140's  BP 88/49 then 91/53 RRT called    0539:  RRT canceled by Hospitalist who came to room. 0541:  EKG, O2 applied    0608:  5 mg Cardizem IV and 325 mg Aspirin per MD    0267:  EKG    0631:  Another 5 mg Cardizem per MD    0703:  MEWS score 3 temp still elevated 101.9, MD notified, no new orders.

## 2018-06-06 NOTE — PROGRESS NOTES
Patient: Elsie Patten                MRN:@           SSN: [id number]   YOB: 1950         AGE: 74 y.o. SEX: male          DIAGNOSTIC IMAGING         . Final result (Exam End: 6/6/2018 11:36 AM) Open    Study Result   EXAMINATION: Ultrasound right upper extremity     INDICATION: Wrist swelling     COMPARISON: Radiographs 6/5/2018     TECHNIQUE: Grayscale sonographic images of the right wrist obtained, targeting  area of interest.     FINDINGS:     In the area of interest, the dorsal wrist, there is evidence of small joint  effusion and/or synovial thickening. No definable rounded or convex border fluid  collection to suggest ganglion cyst or other fluid collection.     IMPRESSION  IMPRESSION:     1. No definable/drainable fluid collection identified. Suspected small joint  effusion and/or synovial thickening in the dorsal wrist.  -From an imaging perspective, MRI of the wrist may provide further  characterization, and contrast enhancement should be considered if there is  concern for infection or mass. 1. OT  FOR WRIST SPLINT  2. REC: TREAT FOR GOUT.     IF HIS SYMPTOMS WORSEN, THEN MRI WITH/WITHOUT CONTRAST      Maryuri Hollingsworth MD  6/6/2018  1:11 PM

## 2018-06-06 NOTE — PROGRESS NOTES
06/06/18 0528   Vitals   Temp (!) 102.6 °F (39.2 °C)   Temp Source Rectal   Pulse (Heart Rate) (!) 142   Heart Rate Source Brachial   Resp Rate 28   O2 Sat (%) 94 %   Level of Consciousness Alert   BP 91/53   MAP (Calculated) 66   BP 1 Location Left arm   BP 1 Method Automatic   MEWS Score 8   Pt has a MEWS score of a 8 due to elevated temperature, abnormal rhythm and heart rate. Pt has a pacemaker. Rapid response called, then cancelled per physician recommendation. Physician at bedside with primary nurse and writer (MD Nannette Cook). MD assessing pt and verbalizing orders, ekg complete. Intervention and orders being complete with MD at bedside. Will continue to monitor. Underlying rhythm appears to be aflutter per MD and tele tech. MD will speak with cardiology. Pt has no chest pain. Tylenol will be given and cardizem.

## 2018-06-06 NOTE — CONSULTS
[address]  MR#: [id number]  : 1950  ACCOUNT #: [de-identified]   DATE OF SERVICE: 2018    REASON FOR CONSULTATION:  Right dorsal wrist pain, redness. HISTORY OF PRESENT ILLNESS:  The patient is a 59-year-old male with a history of hypertension, history of stroke, history of cardiac arrhythmia requiring defibrillator placement, a history of gout, as well as HIV positive status, HIV/AIDS. He presented to the emergency room complaining of right wrist pain and back pain onset for the last 2 days. ER physician called me to assess this patient's wrist.  The patient tells me he has had a history of gout about a year ago when he was drinking alcohol quite significantly, he states, he developed redness and pain in his right wrist.  He states the pain he is having in his right wrist feels just about exactly the same as he had when he has had his prior right wrist gouty attack. He has denied any fever, shakes, chills, night sweats, cough or any other joint pain, i.e., to his knees, elbows, left wrist, left hand and/or his shoulders. He denies having any vomiting, diarrhea, chest pain or numbness to his legs. No change in his bowel or bladder habits. No visual changes. He states that he has been off of his HIV meds since 2018. The medical records indicate that he does need a refill. Next, his infectious disease specialist is at the [facility], Dr. Freddy Augustine. He denies any history of DVT or PE and denies any IV drug use and denies any contact with any sick individuals. PAST MEDICAL HISTORY:  Significant for hypertension, history of rectal bleeding, history of stroke, history of HIV positive status. PAST SURGICAL HISTORY:  He apparently has had surgery on his back. That is what the medical records indicate. SOCIAL HISTORY:  The patient is . He smokes cigarettes, at least 1 pack of cigarettes every day.   He has smoked for many years now. He drinks alcohol, at least 3.5 ounces per week. He does use marijuana occasionally. He is currently not sexually active. ALLERGIES:  NONE. REVIEW OF SYSTEMS:  CONSTITUTIONAL:  Negative for fever, shakes, chills, night sweats. ENT:  Negative for congestion. EYES:  Negative for visual disturbance. RESPIRATORY:  Negative for shortness of breath. CARDIOVASCULAR:  Negative for chest pain. GASTROINTESTINAL:  Negative for abdominal pain, diarrhea, nausea, or vomiting. GENITOURINARY:  Negative for dysuria. MUSCULOSKELETAL:  Positive for right wrist pain, for the last several days, as well as left lower back pain. Negative for myalgias. SKIN:  Positive for redness in the dorsal part of his right wrist, that began a few days ago. NEUROLOGIC:  Negative for dizziness, weakness, lightheadedness. LABORATORY DATA:  Lactic acid level is 1.7. White count is 5.4, hemoglobin 13.0, hematocrit 39.0. Differential with 66% neutrophils, 15% lymphocytes. Chemistry:  Sodium 140, potassium 3.2, chloride 104, bicarb 34, glucose 131, BUN 10, creatinine 0.96. GFR greater than 60. Albumin 3.4, ALT 16, AST 15, alkaline phosphatase 160, elevated uric acid 4.4. CK 57, CK-MB  less than 1.0, troponin level of 0.06. Blood cultures are pending. X-rays right wrist, shows right dorsal soft tissue wrist swelling, mild; mild degenerative changes are also seen at the proximal row at the radial proximal carpal joints. No erosions. Normal mineralization seen, no foreign bodies. PHYSICAL EXAMINATION:  VITAL SIGNS:  In the emergency room, temperature 99.6. At 2030 hours, on 09/05/2018:  Blood pressure 180/152, blood pressure 157/105 at 2200 hours. Pulse rate 67. GENERAL:  The patient is alert, follows commands and he is in no acute distress. He is lying supine, in bed 12, in the emergency room. EXTREMITIES:  His right wrist was examined.   There is some warmth to the right wrist.  There is some slight redness in coloration when comparing the right wrist to the left wrist.  There are less wrinkles to the skin on the dorsal part of the right wrist, compared to that of the left wrist, indicating swelling. I can passively flex and extend his wrist, and he can actively flex and extend his wrist, through a short range of motion, does cause him some discomfort, to the dorsal part of his hand. There is no severe swelling to the right wrist.  He can flex and extend his fingers slowly and there is no stiffness of his digits. He has intact radial artery pulse. He has excellent cap refill to his fingers. His right forearm remains nontender. Right elbow is nontender    His back was examined by the ER physician. IMPRESSION:  A 44-year-old male who had a history of gout in the past who has spontaneous swelling to the right wrist.  I had a lengthy discussion with him. He is immunocompromised, so I explained the redness in his wrist could be the following differential diagnosis:  1. Inflammatory arthritis, such as gout. 2.  Infection, septic arthritis, right wrist.      In individual, who is immunocompromised, he may not present the same way as someone who is nonimmunocompromised. I explained this to him. After a lengthy discussion, my plan of action is going to be:  Recommendation is going to be for ultrasound of his right wrist, bedside ultrasound, or ultrasound in the radiology department to assess and quantify any wrist collection or fluid collection, that may need to be aspirated. If it is a fluid collection, recommend Radiology to perform an aspiration of the right wrist and also to send for synovial fluid, total cell count with cell count differential, as well as cultures. Should there be toy pus, the recommendation will be for open irrigation, lavage, and debridement of the right wrist.    All of this, I explained to him in detail.       MD DANNI Kelly / Camilo Francois  D: 06/06/2018 01:06     T: 06/06/2018 04:20  JOB #: [id number]

## 2018-06-07 ENCOUNTER — APPOINTMENT (OUTPATIENT)
Dept: ULTRASOUND IMAGING | Age: 68
DRG: 558 | End: 2018-06-07
Attending: INTERNAL MEDICINE
Payer: OTHER GOVERNMENT

## 2018-06-07 LAB
ANION GAP SERPL CALC-SCNC: 10 MMOL/L (ref 3–18)
BACTERIA SPEC CULT: NORMAL
BASOPHILS # BLD: 0 K/UL (ref 0–0.06)
BASOPHILS NFR BLD: 0 % (ref 0–2)
BUN SERPL-MCNC: 29 MG/DL (ref 7–18)
BUN/CREAT SERPL: 16 (ref 12–20)
CALCIUM SERPL-MCNC: 9.2 MG/DL (ref 8.5–10.1)
CALCIUM SERPL-MCNC: 9.4 MG/DL (ref 8.5–10.1)
CHLORIDE SERPL-SCNC: 106 MMOL/L (ref 100–108)
CK MB CFR SERPL CALC: ABNORMAL % (ref 0–4)
CK MB SERPL-MCNC: <1 NG/ML (ref 5–25)
CK SERPL-CCNC: 53 U/L (ref 39–308)
CO2 SERPL-SCNC: 23 MMOL/L (ref 21–32)
CREAT SERPL-MCNC: 1.85 MG/DL (ref 0.6–1.3)
DATE LAST DOSE: NORMAL
DIFFERENTIAL METHOD BLD: ABNORMAL
DIGOXIN SERPL-MCNC: 0.8 NG/ML (ref 0.9–2)
EOSINOPHIL # BLD: 0.1 K/UL (ref 0–0.4)
EOSINOPHIL NFR BLD: 1 % (ref 0–5)
ERYTHROCYTE [DISTWIDTH] IN BLOOD BY AUTOMATED COUNT: 11.8 % (ref 11.6–14.5)
GLUCOSE SERPL-MCNC: 93 MG/DL (ref 74–99)
HCT VFR BLD AUTO: 31.1 % (ref 36–48)
HGB BLD-MCNC: 10.3 G/DL (ref 13–16)
LYMPHOCYTES # BLD: 0.4 K/UL (ref 0.9–3.6)
LYMPHOCYTES NFR BLD: 3 % (ref 21–52)
MAGNESIUM SERPL-MCNC: 1.5 MG/DL (ref 1.6–2.6)
MCH RBC QN AUTO: 29 PG (ref 24–34)
MCHC RBC AUTO-ENTMCNC: 33.1 G/DL (ref 31–37)
MCV RBC AUTO: 87.6 FL (ref 74–97)
MONOCYTES # BLD: 0.8 K/UL (ref 0.05–1.2)
MONOCYTES NFR BLD: 7 % (ref 3–10)
NEUTS SEG # BLD: 10.3 K/UL (ref 1.8–8)
NEUTS SEG NFR BLD: 89 % (ref 40–73)
PHOSPHATE SERPL-MCNC: 2.6 MG/DL (ref 2.5–4.9)
PLATELET # BLD AUTO: 236 K/UL (ref 135–420)
PMV BLD AUTO: 12.5 FL (ref 9.2–11.8)
POTASSIUM SERPL-SCNC: 3.6 MMOL/L (ref 3.5–5.5)
PTH-INTACT SERPL-MCNC: 74 PG/ML (ref 18.4–88)
RBC # BLD AUTO: 3.55 M/UL (ref 4.7–5.5)
REPORTED DOSE,DOSE: NORMAL UNITS
REPORTED DOSE/TIME,TMG: 2300
SERVICE CMNT-IMP: NORMAL
SODIUM SERPL-SCNC: 139 MMOL/L (ref 136–145)
T4 FREE SERPL-MCNC: 1.2 NG/DL (ref 0.7–1.5)
TROPONIN I SERPL-MCNC: 0.15 NG/ML (ref 0–0.04)
TSH SERPL DL<=0.05 MIU/L-ACNC: 0.16 UIU/ML (ref 0.36–3.74)
VANCOMYCIN TROUGH SERPL-MCNC: 15.5 UG/ML (ref 10–20)
WBC # BLD AUTO: 11.6 K/UL (ref 4.6–13.2)

## 2018-06-07 PROCEDURE — 87329 GIARDIA AG IA: CPT | Performed by: INTERNAL MEDICINE

## 2018-06-07 PROCEDURE — 65660000000 HC RM CCU STEPDOWN

## 2018-06-07 PROCEDURE — 87177 OVA AND PARASITES SMEARS: CPT | Performed by: INTERNAL MEDICINE

## 2018-06-07 PROCEDURE — 80048 BASIC METABOLIC PNL TOTAL CA: CPT | Performed by: INTERNAL MEDICINE

## 2018-06-07 PROCEDURE — 77010033678 HC OXYGEN DAILY

## 2018-06-07 PROCEDURE — 87493 C DIFF AMPLIFIED PROBE: CPT | Performed by: INTERNAL MEDICINE

## 2018-06-07 PROCEDURE — 74011250637 HC RX REV CODE- 250/637: Performed by: INTERNAL MEDICINE

## 2018-06-07 PROCEDURE — 36415 COLL VENOUS BLD VENIPUNCTURE: CPT | Performed by: INTERNAL MEDICINE

## 2018-06-07 PROCEDURE — 94640 AIRWAY INHALATION TREATMENT: CPT

## 2018-06-07 PROCEDURE — 93306 TTE W/DOPPLER COMPLETE: CPT

## 2018-06-07 PROCEDURE — 84100 ASSAY OF PHOSPHORUS: CPT | Performed by: INTERNAL MEDICINE

## 2018-06-07 PROCEDURE — 84439 ASSAY OF FREE THYROXINE: CPT | Performed by: INTERNAL MEDICINE

## 2018-06-07 PROCEDURE — 83970 ASSAY OF PARATHORMONE: CPT | Performed by: INTERNAL MEDICINE

## 2018-06-07 PROCEDURE — 97763 ORTHC/PROSTC MGMT SBSQ ENC: CPT

## 2018-06-07 PROCEDURE — 84443 ASSAY THYROID STIM HORMONE: CPT | Performed by: INTERNAL MEDICINE

## 2018-06-07 PROCEDURE — 74011250636 HC RX REV CODE- 250/636: Performed by: EMERGENCY MEDICINE

## 2018-06-07 PROCEDURE — L3809 WHFO W/O JOINTS PRE OTS: HCPCS

## 2018-06-07 PROCEDURE — 97535 SELF CARE MNGMENT TRAINING: CPT

## 2018-06-07 PROCEDURE — 76770 US EXAM ABDO BACK WALL COMP: CPT

## 2018-06-07 PROCEDURE — 82784 ASSAY IGA/IGD/IGG/IGM EACH: CPT | Performed by: INTERNAL MEDICINE

## 2018-06-07 PROCEDURE — 97162 PT EVAL MOD COMPLEX 30 MIN: CPT

## 2018-06-07 PROCEDURE — 74011250636 HC RX REV CODE- 250/636: Performed by: INTERNAL MEDICINE

## 2018-06-07 PROCEDURE — 85025 COMPLETE CBC W/AUTO DIFF WBC: CPT | Performed by: INTERNAL MEDICINE

## 2018-06-07 PROCEDURE — 82550 ASSAY OF CK (CPK): CPT | Performed by: INTERNAL MEDICINE

## 2018-06-07 PROCEDURE — 83735 ASSAY OF MAGNESIUM: CPT | Performed by: INTERNAL MEDICINE

## 2018-06-07 PROCEDURE — 74011000250 HC RX REV CODE- 250: Performed by: INTERNAL MEDICINE

## 2018-06-07 PROCEDURE — 80162 ASSAY OF DIGOXIN TOTAL: CPT | Performed by: INTERNAL MEDICINE

## 2018-06-07 PROCEDURE — 80202 ASSAY OF VANCOMYCIN: CPT | Performed by: EMERGENCY MEDICINE

## 2018-06-07 PROCEDURE — 97530 THERAPEUTIC ACTIVITIES: CPT

## 2018-06-07 PROCEDURE — 97166 OT EVAL MOD COMPLEX 45 MIN: CPT

## 2018-06-07 RX ORDER — MAGNESIUM SULFATE 1 G/100ML
1 INJECTION INTRAVENOUS ONCE
Status: COMPLETED | OUTPATIENT
Start: 2018-06-07 | End: 2018-06-07

## 2018-06-07 RX ADMIN — FUROSEMIDE 40 MG: 40 TABLET ORAL at 11:36

## 2018-06-07 RX ADMIN — ACETAMINOPHEN 650 MG: 325 TABLET ORAL at 07:18

## 2018-06-07 RX ADMIN — MAGNESIUM SULFATE IN DEXTROSE 1 G: 10 INJECTION, SOLUTION INTRAVENOUS at 19:18

## 2018-06-07 RX ADMIN — DARUNAVIR 600 MG: 600 TABLET, FILM COATED ORAL at 11:33

## 2018-06-07 RX ADMIN — IPRATROPIUM BROMIDE AND ALBUTEROL SULFATE 3 ML: 2.5; .5 SOLUTION RESPIRATORY (INHALATION) at 20:28

## 2018-06-07 RX ADMIN — DICLOFENAC 2 G: 10 GEL TOPICAL at 11:45

## 2018-06-07 RX ADMIN — PRAVASTATIN SODIUM 40 MG: 20 TABLET ORAL at 22:14

## 2018-06-07 RX ADMIN — CARVEDILOL 12.5 MG: 6.25 TABLET, FILM COATED ORAL at 17:11

## 2018-06-07 RX ADMIN — SODIUM CHLORIDE 50 ML/HR: 900 INJECTION, SOLUTION INTRAVENOUS at 11:50

## 2018-06-07 RX ADMIN — SODIUM CHLORIDE 1000 MG: 900 INJECTION, SOLUTION INTRAVENOUS at 13:32

## 2018-06-07 RX ADMIN — RITONAVIR 100 MG: 100 TABLET, FILM COATED ORAL at 11:33

## 2018-06-07 RX ADMIN — EMTRICITABINE 200 MG: 200 CAPSULE ORAL at 11:36

## 2018-06-07 RX ADMIN — APIXABAN 5 MG: 5 TABLET, FILM COATED ORAL at 11:36

## 2018-06-07 RX ADMIN — DICLOFENAC 2 G: 10 GEL TOPICAL at 17:14

## 2018-06-07 RX ADMIN — SPIRONOLACTONE 25 MG: 25 TABLET, FILM COATED ORAL at 11:36

## 2018-06-07 RX ADMIN — DIGOXIN 0.25 MG: 0.12 TABLET ORAL at 11:35

## 2018-06-07 RX ADMIN — APIXABAN 5 MG: 5 TABLET, FILM COATED ORAL at 22:14

## 2018-06-07 RX ADMIN — PYRIDOXINE HCL TAB 50 MG 50 MG: 50 TAB at 11:35

## 2018-06-07 RX ADMIN — CARVEDILOL 12.5 MG: 6.25 TABLET, FILM COATED ORAL at 11:35

## 2018-06-07 RX ADMIN — IPRATROPIUM BROMIDE AND ALBUTEROL SULFATE 3 ML: 2.5; .5 SOLUTION RESPIRATORY (INHALATION) at 01:14

## 2018-06-07 RX ADMIN — POTASSIUM CHLORIDE 20 MEQ: 20 TABLET, EXTENDED RELEASE ORAL at 11:34

## 2018-06-07 RX ADMIN — FOLIC ACID 1 MG: 1 TABLET ORAL at 11:36

## 2018-06-07 RX ADMIN — LISINOPRIL 10 MG: 10 TABLET ORAL at 11:51

## 2018-06-07 NOTE — PROGRESS NOTES
Bedside and Verbal shift change report given to [id number] [facility] (oncoming nurse) by Lavell Cantor RN   (offgoing nurse). Report given with SBAR, Kardex, MAR and Recent Results.

## 2018-06-07 NOTE — PROGRESS NOTES
PT orders received, chart reviewed. Pt unable to participate with PT due to:  []  Nausea/vomiting  []  Eating  []  Pain  []  Pt lethargic  [x]  Off Unit for ECHO   Will f/u later as patient's schedule allows. Thank you.   Ramsey Beard PT, DPT

## 2018-06-07 NOTE — PROGRESS NOTES
SUBJECTIVE:    BACH present. No chest or abdominal pain. Right wrist pain present but better  No nausea or vomiting. No cough. Diarrhea present . OBJECTIVE:    BP 95/52 (BP 1 Location: Right arm, BP Patient Position: At rest)  Pulse 60  Temp 97.9 °F (36.6 °C)  Resp 17  Ht 6' 3\" (1.905 m)  Wt 71.9 kg (158 lb 7 oz)  SpO2 100%  BMI 19.8 kg/m2    HEENT:  No pallor. No icterus  Neck: no JVD  CVS: RRR, AICD +  RS: Diminished in bases, no wheezes  GI: NT, BS +  Extremities: no pedal edema  Musculoskeletal: right wrist tenderness present but no erythema or increased warmth  CNS: moves both LEs well while in bed  General: NAD, Follows commands    ASSESSMENT:    1. SIRS versus Sepsis sec to # 2.  2. Possible inflammatory versus septic right wrist arthritis  3. Chronic systolic heart failure with EF of around 15%. S/p AICD. 4. Hypokalemia and hypomagnesemia. 5. BACH could be from CHD and/or presumed COPD  6. Human immunodeficiency virus. 7. Paroxysmal Atrial fibrillation on Eliquis. 8. Hypertension. 9. Noncompliance with the medications and diet compliance. 10. History of cerebrovascular accident in the past.  11. History of nonsustained ventricular tachycardia in the past.  12. History of tuberculosis, status post treatment. 13. Dyslipidemia. 14. Cocaine and marijuana use. 15. Diarrhea   16. Indeterminate elevation of troponin due to demand ischemia sec to sepsis   17. Underweight  18.  IRAIDA    PLAN:    Cont current management  D/w ID - advised to stop HAART medications  Nephrology consulted - will increase IVF   Asked CM to start [facility] transfer process if possible     CMP:   Lab Results   Component Value Date/Time     06/07/2018 01:11 AM    K 3.6 06/07/2018 01:11 AM     06/07/2018 01:11 AM    CO2 23 06/07/2018 01:11 AM    AGAP 10 06/07/2018 01:11 AM    GLU 93 06/07/2018 01:11 AM    BUN 29 (H) 06/07/2018 01:11 AM    CREA 1.85 (H) 06/07/2018 01:11 AM    GFRAA 45 (L) 06/07/2018 01:11 AM GFRNA 37 (L) 06/07/2018 01:11 AM    CA 9.2 06/07/2018 01:11 AM    MG 1.5 (L) 06/07/2018 01:11 AM    PHOS 2.6 06/07/2018 01:11 AM     CBC:   Lab Results   Component Value Date/Time    WBC 11.6 06/07/2018 01:11 AM    HGB 10.3 (L) 06/07/2018 01:11 AM    HCT 31.1 (L) 06/07/2018 01:11 AM     06/07/2018 01:11 AM

## 2018-06-07 NOTE — ROUTINE PROCESS
Bedside, Verbal and Written shift change report given to RIZWANA Suh RN (oncoming nurse) by Jaida HERNÁNDEZ(offgoing nurse). Report included the following information SBAR, Kardex, and MAR. Hourly rounding and  chart checks completed.

## 2018-06-07 NOTE — PROGRESS NOTES
Paged Dr Arvin Denver pt is not eating or drinking any fluids new orders to start IVF at 50 ml will continue to monitor for fluid overload or respiratory distress

## 2018-06-07 NOTE — PROGRESS NOTES
Patient: Janis Taylor                MRN:@           N: [id number]   YOB: 1950         AGE: 74 y.o. SEX: male         Assessment     Janis Taylor resting at this time. Janis Taylor is a 74 y.o. male with less right wrist pain. Impression  Right dorsal wrist cellulitis vs inflammatory OA (gout): improving. He reports no pain this AM to right wrist    Plan    DDx: gout vs dorsal hand cellulitis    1. OT consult made by me 6/4/18  2. ID: imaging AB therapy: Vancomycin  3. No orthopaedic intervention at this time; the dorsal redness present in ED has resolved/slightly warm this AM.    Examination       Visit Vitals    BP 95/49 (BP 1 Location: Left arm, BP Patient Position: At rest)    Pulse 68    Temp 99.3 °F (37.4 °C)    Resp 18    Ht 6' 3\" (1.905 m)    Wt 158 lb 11.2 oz (72 kg)    SpO2 97%    BMI 19.84 kg/m2      Appearance: Alert, well appearing and pleasant patient who is in no distress, oriented to person, place/time, and who follows commands. Thin appearing man     Respiratory: Breathing is unlabored without accessory chest muscle use  Cardiovascular/Peripheral Vascular: Normal Pulses to each hand and foot    HAND, WRIST, FOREARM:  right    Integumentary: No rashes, skin patches, wounds, blisters, or abrasions    Warm and normal color. No regions of expressible drainage       Deformities:  Is present as he hoild wrist flexed posture most of the time         Swelling: Regions of abnormal swelling : much less dorsal wrist swelling       Tenderness: There is not  regions of tenderness : wrist right       Motor/Strength/Tone Exam: no involuntary movements       Sensory Exam:  normal light touch sensation       Stability Testing: NONE       ROM:   diminished range of motion        Vascular : Normal capillary refill and digital coloration   No embolic phenomena to the toes or hands       Past Medical History:   Diagnosis Date    Autoimmune disease ([address]     AIDS? HIV    Hypertension     Rectal bleeding     Stroke [facility])      Past Surgical History:   Procedure Laterality Date    HX ORTHOPAEDIC      back     The patient has a family history of  No Known Allergies       Current Facility-Administered Medications:     apixaban (ELIQUIS) tablet 5 mg, 5 mg, Oral, Q12H, Genet Bañuelos MD, 5 mg at 06/06/18 2157    aspirin delayed-release tablet 81 mg, 81 mg, Oral, DAILY, Genet Bañuelos MD, 81 mg at 06/06/18 1023    carvedilol (COREG) tablet 12.5 mg, 12.5 mg, Oral, BID WITH MEALS, Genet Bañuelos MD, 12.5 mg at 06/06/18 1812    darunavir ethanolate (PREZISTA) tablet 600 mg, 600 mg, Oral, DAILY WITH BREAKFAST, Genet Bañuelos MD, 600 mg at 06/06/18 1025    digoxin (LANOXIN) tablet 0.25 mg, 0.25 mg, Oral, DAILY, Genet Bañuelos MD, 0.25 mg at 06/06/18 1023    emtricitabine (EMTRIVA) capsule 200 mg, 200 mg, Oral, DAILY, Genet Bañuelos MD, 200 mg at 12/14/86 5969    folic acid (FOLVITE) tablet 1 mg, 1 mg, Oral, DAILY, Genet Bañuelos MD, 1 mg at 06/06/18 1023    furosemide (LASIX) tablet 40 mg, 40 mg, Oral, DAILY, Genet Bañuelos MD, 40 mg at 06/06/18 1020    lisinopril (PRINIVIL, ZESTRIL) tablet 10 mg, 10 mg, Oral, DAILY, Genet Bañuelos MD, 10 mg at 06/06/18 1038    potassium chloride (K-DUR, KLOR-CON) SR tablet 20 mEq, 20 mEq, Oral, BID, Genet Bañuelos MD, 20 mEq at 06/06/18 1812    pravastatin (PRAVACHOL) tablet 40 mg, 40 mg, Oral, QHS, Genet Bañuelos MD, 40 mg at 06/06/18 2205    pyridoxine (vitamin B6) (VITAMIN B-6) tablet 50 mg, 50 mg, Oral, DAILY, Genet Bañuelos MD, 50 mg at 06/06/18 1023    acetaminophen (TYLENOL) tablet 650 mg, 650 mg, Oral, Q6H PRN, Genet Bañuelos MD, 650 mg at 06/06/18 1811    oxyCODONE-acetaminophen (PERCOCET) 5-325 mg per tablet 1 Tab, 1 Tab, Oral, Q6H PRN, Genet Bañuelos MD    naloxone [facility]) injection 0.4 mg, 0.4 mg, IntraVENous, PRN, Genet Bañuelos MD    ondansetron (ZOFRAN) injection 4 mg, 4 mg, IntraVENous, Q6H PRN, Reese Youssef MD    docusate sodium (COLACE) capsule 100 mg, 100 mg, Oral, BID PRN, Reese Youssef MD    ritonavir (NORVIR) tablet 100 mg, 100 mg, Oral, DAILY WITH BREAKFAST, Cait Chowdhury MD, 100 mg at 06/06/18 1021    VANCOMYCIN, BODØ, , , ONE TIME **AND** VANCOMYCIN TROUGH DUE, , Other, Daily, Vlad Leach MD    spironolactone (ALDACTONE) tablet 25 mg, 25 mg, Oral, DAILY, Irma Garcia MD    albuterol-ipratropium (DUO-NEB) 2.5 MG-0.5 MG/3 ML, 3 mL, Nebulization, Q6H RT, Irma Garcia MD, 3 mL at 06/07/18 0114    diclofenac (VOLTAREN) 1 % topical gel 2 g, 2 g, Topical, TID, Irma Garcia MD, 2 g at 06/06/18 2158    nicotine (NICODERM CQ) 14 mg/24 hr patch 1 Patch, 1 Patch, TransDERmal, DAILY, Irma Garcia MD    0.9% sodium chloride infusion, 50 mL/hr, IntraVENous, CONTINUOUS, Irma Garcia MD, Last Rate: 50 mL/hr at 06/06/18 1853, 50 mL/hr at 06/06/18 1853    vancomycin (VANCOCIN) 1,000 mg in 0.9% sodium chloride (MBP/ADV) 250 mL adv, 1,000 mg, IntraVENous, Q12H, Vlad Leach MD, Last Rate: 125 mL/hr at 06/06/18 2206, 1,000 mg at 06/06/18 2206       Gloria Giles MD  6/7/2018  6:44 AM

## 2018-06-07 NOTE — ROUTINE PROCESS
Bedside and Verbal shift change report given to EDGAR Gallegos (oncoming nurse) by [facility], RN (offgoing nurse). Report included the following information SBAR, Kardex and Recent Results.

## 2018-06-07 NOTE — PROGRESS NOTES
Problem: Mobility Impaired (Adult and Pediatric)  Goal: *Acute Goals and Plan of Care (Insert Text)  Physical Therapy Goals  Initiated 6/7/2018 and to be accomplished within 7 day(s)  1. Patient will move from supine to sit and sit to supine , scoot up and down and roll side to side in bed with supervision/set-up. 2.  Patient will transfer from bed to chair and chair to bed with supervision/set-up using the least restrictive device. 3.  Patient will perform sit to stand with supervision/set-up. 4.  Patient will ambulate with supervision/set-up for >50 feet with the least restrictive device. 5.  Patient will ascend/descend 3 stairs with B handrail(s) with minimal assistance/contact guard assist.  Outcome: Progressing Towards Goal  physical Therapy EVALUATION    Patient: Jodell Romberg (47 y.o. male)  Date: 6/7/2018  Primary Diagnosis: Joint pain  Elevated troponin  HIV (human immunodeficiency virus infection) ([facility] [address]  Precautions: Fall, Skin    OBJECTIVE/ASSESSMENT :  Based on the objective data described below, the patient presents with impaired functional mobility including bed mobility, transfers, ambulation, and general activity tolerance following admission for R wrist pain. Patient presents today semi-reclined in bed, alert and agreeable to PT evaluation. He has a history of HIV and has been non-compliant with antiretroviral medications for past week. Patient seen with OT to maximize patient and therapist safety. Patient currently on bedpan, refusing OOB activity at this time d/t persistent diarrhea, however agreeable to sitting EOB. Patient transferred to EOB with supervision and additional time to complete, demonstrated good sitting balance during dynamic reaching with OT. Patient then performed scooting along EOB with SBA, assisted back to bed with Erik and left with transport arriving to take patient for ultrasound.   Education: bed mobility, transfers, activity pacing, body mechanics, position change -- patient verbalized/demonstrated understanding    Patient demonstrates decreased strength and independence with functional mobility, will benefit from skilled intervention to address the above impairments. Patients rehabilitation potential is considered to be Fair  Factors which may influence rehabilitation potential include:   []         None noted  []         Mental ability/status  [x]         Medical condition  [x]         Home/family situation and support systems  [x]         Safety awareness  []         Pain tolerance/management  []         Other:      PLAN :  Recommendations and Planned Interventions:  [x]           Bed Mobility Training             [x]    Neuromuscular Re-Education  [x]           Transfer Training                   []    Orthotic/Prosthetic Training  [x]           Gait Training                          []    Modalities  [x]           Therapeutic Exercises          []    Edema Management/Control  [x]           Therapeutic Activities            [x]    Patient and Family Training/Education  []           Other (comment):    Frequency/Duration: Patient will be followed by physical therapy 1-2 times per day, 4-7 days per week to address goals. Discharge Recommendations: Yobani Lawrence  Further Equipment Recommendations for Discharge: TBD     SUBJECTIVE:   Patient stated I'm on the bedpan.     OBJECTIVE DATA SUMMARY:     Past Medical History:   Diagnosis Date    Autoimmune disease ([facility] [address]     AIDS? HIV    Hypertension     Rectal bleeding     Stroke [facility])      Past Surgical History:   Procedure Laterality Date    HX ORTHOPAEDIC      back     Barriers to Learning/Limitations: None  Compensate with: N/A  Prior Level of Function/Home Situation: Patient lives alone in single story home, reports ambulating with [facility] prior to admission.   Home Situation  Home Environment: Private residence  # Steps to Enter: 4  Rails to Enter: Yes  Hand Rails : Bilateral  One/Two Story Residence: One story  Living Alone: Yes  Support Systems: None  Patient Expects to be Discharged to[de-identified] Unknown  Current DME Used/Available at Home: Cane, straight, Walker, rolling  Tub or Shower Type:  (Pt sponge bathes at home.)  Critical Behavior:  Neurologic State: Alert  Psychosocial  Patient Behaviors: Calm; Cooperative  Purposeful Interaction: Yes  Pt Identified Daily Priority: Clinical issues (comment)  [facility] Process: Teaching/learning  Caring Interventions: Therapeutic modalities  Reassure: Therapeutic listening  Therapeutic Modalities: Deep breathing  Strength:    Strength: Generally decreased, functional (BLE)  Tone & Sensation:   Tone: Normal (BLE)  Sensation: Intact (BLE)   Range Of Motion:  AROM: Generally decreased, functional (BLE)  Functional Mobility:  Bed Mobility:  Supine to Sit: Supervision  Sit to Supine: Minimum assistance  Scooting: Stand-by assistance  Transfers:  Sit to Stand:  (N/A)  Balance:   Sitting: Intact  Ambulation/Gait Training:   N/A  Pain:  Pre session: 0/10  Post session: 0/10  Activity Tolerance:   Fair  Please refer to the flowsheet for vital signs taken during this treatment. After treatment:   [] Patient left in no apparent distress sitting up in chair  [] Patient left sitting on EOB  [x] Patient left in no apparent distress in bed  [] Patient declined to be OOB at this time due to  [x] Call bell left within reach  [x] Nursing notified(Milka)  [] Caregiver present  [] Bed alarm activated  [] SCDs in place    COMMUNICATION/EDUCATION:   [x]         Fall prevention education was provided and the patient/caregiver indicated understanding. [x]         Patient/family have participated as able in goal setting and plan of care. [x]         Patient/family agree to work toward stated goals and plan of care. []         Patient understands intent and goals of therapy, but is neutral about his/her participation. []         Patient is unable to participate in goal setting and plan of care.     Thank you for this referral.  Valerie Sepulveda   Time Calculation: 23 mins      Mobility [id number] Current  CJ= 20-39%   Goal  CI= 1-19%. The severity rating is based on the Level of Assistance required for Functional Mobility and ADLs.     Eval Complexity: History: MEDIUM  Complexity : 1-2 comorbidities / personal factors will impact the outcome/ POC Exam:MEDIUM Complexity : 3 Standardized tests and measures addressing body structure, function, activity limitation and / or participation in recreation  Presentation: MEDIUM Complexity : Evolving with changing characteristics  Clinical Decision Making:Medium Complexity   Overall Complexity:MEDIUM

## 2018-06-07 NOTE — CONSULTS
Consult Note  Consult requested by: dr Lexis Woody is a 74 y.o. male [address] who is being seen on consult for arf  Chief Complaint   Patient presents with    Back Pain    Shortness of Breath    Wrist Pain     Admission diagnosis: Right forearm cellulitis     HPI: 74 y o  [place] male admitted with wrist pain,asked to evaluate for arf. pt has hx of hiv,severe cardiomyopathy,followed at the [facility],on numerous meds as oupatient,however he had run out few days ago. hx of cocaine abuse,denies taking nsaids. c/o wrist,back pain and diarrhea.noted to have atrial flutter in er.started on antibiotics for possible sepsis  Past Medical History:   Diagnosis Date    Autoimmune disease ([facility] [address]     AIDS? HIV    Hypertension     Rectal bleeding     Stroke [facility])       Past Surgical History:   Procedure Laterality Date    HX ORTHOPAEDIC      back       Social History     Social History    Marital status:      Spouse name: N/A    Number of children: N/A    Years of education: N/A     Occupational History    Not on file. Social History Main Topics    Smoking status: Current Every Day Smoker     Packs/day: 1.00     Types: Cigarettes    Smokeless tobacco: Never Used    Alcohol use 3.5 oz/week     7 Standard drinks or equivalent per week      Comment: \"drink everyday but never alone\"     Drug use: Yes     Special: Marijuana    Sexual activity: Not Currently     Other Topics Concern    Not on file     Social History Narrative       No family history on file. No Known Allergies     Home Medications:     Prior to Admission Medications   Prescriptions Last Dose Informant Patient Reported? Taking? Darunavir (PREZISTA) 600 mg tablet 5/30/2018 at Unknown time  Yes Yes   Sig: Take 600 mg by mouth.      OTHER 5/30/2018 at Unknown time  No Yes   Sig: BMP, MG in 1 week  Dx: CHF  Fax results to PCP at [facility] ASAP   albuterol (PROVENTIL HFA) 90 mcg/actuation inhaler 2018 at Unknown time  No Yes   Sig: Take 1 Puff by inhalation every four (4) hours as needed for Wheezing. aluminum-magnesium hydroxide (MAALOX) 200-200 mg/5 mL suspension 2018 at Unknown time  No Yes   Sig: Take 15 mL by mouth every six (6) hours as needed for Indigestion. amLODIPine (NORVASC) 5 mg tablet 2018 at Unknown time  Yes Yes   Sig: Take 5 mg by mouth daily. apixaban (ELIQUIS) 5 mg tablet 2018 at Unknown time  No Yes   Sig: Take 1 Tab by mouth every twelve (12) hours. aspirin 81 mg tablet 2018 at Unknown time  Yes Yes   Sig: Take 81 mg by mouth daily. butalbital-acetaminophen-caff (FIORICET) -40 mg per capsule 2018 at Unknown time  No Yes   Sig: Take 1 Cap by mouth every four (4) hours as needed for Pain or Headache. Max Daily Amount: 6 Caps.   carvedilol (COREG) 25 mg tablet 2018 at Unknown time  No Yes   Sig: Take 0.5 Tabs by mouth two (2) times daily (with meals). digoxin (LANOXIN) 0.25 mg tablet 2018 at Unknown time  Yes Yes   Sig: Take  by mouth daily. emtricitabine (EMTRIVA) 200 mg capsule 2018 at Unknown time  Yes Yes   Sig: Take 200 mg by mouth daily. folic acid (FOLVITE) 1 mg tablet 2018 at Unknown time  Yes Yes   Sig: Take 1 mg by mouth daily. furosemide (LASIX) 40 mg tablet 2018 at Unknown time  No Yes   Si mg po daily   gabapentin (NEURONTIN) 300 mg capsule 2018 at Unknown time  Yes Yes   Sig: Take 300 mg by mouth three (3) times daily. guaiFENesin ER (MUCINEX) 600 mg ER tablet 2018 at Unknown time  No Yes   Sig: Take 1 Tab by mouth two (2) times a day. lisinopril (PRINIVIL, ZESTRIL) 10 mg tablet 2018 at Unknown time  No Yes   Sig: Take 1 Tab by mouth daily. magnesium oxide 400 mg cap 2018 at Unknown time  No Yes   Sig: Take 400 mg by mouth daily.    methocarbamol (ROBAXIN-750) 750 mg tablet 2018 at Unknown time  No Yes   Sig: Take 1 Tab by mouth three (3) times daily as needed. Indications: pain; muscle spasms   potassium chloride (K-DUR, KLOR-CON) 20 mEq tablet 5/30/2018 at Unknown time  No Yes   Sig: Take 1 Tab by mouth two (2) times a day. pravastatin (PRAVACHOL) 40 mg tablet 5/30/2018 at Unknown time  Yes Yes   Sig: Take 40 mg by mouth nightly. pyridoxine, vitamin B6, (VITAMIN B-6) 50 mg tablet 5/30/2018 at Unknown time  Yes Yes   Sig: Take 50 mg by mouth daily. ritonavir (NORVIR) 100 mg capsule 5/30/2018 at Unknown time  Yes Yes   Sig: Take 100 mg by mouth daily (with breakfast). spironolactone (ALDACTONE) 25 mg tablet 5/30/2018 at Unknown time  Yes Yes   Sig: Take 37.5 mg by mouth daily.       Facility-Administered Medications: None       Current Facility-Administered Medications   Medication Dose Route Frequency    VANCOMYCIN INFORMATION NOTE   Other Rx Dosing/Monitoring    apixaban (ELIQUIS) tablet 5 mg  5 mg Oral Q12H    carvedilol (COREG) tablet 12.5 mg  12.5 mg Oral BID WITH MEALS    darunavir ethanolate (PREZISTA) tablet 600 mg  600 mg Oral DAILY WITH BREAKFAST    digoxin (LANOXIN) tablet 0.25 mg  0.25 mg Oral DAILY    emtricitabine (EMTRIVA) capsule 200 mg  200 mg Oral DAILY    folic acid (FOLVITE) tablet 1 mg  1 mg Oral DAILY    pravastatin (PRAVACHOL) tablet 40 mg  40 mg Oral QHS    pyridoxine (vitamin B6) (VITAMIN B-6) tablet 50 mg  50 mg Oral DAILY    acetaminophen (TYLENOL) tablet 650 mg  650 mg Oral Q6H PRN    oxyCODONE-acetaminophen (PERCOCET) 5-325 mg per tablet 1 Tab  1 Tab Oral Q6H PRN    naloxone (NARCAN) injection 0.4 mg  0.4 mg IntraVENous PRN    ondansetron (ZOFRAN) injection 4 mg  4 mg IntraVENous Q6H PRN    docusate sodium (COLACE) capsule 100 mg  100 mg Oral BID PRN    ritonavir (NORVIR) tablet 100 mg  100 mg Oral DAILY WITH BREAKFAST    albuterol-ipratropium (DUO-NEB) 2.5 MG-0.5 MG/3 ML  3 mL Nebulization Q6H RT    diclofenac (VOLTAREN) 1 % topical gel 2 g  2 g Topical TID    nicotine (NICODERM CQ) 14 mg/24 hr patch 1 Patch  1 Patch TransDERmal DAILY    0.9% sodium chloride infusion  50 mL/hr IntraVENous CONTINUOUS       Review of Systems:   A comprehensive review of systems was negative except for that written in the HPI. Data Review:    Labs: Results:       Chemistry Recent Labs      06/07/18 0111 06/05/18 2034   GLU  93  131*   NA  139  140   K  3.6  3.2*   CL  106  104   CO2  23  34*   BUN  29*  10   CREA  1.85*  0.96   CA  9.2  10.2*   AGAP  10  2*   BUCR  16  10*   AP   --   160*   TP   --   10.0*   ALB   --   3.4   GLOB   --   6.6*   AGRAT   --   0.5*      PTH  Lab Results   Component Value Date/Time    Calcium 9.2 06/07/2018 01:11 AM    Phosphorus 2.6 06/07/2018 01:11 AM      CBC w/Diff Recent Labs      06/07/18 0111 06/05/18 2034   WBC  11.6  5.4   RBC  3.55*  4.42*   HGB  10.3*  13.0   HCT  31.1*  39.0   PLT  236  309   GRANS  89*  66   LYMPH  3*  15*   EOS  1  1      Coagulation No results for input(s): PTP, INR, APTT in the last 72 hours. No lab exists for component: INREXT    Iron/Ferritin No results for input(s): IRON in the last 72 hours. No lab exists for component: TIBCCALC   BNP No results for input(s): BNPP in the last 72 hours.    Cardiac Enzymes Recent Labs      06/07/18 0111 06/06/18   0550   CPK  53  49   CKND1  CALCULATION NOT PERFORMED WHEN RESULT IS BELOW LINEAR LIMIT  CALCULATION NOT PERFORMED WHEN RESULT IS BELOW LINEAR LIMIT      Liver Enzymes Recent Labs      06/05/18 2034   TP  10.0*   ALB  3.4   AP  160*   SGOT  15      Thyroid Studies Lab Results   Component Value Date/Time    TSH 0.16 (L) 06/07/2018 01:11 AM        Urinalysis Lab Results   Component Value Date/Time    Color DARK YELLOW 06/06/2018 04:05 PM    Appearance TURBID 06/06/2018 04:05 PM    Specific gravity 1.027 06/06/2018 04:05 PM    pH (UA) 6.0 06/06/2018 04:05 PM    Protein 100 (A) 06/06/2018 04:05 PM    Glucose NEGATIVE  06/06/2018 04:05 PM    Ketone TRACE (A) 06/06/2018 04:05 PM    Bilirubin SMALL (A) 06/06/2018 04:05 PM    Urobilinogen 1.0 06/06/2018 04:05 PM    Nitrites POSITIVE (A) 06/06/2018 04:05 PM    Leukocyte Esterase LARGE (A) 06/06/2018 04:05 PM    Epithelial cells  06/06/2018 04:05 PM     UNABLE TO QUANTITATE MICROSCOPIC PARAMETERS DUE TO EXCESSIVE WBCS    Bacteria (A) 06/06/2018 04:05 PM     UNABLE TO QUANTITATE MICROSCOPIC PARAMETERS DUE TO EXCESSIVE WBCS    WBC TOO NUMEROUS TO COUNT 06/06/2018 04:05 PM    RBC  06/06/2018 04:05 PM     UNABLE TO QUANTITATE MICROSCOPIC PARAMETERS DUE TO EXCESSIVE WBCS         IMAGES:   XR Results (maximum last 3): Results from [place] encounter on 06/05/18   XR WRIST RT AP/LAT/OBL MIN 3V   Narrative Right Wrist Three Views    CPT CODE: 46682    HISTORY: Right wrist pain and swelling, shortness of breath, back pain, . Pain. COMPARISON: Right hand July 15, 2017. FINDINGS:    Three views have been obtained. There is no soft tissue swelling. There is no  evidence of fracture or dislocation. There is mild narrowing of the radial  proximal carpal row joints. No erosions are demonstrated. There is mild dorsal  soft tissue swelling. Mineralization is normal.  There is no radiopaque foreign  body. Impression IMPRESSION:    Mild dorsal soft tissue swelling at the wrist. Mild degenerative arthritis at  the proximal joint      XR CHEST PA LAT   Narrative CHEST PA AND LATERAL    CPT CODE: [id number]    HISTORY: Back pain , SOB. , Markedly hypertensive    COMPARISON: Chest x-ray February 20, 2018. FINDINGS:     PA and lateral views obtained. 4 leads of the left upper chest AICD are in  stable position. The cardiac and mediastinal silhouette is normal for age. The  lungs are clear. The costophrenic angles are sharply defined. Pulmonary  vascularity is normal. No bony abnormalities are seen.          Impression IMPRESSION:    No acute finding      Results from Hospital Encounter encounter on 02/20/18   XR CHEST PA LAT   Narrative CHEST PA AND LATERAL    CPT CODE: [id number]    HISTORY: Upper respiratory infection with cough and generalized body aches ,  cough. COMPARISON: Chest x-ray August 8, 2017. FINDINGS:     PA and lateral views obtained. Left upper chest AICD demonstrated with one right  atrial and 3 right ventricular leads. The cardiac and mediastinal silhouette is  normal.  The lungs are clear and mildly hyperinflated  The costophrenic angles  are sharply defined. Pulmonary vascularity is normal. No bony abnormalities are  seen. Impression IMPRESSION:    Stable mild hyperinflation. CT Results (maximum last 3): Results from [place] encounter on 08/11/17   CT HEAD WO CONT   Narrative CT head without IV contrast    Comparison December 9, 2015    INDICATION: Stroke alert. Facial muscle weakness and paralysis. All CT scans at this facility are performed using dose optimization technique as  appropriate to a performed exam, to include automated exposure control,  adjustment of the mA and/or kV according to patient size (including appropriate  matching for site specific examination) or use of iterative reconstruction  technique. No midline shift or extra-axial collection. Presumed small vessel ischemic  disease of white matter, advanced and stable. Old infarct in the left thalamus. Heterogeneous low densities in the basal ganglia. No intracranial hemorrhage,  mass lesions or cortical infarct involving a major vessel. Visualized paranasal  sinuses and mastoid air cells are clear. Impression IMPRESSION: No acute intracranial abnormalities by CT. No intracranial  hemorrhage. Advanced presumed small vessel ischemic disease.     Discussed with Dr Reina Barber [id number]      Results from [place] encounter on 12/09/15   CT SPINE CERV WO CONT   Narrative CT cervical spine     HISTORY: Headache, neck pain after fall 2 days ago    COMPARISON: None    TECHNIQUE: 2.5 mm helically acquired images from the base of skull to the lung  apex. Sagittal and coronal reformations were obtained for better evaluation of  alignment, disk space height, interfacet relations, and vertebral integrity. FINDINGS:     Postsurgical changes of fusion from C4-C6. Marked degenerative disc space  narrowing at C6/C7. There are multilevel posterior disc osteophyte complexes  with associated mild spinal canal narrowing and multilevel neuroforaminal  narrowing. Sagittal reformations show normal cervical alignment. The  atlantoaxial interspace and craniocervical junction are maintained. Coronal  reformations show normal alignment of articular pillars. The dens is intact. No  acute fracture. No prevertebral soft tissue edema or subcutaneous air. Impression IMPRESSION:    No evidence of fracture or dislocation of the cervical spine. Multilevel degenerative changes of the cervical spine. CT HEAD WO CONT   Narrative CT of the head without contrast    HISTORY: Fall 2 days ago, now with headache and pain    COMPARISON: February 2015    Technique: Multiple axial CT images of the head were obtained extending from the  skull base to the vertex. FINDINGS:     There is no acute intracranial hemorrhage, shift of midline structures,  extraaxial fluid collections or mass effect. Ventricles and basal cisterns are  normal in size and appearance. The visualized osseous structures are  unremarkable. There is periventricular white matter hypoattenuation suggestive  of chronic small vessel ischemic disease. There is a remote infarct in the left  thalamus. Impression Impression:    No acute intracranial abnormalities identified. MRI Results (maximum last 3): No results found for this or any previous visit. Nuclear Medicine Results (maximum last 3): No results found for this or any previous visit. US Results (maximum last 3):     Results from [place] encounter on 06/05/18   US EXT NONVAS RT LTD   Narrative EXAMINATION: Ultrasound right upper extremity    INDICATION: Wrist swelling    COMPARISON: Radiographs 6/5/2018    TECHNIQUE: Grayscale sonographic images of the right wrist obtained, targeting  area of interest.    FINDINGS:    In the area of interest, the dorsal wrist, there is evidence of small joint  effusion and/or synovial thickening. No definable rounded or convex border fluid  collection to suggest ganglion cyst or other fluid collection. Impression IMPRESSION:    1. No definable/drainable fluid collection identified. Suspected small joint  effusion and/or synovial thickening in the dorsal wrist.  -From an imaging perspective, MRI of the wrist may provide further  characterization, and contrast enhancement should be considered if there is  concern for infection or mass. DEXA Results (maximum last 3): No results found for this or any previous visit. LASHONDA Results (maximum last 3): No results found for this or any previous visit. IR Results (maximum last 3): No results found for this or any previous visit. VAS/US Results (maximum last 3): No results found for this or any previous visit. PET Results (maximum last 3): No results found for this or any previous visit. No results found for this or any previous visit.   @[facility]([id number])    CULTURE:   )  Recent Labs      06/05/18 2034 06/05/18 2019   CULT  NO GROWTH 2 DAYS  NO GROWTH 2 DAYS     Recent Labs      06/05/18 2034 06/05/18 2019   CULT  NO GROWTH 2 DAYS  NO GROWTH 2 DAYS       Physical Assessment:     Visit Vitals    BP 95/52 (BP 1 Location: Right arm, BP Patient Position: At rest)    Pulse 60    Temp 97.9 °F (36.6 °C)    Resp 17    Ht 6' 3\" (1.905 m)    Wt 71.9 kg (158 lb 7 oz)    SpO2 100%    BMI 19.8 kg/m2     Last 3 Recorded Weights in this Encounter    06/05/18 1817 06/06/18 0604 06/07/18 0716   Weight: 72.6 kg (160 lb) 72 kg (158 lb 11.2 oz) 71.9 kg (158 lb 7 oz)       Intake/Output Summary (Last 24 hours) at 06/07/18 1402  Last data filed at 06/07/18 0830   Gross per 24 hour   Intake              250 ml   Output                1 ml   Net              249 ml       Physial Exam:  General appearance:  mild distress  Skin: normal coloration and turgor, no rashes, no suspicious skin lesions noted. HEENT: Head; normocephalic, atraumatic. ANTHONY. ENT- ENT exam normal, no neck nodes or sinus tenderness. Lungs: clear to auscultation bilaterally  Heart: regular rate and rhythm, S1, S2 normal, no murmur, click, rub or gallop  Abdomen: soft, non-tender. Bowel sounds normal. No masses,  no organomegaly  Extremities: extremities normal, atraumatic, no cyanosis or edema    PLAN / RECOMMENDATION:    Arf,looks dry . probably related to diarrhea.hold lasix ,acei,aldactone. continue gentle hydration. check renal ultrasound  Abnormal urinalysis,proteinuria. check urine cultures,urine protein/creatinine,urine lytes. will need further followup for possible hiv nephropathy  Cellulitis vs gout. adjust antibiotics. discussed with dr Alex gonzalez  High total protein,probably related to dehydration,recheck and check serum protein electropheresis  Recheck potassium,magnesium ,phos      Thank you for the consultation to participate in patient's care. I have personally discussed my plan with the referring physician.      Jose Dumont MD  June 7, 2018

## 2018-06-07 NOTE — PROGRESS NOTES
NUTRITION    Nursing Referral: [facility]   Nutrition Consult: General Nutrition Management & Supplements      RECOMMENDATIONS / PLAN:     - Add supplements: Ensure Enlive TID  - Continue RD inpatient monitoring and evaluation. NUTRITION INTERVENTIONS & DIAGNOSIS:     [x] Meals/snacks: modify composition  [x] Medical food supplement therapy: initiate    Nutrition Diagnosis:  Inadequate oral intake related to decreased appetite as evidenced by poor meal intake PTA    ASSESSMENT:     Pt unavailable at time of visit. Needs assistance with meals per nursing report; pt has difficulty lifting arms up. Only consumed 20-25% of breakfast today per report.  Per chart review, pt reported decreased appetite and meal intake and unplanned wt loss PTA    Average po intake adequate to meet patients estimated nutritional needs:   [] Yes     [x] No   [] Unable to determine at this time    Diet: DIET CARDIAC Regular      Food Allergies:  None known   Current Appetite:   [] Good     [] Fair     [] Poor     [x] Other: unknown   Appetite/meal intake prior to admission:   [] Good     [] Fair     [x] Poor: per nursing screen     [] Other:  Feeding Limitations:  [] Swallowing difficulty    [] Chewing difficulty    [] Other:  Current Meal Intake: Patient Vitals for the past 100 hrs:   % Diet Eaten   06/07/18 0830 20 %       BM:  6/7 - loose  Skin Integrity:  No pressure injury or wound noted  Edema: none    Pertinent Medications: Reviewed: NS at 50 mL/hr, folic acid, lasix, KCl, vitamin B6    Recent Labs      06/07/18   0111  06/05/18   2034   NA  139  140   K  3.6  3.2*   CL  106  104   CO2  23  34*   GLU  93  131*   BUN  29*  10   CREA  1.85*  0.96   CA  9.2  10.2*   MG  1.5*  1.9   PHOS  2.6   --    ALB   --   3.4   SGOT   --   15   ALT   --   16       Intake/Output Summary (Last 24 hours) at 06/07/18 1248  Last data filed at 06/07/18 0830   Gross per 24 hour   Intake              250 ml   Output                1 ml   Net              249 ml Anthropometrics:  Ht Readings from Last 1 Encounters:   06/05/18 6' 3\" (1.905 m)     Last 3 Recorded Weights in this Encounter    06/05/18 1817 06/06/18 0604 06/07/18 0716   Weight: 72.6 kg (160 lb) 72 kg (158 lb 11.2 oz) 71.9 kg (158 lb 7 oz)     Body mass index is 19.8 kg/(m^2). Weight History:   Pt reported unplanned wt loss PTA per chart hx. Noted weight fluctuations PTA with net wt loss of 27 lb (14.6%) x 10-11 months per chart hx    Weight Metrics 6/7/2018 8/11/2017 8/8/2017 7/15/2017 7/2/2017 1/6/2017 1/5/2017   Weight 158 lb 7 oz 147 lb 159 lb 185 lb 185 lb - 171 lb   BMI 19.8 kg/m2 18.87 kg/m2 20.41 kg/m2 23.75 kg/m2 23.75 kg/m2 21.37 kg/m2 -        Admitting Diagnosis: Joint pain  Elevated troponin  HIV (human immunodeficiency virus infection) ([facility] [address]  Pertinent PMHx:  HTN, HIV, stroke    Education Needs:        [x] None identified  [] Identified - Not appropriate at this time  []  Identified and addressed - refer to education log  Learning Limitations:   [x] None identified  [] Identified    Cultural, Muslim & ethnic food preferences:  [x] None identified    [] Identified and addressed     ESTIMATED NUTRITION NEEDS:     Calories: 5676-2607 kcal (MSJx1.2-1.3) based on  [] Actual BW      [] IBW   Protein: 64-80 gm (0.8-1 gm/kg) based on  [] Actual BW      [] IBW   Fluid: 1 mL/kcal     MONITORING & EVALUATION:     Nutrition Goal(s):   1. Po intake of meals will meet >75% of patient estimated nutritional needs within the next 7 days.   Outcome:  [] Met/Ongoing    []  Not Met    [x] New/Initial Goal     Monitoring:   [x] Food and beverage intake   [x] Diet order   [x] Nutrition-focused physical findings   [x] Treatment/therapy   [] Weight   [] Enteral nutrition intake        Previous Recommendations (for follow-up assessments only):     []   Implemented       []   Not Implemented (RD to address)      [] No Longer Appropriate     [] No Recommendation Made     Discharge Planning:  Cardiac diet [x] Participated in care planning, discharge planning, & interdisciplinary rounds as appropriate      Kyra [place], [address] [place] [address]   Pager: [phone]

## 2018-06-07 NOTE — PROGRESS NOTES
Problem: Self Care Deficits Care Plan (Adult)  Goal: *Acute Goals and Plan of Care (Insert Text)  Occupational Therapy Goals  Initiated 6/7/2018 within 7 day(s). 1.  Patient will perform self-feeding with supervision/set-up. 2.  Patient will perform grooming with supervision/set-up while seated on EOB. 3.  Patient will perform upper body dressing with minimal assistance/contact guard assist.  4.  Patient will perform toilet transfers with minimal assistance/contact guard assist.  5.  Patient will participate in upper extremity therapeutic exercise/activities with supervision/set-up for 8 minutes to increase strength/endurance for ADLs. Outcome: Progressing Towards Goal  Occupational Therapy EVALUATION    Patient: Jarred Licea (70 y.o. male)  Date: 6/7/2018  Primary Diagnosis: Joint pain  Elevated troponin  HIV (human immunodeficiency virus infection) ([facility] [address]        Precautions:   Fall (R resting hand splint); enteric      ASSESSMENT :  Based on the objective data described below, the patient presents with decreased ADLs, decreased functional mobility and muscle weakness/poor endurance. Patient admitted for right wrist discomfort/edema. Wrist support applied to RUE to provide neutral to slight extension of wrist.  No discomfort noted and no skin breakdown noted when skin checked an hour after application. Patient able to perform functional tasks using his RUE with support in place. He requires extra time for all activities and mod to max assist secondary to generalized weakness. He was on the bed pan with continuous loose stools and functional standing deferred. Also transport arrived to take patient off the floor. Educated patient on need to wear wrist support for at least 6 hours daily. Recommend continued therapy at SNF to maximize his functional independence for safe return to home alone. Patient will benefit from skilled intervention to address the above impairments.   Patients rehabilitation potential is considered to be Good  Factors which may influence rehabilitation potential include:   []             None noted  []             Mental ability/status  [x]             Medical condition  []             Home/family situation and support systems  []             Safety awareness  []             Pain tolerance/management  []             Other:      PLAN :  Recommendations and Planned Interventions:  [x]               Self Care Training                  [x]        Therapeutic Activities  [x]               Functional Mobility Training    []        Cognitive Retraining  [x]               Therapeutic Exercises           [x]        Endurance Activities  [x]               Balance Training                   []        Neuromuscular Re-Education  []               Visual/Perceptual Training     [x]   Home Safety Training  [x]               Patient Education                 [x]        Family Training/Education  []               Other (comment):    Frequency/Duration: Patient will be followed by occupational therapy 1-2 times per day/4-7 days per week to address goals. Discharge Recommendations: Yobani Lawrence  Further Equipment Recommendations for Discharge: TBD at next level of care     Barriers to Learning/Limitations: None  Compensate with: visual, verbal, tactile, kinesthetic cues/model     PATIENT COMPLEXITY      Eval Complexity: History: MEDIUM Complexity : Expanded review of history including physical, cognitive and psychosocial  history ; Examination: MEDIUM Complexity : 3-5 performance deficits relating to physical, cognitive , or psychosocial skils that result in activity limitations and / or participation restrictions; Decision Making:MEDIUM Complexity : Patient may present with comorbidities that affect occupational performnce.  Miniml to moderate modification of tasks or assistance (eg, physical or verbal ) with assesment(s) is necessary to enable patient to complete evaluation Assessment: Moderate Complexity     G-CODES:     Self Care  Current  CL= 60-79%   Goal  CK= 40-59%. The severity rating is based on the Level of Assistance required for Functional Mobility and ADLs. SUBJECTIVE:   Patient stated I do everything for myself.     OBJECTIVE DATA SUMMARY:     Past Medical History:   Diagnosis Date    Autoimmune disease ([facility] [address]     AIDS? HIV    Hypertension     Rectal bleeding     Stroke [facility])      Past Surgical History:   Procedure Laterality Date    HX ORTHOPAEDIC      back     Prior Level of Function/Home Situation: Pt was modified independent with basic self care tasks and used a RW/SPC for functional mobility PTA. Home Situation  Home Environment: Private residence  One/Two Story Residence: One story  Living Alone: Yes  Support Systems: Child(cheryl)  Patient Expects to be Discharged to[de-identified] Unknown  Current DME Used/Available at Home: Cane, straight, Walker  Tub or Shower Type:  (Pt sponge bathes at home.)  [x]  Right hand dominant   []  Left hand dominant  Cognitive/Behavioral Status:  Neurologic State: Alert  Orientation Level: Oriented X4  Cognition: Follows commands  Safety/Judgement: Awareness of environment; Fall prevention    Skin: Intact on UEs    Edema: None noted in UEs    Vision/Perceptual:    Acuity: Within Defined Limits      Coordination:  Fine Motor Skills-Upper: Left Intact; Right Intact    Gross Motor Skills-Upper: Left Intact; Right Intact    Balance:  Sitting: Intact    Strength:  Strength: Generally decreased, functional (UEs; R 3+/5, L 4/5)    Tone & Sensation:  Tone: Normal (UEs)  Sensation: Intact (UEs)    Range of Motion:  AROM: Generally decreased, functional (UEs; RUE secondary to weakness)    Functional Mobility and Transfers for ADLs:  Bed Mobility:  Supine to Sit: Supervision  Sit to Supine: Minimum assistance  Transfers: Toilet Transfer :  (NT; pt with frequent, runny stool on bed pan)    ADL Assessment:  Feeding:  Moderate assistance    Oral Facial Hygiene/Grooming: Moderate assistance    Bathing: Moderate assistance    Upper Body Dressing: Moderate assistance    Lower Body Dressing: Maximum assistance    Toileting: Maximum assistance    ADL Intervention:  Patient practiced LB dressing while seated on EOB with no LOB noted. He required assist to bring BLEs up to his body and then he required extra time to push socks off his feet. Assist given to get socks over his toes but he assisted in pulling them up over his heels. Cognitive Retraining  Safety/Judgement: Awareness of environment; Fall prevention    Pain:  Pt reports 0/10 pain or discomfort prior to treatment.    Pt reports 0/10 pain or discomfort post treatment. Activity Tolerance:   Good    Please refer to the flowsheet for vital signs taken during this treatment. After treatment:   [] Patient left in no apparent distress sitting up in chair  [x] Patient left in no apparent distress in bed  [x] Call bell left within reach  [x] Nursing notified  [] Caregiver present  [] Bed alarm activated    COMMUNICATION/EDUCATION:   [x] Home safety education was provided and the patient/caregiver indicated understanding. [x] Patient/family have participated as able in goal setting and plan of care. [x] Patient/family agree to work toward stated goals and plan of care. [] Patient understands intent and goals of therapy, but is neutral about his/her participation. [] Patient is unable to participate in goal setting and plan of care.     Thank you for this referral.  Gustavo Leonard MS OTR/L  Time Calculation: 40 mins

## 2018-06-07 NOTE — PROGRESS NOTES
Infectious Disease Progress Note    Requested by; Dr. Subhash gonzalez    Reason: right wrist septic arthritis/sepsis    Current abx Prior abx    vancomycin since 6/6/18 Cefepime 6/6     Lines:       Assessment :    74 y.o.  male who has PMH of HIV/AIDS (on emtricitabine/tenofovir/dolutegravir), HTN, a-fib/flutter on Eliquis presented to ER on 6/6/18 with Rt dorsal wrist pain, swelling, induration and a fever. Highly complex clinical picture. Difficult to determine exact etiology of right wrist pain since presentation is atypical. It could be due to evolving right wrist cellulitis versus gout. High grade fevers, a.fib with RVR and generalized malaise on admission concerning for sepsis due to right wrist cellulitis. Hence, agree with abx use. Immunosuppressed state can mask the full clinical presentation. No clinical evidence to suggest right wrist septic arthritis on today's exam.     Clinically better. Acute renal failure: nephrology consult appreciated - likely due to volume depletion - diarrhea since admission/lasix may have contributed to this. Diarrhea: r/o parasitic illness, c.diff    Recommendations:    1. Continue vancomycin for now  2.  ortho recommendations  3. Resume antiretrovirals as outpatient - we don't have dolutegravir in house which is part of patient's outpatient ART regimen. 4. F/u stool c.diff   5. Obtain stool O&P  6. F/u nephrology recommendations    Advance Care planning: full code: discussed  with patient/surrogate decision maker:Gage Watters: [phone]     Above plan was discussed in details with patient,  and dr Ozzie Martins. Please call me if any further questions or concerns. Will continue to participate in the care of this patient. subjective:    Feels better.    Patient denies subjective fever, headaches, visual disturbances, sore throat, runny nose, earaches, cp, sob, chills, cough, abdominal pain, diarrhea, burning micturition, pain or weakness in extremities. He denies back pain/flank pain.              home Medication List    Details   spironolactone (ALDACTONE) 25 mg tablet Take 37.5 mg by mouth daily. amLODIPine (NORVASC) 5 mg tablet Take 5 mg by mouth daily. digoxin (LANOXIN) 0.25 mg tablet Take  by mouth daily. albuterol (PROVENTIL HFA) 90 mcg/actuation inhaler Take 1 Puff by inhalation every four (4) hours as needed for Wheezing. Qty: 1 Inhaler, Refills: 0      guaiFENesin ER (MUCINEX) 600 mg ER tablet Take 1 Tab by mouth two (2) times a day. Qty: 12 Tab, Refills: 0      butalbital-acetaminophen-caff (FIORICET) -40 mg per capsule Take 1 Cap by mouth every four (4) hours as needed for Pain or Headache. Max Daily Amount: 6 Caps. Qty: 12 Cap, Refills: 0      methocarbamol (ROBAXIN-750) 750 mg tablet Take 1 Tab by mouth three (3) times daily as needed. Indications: pain; muscle spasms  Qty: 12 Tab, Refills: 0      furosemide (LASIX) 40 mg tablet 40 mg po daily  Qty: 30 Tab, Refills: 0      potassium chloride (K-DUR, KLOR-CON) 20 mEq tablet Take 1 Tab by mouth two (2) times a day. Qty: 30 Tab, Refills: 0      magnesium oxide 400 mg cap Take 400 mg by mouth daily. Qty: 20 Each, Refills: 0      OTHER BMP, MG in 1 week  Dx: CHF  Fax results to PCP at [facility]  Qty: 1 Each, Refills: 0      apixaban (ELIQUIS) 5 mg tablet Take 1 Tab by mouth every twelve (12) hours. Qty: 60 Tab, Refills: 0      carvedilol (COREG) 25 mg tablet Take 0.5 Tabs by mouth two (2) times daily (with meals). Qty: 60 Tab, Refills: 0      lisinopril (PRINIVIL, ZESTRIL) 10 mg tablet Take 1 Tab by mouth daily. Qty: 30 Tab, Refills: 0      pyridoxine, vitamin B6, (VITAMIN B-6) 50 mg tablet Take 50 mg by mouth daily. aluminum-magnesium hydroxide (MAALOX) 200-200 mg/5 mL suspension Take 15 mL by mouth every six (6) hours as needed for Indigestion. Qty: 300 mL, Refills: 0      folic acid (FOLVITE) 1 mg tablet Take 1 mg by mouth daily.         aspirin 81 mg tablet Take 81 mg by mouth daily. gabapentin (NEURONTIN) 300 mg capsule Take 300 mg by mouth three (3) times daily. Darunavir (PREZISTA) 600 mg tablet Take 600 mg by mouth.        pravastatin (PRAVACHOL) 40 mg tablet Take 40 mg by mouth nightly. ritonavir (NORVIR) 100 mg capsule Take 100 mg by mouth daily (with breakfast). emtricitabine (EMTRIVA) 200 mg capsule Take 200 mg by mouth daily.          Current Facility-Administered Medications   Medication Dose Route Frequency    apixaban (ELIQUIS) tablet 5 mg  5 mg Oral Q12H    carvedilol (COREG) tablet 12.5 mg  12.5 mg Oral BID WITH MEALS    darunavir ethanolate (PREZISTA) tablet 600 mg  600 mg Oral DAILY WITH BREAKFAST    digoxin (LANOXIN) tablet 0.25 mg  0.25 mg Oral DAILY    emtricitabine (EMTRIVA) capsule 200 mg  200 mg Oral DAILY    folic acid (FOLVITE) tablet 1 mg  1 mg Oral DAILY    furosemide (LASIX) tablet 40 mg  40 mg Oral DAILY    lisinopril (PRINIVIL, ZESTRIL) tablet 10 mg  10 mg Oral DAILY    potassium chloride (K-DUR, KLOR-CON) SR tablet 20 mEq  20 mEq Oral BID    pravastatin (PRAVACHOL) tablet 40 mg  40 mg Oral QHS    pyridoxine (vitamin B6) (VITAMIN B-6) tablet 50 mg  50 mg Oral DAILY    acetaminophen (TYLENOL) tablet 650 mg  650 mg Oral Q6H PRN    oxyCODONE-acetaminophen (PERCOCET) 5-325 mg per tablet 1 Tab  1 Tab Oral Q6H PRN    naloxone (NARCAN) injection 0.4 mg  0.4 mg IntraVENous PRN    ondansetron (ZOFRAN) injection 4 mg  4 mg IntraVENous Q6H PRN    docusate sodium (COLACE) capsule 100 mg  100 mg Oral BID PRN    ritonavir (NORVIR) tablet 100 mg  100 mg Oral DAILY WITH BREAKFAST    spironolactone (ALDACTONE) tablet 25 mg  25 mg Oral DAILY    albuterol-ipratropium (DUO-NEB) 2.5 MG-0.5 MG/3 ML  3 mL Nebulization Q6H RT    diclofenac (VOLTAREN) 1 % topical gel 2 g  2 g Topical TID    nicotine (NICODERM CQ) 14 mg/24 hr patch 1 Patch  1 Patch TransDERmal DAILY    0.9% sodium chloride infusion  50 mL/hr IntraVENous CONTINUOUS    vancomycin (VANCOCIN) 1,000 mg in 0.9% sodium chloride (MBP/ADV) 250 mL adv  1,000 mg IntraVENous Q12H       Allergies: Review of patient's allergies indicates no known allergies. Temp (24hrs), Av.4 °F (37.4 °C), Min:97.6 °F (36.4 °C), Max:102.1 °F (38.9 °C)    Visit Vitals    BP 95/52 (BP 1 Location: Right arm, BP Patient Position: At rest)    Pulse 60    Temp 97.9 °F (36.6 °C)    Resp 17    Ht 6' 3\" (1.905 m)    Wt 71.9 kg (158 lb 7 oz)    SpO2 100%    BMI 19.8 kg/m2       ROS: 12 point ROS obtained in details. Pertinent positives as mentioned in HPI,   otherwise negative    Physical Exam:    Constitutional: He is oriented to person, place, and time. He appears well-developed and well-nourished. No distress. Thin. HENT:   Head: Normocephalic and atraumatic. Mouth/Throat: Mucous membranes moist   Eyes: Conjunctivae and EOM are normal. Pupils are equal, round, and reactive to light. No scleral icterus. Neck: Normal range of motion. Neck supple. No JVD present. No thyromegaly present. Cardiovascular: Normal rate, regular rhythm, S1 normal and S2 normal.  Exam reveals no gallop and no friction rub. No murmur heard. Pulmonary/Chest: Effort normal and breath sounds normal. No accessory muscle usage. No respiratory distress. Abdominal: Soft. Normal appearance. He exhibits no distension. There is no tenderness. There is no rigidity, no rebound and no guarding. Musculoskeletal: Normal range of motion. He exhibits no edema or tenderness. Resolved warmth right wrist - no overlying tenderness. No pain on active/passive movements of right wrist.   Neurological: He is alert and oriented to person, place, and time. He has normal strength. No cranial nerve deficit or sensory deficit. Coordination normal.   Skin: Skin is warm and intact. No rash noted. Psychiatric: He has a normal mood and affect. His speech is normal and behavior is normal.   Vitals reviewed. Labs: Results:   Chemistry Recent Labs      06/07/18 0111 06/05/18 2034   GLU  93  131*   NA  139  140   K  3.6  3.2*   CL  106  104   CO2  23  34*   BUN  29*  10   CREA  1.85*  0.96   CA  9.2  10.2*   AGAP  10  2*   BUCR  16  10*   AP   --   160*   TP   --   10.0*   ALB   --   3.4   GLOB   --   6.6*   AGRAT   --   0.5*      CBC w/Diff Recent Labs      06/07/18 0111 06/05/18 2034   WBC  11.6  5.4   RBC  3.55*  4.42*   HGB  10.3*  13.0   HCT  31.1*  39.0   PLT  236  309   GRANS  89*  66   LYMPH  3*  15*   EOS  1  1      Microbiology Recent Labs      06/05/18 2034 06/05/18 2019   CULT  NO GROWTH 2 DAYS  NO GROWTH 2 DAYS          RADIOLOGY:    All available imaging studies/reports in [facility] care for this admission were reviewed    Dr. Emma Suggs, Infectious Disease Specialist  [phone]  June 7, 2018  1:36 PM

## 2018-06-07 NOTE — PROGRESS NOTES
Elevated temperature tylenol 650 mg given, removed excessive blankets to get temperature down IS given pt was able to get up 1000 with assistance encouraged to increase po fluids pt states that he is not thirsty.  Will continue to monitor for dehydration and change in LOC no distress noted

## 2018-06-08 ENCOUNTER — APPOINTMENT (OUTPATIENT)
Dept: CT IMAGING | Age: 68
DRG: 558 | End: 2018-06-08
Attending: INTERNAL MEDICINE
Payer: OTHER GOVERNMENT

## 2018-06-08 LAB
ANION GAP SERPL CALC-SCNC: 6 MMOL/L (ref 3–18)
APPEARANCE UR: ABNORMAL
BACTERIA URNS QL MICRO: ABNORMAL /HPF
BASOPHILS # BLD: 0 K/UL (ref 0–0.06)
BASOPHILS NFR BLD: 0 % (ref 0–2)
BILIRUB UR QL: NEGATIVE
BUN SERPL-MCNC: 29 MG/DL (ref 7–18)
BUN/CREAT SERPL: 24 (ref 12–20)
CALCIUM SERPL-MCNC: 9.5 MG/DL (ref 8.5–10.1)
CHLORIDE SERPL-SCNC: 110 MMOL/L (ref 100–108)
CO2 SERPL-SCNC: 23 MMOL/L (ref 21–32)
COLOR UR: YELLOW
CREAT SERPL-MCNC: 1.22 MG/DL (ref 0.6–1.3)
CREAT UR-MCNC: 141 MG/DL (ref 30–125)
DIFFERENTIAL METHOD BLD: ABNORMAL
DIGOXIN SERPL-MCNC: 0.6 NG/ML (ref 0.9–2)
EOSINOPHIL # BLD: 0.3 K/UL (ref 0–0.4)
EOSINOPHIL NFR BLD: 3 % (ref 0–5)
EPITH CASTS URNS QL MICRO: ABNORMAL /LPF (ref 0–5)
ERYTHROCYTE [DISTWIDTH] IN BLOOD BY AUTOMATED COUNT: 11.9 % (ref 11.6–14.5)
G LAMBLIA AG STL QL IA: NEGATIVE
GLUCOSE SERPL-MCNC: 88 MG/DL (ref 74–99)
GLUCOSE UR STRIP.AUTO-MCNC: NEGATIVE MG/DL
GRAN CASTS URNS QL MICRO: ABNORMAL /LPF
HCT VFR BLD AUTO: 29.5 % (ref 36–48)
HGB BLD-MCNC: 9.8 G/DL (ref 13–16)
HGB UR QL STRIP: ABNORMAL
HYALINE CASTS URNS QL MICRO: ABNORMAL /LPF (ref 0–2)
KETONES UR QL STRIP.AUTO: NEGATIVE MG/DL
LEUKOCYTE ESTERASE UR QL STRIP.AUTO: ABNORMAL
LYMPHOCYTES # BLD: 0.6 K/UL (ref 0.9–3.6)
LYMPHOCYTES NFR BLD: 6 % (ref 21–52)
MAGNESIUM SERPL-MCNC: 1.9 MG/DL (ref 1.6–2.6)
MCH RBC QN AUTO: 29 PG (ref 24–34)
MCHC RBC AUTO-ENTMCNC: 33.2 G/DL (ref 31–37)
MCV RBC AUTO: 87.3 FL (ref 74–97)
MONOCYTES # BLD: 0.7 K/UL (ref 0.05–1.2)
MONOCYTES NFR BLD: 7 % (ref 3–10)
MUCOUS THREADS URNS QL MICRO: ABNORMAL /LPF
NEUTS SEG # BLD: 8.5 K/UL (ref 1.8–8)
NEUTS SEG NFR BLD: 84 % (ref 40–73)
NITRITE UR QL STRIP.AUTO: NEGATIVE
PH UR STRIP: 5.5 [PH] (ref 5–8)
PHOSPHATE SERPL-MCNC: 1.9 MG/DL (ref 2.5–4.9)
PLATELET # BLD AUTO: 210 K/UL (ref 135–420)
PMV BLD AUTO: 12.2 FL (ref 9.2–11.8)
POTASSIUM SERPL-SCNC: 3.7 MMOL/L (ref 3.5–5.5)
PROT UR STRIP-MCNC: 30 MG/DL
PROT UR-MCNC: 99 MG/DL
RBC # BLD AUTO: 3.38 M/UL (ref 4.7–5.5)
RBC #/AREA URNS HPF: ABNORMAL /HPF (ref 0–5)
SODIUM SERPL-SCNC: 139 MMOL/L (ref 136–145)
SODIUM UR-SCNC: 33 MMOL/L (ref 20–110)
SP GR UR REFRACTOMETRY: 1.02 (ref 1–1.03)
SPECIMEN SOURCE: NORMAL
UROBILINOGEN UR QL STRIP.AUTO: 1 EU/DL (ref 0.2–1)
VANCOMYCIN SERPL-MCNC: 9.7 UG/ML (ref 5–40)
WBC # BLD AUTO: 10.1 K/UL (ref 4.6–13.2)
WBC URNS QL MICRO: ABNORMAL /HPF (ref 0–5)

## 2018-06-08 PROCEDURE — 97535 SELF CARE MNGMENT TRAINING: CPT

## 2018-06-08 PROCEDURE — 70450 CT HEAD/BRAIN W/O DYE: CPT

## 2018-06-08 PROCEDURE — 77010033678 HC OXYGEN DAILY

## 2018-06-08 PROCEDURE — 85025 COMPLETE CBC W/AUTO DIFF WBC: CPT | Performed by: INTERNAL MEDICINE

## 2018-06-08 PROCEDURE — C9113 INJ PANTOPRAZOLE SODIUM, VIA: HCPCS | Performed by: INTERNAL MEDICINE

## 2018-06-08 PROCEDURE — 74011250636 HC RX REV CODE- 250/636: Performed by: INTERNAL MEDICINE

## 2018-06-08 PROCEDURE — 97116 GAIT TRAINING THERAPY: CPT

## 2018-06-08 PROCEDURE — 82570 ASSAY OF URINE CREATININE: CPT | Performed by: INTERNAL MEDICINE

## 2018-06-08 PROCEDURE — 74011250637 HC RX REV CODE- 250/637: Performed by: INTERNAL MEDICINE

## 2018-06-08 PROCEDURE — 77030010545

## 2018-06-08 PROCEDURE — 87086 URINE CULTURE/COLONY COUNT: CPT | Performed by: INTERNAL MEDICINE

## 2018-06-08 PROCEDURE — 84300 ASSAY OF URINE SODIUM: CPT | Performed by: INTERNAL MEDICINE

## 2018-06-08 PROCEDURE — 83735 ASSAY OF MAGNESIUM: CPT | Performed by: INTERNAL MEDICINE

## 2018-06-08 PROCEDURE — 84156 ASSAY OF PROTEIN URINE: CPT | Performed by: INTERNAL MEDICINE

## 2018-06-08 PROCEDURE — 94640 AIRWAY INHALATION TREATMENT: CPT

## 2018-06-08 PROCEDURE — 97530 THERAPEUTIC ACTIVITIES: CPT

## 2018-06-08 PROCEDURE — 74011000250 HC RX REV CODE- 250: Performed by: INTERNAL MEDICINE

## 2018-06-08 PROCEDURE — 80202 ASSAY OF VANCOMYCIN: CPT | Performed by: INTERNAL MEDICINE

## 2018-06-08 PROCEDURE — 81001 URINALYSIS AUTO W/SCOPE: CPT | Performed by: INTERNAL MEDICINE

## 2018-06-08 PROCEDURE — 80162 ASSAY OF DIGOXIN TOTAL: CPT | Performed by: INTERNAL MEDICINE

## 2018-06-08 PROCEDURE — 71250 CT THORAX DX C-: CPT

## 2018-06-08 PROCEDURE — 80048 BASIC METABOLIC PNL TOTAL CA: CPT | Performed by: INTERNAL MEDICINE

## 2018-06-08 PROCEDURE — 84100 ASSAY OF PHOSPHORUS: CPT | Performed by: INTERNAL MEDICINE

## 2018-06-08 PROCEDURE — 65660000000 HC RM CCU STEPDOWN

## 2018-06-08 PROCEDURE — 97110 THERAPEUTIC EXERCISES: CPT

## 2018-06-08 PROCEDURE — 36415 COLL VENOUS BLD VENIPUNCTURE: CPT | Performed by: INTERNAL MEDICINE

## 2018-06-08 RX ORDER — AZITHROMYCIN 250 MG/1
500 TABLET, FILM COATED ORAL DAILY
Status: DISCONTINUED | OUTPATIENT
Start: 2018-06-09 | End: 2018-06-14 | Stop reason: HOSPADM

## 2018-06-08 RX ORDER — TENOFOVIR DISOPROXIL FUMARATE 300 MG/1
300 TABLET, FILM COATED ORAL DAILY
Status: DISCONTINUED | OUTPATIENT
Start: 2018-06-09 | End: 2018-06-14 | Stop reason: HOSPADM

## 2018-06-08 RX ORDER — CLINDAMYCIN HYDROCHLORIDE 150 MG/1
300 CAPSULE ORAL EVERY 6 HOURS
Status: COMPLETED | OUTPATIENT
Start: 2018-06-08 | End: 2018-06-11

## 2018-06-08 RX ORDER — EMTRICITABINE 200 MG/1
200 CAPSULE ORAL DAILY
Status: DISCONTINUED | OUTPATIENT
Start: 2018-06-09 | End: 2018-06-14 | Stop reason: HOSPADM

## 2018-06-08 RX ORDER — BUDESONIDE AND FORMOTEROL FUMARATE DIHYDRATE 160; 4.5 UG/1; UG/1
2 AEROSOL RESPIRATORY (INHALATION) 2 TIMES DAILY
Status: DISCONTINUED | OUTPATIENT
Start: 2018-06-08 | End: 2018-06-10

## 2018-06-08 RX ORDER — LEVOFLOXACIN 5 MG/ML
500 INJECTION, SOLUTION INTRAVENOUS EVERY 24 HOURS
Status: DISCONTINUED | OUTPATIENT
Start: 2018-06-08 | End: 2018-06-08

## 2018-06-08 RX ADMIN — DIGOXIN 0.25 MG: 0.12 TABLET ORAL at 08:29

## 2018-06-08 RX ADMIN — CARVEDILOL 12.5 MG: 6.25 TABLET, FILM COATED ORAL at 16:31

## 2018-06-08 RX ADMIN — ACETAMINOPHEN 650 MG: 325 TABLET ORAL at 00:44

## 2018-06-08 RX ADMIN — APIXABAN 5 MG: 5 TABLET, FILM COATED ORAL at 22:01

## 2018-06-08 RX ADMIN — OXYCODONE HYDROCHLORIDE AND ACETAMINOPHEN 1 TABLET: 5; 325 TABLET ORAL at 22:06

## 2018-06-08 RX ADMIN — SODIUM CHLORIDE 40 MG: 9 INJECTION INTRAMUSCULAR; INTRAVENOUS; SUBCUTANEOUS at 00:44

## 2018-06-08 RX ADMIN — CARVEDILOL 12.5 MG: 6.25 TABLET, FILM COATED ORAL at 08:29

## 2018-06-08 RX ADMIN — DIBASIC SODIUM PHOSPHATE, MONOBASIC POTASSIUM PHOSPHATE AND MONOBASIC SODIUM PHOSPHATE 1 TABLET: 852; 155; 130 TABLET ORAL at 16:30

## 2018-06-08 RX ADMIN — APIXABAN 5 MG: 5 TABLET, FILM COATED ORAL at 08:29

## 2018-06-08 RX ADMIN — DOCUSATE SODIUM 100 MG: 100 CAPSULE, LIQUID FILLED ORAL at 22:01

## 2018-06-08 RX ADMIN — IPRATROPIUM BROMIDE AND ALBUTEROL SULFATE 3 ML: 2.5; .5 SOLUTION RESPIRATORY (INHALATION) at 20:50

## 2018-06-08 RX ADMIN — DIBASIC SODIUM PHOSPHATE, MONOBASIC POTASSIUM PHOSPHATE AND MONOBASIC SODIUM PHOSPHATE 1 TABLET: 852; 155; 130 TABLET ORAL at 22:01

## 2018-06-08 RX ADMIN — SODIUM CHLORIDE 75 ML/HR: 900 INJECTION, SOLUTION INTRAVENOUS at 06:20

## 2018-06-08 RX ADMIN — CLINDAMYCIN HYDROCHLORIDE 300 MG: 150 CAPSULE ORAL at 17:54

## 2018-06-08 RX ADMIN — IPRATROPIUM BROMIDE AND ALBUTEROL SULFATE 3 ML: 2.5; .5 SOLUTION RESPIRATORY (INHALATION) at 01:15

## 2018-06-08 RX ADMIN — IPRATROPIUM BROMIDE AND ALBUTEROL SULFATE 3 ML: 2.5; .5 SOLUTION RESPIRATORY (INHALATION) at 07:31

## 2018-06-08 RX ADMIN — FOLIC ACID 1 MG: 1 TABLET ORAL at 08:30

## 2018-06-08 RX ADMIN — PRAVASTATIN SODIUM 40 MG: 20 TABLET ORAL at 22:01

## 2018-06-08 NOTE — PROGRESS NOTES
[facility]      CARDIOLOGY PROGRESS NOTE  RECS:    1. Sepsis - right forearm cellulitis - improving, treatment per primary team  2. Non ischemic cardiomyopathy - Echo 6/6/2018 EF 55% increased from 15% prior EF 20% - continue Coreg. D/C  Digoxin and aldactone. 3. AICD -CRT in place - denies discharge. Check with [employer] for interrogation  4. HIV - medications per ID  5. Atrial flutter - rates controlled with Coreg and has demand V Pacing  6. Chronic anticoagulation - continue Eliquis  7. Hypertension - controlled - norvasc d/c, lisinopril d/c due to hypotension  8. Hypokalemia - replete  9. H/O non-compliance - encourage compliance with medications and f/u     D/w pt & son in room  Get ICD check  LVEF is now normal - agree with discontinuing digoxin and aldactone.   Will f/u peripherally  Call us as needed    ASSESSMENT:  Hospital Problems  Date Reviewed: 6/6/2018          Codes Class Noted POA    Joint pain ICD-10-CM: M25.50  ICD-9-CM: 719.40  6/6/2018 Unknown        Elevated troponin ICD-10-CM: R74.8  ICD-9-CM: 790.6  6/6/2018 Unknown        * (Principal)Right forearm cellulitis ICD-10-CM: C34.230  ICD-9-CM: 682.3  6/6/2018 Unknown        Chronic anticoagulation ICD-10-CM: Z79.01  ICD-9-CM: V58.61  6/6/2018 Unknown        Atrial flutter ([facility] ICD-10-CM: I48.92  ICD-9-CM: 427.32  11/2/2016 Yes        HIV (human immunodeficiency virus infection) ([facility] ICD-10-CM: B20  ICD-9-CM: V08  9/12/2012 Unknown        Essential hypertension, benign ICD-10-CM: I10  ICD-9-CM: 401.1  9/12/2012 Yes        Noncompliance ICD-10-CM: Z91.19  ICD-9-CM: V15.81  9/12/2012 Yes                SUBJECTIVE:  No CP or SOB    OBJECTIVE:    VS:   Visit Vitals    /72 (BP 1 Location: Right arm, BP Patient Position: At rest)    Pulse 60    Temp 97.7 °F (36.5 °C)    Resp 17    Ht 6' 3\" (1.905 m)    Wt 71.9 kg (158 lb 7 oz)    SpO2 100%    BMI 19.8 kg/m2         Intake/Output Summary (Last 24 hours) at 06/08/18 1123  Last data filed at 06/08/18 0659   Gross per 24 hour   Intake              900 ml   Output              300 ml   Net              600 ml     TELE: AFIB, v paced    General: alert and in no apparent distress  HENT: Normocephalic, atraumatic. Normal external eye. Neck :  no JVD  Cardiac:  regularly irregular rhythm, S1, S2 normal  Lungs: clear to auscultation bilaterally  Abdomen: Soft, nontender, no masses  Extremities:  No c/c/e, peripheral pulses present, left wrist brace in place. Labs: Results:       Chemistry Recent Labs      06/08/18   0356  06/07/18   1650  06/07/18   0111  06/05/18 2034   GLU  88   --   93  131*   NA  139   --   139  140   K  3.7   --   3.6  3.2*   CL  110*   --   106  104   CO2  23   --   23  34*   BUN  29*   --   29*  10   CREA  1.22   --   1.85*  0.96   CA  9.5  9.4  9.2  10.2*   AGAP  6   --   10  2*   BUCR  24*   --   16  10*   AP   --    --    --   160*   TP   --    --    --   10.0*   ALB   --    --    --   3.4   GLOB   --    --    --   6.6*   AGRAT   --    --    --   0.5*      CBC w/Diff Recent Labs      06/08/18   0356  06/07/18   0111  06/05/18 2034   WBC  10.1  11.6  5.4   RBC  3.38*  3.55*  4.42*   HGB  9.8*  10.3*  13.0   HCT  29.5*  31.1*  39.0   PLT  210  236  309   GRANS  84*  89*  66   LYMPH  6*  3*  15*   EOS  3  1  1      Cardiac Enzymes Recent Labs      06/07/18   0111  06/06/18   0550   CPK  53  49   CKND1  CALCULATION NOT PERFORMED WHEN RESULT IS BELOW LINEAR LIMIT  CALCULATION NOT PERFORMED WHEN RESULT IS BELOW LINEAR LIMIT      Coagulation No results for input(s): PTP, INR, APTT in the last 72 hours.     No lab exists for component: INREXT    Lipid Panel Lab Results   Component Value Date/Time    Cholesterol, total 131 09/13/2012 02:20 AM    HDL Cholesterol 66 (H) 09/13/2012 02:20 AM    LDL, calculated 51.4 09/13/2012 02:20 AM    VLDL, calculated 13.6 09/13/2012 02:20 AM    Triglyceride 68 09/13/2012 02:20 AM    CHOL/HDL Ratio 2.0 09/13/2012 02:20 AM      BNP No results for input(s): BNPP in the last 72 hours. Liver Enzymes Recent Labs      06/05/18 2034   TP  10.0*   ALB  3.4   AP  160*   SGOT  15      Thyroid Studies Lab Results   Component Value Date/Time    TSH 0.16 (L) 06/07/2018 01:11 AM              Edgard Cesar NP   Pager # [phone] supervised    I have independently evaluated and examined the patient. All relevant labs and testing data's are reviewed. Care plan discussed and updated after review.     Bryson Mcclure MD

## 2018-06-08 NOTE — ROUTINE PROCESS
Bedside shift change report given to 79 Robertshruti Baxter (oncoming nurse) by Lovely Goss (offgoing nurse). Report included the following information SBAR, Kardex and Recent Results.

## 2018-06-08 NOTE — PROGRESS NOTES
Patient: Martina Barnett                MRN:@           N: [id number]   YOB: 1950         AGE: 74 y.o.             SEX: male       Will sign off  Martina Barnett doing better as it relates to wrist right    PE    Visit Vitals    /76 (BP 1 Location: Left arm, BP Patient Position: At rest)    Pulse 71    Temp 98.8 °F (37.1 °C)    Resp 17    Ht 6' 3\" (1.905 m)    Wt 158 lb 7 oz (71.9 kg)    SpO2 98%    BMI 19.8 kg/m2        RIGHT WRIST  No redness//moves itvbetter // wrist in brace      He was seen earlier today about 2 pm        Tasha Calderon MD  6/8/2018  6:56 PM

## 2018-06-08 NOTE — PROGRESS NOTES
RENAL DAILY PROGRESS NOTE      72y M admitted with right wrist pain, following for [place]  Subjective:   Complaint:   Overnight events noted  Feels okay   Had low grade fever last night   Hemodynamic stable  Admit diarrhea  better   Reviewed his kidney usg, it shows echogenisity , but no mass or hydro. no nausea, vomiting, chest pain, short of breath, cough, seizure. IMPRESSION:   Acute kidney injury , pre renal ATN  Sepsis, right forearm cellulitis  Hypoklaemia , hypomagnesemia, -- improving   HTN,   HIV  Anemia    PLAN:   Decrease IV hydration to 50cc/hr for today, expect renal function ton continue to recover. I would recommend to avoid nsaids as possible, avoid standing dose. Discussed case with Dr. Kvng Garduno, agree to decrease BP meds. Adjust all meds per current renal function status. Current Facility-Administered Medications   Medication Dose Route Frequency    VANCOMYCIN INFORMATION NOTE   Other Rx Dosing/Monitoring    Vancomycin RANDOM level due @ 13:00 on 6/8. Thanks!    Other Daily    apixaban (ELIQUIS) tablet 5 mg  5 mg Oral Q12H    carvedilol (COREG) tablet 12.5 mg  12.5 mg Oral BID WITH MEALS    folic acid (FOLVITE) tablet 1 mg  1 mg Oral DAILY    pravastatin (PRAVACHOL) tablet 40 mg  40 mg Oral QHS    acetaminophen (TYLENOL) tablet 650 mg  650 mg Oral Q6H PRN    oxyCODONE-acetaminophen (PERCOCET) 5-325 mg per tablet 1 Tab  1 Tab Oral Q6H PRN    naloxone (NARCAN) injection 0.4 mg  0.4 mg IntraVENous PRN    ondansetron (ZOFRAN) injection 4 mg  4 mg IntraVENous Q6H PRN    docusate sodium (COLACE) capsule 100 mg  100 mg Oral BID PRN    albuterol-ipratropium (DUO-NEB) 2.5 MG-0.5 MG/3 ML  3 mL Nebulization Q6H RT    diclofenac (VOLTAREN) 1 % topical gel 2 g  2 g Topical TID    nicotine (NICODERM CQ) 14 mg/24 hr patch 1 Patch  1 Patch TransDERmal DAILY    0.9% sodium chloride infusion  75 mL/hr IntraVENous CONTINUOUS       Review of Symptoms: comprehensive ROS negative except above. Objective:   Patient Vitals for the past 24 hrs:   Temp Pulse Resp BP SpO2   06/08/18 1207 98.9 °F (37.2 °C) 77 18 108/64 98 %   06/08/18 0751 97.7 °F (36.5 °C) 60 17 126/72 100 %   06/08/18 0400 99.1 °F (37.3 °C) 65 18 122/63 97 %   06/08/18 0117 - - - - 98 %   06/08/18 0000 (!) 100.6 °F (38.1 °C) 66 16 112/66 98 %   06/07/18 2031 - - - - 95 %   06/07/18 2000 99 °F (37.2 °C) 67 18 113/61 98 %   06/07/18 1711 - 63 - - -   06/07/18 1557 98.2 °F (36.8 °C) (!) 58 17 104/58 99 %        Weight change:      06/06 1901 - 06/08 0700  In: 1150 [P.O.:250;  I.V.:900]  Out: 300 [Urine:300]    Intake/Output Summary (Last 24 hours) at 06/08/18 1219  Last data filed at 06/08/18 0659   Gross per 24 hour   Intake              900 ml   Output              300 ml   Net              600 ml     Physical Exam:   General: comfortable, no acute distress   HEENT sclera anicteric, supple neck, no thyromegaly  CVS: S1S2 heard,  no rub  RS: + air entry b/l,   Abd: Soft, Non tender,  Neuro:  awake, alert , CN II-XII are grossly intact  Extrm: no edema, no cyanosis, clubbing   Musculoskeletal:right forearm cellulitis       Data Review:     LABS:   Hematology: Recent Labs      06/08/18   0356  06/07/18   0111  06/05/18 2034   WBC  10.1  11.6  5.4   HGB  9.8*  10.3*  13.0   HCT  29.5*  31.1*  39.0     Chemistry: Recent Labs      06/08/18   0356  06/07/18   1650  06/07/18   0111  06/05/18 2034   BUN  29*   --   29*  10   CREA  1.22   --   1.85*  0.96   CA  9.5  9.4  9.2  10.2*   ALB   --    --    --   3.4   K  3.7   --   3.6  3.2*   NA  139   --   139  140   CL  110*   --   106  104   CO2  23   --   23  34*   PHOS  1.9*   --   2.6   --    GLU  88   --   93  131*            Procedures/imaging: see electronic medical records for all procedures, Xrays and details which were not copied into this note but were reviewed prior to creation of Plan          Assessment & Plan:   As above         Mandy Coley MD  6/8/2018  12:19 PM

## 2018-06-08 NOTE — PROGRESS NOTES
Problem: Self Care Deficits Care Plan (Adult)  Goal: *Acute Goals and Plan of Care (Insert Text)  Occupational Therapy Goals  Initiated 6/7/2018 within 7 day(s). 1.  Patient will perform self-feeding with supervision/set-up. 2.  Patient will perform grooming with supervision/set-up while seated on EOB. 3.  Patient will perform upper body dressing with minimal assistance/contact guard assist.  4.  Patient will perform toilet transfers with minimal assistance/contact guard assist.  5.  Patient will participate in upper extremity therapeutic exercise/activities with supervision/set-up for 8 minutes to increase strength/endurance for ADLs. Outcome: Progressing Towards Goal  Occupational Therapy TREATMENT    Patient: Josh العراقي (18 y.o. male)  Date: 6/8/2018  Diagnosis: Joint pain  Elevated troponin  HIV (human immunodeficiency virus infection) ([facility] [address] Right forearm cellulitis       Precautions: Fall, Skin  Chart, occupational therapy assessment, plan of care, and goals were reviewed. ASSESSMENT:  Pt is agreeable to participate in OT this session, maneuvered to EOB w/Min Ax2 in preparation for functional mobility and ADL training. Pt then was able to maneuver to the BR w/FWW and CGA for std ADL grooming task, however upon std sinkside pt began urinating on the floor, as condom catheter came off. Pt returned to EOB, required CGA for UB/LB dressing 2/2 gown, socks were soiled w/urine. Pt required encouragement to perform as much as possible on his own. Pt's RUE wrist support splint doffed for skin check and strap re-adjustment, noted decreased edema in R wrist, no skin pressure areas/breakdown noted, wrist support was re-applied. Pt then returned to sup, was set up for lunch, initially reports he is not able to self-feed, required CGA initially to maneuver utensils in RUE, however, following providing proximal stability for R elbow, pt was able to self-feed w/Set-up.   Progression toward goals:  [] Improving appropriately and progressing toward goals  [x]          Improving slowly and progressing toward goals  []          Not making progress toward goals and plan of care will be adjusted     PLAN:  Patient continues to benefit from skilled intervention to address the above impairments. Continue treatment per established plan of care. Discharge Recommendations:  Yobani Lawrence  Further Equipment Recommendations for Discharge:  N/A      G-CODES:     Self Care  Current  CK= 40-59%   Goal  CK= 40-59%. The severity rating is based on the Level of Assistance required for Functional Mobility and ADLs. SUBJECTIVE:   Patient stated I can't feed myself, but I was able to yesterday.     OBJECTIVE DATA SUMMARY:   Cognitive/Behavioral Status:  Neurologic State: Alert  Orientation Level: Oriented X4  Cognition: Follows commands  Safety/Judgement: Awareness of environment, Fall prevention    Functional Mobility and Transfers for ADLs:   Bed Mobility:     Supine to Sit: Minimum assistance;Assist x2  Sit to Supine: Stand-by assistance      Transfers:  Sit to Stand: Contact guard assistance    Toilet Transfer : Contact guard assistance (w/FWW simulated)    Balance:  Sitting: Impaired  Sitting - Static: Good (unsupported)  Sitting - Dynamic: Fair (occasional)  Standing: Impaired; With support  Standing - Static: Fair  Standing - Dynamic : Fair    ADL Intervention:     Feeding  Utensil Management: Contact guard assistance  Food to Mouth: Supervision/set-up  Drink to Mouth: Supervision/set-up       Upper Body [address]: Contact guard assistance (seated EOB)    Lower Body Dressing Assistance  Socks: Contact guard assistance  Leg Crossed Method Used: Yes    Pain:  Pt reports 0/10 pain or discomfort prior to treatment.    Pt reports 0/10 pain or discomfort post treatment.      Activity Tolerance:    Fair    Please refer to the flowsheet for vital signs taken during this treatment.   After treatment:   []  Patient left in no apparent distress sitting up in chair  [x]  Patient left in no apparent distress in bed  [x]  Call bell left within reach  [x]  Nursing notified  []  Caregiver present  [x]  Bed alarm activated  MELODY Cooper/GHAZAL  Time Calculation: 45 mins

## 2018-06-08 NOTE — PROGRESS NOTES
Infectious Disease Progress Note    Requested by; Dr. Tylor gonzalez    Reason: right wrist septic arthritis/sepsis    Current abx Prior abx    vancomycin since 6/6/18  Levofloxacin 6/8 Cefepime 6/6     Lines:       Assessment :    74 y.o.  male who has PMH of HIV/AIDS (on emtricitabine/tenofovir/dolutegravir), HTN, a-fib/flutter on Eliquis presented to ER on 6/6/18 with Rt dorsal wrist pain, swelling, induration and a fever. Highly complex clinical picture. Difficult to determine exact etiology of right wrist pain since presentation is atypical. It could be due to evolving right wrist cellulitis versus gout. High grade fevers, a.fib with RVR and generalized malaise on admission concerning for sepsis due to right wrist cellulitis. Hence, agree with abx use. Immunosuppressed state can mask the full clinical presentation. No clinical evidence to suggest right wrist septic arthritis on today's exam.     Acute renal failure: nephrology consult appreciated - likely due to volume depletion - diarrhea since admission/lasix may have contributed to this. improved    Diarrhea: negative c.diff pcr - resolved    Now with low grade fevers overnight, increased sob. Ct chest with bilateral perihilar infiltrates. ? atypical community acquired pneumonia    Recommendations:    1. discontinue vancomycin, levofloxacin. Start po azithromycin for CAP  2. Start po clindamycin for 3 days  3. Resume antiretrovirals - d/w pharmacist - dolutegravir is available   4. F/u nephrology recommendations  5. Monitor temp, clinically        Advance Care planning: full code: discussed  with patient/surrogate decision maker:Gage Watters: [phone]     Above plan was discussed in details with patient,  and dr Roxan Goltz. Please call me if any further questions or concerns. Will continue to participate in the care of this patient. subjective:    Feels better.    Patient denies subjective fever, headaches, visual disturbances, sore throat, runny nose, earaches, cp, sob, chills, cough, abdominal pain, diarrhea, burning micturition, pain or weakness in extremities. He denies back pain/flank pain.              home Medication List    Details   spironolactone (ALDACTONE) 25 mg tablet Take 37.5 mg by mouth daily. amLODIPine (NORVASC) 5 mg tablet Take 5 mg by mouth daily. digoxin (LANOXIN) 0.25 mg tablet Take  by mouth daily. albuterol (PROVENTIL HFA) 90 mcg/actuation inhaler Take 1 Puff by inhalation every four (4) hours as needed for Wheezing. Qty: 1 Inhaler, Refills: 0      guaiFENesin ER (MUCINEX) 600 mg ER tablet Take 1 Tab by mouth two (2) times a day. Qty: 12 Tab, Refills: 0      butalbital-acetaminophen-caff (FIORICET) -40 mg per capsule Take 1 Cap by mouth every four (4) hours as needed for Pain or Headache. Max Daily Amount: 6 Caps. Qty: 12 Cap, Refills: 0      methocarbamol (ROBAXIN-750) 750 mg tablet Take 1 Tab by mouth three (3) times daily as needed. Indications: pain; muscle spasms  Qty: 12 Tab, Refills: 0      furosemide (LASIX) 40 mg tablet 40 mg po daily  Qty: 30 Tab, Refills: 0      potassium chloride (K-DUR, KLOR-CON) 20 mEq tablet Take 1 Tab by mouth two (2) times a day. Qty: 30 Tab, Refills: 0      magnesium oxide 400 mg cap Take 400 mg by mouth daily. Qty: 20 Each, Refills: 0      OTHER BMP, MG in 1 week  Dx: CHF  Fax results to PCP at [facility]  Qty: 1 Each, Refills: 0      apixaban (ELIQUIS) 5 mg tablet Take 1 Tab by mouth every twelve (12) hours. Qty: 60 Tab, Refills: 0      carvedilol (COREG) 25 mg tablet Take 0.5 Tabs by mouth two (2) times daily (with meals). Qty: 60 Tab, Refills: 0      lisinopril (PRINIVIL, ZESTRIL) 10 mg tablet Take 1 Tab by mouth daily. Qty: 30 Tab, Refills: 0      pyridoxine, vitamin B6, (VITAMIN B-6) 50 mg tablet Take 50 mg by mouth daily.       aluminum-magnesium hydroxide (MAALOX) 200-200 mg/5 mL suspension Take 15 mL by mouth every six (6) hours as needed for Indigestion. Qty: 300 mL, Refills: 0      folic acid (FOLVITE) 1 mg tablet Take 1 mg by mouth daily. aspirin 81 mg tablet Take 81 mg by mouth daily. gabapentin (NEURONTIN) 300 mg capsule Take 300 mg by mouth three (3) times daily. Darunavir (PREZISTA) 600 mg tablet Take 600 mg by mouth.        pravastatin (PRAVACHOL) 40 mg tablet Take 40 mg by mouth nightly. ritonavir (NORVIR) 100 mg capsule Take 100 mg by mouth daily (with breakfast). emtricitabine (EMTRIVA) 200 mg capsule Take 200 mg by mouth daily. Current Facility-Administered Medications   Medication Dose Route Frequency    VANCOMYCIN INFORMATION NOTE   Other Rx Dosing/Monitoring    Vancomycin RANDOM level due @ 13:00 on . Thanks! Other Daily    apixaban (ELIQUIS) tablet 5 mg  5 mg Oral Q12H    carvedilol (COREG) tablet 12.5 mg  12.5 mg Oral BID WITH MEALS    digoxin (LANOXIN) tablet 0.25 mg  0.25 mg Oral DAILY    folic acid (FOLVITE) tablet 1 mg  1 mg Oral DAILY    pravastatin (PRAVACHOL) tablet 40 mg  40 mg Oral QHS    acetaminophen (TYLENOL) tablet 650 mg  650 mg Oral Q6H PRN    oxyCODONE-acetaminophen (PERCOCET) 5-325 mg per tablet 1 Tab  1 Tab Oral Q6H PRN    naloxone (NARCAN) injection 0.4 mg  0.4 mg IntraVENous PRN    ondansetron (ZOFRAN) injection 4 mg  4 mg IntraVENous Q6H PRN    docusate sodium (COLACE) capsule 100 mg  100 mg Oral BID PRN    albuterol-ipratropium (DUO-NEB) 2.5 MG-0.5 MG/3 ML  3 mL Nebulization Q6H RT    diclofenac (VOLTAREN) 1 % topical gel 2 g  2 g Topical TID    nicotine (NICODERM CQ) 14 mg/24 hr patch 1 Patch  1 Patch TransDERmal DAILY    0.9% sodium chloride infusion  75 mL/hr IntraVENous CONTINUOUS       Allergies: Review of patient's allergies indicates no known allergies.     Temp (24hrs), Av.8 °F (37.1 °C), Min:97.7 °F (36.5 °C), Max:100.6 °F (38.1 °C)    Visit Vitals    /72 (BP 1 Location: Right arm, BP Patient Position: At rest)    Pulse 60    Temp 97.7 °F (36.5 °C)    Resp 17    Ht 6' 3\" (1.905 m)    Wt 71.9 kg (158 lb 7 oz)    SpO2 100%    BMI 19.8 kg/m2       ROS: 12 point ROS obtained in details. Pertinent positives as mentioned in HPI,   otherwise negative    Physical Exam:    Constitutional: He is oriented to person, place, and time. He appears well-developed and well-nourished. No distress. Thin. HENT:   Head: Normocephalic and atraumatic. Mouth/Throat: Mucous membranes moist   Eyes: Conjunctivae and EOM are normal. Pupils are equal, round, and reactive to light. No scleral icterus. Neck: Normal range of motion. Neck supple. No JVD present. No thyromegaly present. Cardiovascular: Normal rate, regular rhythm, S1 normal and S2 normal.  Exam reveals no gallop and no friction rub. No murmur heard. Pulmonary/Chest: Effort normal and breath sounds normal. No accessory muscle usage. No respiratory distress. Abdominal: Soft. Normal appearance. He exhibits no distension. There is no tenderness. There is no rigidity, no rebound and no guarding. Musculoskeletal: Normal range of motion. He exhibits no edema or tenderness. Resolved warmth right wrist - no overlying tenderness. No pain on active/passive movements of right wrist.   Neurological: He is alert and oriented to person, place, and time. He has normal strength. No cranial nerve deficit or sensory deficit. Coordination normal.   Skin: Skin is warm and intact. No rash noted. Psychiatric: He has a normal mood and affect. His speech is normal and behavior is normal.   Vitals reviewed.          Labs: Results:   Chemistry Recent Labs      06/08/18   0356  06/07/18   1650  06/07/18   0111  06/05/18   2034   GLU  88   --   93  131*   NA  139   --   139  140   K  3.7   --   3.6  3.2*   CL  110*   --   106  104   CO2  23   --   23  34*   BUN  29*   --   29*  10   CREA  1.22   --   1.85*  0.96   CA  9.5  9.4  9.2  10.2*   AGAP  6   --   10  2*   BUCR  24*   --   16  10*   AP   --    -- --   160*   TP   --    --    --   10.0*   ALB   --    --    --   3.4   GLOB   --    --    --   6.6*   AGRAT   --    --    --   0.5*      CBC w/Diff Recent Labs      06/08/18   0356  06/07/18   0111  06/05/18 2034   WBC  10.1  11.6  5.4   RBC  3.38*  3.55*  4.42*   HGB  9.8*  10.3*  13.0   HCT  29.5*  31.1*  39.0   PLT  210  236  309   GRANS  84*  89*  66   LYMPH  6*  3*  15*   EOS  3  1  1      Microbiology Recent Labs      06/07/18   1340  06/05/18 2034 06/05/18 2019   CULT  Toxigenic C. difficile NEGATIVE                         C. difficile target DNA sequences are not detected.   NO GROWTH 3 DAYS  NO GROWTH 3 DAYS          RADIOLOGY:    All available imaging studies/reports in [facility] care for this admission were reviewed    Dr. Alea Conley, Infectious Disease Specialist  [phone]  June 8, 2018  1:36 PM

## 2018-06-08 NOTE — CDMP QUERY
Please clarify if this patient is being treated/managed for:  Sepsis . .please document when able, to remove verses sirs . .or remove Sepsis dx. .or possible or probable . Wilton Hauser Please clarify and document your clinical opinion in the progress notes and discharge summary including the definitive and/or presumptive diagnosis, (suspected or probable), related to the above clinical findings. Please include clinical findings supporting your diagnosis. If you DECLINE this query or would like to communicate with [facility], please utilize the \"[facility] message box\" at the TOP of the Progress Note on the right.       Thank Jacqueline Corbett RN ext [phone]

## 2018-06-08 NOTE — PROGRESS NOTES
Problem: Mobility Impaired (Adult and Pediatric)  Goal: *Acute Goals and Plan of Care (Insert Text)  Physical Therapy Goals  Initiated 6/7/2018 and to be accomplished within 7 day(s)  1. Patient will move from supine to sit and sit to supine , scoot up and down and roll side to side in bed with supervision/set-up. 2.  Patient will transfer from bed to chair and chair to bed with supervision/set-up using the least restrictive device. 3.  Patient will perform sit to stand with supervision/set-up. 4.  Patient will ambulate with supervision/set-up for >50 feet with the least restrictive device. 5.  Patient will ascend/descend 3 stairs with B handrail(s) with minimal assistance/contact guard assist.   Outcome: Progressing Towards Goal  physical Therapy TREATMENT    Patient: Liza Bowers (85 y.o. male)  Date: 6/8/2018  Diagnosis: Joint pain  Elevated troponin  HIV (human immunodeficiency virus infection) ([facility] [address] Right forearm cellulitis  Precautions: Fall, Skin   Chart, physical therapy assessment, plan of care and goals were reviewed. OBJECTIVE/ ASSESSMENT:  Patient found supine in bed willing to work with PT. Pt sat at EOB this tx with min A x 2. Pt stood to RW and ambulated to bathroom with early heel rise / tip toe gait pattern. Pt's DAVID in narrow and pt present with increased trunk sway that requires min A at times. Pt began to urinate while standing at sink. Pt has condom cath that has apparently failed as urine falls onto floor. Floor was cleaned while pt stood and pt returned to sitting at EOB. Pt performed LAQ x 3 bilaterally with additional time needed. Pt's gown was changed in sitting as his gown has become urine soaked. Pt returned to supine and left with needs in reach. Pt has lunch tray in front of him and he states he can not feed himself. However pt able to feed self with encouragement. Education: therex, transfers, gait.   Progression toward goals:  []      Improving appropriately and progressing toward goals  []      Improving slowly and progressing toward goals  []      Not making progress toward goals and plan of care will be adjusted     PLAN:  Patient continues to benefit from skilled intervention to address the above impairments. Continue treatment per established plan of care. Discharge Recommendations:  SNF  Further Equipment Recommendations for Discharge:  rolling walker (Pt is 6'3\")     SUBJECTIVE:   Patient stated I once had hiccups for nine days.  Pt has hiccups throughout tx. OBJECTIVE DATA SUMMARY:   Critical Behavior:  Neurologic State: Alert  Orientation Level: Oriented X4  Cognition: Follows commands  Safety/Judgement: Awareness of environment, Fall prevention  Functional Mobility Training:  Bed Mobility:  Supine to Sit: Minimum assistance;Assist x2  Sit to Supine: Stand-by assistance  Transfers:  Sit to Stand: Contact guard assistance  Stand to Sit: Contact guard assistance  Balance:  Sitting: Impaired  Sitting - Static: Good (unsupported)  Sitting - Dynamic: Fair (occasional)  Standing: Impaired; With support  Standing - Static: Fair  Standing - Dynamic : Fair  Ambulation/Gait Training:  Distance (ft): 14 Feet (ft)  Assistive Device: Walker, rolling  Ambulation - Level of Assistance: Contact guard assistance;Minimal assistance  Gait Abnormalities: Decreased step clearance;Early heel rise  Base of Support: Narrowed  Stance: Weight shift  Speed/Mallory: Fluctuations  Step Length: Left shortened;Right shortened  Swing Pattern: Left asymmetrical;Right asymmetrical    Pain:  Pre tx pain: 0  Post tx pain: 0  Pain Scale 1: Numeric (0 - 10)  Pain Intensity 1: 0  Activity Tolerance:   Fair  Please refer to the flowsheet for vital signs taken during this treatment.   After treatment:   [] Patient left in no apparent distress sitting up in chair  [x] Patient left in no apparent distress in bed  [x] Call bell left within reach  [] Nursing notified  [] Caregiver present  [] Bed alarm activated  [] SCDs applied  [] Ice applied      [employer], PTA   Time Calculation: 45 mins    Mobility [id number] Current  CJ= 20-39%. The severity rating is based on the Level of Assistance required for Functional Mobility and ADLs. Mobility   Goal  CI= 1-19%. The severity rating is based on the Level of Assistance required for Functional Mobility and ADLs.

## 2018-06-08 NOTE — PROGRESS NOTES
SUBJECTIVE:    FEELING weak. Just worked with PT/OT. Bilateral leg weakness. No chest or abdomina pain . BACH at its baseline. Spo2 is 97% on RA. Cough present. No nausea or vomiting. OBJECTIVE:    /64 (BP 1 Location: Right arm, BP Patient Position: At rest)  Pulse 77  Temp 98.9 °F (37.2 °C)  Resp 18  Ht 6' 3\" (1.905 m)  Wt 71.9 kg (158 lb 7 oz)  SpO2 98%  BMI 19.8 kg/m2    CVS: RRR, AICD +  RS: Diminished in bases, no wheezes  GI: NT, BS +  Extremities: no pedal edema  CNS: moves both LEs well while in bed  General: NAD, Follows commands    ASSESSMENT:    1. SIRS versus Sepsis sec to # 2. Resolved. 2. 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 Chronic systolic heart failure with EF of around 55%. S/p AICD. Compensated. 4. Hypokalemia and hypomagnesemia. 5. BACH could be from CHD and/or presumed COPD  6. Human immunodeficiency virus. 7. Paroxysmal Atrial fibrillation, rate controlled. 8. Hypertension. 9. Noncompliance with the medications and diet compliance. 10. History of cerebrovascular accident in the past.  11. History of nonsustained ventricular tachycardia in the past.  12. History of tuberculosis, status post treatment. 13. Dyslipidemia. 14. Cocaine and marijuana use. 15. Diarrhea, improving. No c diff. 16. Indeterminate elevation of troponin due to demand ischemia sec to sepsis   17. Underweight  18.  IRAIDA, much better    PLAN:    Cont current management  CT head and chest ordered  Replete PHOS  Probiotic will be added  D/W ID     CMP:   Lab Results   Component Value Date/Time     06/08/2018 03:56 AM    K 3.7 06/08/2018 03:56 AM     (H) 06/08/2018 03:56 AM    CO2 23 06/08/2018 03:56 AM    AGAP 6 06/08/2018 03:56 AM    GLU 88 06/08/2018 03:56 AM    BUN 29 (H) 06/08/2018 03:56 AM    CREA 1.22 06/08/2018 03:56 AM    GFRAA >60 06/08/2018 03:56 AM    GFRNA 60 (L) 06/08/2018 03:56 AM    CA 9.5 06/08/2018 03:56 AM    MG 1.9 06/08/2018 03:56 AM    PHOS 1.9 (L) 06/08/2018 03:56 AM     CBC:   Lab Results   Component Value Date/Time    WBC 10.1 06/08/2018 03:56 AM    HGB 9.8 (L) 06/08/2018 03:56 AM    HCT 29.5 (L) 06/08/2018 03:56 AM     06/08/2018 03:56 AM

## 2018-06-08 NOTE — ROUTINE PROCESS
Bedside, Verbal and Written shift change report given to RIZWANA Robles RN (oncoming nurse) by Jaida HERNÁNDEZ(offgoing nurse). Report included the following information SBAR, Kardex, and MAR. Hourly rounding and  chart checks completed.

## 2018-06-09 LAB
ANION GAP SERPL CALC-SCNC: 5 MMOL/L (ref 3–18)
BACTERIA SPEC CULT: NORMAL
BUN SERPL-MCNC: 18 MG/DL (ref 7–18)
BUN/CREAT SERPL: 19 (ref 12–20)
CALCIUM SERPL-MCNC: 9.5 MG/DL (ref 8.5–10.1)
CHLORIDE SERPL-SCNC: 111 MMOL/L (ref 100–108)
CO2 SERPL-SCNC: 25 MMOL/L (ref 21–32)
CREAT SERPL-MCNC: 0.94 MG/DL (ref 0.6–1.3)
GLUCOSE SERPL-MCNC: 94 MG/DL (ref 74–99)
HCT VFR BLD AUTO: 28.7 % (ref 36–48)
HGB BLD-MCNC: 9.3 G/DL (ref 13–16)
PHOSPHATE SERPL-MCNC: 2.4 MG/DL (ref 2.5–4.9)
POTASSIUM SERPL-SCNC: 3.8 MMOL/L (ref 3.5–5.5)
SERVICE CMNT-IMP: NORMAL
SODIUM SERPL-SCNC: 141 MMOL/L (ref 136–145)

## 2018-06-09 PROCEDURE — 74011250636 HC RX REV CODE- 250/636: Performed by: INTERNAL MEDICINE

## 2018-06-09 PROCEDURE — 97110 THERAPEUTIC EXERCISES: CPT

## 2018-06-09 PROCEDURE — 94640 AIRWAY INHALATION TREATMENT: CPT

## 2018-06-09 PROCEDURE — 97168 OT RE-EVAL EST PLAN CARE: CPT

## 2018-06-09 PROCEDURE — 80048 BASIC METABOLIC PNL TOTAL CA: CPT | Performed by: INTERNAL MEDICINE

## 2018-06-09 PROCEDURE — 74011250637 HC RX REV CODE- 250/637: Performed by: INTERNAL MEDICINE

## 2018-06-09 PROCEDURE — 65660000000 HC RM CCU STEPDOWN

## 2018-06-09 PROCEDURE — 36415 COLL VENOUS BLD VENIPUNCTURE: CPT | Performed by: INTERNAL MEDICINE

## 2018-06-09 PROCEDURE — 85018 HEMOGLOBIN: CPT | Performed by: INTERNAL MEDICINE

## 2018-06-09 PROCEDURE — 74011000250 HC RX REV CODE- 250: Performed by: INTERNAL MEDICINE

## 2018-06-09 PROCEDURE — 84100 ASSAY OF PHOSPHORUS: CPT | Performed by: INTERNAL MEDICINE

## 2018-06-09 RX ADMIN — TENOFOVIR DISOPROXIL FUMARATE 300 MG: 300 TABLET, COATED ORAL at 10:03

## 2018-06-09 RX ADMIN — IPRATROPIUM BROMIDE AND ALBUTEROL SULFATE 3 ML: 2.5; .5 SOLUTION RESPIRATORY (INHALATION) at 14:00

## 2018-06-09 RX ADMIN — DIPHENHYDRAMINE HYDROCHLORIDE AND LIDOCAINE HYDROCHLORIDE AND ALUMINUM HYDROXIDE AND MAGNESIUM HYDRO 5 ML: KIT at 18:26

## 2018-06-09 RX ADMIN — IPRATROPIUM BROMIDE AND ALBUTEROL SULFATE 3 ML: 2.5; .5 SOLUTION RESPIRATORY (INHALATION) at 01:23

## 2018-06-09 RX ADMIN — CLINDAMYCIN HYDROCHLORIDE 300 MG: 150 CAPSULE ORAL at 18:26

## 2018-06-09 RX ADMIN — CLINDAMYCIN HYDROCHLORIDE 300 MG: 150 CAPSULE ORAL at 12:55

## 2018-06-09 RX ADMIN — OXYCODONE HYDROCHLORIDE AND ACETAMINOPHEN 1 TABLET: 5; 325 TABLET ORAL at 10:39

## 2018-06-09 RX ADMIN — IPRATROPIUM BROMIDE AND ALBUTEROL SULFATE 3 ML: 2.5; .5 SOLUTION RESPIRATORY (INHALATION) at 07:20

## 2018-06-09 RX ADMIN — DIBASIC SODIUM PHOSPHATE, MONOBASIC POTASSIUM PHOSPHATE AND MONOBASIC SODIUM PHOSPHATE 1 TABLET: 852; 155; 130 TABLET ORAL at 18:26

## 2018-06-09 RX ADMIN — BUDESONIDE AND FORMOTEROL FUMARATE DIHYDRATE 2 PUFF: 160; 4.5 AEROSOL RESPIRATORY (INHALATION) at 09:00

## 2018-06-09 RX ADMIN — CLINDAMYCIN HYDROCHLORIDE 300 MG: 150 CAPSULE ORAL at 01:15

## 2018-06-09 RX ADMIN — FOLIC ACID 1 MG: 1 TABLET ORAL at 10:03

## 2018-06-09 RX ADMIN — PRAVASTATIN SODIUM 40 MG: 20 TABLET ORAL at 21:23

## 2018-06-09 RX ADMIN — APIXABAN 5 MG: 5 TABLET, FILM COATED ORAL at 10:03

## 2018-06-09 RX ADMIN — CLINDAMYCIN HYDROCHLORIDE 300 MG: 150 CAPSULE ORAL at 06:19

## 2018-06-09 RX ADMIN — CARVEDILOL 12.5 MG: 6.25 TABLET, FILM COATED ORAL at 10:03

## 2018-06-09 RX ADMIN — DOLUTEGRAVIR SODIUM 50 MG: 50 TABLET, FILM COATED ORAL at 10:08

## 2018-06-09 RX ADMIN — DIPHENHYDRAMINE HYDROCHLORIDE AND LIDOCAINE HYDROCHLORIDE AND ALUMINUM HYDROXIDE AND MAGNESIUM HYDRO 5 ML: KIT at 12:56

## 2018-06-09 RX ADMIN — SODIUM CHLORIDE 50 ML/HR: 900 INJECTION, SOLUTION INTRAVENOUS at 01:15

## 2018-06-09 RX ADMIN — DIBASIC SODIUM PHOSPHATE, MONOBASIC POTASSIUM PHOSPHATE AND MONOBASIC SODIUM PHOSPHATE 1 TABLET: 852; 155; 130 TABLET ORAL at 10:03

## 2018-06-09 RX ADMIN — EMTRICITABINE 200 MG: 200 CAPSULE ORAL at 10:03

## 2018-06-09 RX ADMIN — CARVEDILOL 12.5 MG: 6.25 TABLET, FILM COATED ORAL at 18:26

## 2018-06-09 RX ADMIN — AZITHROMYCIN 500 MG: 250 TABLET, FILM COATED ORAL at 10:04

## 2018-06-09 RX ADMIN — APIXABAN 5 MG: 5 TABLET, FILM COATED ORAL at 21:23

## 2018-06-09 RX ADMIN — IPRATROPIUM BROMIDE AND ALBUTEROL SULFATE 3 ML: 2.5; .5 SOLUTION RESPIRATORY (INHALATION) at 19:47

## 2018-06-09 NOTE — PROGRESS NOTES
RENAL DAILY PROGRESS NOTE      67y M admitted with right wrist pain, following for IRAIDA  Subjective:   Complaint:   Overnight events noted  Feels okay   Afebrile last 24hrs   no nausea, vomiting, chest pain, short of breath, cough, seizure. IMPRESSION:   Acute kidney injury , pre renal ATN  Sepsis, right forearm cellulitis  Hypoklaemia , hypomagnesemia, -- improving   HTN,   HIV  Anemia    PLAN:   His renal function improve to baseline with hydration,  suggestive of pre renal injury. Continue current supportive treatment by primary team. Will be available if any question or concern. Discussed with Dr. Angel Ramires.          Current Facility-Administered Medications   Medication Dose Route Frequency    magic mouthwash (FIRST-MOUTHWASH BLM) oral suspension 5 mL  5 mL Oral TIDAC    phosphorus (K PHOS NEUTRAL) 250 mg tablet 1 Tab  1 Tab Oral TID    clindamycin (CLEOCIN) capsule 300 mg  300 mg Oral Q6H    emtricitabine (EMTRIVA) capsule 200 mg  200 mg Oral DAILY    tenofovir DISOPROXIL FUMARATE (VIREAD) tablet 300 mg  300 mg Oral DAILY    budesonide-formoterol (SYMBICORT) 160-4.5 mcg/actuation HFA inhaler 2 Puff  2 Puff Inhalation BID    azithromycin (ZITHROMAX) tablet 500 mg  500 mg Oral DAILY    dolutegravir (TIVICAY) tablet 50 mg  50 mg Oral DAILY    apixaban (ELIQUIS) tablet 5 mg  5 mg Oral Q12H    carvedilol (COREG) tablet 12.5 mg  12.5 mg Oral BID WITH MEALS    folic acid (FOLVITE) tablet 1 mg  1 mg Oral DAILY    pravastatin (PRAVACHOL) tablet 40 mg  40 mg Oral QHS    acetaminophen (TYLENOL) tablet 650 mg  650 mg Oral Q6H PRN    oxyCODONE-acetaminophen (PERCOCET) 5-325 mg per tablet 1 Tab  1 Tab Oral Q6H PRN    naloxone (NARCAN) injection 0.4 mg  0.4 mg IntraVENous PRN    ondansetron (ZOFRAN) injection 4 mg  4 mg IntraVENous Q6H PRN    docusate sodium (COLACE) capsule 100 mg  100 mg Oral BID PRN    albuterol-ipratropium (DUO-NEB) 2.5 MG-0.5 MG/3 ML  3 mL Nebulization Q6H RT    nicotine (NICODERM CQ) 14 mg/24 hr patch 1 Patch  1 Patch TransDERmal DAILY       Review of Symptoms: comprehensive ROS negative except above.    Objective:     Patient Vitals for the past 24 hrs:   Temp Pulse Resp BP SpO2   06/09/18 1142 97.4 °F (36.3 °C) (!) 57 18 152/81 97 %   06/09/18 0803 98 °F (36.7 °C) 60 18 134/66 98 %   06/09/18 0426 98 °F (36.7 °C) 60 20 119/70 100 %   06/09/18 0124 - - - - 96 %   06/09/18 0049 98.9 °F (37.2 °C) 69 18 144/72 96 %   06/08/18 2053 - - - - 96 %   06/08/18 2026 99.1 °F (37.3 °C) 64 17 164/83 95 %   06/08/18 1600 98.8 °F (37.1 °C) 71 17 131/76 98 %   06/08/18 1207 98.9 °F (37.2 °C) 77 18 108/64 98 %        Weight change: 11.3 kg (24 lb 15.4 oz)     06/07 1901 - 06/09 0700  In: 1500 [I.V.:1500]  Out: 1300 [Urine:1300]    Intake/Output Summary (Last 24 hours) at 06/09/18 1200  Last data filed at 06/09/18 0439   Gross per 24 hour   Intake              600 ml   Output             1300 ml   Net             -700 ml     Physical Exam:   General: comfortable, no acute distress   HEENT sclera anicteric, supple neck, no thyromegaly  CVS: S1S2 heard,  no rub  RS: + air entry b/l,   Abd: Soft, Non tender,  Neuro:  awake, alert , CN II-XII are grossly intact  Extrm: no edema, no cyanosis, clubbing   Musculoskeletal:right forearm cellulitis       Data Review:     LABS:   Hematology:   Recent Labs      06/09/18   0345  06/08/18   0356  06/07/18   0111   WBC   --   10.1  11.6   HGB  9.3*  9.8*  10.3*   HCT  28.7*  29.5*  31.1*     Chemistry:   Recent Labs      06/09/18   0345  06/08/18   0356  06/07/18   1650  06/07/18   0111   BUN  18  29*   --   29*   CREA  0.94  1.22   --   1.85*   CA  9.5  9.5  9.4  9.2   K  3.8  3.7   --   3.6   NA  141  139   --   139   CL  111*  110*   --   106   CO2  25  23   --   23   PHOS  2.4*  1.9*   --   2.6   GLU  94  88   --   93            Procedures/imaging: see electronic medical records for all procedures, Xrays and details which were not copied into this note but were reviewed prior to creation of Plan          Assessment & Plan:   As above         Merry Buckley MD  6/9/2018  12:19 PM

## 2018-06-09 NOTE — PROGRESS NOTES
Problem: Mobility Impaired (Adult and Pediatric)  Goal: *Acute Goals and Plan of Care (Insert Text)  Physical Therapy Goals  Initiated 6/7/2018 and to be accomplished within 7 day(s)  1. Patient will move from supine to sit and sit to supine , scoot up and down and roll side to side in bed with supervision/set-up. 2.  Patient will transfer from bed to chair and chair to bed with supervision/set-up using the least restrictive device. 3.  Patient will perform sit to stand with supervision/set-up. 4.  Patient will ambulate with supervision/set-up for >50 feet with the least restrictive device. 5.  Patient will ascend/descend 3 stairs with B handrail(s) with minimal assistance/contact guard assist.   Outcome: Progressing Towards Goal  physical Therapy TREATMENT    Patient: Alok Vargas (63 y.o. male)  Date: 6/9/2018  Diagnosis: Joint pain  Elevated troponin  HIV (human immunodeficiency virus infection) ([facility] [address] Right forearm cellulitis  Precautions: Fall, Skin   Chart, physical therapy assessment, plan of care and goals were reviewed. OBJECTIVE/ ASSESSMENT:  Patient found supine in bed. Pt declines OOB activity due to fatigue, but is willing to perform supine therex. Pt encouraged to continue therex on his own. Pt's progress limited this tx. Education: therex. Progression toward goals:  []      Improving appropriately and progressing toward goals  [x]      Improving slowly and progressing toward goals  []      Not making progress toward goals and plan of care will be adjusted     PLAN:  Patient continues to benefit from skilled intervention to address the above impairments. Continue treatment per established plan of care. Discharge Recommendations:  SNF  Further Equipment Recommendations for Discharge:  rolling walker     SUBJECTIVE:   Patient stated I'm just beat.     OBJECTIVE DATA SUMMARY:   Critical Behavior:  Neurologic State: Alert  Orientation Level: Oriented X4  Cognition: Follows commands  Safety/Judgement: Awareness of environment, Fall prevention  Functional Mobility Training:  Therapeutic Exercises:       EXERCISE   Sets   Reps   Active Active Assist   Passive Self- assited ROM   Comments   Ankle Pumps 1 20  [x] [] [] [] Bilaterally   Quad Sets 1 10 [x] [] [] [] Bilaterally   Seated Knee Flexion   [] [] [] []    Short Arc Quads   [] [] [] []    Heel Slides 1 10 [x] [] [] [] Bilaterally   Straight Leg Raises   [] [] [] []    Hip Abd/Add   [] [] [] []    Long Arc Quads   [] [] [] []    Seated Marching   [] [] [] []    Standing Marching   [] [] [] []       [] [] [] []      Pain:  Pre tx pain: 0  Post tx pain: 0  Activity Tolerance:   Fair  Please refer to the flowsheet for vital signs taken during this treatment. After treatment:   [] Patient left in no apparent distress sitting up in chair  [x] Patient left in no apparent distress in bed  [x] Call bell left within reach  [] Nursing notified  [] Caregiver present   [] Bed alarm activated  [] SCDs applied  [] Ice applied      Carlos Roberts PTA   Time Calculation: 12 mins    Mobility [id number] Current  CM= 80-99%. The severity rating is based on the Level of Assistance required for Functional Mobility and ADLs. Mobility   Goal  CI= 1-19%. The severity rating is based on the Level of Assistance required for Functional Mobility and ADLs.

## 2018-06-09 NOTE — PROGRESS NOTES
Patient: Manjit Purvis                MRN:@           SSN: [id number]   YOB: 1950         AGE: 74 y.o. SEX: male       Will sign off  Manjit Purvis doing better as it relates to wrist right    Wearing volar / velcro splint    PE    Visit Vitals    /66 (BP 1 Location: Left arm, BP Patient Position: Supine)    Pulse 60    Temp 98 °F (36.7 °C)    Resp 18    Ht 6' 3\" (1.905 m)    Wt 186 lb 4.8 oz (84.5 kg)    SpO2 98%    BMI 23.29 kg/m2        RIGHT WRIST  No redness, trace edema dorsal mid hand, no skin breakdown    (P) Flexion / Extension wrist 20 and 10 degrees respectfully.     Able to close all digits to palm with no deficit, extension full, no pain    Plan: ROM Right wrist (out) of splint PT to assess    No surgical intervention needed at this time        Allan Morel PA-C  6/9/2018

## 2018-06-09 NOTE — PROGRESS NOTES
Problem: Mobility Impaired (Adult and Pediatric)  Goal: *Acute Goals and Plan of Care (Insert Text)  Physical Therapy Goals  Initiated 6/7/2018 and to be accomplished within 7 day(s)  1. Patient will move from supine to sit and sit to supine , scoot up and down and roll side to side in bed with supervision/set-up. 2.  Patient will transfer from bed to chair and chair to bed with supervision/set-up using the least restrictive device. 3.  Patient will perform sit to stand with supervision/set-up. 4.  Patient will ambulate with supervision/set-up for >50 feet with the least restrictive device. 5.  Patient will ascend/descend 3 stairs with B handrail(s) with minimal assistance/contact guard assist.     1158 - Pt is being shaved at this time. Will f/u later in day.

## 2018-06-09 NOTE — PROGRESS NOTES
Problem: Self Care Deficits Care Plan (Adult)  Goal: *Acute Goals and Plan of Care (Insert Text)  Occupational Therapy Goals  Initiated 6/7/2018 within 7 day(s). 1.  Patient will perform self-feeding with supervision/set-up. 2.  Patient will perform grooming with supervision/set-up while seated on EOB. 3.  Patient will perform upper body dressing with minimal assistance/contact guard assist.  4.  Patient will perform toilet transfers with minimal assistance/contact guard assist.  5.  Patient will demonstrate right wrist extension and intrinsic with 4/5 strength in preparation for his efficient completion of his self care routine  6. Patient will demonstrate independence with right hand program       Occupational Therapy ReEVALUATION    Patient: Kuldeep Jones (42 y.o. male)  Date: 6/9/2018  Diagnosis: Joint pain  Elevated troponin  HIV (human immunodeficiency virus infection) ([facility] [address] Right forearm cellulitis  Precautions: Fall, Skin    ASSESSMENT :  Based on the objective data described below, the patient presents with new orders: right wrist/hand ROM/stretching. Following soft tissue mobilization and gentle stretching, he improved from 80% active right wrist extension to full with 3+/5 strength (3/5 at end range)    Patient will benefit from skilled intervention to address the above impairments.   Patients rehabilitation potential is considered to be Good  Factors which may influence rehabilitation potential include:   [x]                None noted  []                Mental ability/status  []                Medical condition  []                Home/family situation and support systems  []                Safety awareness  []                Pain tolerance/management  []                Other:      PLAN :  Recommendations and Planned Interventions:  [x]                  Self Care Training                  [x]           Therapeutic Activities  [x]                  Functional Mobility Training    [] Cognitive Retraining  [x]                  Therapeutic Exercises           []           Endurance Activities  []                  Balance Training                   []           Neuromuscular Re-Education  []                  Visual/Perceptual Training     [x]      Home Safety Training  [x]                  Patient Education                 [x]           Family Training/Education  []                  Other (comment):    Frequency/Duration: Patient will be followed by occupational therapy 1-2 times per day/ 3-5 days per week to address goals. Discharge Recommendations: Skilled Nursing Facility  Further Equipment Recommendations for Discharge: N/A     G-CODES:       SUBJECTIVE:   Patient stated Rudolph galeana for helping me.     OBJECTIVE DATA SUMMARY:   Hospital course since last seen and reason for reevaluation: new orders from the MD  Cognitive/Behavioral Status:  Neurologic State: Alert  Orientation Level: Oriented X4  Skin: no skin integrity issues noted during OT evaluation  Edema: no extremity edema noted  Vision/Perceptual:     [facility]     Coordination:   minimally decreased RUE; otherwise WFL   Balance: too fatigued to get up  Strength:  Right wrist as noted above; LUE and proximal RUE is 4-/5   Tone & Sensation:  BUE's WFL   Range of Motion:  BUE's AROM is WFL   Functional Mobility and Transfers for ADLs:  Bed Mobility:   he declined   Transfers:   he declined   ADL Assessment:   occasional min assist self feeding (simulated this date)  Therapeutic Exercise:  Soft tissue mobilization and functional ROM/strengthening (he is tolerating 10 repetitions of all joints/all planes)  Pain:  Pt reports 0/10 pain or discomfort prior to treatment.    Pt reports 0/10 pain or discomfort post treatment. Activity Tolerance:   No SOB noted; he is receiving a breathing treatment and is reporting that he is very tired  Please refer to the flowsheet for vital signs taken during this treatment.   After treatment:   [] Patient left in no apparent distress sitting up in chair  [x] Patient left in no apparent distress in bed  [x] Call bell left within reach  [] Nursing notified  [] Caregiver present  [] Bed alarm activated    COMMUNICATION/EDUCATION:   []    Home safety education was provided and the patient/caregiver indicated understanding. []    Patient/family have participated as able in goal setting and plan of care. [x]    Patient/family agree to work toward stated goals and plan of care. []    Patient understands intent and goals of therapy, but is neutral about his/her participation. []    Patient is unable to participate in goal setting and plan of care. This patients plan of care is appropriate for delegation to [facility].     Thank you for this referral.  Marylin Rwoan OTR/L  Time Calculation: 12 mins

## 2018-06-09 NOTE — PROGRESS NOTES
Problem: Mobility Impaired (Adult and Pediatric)  Goal: *Acute Goals and Plan of Care (Insert Text)  Physical Therapy Goals  Initiated 6/7/2018 and to be accomplished within 7 day(s)  1. Patient will move from supine to sit and sit to supine , scoot up and down and roll side to side in bed with supervision/set-up. 2.  Patient will transfer from bed to chair and chair to bed with supervision/set-up using the least restrictive device. 3.  Patient will perform sit to stand with supervision/set-up. 4.  Patient will ambulate with supervision/set-up for >50 feet with the least restrictive device. 5.  Patient will ascend/descend 3 stairs with B handrail(s) with minimal assistance/contact guard assist.   Outcome: Progressing Towards Goal    0902 - New orders acknowledged, pt is currently on PT caseload. Will continue to follow. Thank you.

## 2018-06-09 NOTE — PROGRESS NOTES
SUBJECTIVE:    Feeling better. C/o tongue pain. No dysphagia. No chest pain. BACH and cough present. No abdominal pain and nausea or vomiting. No dizziness. OBJECTIVE:    /70 (BP 1 Location: Left arm, BP Patient Position: At rest)  Pulse 60  Temp 98 °F (36.7 °C)  Resp 20  Ht 6' 3\" (1.905 m)  Wt 84.5 kg (186 lb 4.8 oz)  SpO2 100%  BMI 23.29 kg/m2    HEENT:  No thrush but some redness of tongue, no edema   CVS: RRR, AICD +  RS: Diminished in bases, no wheezes  GI: NT, BS +  Extremities: no pedal edema  CNS: moves both LEs well while in bed  General: NAD, Follows commands    ASSESSMENT:    1. SIRS versus Sepsis sec to # 2. Resolved. 2. 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 Chronic systolic heart failure with EF of around 55%. S/p AICD. Compensated. 4. Hypokalemia and hypomagnesemia. 5. BACH could be from CHD and/or presumed COPD  6. Human immunodeficiency virus. 7. Paroxysmal Atrial fibrillation, rate controlled. 8. Hypertension. 9. Noncompliance with the medications and diet compliance. 10. History of cerebrovascular accident in the past.  11. History of nonsustained ventricular tachycardia in the past.  12. History of tuberculosis, status post treatment. 13. Dyslipidemia. 14. Cocaine and marijuana use. 15. Diarrhea, improved  16. Indeterminate elevation of troponin due to demand ischemia sec to sepsis   17. Underweight  18. IRAIDA, resolved   19. Glossitis   20.   Acute on chronic bronchitis     PLAN:    Cont current management  Magic mouth solution added  Discharge planning in process     CMP:   Lab Results   Component Value Date/Time     06/09/2018 03:45 AM    K 3.8 06/09/2018 03:45 AM     (H) 06/09/2018 03:45 AM    CO2 25 06/09/2018 03:45 AM    AGAP 5 06/09/2018 03:45 AM    GLU 94 06/09/2018 03:45 AM    BUN 18 06/09/2018 03:45 AM    CREA 0.94 06/09/2018 03:45 AM    GFRAA >60 06/09/2018 03:45 AM    GFRNA >60 06/09/2018 03:45 AM    CA 9.5 06/09/2018 03:45 AM    PHOS 2.4 (L) 06/09/2018 03:45 AM     CBC:   Lab Results   Component Value Date/Time    HGB 9.3 (L) 06/09/2018 03:45 AM    HCT 28.7 (L) 06/09/2018 03:45 AM

## 2018-06-10 LAB
ANION GAP SERPL CALC-SCNC: 5 MMOL/L (ref 3–18)
BASOPHILS # BLD: 0 K/UL (ref 0–0.06)
BASOPHILS NFR BLD: 0 % (ref 0–2)
BUN SERPL-MCNC: 16 MG/DL (ref 7–18)
BUN/CREAT SERPL: 22 (ref 12–20)
CALCIUM SERPL-MCNC: 9.5 MG/DL (ref 8.5–10.1)
CHLORIDE SERPL-SCNC: 107 MMOL/L (ref 100–108)
CO2 SERPL-SCNC: 28 MMOL/L (ref 21–32)
CREAT SERPL-MCNC: 0.74 MG/DL (ref 0.6–1.3)
DIFFERENTIAL METHOD BLD: ABNORMAL
EOSINOPHIL # BLD: 0.6 K/UL (ref 0–0.4)
EOSINOPHIL NFR BLD: 8 % (ref 0–5)
ERYTHROCYTE [DISTWIDTH] IN BLOOD BY AUTOMATED COUNT: 12.2 % (ref 11.6–14.5)
FERRITIN SERPL-MCNC: 253 NG/ML (ref 8–388)
GLUCOSE SERPL-MCNC: 111 MG/DL (ref 74–99)
HCT VFR BLD AUTO: 29 % (ref 36–48)
HGB BLD-MCNC: 9.3 G/DL (ref 13–16)
IRON SATN MFR SERPL: 25 %
IRON SERPL-MCNC: 63 UG/DL (ref 50–175)
LYMPHOCYTES # BLD: 1.2 K/UL (ref 0.9–3.6)
LYMPHOCYTES NFR BLD: 17 % (ref 21–52)
MAGNESIUM SERPL-MCNC: 1.6 MG/DL (ref 1.6–2.6)
MCH RBC QN AUTO: 28.3 PG (ref 24–34)
MCHC RBC AUTO-ENTMCNC: 32.1 G/DL (ref 31–37)
MCV RBC AUTO: 88.1 FL (ref 74–97)
MONOCYTES # BLD: 0.8 K/UL (ref 0.05–1.2)
MONOCYTES NFR BLD: 10 % (ref 3–10)
NEUTS SEG # BLD: 4.7 K/UL (ref 1.8–8)
NEUTS SEG NFR BLD: 65 % (ref 40–73)
PHOSPHATE SERPL-MCNC: 2.3 MG/DL (ref 2.5–4.9)
PLATELET # BLD AUTO: 216 K/UL (ref 135–420)
PMV BLD AUTO: 11.8 FL (ref 9.2–11.8)
POTASSIUM SERPL-SCNC: 4 MMOL/L (ref 3.5–5.5)
RBC # BLD AUTO: 3.29 M/UL (ref 4.7–5.5)
SODIUM SERPL-SCNC: 140 MMOL/L (ref 136–145)
TIBC SERPL-MCNC: 257 UG/DL (ref 250–450)
WBC # BLD AUTO: 7.2 K/UL (ref 4.6–13.2)

## 2018-06-10 PROCEDURE — 84100 ASSAY OF PHOSPHORUS: CPT | Performed by: INTERNAL MEDICINE

## 2018-06-10 PROCEDURE — 74011000250 HC RX REV CODE- 250: Performed by: INTERNAL MEDICINE

## 2018-06-10 PROCEDURE — 83550 IRON BINDING TEST: CPT | Performed by: INTERNAL MEDICINE

## 2018-06-10 PROCEDURE — 77010033678 HC OXYGEN DAILY

## 2018-06-10 PROCEDURE — 85025 COMPLETE CBC W/AUTO DIFF WBC: CPT | Performed by: INTERNAL MEDICINE

## 2018-06-10 PROCEDURE — 36415 COLL VENOUS BLD VENIPUNCTURE: CPT | Performed by: INTERNAL MEDICINE

## 2018-06-10 PROCEDURE — 74011250636 HC RX REV CODE- 250/636: Performed by: INTERNAL MEDICINE

## 2018-06-10 PROCEDURE — 80048 BASIC METABOLIC PNL TOTAL CA: CPT | Performed by: INTERNAL MEDICINE

## 2018-06-10 PROCEDURE — 83735 ASSAY OF MAGNESIUM: CPT | Performed by: INTERNAL MEDICINE

## 2018-06-10 PROCEDURE — 74011250637 HC RX REV CODE- 250/637: Performed by: INTERNAL MEDICINE

## 2018-06-10 PROCEDURE — 65660000000 HC RM CCU STEPDOWN

## 2018-06-10 PROCEDURE — 82728 ASSAY OF FERRITIN: CPT | Performed by: INTERNAL MEDICINE

## 2018-06-10 PROCEDURE — 94640 AIRWAY INHALATION TREATMENT: CPT

## 2018-06-10 RX ORDER — LIDOCAINE HYDROCHLORIDE 20 MG/ML
JELLY TOPICAL 3 TIMES DAILY
Status: DISCONTINUED | OUTPATIENT
Start: 2018-06-10 | End: 2018-06-14 | Stop reason: HOSPADM

## 2018-06-10 RX ORDER — IPRATROPIUM BROMIDE AND ALBUTEROL SULFATE 2.5; .5 MG/3ML; MG/3ML
3 SOLUTION RESPIRATORY (INHALATION)
Status: DISCONTINUED | OUTPATIENT
Start: 2018-06-10 | End: 2018-06-10

## 2018-06-10 RX ORDER — IPRATROPIUM BROMIDE AND ALBUTEROL SULFATE 2.5; .5 MG/3ML; MG/3ML
3 SOLUTION RESPIRATORY (INHALATION)
Status: DISCONTINUED | OUTPATIENT
Start: 2018-06-10 | End: 2018-06-14 | Stop reason: HOSPADM

## 2018-06-10 RX ORDER — BUDESONIDE AND FORMOTEROL FUMARATE DIHYDRATE 160; 4.5 UG/1; UG/1
2 AEROSOL RESPIRATORY (INHALATION)
Status: DISCONTINUED | OUTPATIENT
Start: 2018-06-10 | End: 2018-06-14 | Stop reason: HOSPADM

## 2018-06-10 RX ORDER — MAGNESIUM SULFATE 1 G/100ML
1 INJECTION INTRAVENOUS ONCE
Status: COMPLETED | OUTPATIENT
Start: 2018-06-10 | End: 2018-06-10

## 2018-06-10 RX ADMIN — CLINDAMYCIN HYDROCHLORIDE 300 MG: 150 CAPSULE ORAL at 18:09

## 2018-06-10 RX ADMIN — PRAVASTATIN SODIUM 40 MG: 20 TABLET ORAL at 21:41

## 2018-06-10 RX ADMIN — AZITHROMYCIN 500 MG: 250 TABLET, FILM COATED ORAL at 08:24

## 2018-06-10 RX ADMIN — OXYCODONE HYDROCHLORIDE AND ACETAMINOPHEN 1 TABLET: 5; 325 TABLET ORAL at 06:35

## 2018-06-10 RX ADMIN — CLINDAMYCIN HYDROCHLORIDE 300 MG: 150 CAPSULE ORAL at 00:30

## 2018-06-10 RX ADMIN — LIDOCAINE HYDROCHLORIDE: 20 JELLY TOPICAL at 16:37

## 2018-06-10 RX ADMIN — CLINDAMYCIN HYDROCHLORIDE 300 MG: 150 CAPSULE ORAL at 11:52

## 2018-06-10 RX ADMIN — BUDESONIDE AND FORMOTEROL FUMARATE DIHYDRATE 2 PUFF: 160; 4.5 AEROSOL RESPIRATORY (INHALATION) at 19:52

## 2018-06-10 RX ADMIN — IPRATROPIUM BROMIDE AND ALBUTEROL SULFATE 3 ML: .5; 3 SOLUTION RESPIRATORY (INHALATION) at 08:48

## 2018-06-10 RX ADMIN — CLINDAMYCIN HYDROCHLORIDE 300 MG: 150 CAPSULE ORAL at 06:28

## 2018-06-10 RX ADMIN — SODIUM PHOSPHATE, MONOBASIC, MONOHYDRATE AND SODIUM PHOSPHATE, DIBASIC, ANHYDROUS: 276; 142 INJECTION, SOLUTION INTRAVENOUS at 14:04

## 2018-06-10 RX ADMIN — LIDOCAINE HYDROCHLORIDE: 20 JELLY TOPICAL at 21:41

## 2018-06-10 RX ADMIN — MAGNESIUM SULFATE IN DEXTROSE 1 G: 10 INJECTION, SOLUTION INTRAVENOUS at 12:53

## 2018-06-10 RX ADMIN — IPRATROPIUM BROMIDE AND ALBUTEROL SULFATE 3 ML: .5; 3 SOLUTION RESPIRATORY (INHALATION) at 19:52

## 2018-06-10 RX ADMIN — EMTRICITABINE 200 MG: 200 CAPSULE ORAL at 08:25

## 2018-06-10 RX ADMIN — APIXABAN 5 MG: 5 TABLET, FILM COATED ORAL at 08:23

## 2018-06-10 RX ADMIN — CARVEDILOL 12.5 MG: 6.25 TABLET, FILM COATED ORAL at 08:24

## 2018-06-10 RX ADMIN — CARVEDILOL 12.5 MG: 6.25 TABLET, FILM COATED ORAL at 16:34

## 2018-06-10 RX ADMIN — BUDESONIDE AND FORMOTEROL FUMARATE DIHYDRATE 2 PUFF: 160; 4.5 AEROSOL RESPIRATORY (INHALATION) at 08:48

## 2018-06-10 RX ADMIN — DIPHENHYDRAMINE HYDROCHLORIDE AND LIDOCAINE HYDROCHLORIDE AND ALUMINUM HYDROXIDE AND MAGNESIUM HYDRO 5 ML: KIT at 10:41

## 2018-06-10 RX ADMIN — FOLIC ACID 1 MG: 1 TABLET ORAL at 08:25

## 2018-06-10 RX ADMIN — DOLUTEGRAVIR SODIUM 50 MG: 50 TABLET, FILM COATED ORAL at 10:36

## 2018-06-10 RX ADMIN — TENOFOVIR DISOPROXIL FUMARATE 300 MG: 300 TABLET, COATED ORAL at 08:26

## 2018-06-10 RX ADMIN — APIXABAN 5 MG: 5 TABLET, FILM COATED ORAL at 21:41

## 2018-06-10 NOTE — ROUTINE PROCESS
Bedside shift change report given to Gina Freire (oncoming nurse) by Alexia Vargas (offgoing nurse). Report included the following information SBAR, Intake/Output, MAR, Recent Results and Cardiac Rhythm . Jose Eduardo Noe

## 2018-06-10 NOTE — PROGRESS NOTES
Problem: Falls - Risk of  Goal: *Absence of Falls  Document Kylee Fall Risk and appropriate interventions in the flowsheet. Outcome: Progressing Towards Goal  Fall Risk Interventions:  Mobility Interventions: Assess mobility with egress test, Bed/chair exit alarm, Patient to call before getting OOB, PT Consult for mobility concerns         Medication Interventions: Bed/chair exit alarm, Patient to call before getting OOB, Teach patient to arise slowly    Elimination Interventions: Bed/chair exit alarm, Call light in reach, Patient to call for help with toileting needs, Toileting schedule/hourly rounds    History of Falls Interventions: Bed/chair exit alarm, Door open when patient unattended, Room close to nurse's station        Problem: Pressure Injury - Risk of  Goal: *Prevention of pressure injury  Document Ba Scale and appropriate interventions in the flowsheet.    Outcome: Progressing Towards Goal  Pressure Injury Interventions:  Sensory Interventions: Assess changes in LOC, Check visual cues for pain, Pressure redistribution bed/mattress (bed type)    Moisture Interventions: Absorbent underpads, Check for incontinence Q2 hours and as needed, Internal/External urinary devices    Activity Interventions: Increase time out of bed, Pressure redistribution bed/mattress(bed type), PT/OT evaluation    Mobility Interventions: HOB 30 degrees or less, Pressure redistribution bed/mattress (bed type), PT/OT evaluation    Nutrition Interventions: Document food/fluid/supplement intake    Friction and Shear Interventions: HOB 30 degrees or less

## 2018-06-10 NOTE — PROGRESS NOTES
SUBJECTIVE:    Feeling better. C/o tongue pain while eating. No dysphagia. No soreness of throat. No chest pain. BACH and cough present. No abdominal pain and nausea or vomiting. OBJECTIVE:    /70 (BP 1 Location: Left arm, BP Patient Position: At rest)  Pulse 63  Temp 97.7 °F (36.5 °C)  Resp 18  Ht 6' 3\" (1.905 m)  Wt 70.9 kg (156 lb 6.4 oz)  SpO2 97%  BMI 19.55 kg/m2    CVS: RRR, AICD +  RS: Diminished in bases, no wheezes  GI: NT, BS +  Extremities: no pedal edema  CNS: moves both LEs well while in bed  General: NAD, Follows commands    ASSESSMENT:    1. SIRS versus Sepsis sec to # 2. Resolved. 2. 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 Chronic systolic heart failure with EF of around 55%. S/p AICD. Compensated. 4. Hypokalemia and hypomagnesemia. 5. BACH could be from CHD and/or presumed COPD  6. Human immunodeficiency virus. 7. Paroxysmal Atrial fibrillation, rate controlled. 8. Hypertension. 9. Noncompliance with the medications and diet compliance. 10. History of cerebrovascular accident in the past.  11. History of nonsustained ventricular tachycardia in the past.  12. History of tuberculosis, status post treatment. 13. Dyslipidemia. 14. Cocaine and marijuana use. 15. Diarrhea, improved  16. Indeterminate elevation of troponin due to demand ischemia sec to sepsis   17. Underweight  18. IRAIDA, resolved   19. Glossitis   20.   Acute on chronic bronchitis     PLAN:    Cont current management  Shruthi reyes  Discharge planning in process     CMP:   Lab Results   Component Value Date/Time     06/10/2018 12:55 AM    K 4.0 06/10/2018 12:55 AM     06/10/2018 12:55 AM    CO2 28 06/10/2018 12:55 AM    AGAP 5 06/10/2018 12:55 AM     (H) 06/10/2018 12:55 AM    BUN 16 06/10/2018 12:55 AM    CREA 0.74 06/10/2018 12:55 AM    GFRAA >60 06/10/2018 12:55 AM    GFRNA >60 06/10/2018 12:55 AM    CA 9.5 06/10/2018 12:55 AM MG 1.6 06/10/2018 12:55 AM    PHOS 2.3 (L) 06/10/2018 12:55 AM     CBC:   Lab Results   Component Value Date/Time    WBC 7.2 06/10/2018 12:55 AM    HGB 9.3 (L) 06/10/2018 12:55 AM    HCT 29.0 (L) 06/10/2018 12:55 AM     06/10/2018 12:55 AM

## 2018-06-10 NOTE — PROGRESS NOTES
1939:  Bedside report    2000:  Assessment    2123:  Meds given    2224:  Resting quietly, NAD    2328:  New condom cath placed    0030:  Meds given    0140:  5 P's    0216:  Sleeping, NAD    0307:  Rounds, NAD    0400:  Vitals    0507:  Resting quietly, no needs at this time.    0601:  Sleeping, NAD    0635:  Pain med. 6/10 Left ear pain.

## 2018-06-11 LAB
ALBUMIN SERPL ELPH-MCNC: 2.6 G/DL (ref 2.9–4.4)
ALBUMIN/GLOB SERPL: 0.8 {RATIO} (ref 0.7–1.7)
ALPHA1 GLOB SERPL ELPH-MCNC: 0.3 G/DL (ref 0–0.4)
ALPHA2 GLOB SERPL ELPH-MCNC: 0.6 G/DL (ref 0.4–1)
B-GLOBULIN SERPL ELPH-MCNC: 0.7 G/DL (ref 0.7–1.3)
BACTERIA SPEC CULT: NORMAL
BACTERIA SPEC CULT: NORMAL
GAMMA GLOB SERPL ELPH-MCNC: 2.1 G/DL (ref 0.4–1.8)
GLOBULIN SER-MCNC: 3.7 G/DL (ref 2.2–3.9)
GLUCOSE BLD STRIP.AUTO-MCNC: 118 MG/DL (ref 70–110)
IGA SERPL-MCNC: 311 MG/DL (ref 61–437)
IGG SERPL-MCNC: 2184 MG/DL (ref 700–1600)
IGM SERPL-MCNC: 227 MG/DL (ref 20–172)
INTERPRETATION SERPL IEP-IMP: ABNORMAL
M PROTEIN SERPL ELPH-MCNC: ABNORMAL G/DL
MAGNESIUM SERPL-MCNC: 1.5 MG/DL (ref 1.6–2.6)
PHOSPHATE SERPL-MCNC: 2.3 MG/DL (ref 2.5–4.9)
POTASSIUM SERPL-SCNC: 3.9 MMOL/L (ref 3.5–5.5)
PROT SERPL-MCNC: 6.3 G/DL (ref 6–8.5)
SERVICE CMNT-IMP: NORMAL
SERVICE CMNT-IMP: NORMAL

## 2018-06-11 PROCEDURE — 74011250637 HC RX REV CODE- 250/637: Performed by: INTERNAL MEDICINE

## 2018-06-11 PROCEDURE — 74011250636 HC RX REV CODE- 250/636: Performed by: INTERNAL MEDICINE

## 2018-06-11 PROCEDURE — 84100 ASSAY OF PHOSPHORUS: CPT | Performed by: INTERNAL MEDICINE

## 2018-06-11 PROCEDURE — 77030010545

## 2018-06-11 PROCEDURE — 36415 COLL VENOUS BLD VENIPUNCTURE: CPT | Performed by: INTERNAL MEDICINE

## 2018-06-11 PROCEDURE — 65660000000 HC RM CCU STEPDOWN

## 2018-06-11 PROCEDURE — 74011000250 HC RX REV CODE- 250: Performed by: INTERNAL MEDICINE

## 2018-06-11 PROCEDURE — 83735 ASSAY OF MAGNESIUM: CPT | Performed by: INTERNAL MEDICINE

## 2018-06-11 PROCEDURE — 84132 ASSAY OF SERUM POTASSIUM: CPT | Performed by: INTERNAL MEDICINE

## 2018-06-11 PROCEDURE — 82962 GLUCOSE BLOOD TEST: CPT

## 2018-06-11 PROCEDURE — 94640 AIRWAY INHALATION TREATMENT: CPT

## 2018-06-11 PROCEDURE — 97535 SELF CARE MNGMENT TRAINING: CPT

## 2018-06-11 RX ORDER — CHLORPROMAZINE HYDROCHLORIDE 50 MG/1
50 TABLET, FILM COATED ORAL DAILY PRN
Status: DISCONTINUED | OUTPATIENT
Start: 2018-06-11 | End: 2018-06-11

## 2018-06-11 RX ORDER — BUDESONIDE AND FORMOTEROL FUMARATE DIHYDRATE 160; 4.5 UG/1; UG/1
2 AEROSOL RESPIRATORY (INHALATION) 2 TIMES DAILY
Qty: 1 INHALER | Refills: 0 | Status: SHIPPED
Start: 2018-06-11 | End: 2018-06-14

## 2018-06-11 RX ORDER — IBUPROFEN 200 MG
1 TABLET ORAL DAILY
Qty: 30 PATCH | Refills: 0 | Status: SHIPPED
Start: 2018-06-12 | End: 2018-07-12

## 2018-06-11 RX ORDER — MAGNESIUM SULFATE HEPTAHYDRATE 40 MG/ML
2 INJECTION, SOLUTION INTRAVENOUS ONCE
Status: COMPLETED | OUTPATIENT
Start: 2018-06-11 | End: 2018-06-11

## 2018-06-11 RX ORDER — AMLODIPINE BESYLATE 5 MG/1
5 TABLET ORAL DAILY
Status: DISCONTINUED | OUTPATIENT
Start: 2018-06-11 | End: 2018-06-12

## 2018-06-11 RX ORDER — CHLORPROMAZINE HYDROCHLORIDE 50 MG/1
25 TABLET, FILM COATED ORAL
Status: DISCONTINUED | OUTPATIENT
Start: 2018-06-11 | End: 2018-06-14 | Stop reason: HOSPADM

## 2018-06-11 RX ORDER — TENOFOVIR DISOPROXIL FUMARATE 300 MG/1
300 TABLET, FILM COATED ORAL DAILY
Qty: 30 TAB | Refills: 0 | Status: SHIPPED
Start: 2018-06-12 | End: 2018-06-14

## 2018-06-11 RX ORDER — IPRATROPIUM BROMIDE AND ALBUTEROL SULFATE 2.5; .5 MG/3ML; MG/3ML
SOLUTION RESPIRATORY (INHALATION)
Qty: 30 NEBULE | Refills: 0 | Status: SHIPPED
Start: 2018-06-11 | End: 2018-06-14

## 2018-06-11 RX ORDER — ACETAMINOPHEN 500 MG
500 TABLET ORAL
Qty: 20 TAB | Refills: 0 | Status: SHIPPED
Start: 2018-06-11

## 2018-06-11 RX ADMIN — CHLORPROMAZINE HYDROCHLORIDE 50 MG: 50 TABLET, SUGAR COATED ORAL at 06:50

## 2018-06-11 RX ADMIN — LIDOCAINE HYDROCHLORIDE: 20 JELLY TOPICAL at 09:35

## 2018-06-11 RX ADMIN — DIBASIC SODIUM PHOSPHATE, MONOBASIC POTASSIUM PHOSPHATE AND MONOBASIC SODIUM PHOSPHATE 1 TABLET: 852; 155; 130 TABLET ORAL at 22:07

## 2018-06-11 RX ADMIN — CLINDAMYCIN HYDROCHLORIDE 300 MG: 150 CAPSULE ORAL at 06:50

## 2018-06-11 RX ADMIN — CARVEDILOL 12.5 MG: 6.25 TABLET, FILM COATED ORAL at 17:14

## 2018-06-11 RX ADMIN — MAGNESIUM SULFATE HEPTAHYDRATE 2 G: 40 INJECTION, SOLUTION INTRAVENOUS at 11:55

## 2018-06-11 RX ADMIN — CARVEDILOL 12.5 MG: 6.25 TABLET, FILM COATED ORAL at 08:00

## 2018-06-11 RX ADMIN — LIDOCAINE HYDROCHLORIDE: 20 JELLY TOPICAL at 17:14

## 2018-06-11 RX ADMIN — LIDOCAINE HYDROCHLORIDE: 20 JELLY TOPICAL at 22:15

## 2018-06-11 RX ADMIN — ACETAMINOPHEN 650 MG: 325 TABLET ORAL at 11:55

## 2018-06-11 RX ADMIN — PRAVASTATIN SODIUM 40 MG: 20 TABLET ORAL at 22:07

## 2018-06-11 RX ADMIN — APIXABAN 5 MG: 5 TABLET, FILM COATED ORAL at 22:06

## 2018-06-11 RX ADMIN — CLINDAMYCIN HYDROCHLORIDE 300 MG: 150 CAPSULE ORAL at 11:55

## 2018-06-11 RX ADMIN — AZITHROMYCIN 500 MG: 250 TABLET, FILM COATED ORAL at 09:00

## 2018-06-11 RX ADMIN — BUDESONIDE AND FORMOTEROL FUMARATE DIHYDRATE 2 PUFF: 160; 4.5 AEROSOL RESPIRATORY (INHALATION) at 19:35

## 2018-06-11 RX ADMIN — DOLUTEGRAVIR SODIUM 50 MG: 50 TABLET, FILM COATED ORAL at 09:00

## 2018-06-11 RX ADMIN — APIXABAN 5 MG: 5 TABLET, FILM COATED ORAL at 09:00

## 2018-06-11 RX ADMIN — DIBASIC SODIUM PHOSPHATE, MONOBASIC POTASSIUM PHOSPHATE AND MONOBASIC SODIUM PHOSPHATE 1 TABLET: 852; 155; 130 TABLET ORAL at 08:00

## 2018-06-11 RX ADMIN — IPRATROPIUM BROMIDE AND ALBUTEROL SULFATE 3 ML: .5; 3 SOLUTION RESPIRATORY (INHALATION) at 19:35

## 2018-06-11 RX ADMIN — AMLODIPINE BESYLATE 5 MG: 5 TABLET ORAL at 11:55

## 2018-06-11 RX ADMIN — FOLIC ACID 1 MG: 1 TABLET ORAL at 09:00

## 2018-06-11 RX ADMIN — EMTRICITABINE 200 MG: 200 CAPSULE ORAL at 09:00

## 2018-06-11 RX ADMIN — CLINDAMYCIN HYDROCHLORIDE 300 MG: 150 CAPSULE ORAL at 00:18

## 2018-06-11 RX ADMIN — DIBASIC SODIUM PHOSPHATE, MONOBASIC POTASSIUM PHOSPHATE AND MONOBASIC SODIUM PHOSPHATE 1 TABLET: 852; 155; 130 TABLET ORAL at 17:15

## 2018-06-11 RX ADMIN — TENOFOVIR DISOPROXIL FUMARATE 300 MG: 300 TABLET, COATED ORAL at 09:00

## 2018-06-11 NOTE — PROGRESS NOTES
0104:  Patient complaining of hiccups that will not go away. MD notified, 50 mg thorazine ordered once per day prn.

## 2018-06-11 NOTE — DISCHARGE SUMMARY
[address] [facility]  MR#: [id number]  : 1950  ACCOUNT #: [de-identified]   ADMIT DATE: 2018  DISCHARGE DATE:     DISPOSITION:  Discharged to skilled nursing facility. DISCHARGE CONDITION:  Stable. DISCHARGE DIAGNOSES:  1. Systemic inflammation response syndrome due to #2, now resolved. 2.  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 3.  Acute on chronic bronchitis, on treatment. 4.  Human immunodeficiency virus. 5.  Chronic systolic and diastolic heart failure with ejection fraction of 55%, status post AICD. 6.  Hypokalemia, magnesium and low phosphorus. 7.  Glossitis, improving. 8.  Dyspnea on exertion from chronic obstructive pulmonary disease, much better now. 9.  Paroxysmal atrial fibrillation, rate controlled. 10.  Hypertension. 11.  Medical noncompliance. 12.  History of cerebrovascular accident in the past.  13.  History of nonsustained ventricular tachycardia in the past.  14.  History of tuberculosis in the past and status post treatment. 15.  Dyslipidemia. 16.  Cocaine and marijuana use. 17.  Diarrhea, resolved. 18.  Indeterminate elevation of troponin due to demand ischemia secondary to systemic inflammation response syndrome and hypertension, resolved. 19.  Underweight. 20.  Acute renal failure, resolved. DISCHARGE MEDICATIONS:  1. Azithromycin 500 mg daily until 06/15/2018. 2.  Clindamycin 300 mg every 6 hours for 2 more doses. 3.  Lidocaine jelly to apply on the left side of the tongue 3 times a day for the next 5 days. 4.  Thorazine 25 mg t.i.d. p.r.n. for hiccups. 5.  Viread 1 tablet daily. 6.  Tivicay 50 mg daily. 7.  Emtriva 200 mg daily. 8.  Eliquis 5 mg b.i.d.  9.  Coreg 12.5 mg b.i.d.  10.  Norvasc 5 mg daily. 11.  Albuterol inhaler 2 puffs q.4 h. p.r.n. for shortness of breath.   12.  Albuterol nebulizer 4 times a day for 5 days, then every 4 hours p.r.n. for shortness of breath. 13.  Magnesium oxide 400 mg daily. 14.  Vitamin B6 50 mg daily. 15.  Folvite 1 mg daily. 16.  Neurontin 300 mg t.i.d.  17.  Pravachol 40 mg daily. 18.  Maalox 15 mL q.6 h. p.r.n. for heartburn. 19.  Symbicort 2 puffs b.i.d.  20.  Nicoderm 14 mg patches daily. 21.  K-Phos 250 mg 1 tablet b.i.d. for 5 doses. 22.  Tylenol 500 mg q.6 h. p.r.n. for pain or fever. IMAGING AND PROCEDURES:  1. Blood cultures x2 negative. 2.  Stool C. difficile negative. 3.  Urine culture x1 negative. 4.  CT chest was done, reported the patient having multiple micronodules in peribronchial area, especially on the right side, could be acute bronchitis, resolution of the prior pneumonia, and right renal upper pole low density mass is consistent with a cyst.  5.  CT head was done at admission, reported no acute infarct, mass or hemorrhage. 6.  Renal ultrasound was done, showed finding consistent with chronic medical disease. 7.  Ultrasound of the right face was negative for any abscess or fluid collection. 8.  X-ray of the right wrist showed mild dorsal soft tissue swelling. 9.  Chest x-ray x1 was reported normal.  10.  Echocardiogram was done, showed ejection fraction around 55% to 60%. CONSULTANTS:  1. Cardiology with Dr. Shanita Tran and group. 2.  ID with Dr. Margurite Soulier and group. 3.  Ortho with Dr. Santo Stout and group. 4.  Nephrology with Dr. Miki Weber and group. HOSPITAL COURSE:  The patient was admitted to the hospital on 6/5/18 with complaint of back pain, shortness of breath and wrist pain. Please refer to hospital admission H and P for further details. The patient also had some SIRS at the time of admission, had a blood culture done x2 negative. Started on broad-spectrum antibiotic, which was changed to above-mentioned antibiotic after the patient was evaluated by ID physicians. Urine culture was negative.   Chest x-ray was normal, but CT scan showed that the patient has acute on chronic bronchitis and was started on nebulizer treatment and antibiotic, with improvement in his symptoms. Regarding the right wrist pain, it was thought secondary to synovitis caused by overuse of that hand. He was seen by Orthopedic Surgeon. The patient had ultrasound of the right face, did not show any drainable fluid collection. The patient's pain was getting better with topical analgesic cream, and then also a right wrist soft splint was given. At this point, the patient does not have any pain and he is able to move right wrist without any problem. The patient also has history of HIV, was also started on antiretroviral medication as mentioned above by Infectious Disease doctor. During hospital course, the patient initially had hypertension, followed by hypotension. His echocardiogram showed improving EF. The patient was seen by Cardiologist who stopped his lisinopril, as well as spironolactone and digoxin. He was continued on Coreg and maintained on Norvasc for hypertension management without any problem. The patient was also found positive for cocaine and marijuana. He admits using them. The cessation of both street drugs, as well as smoking was discussed with the patient. He was also given Nicoderm patch. It seems like patient has COPD, started on Symbicort, and will be continued on albuterol inhaler and nebulizer as mentioned above. The patient had electrolyte disturbances, continued on the above-mentioned supplement. The patient also has some glossitis and started on lidocaine gel, with improvement in his symptoms and he was able to swallow without any problem. The patient was seen by PT and OT who recommended SNF placement, which will be done once it is found. DISCHARGE INSTRUCTIONS:  DIET:  Cardiac, mechanical soft, nectar-thick liquid, aspiration precautions. Speech Therapist to follow. If patient's tongue pain gets better, he can be changed to regular cardiac diet. Dietitian to follow the patient. ACTIVITY:  Fall precautions, PT and OT to follow. FOLLOWUP:    1. Nursing home doctor to follow this patient. 2.  Follow up with Dr. Jake Townsend, [facility] physician, for HIV treatment in 2 weeks. 3.  Follow with Dr. Monie Fernandez, cardiologist, in 3-4 weeks. Total time is greater than 35 minutes. Tristan Lim MD       /MICK  D: 06/11/2018 11:18     T: 06/11/2018 12:03  JOB #: [id number]  CC: Ernie Holter MD  CC: Jolanta Allison MD  CC: Le Silverio MD  CC: Albert Smith MD  CC: Sharmaine Vargas MD  [address] [facility]

## 2018-06-11 NOTE — PROGRESS NOTES
PFM Fall Assessment Note    Patient: Greyson Galeano MRN: [id number]  CSN: [id number]    YOB: 1950  Age: 74 y.o. Sex: male    DOA: 6/5/2018 LOS:  LOS: 5 days                    Subjective:     MD called to bedside by nursing staff for fall evaluation. Pt fell in bathroom. States call bell did not work. Pt has no acute complaints.      Objective:      Visit Vitals    BP (P) 164/76    Pulse (P) 67    Temp (P) 99 °F (37.2 °C)    Resp (P) 18    Ht 6' 3\" (1.905 m)    Wt 76.1 kg (167 lb 12.3 oz)    SpO2 (P) 99%    BMI 20.97 kg/m2     Accucheck:118    Physical Exam:  VSS  General appearance: alert, cooperative, no distress, appears stated age  Pulses: 2+ and symmetric  Skin: Skin color, texture, turgor normal. No rashes or lesions  Neuro:  normal without focal findings  mental status, speech normal, alert and oriented x iii  ANTHONY  reflexes normal and symmetric  MSK: Non tender spine, Full ROM of extremities      Assessment/Plan     74 y.o. yo male admitted for Right forearm cellulitis s/p fall in hospital.    -No Acute injuries on exam  -Staff will place work order for non-operable bathroom call Katie Syed MD, PGY-1  [facility]  6/11/2018

## 2018-06-11 NOTE — ROUTINE PROCESS
Bedside shift change report given to Kareen Gama (oncoming nurse) by America Carter (offgoing nurse). Report included the following information SBAR, Intake/Output, MAR, Recent Results and Cardiac Rhythm . Ila Ochoa

## 2018-06-11 NOTE — PROGRESS NOTES
Care Management Interventions  PCP Verified by CM: Yes (Gela Ayala)  Physical Therapy Consult: Yes  Occupational Therapy Consult: Yes   Resource Information Provided?: Yes     [facility] transfer [facility] to the [place]. PT/OT recommending SNF. Faxed PT/OT notes H&P to [place] and spoke with [facility]. She states they will take a look and call me back. Called Pt's son Efren Gosselin [phone]), [phone](L) and updated him. 1429:   [address] called and she states pt does not qualify [facility] with [facility] but qualified for home health. Dr. Kirti Peraza wants pt transferred to the [place]. Called Krystle back and she states to fax pt's labs and medication lists and all consults and reason for seeking for pt to be transferred. Dr. Kirti Peraza informed.   Pt's info has been faxed to the [place]

## 2018-06-11 NOTE — PROGRESS NOTES
Infectious Disease Progress Note    Requested by; Dr. Ashley gonzalez    Reason: right wrist septic arthritis/sepsis    Current abx Prior abx   Clindamycin since 6/8  Azithromycin since 6/8 Cefepime 6/6   vancomycin since 6/6/18-6/8  Levofloxacin 6/8     Lines:       Assessment :    74 y.o.  male who has PMH of HIV/AIDS (on emtricitabine/tenofovir/dolutegravir), HTN, a-fib/flutter on Eliquis presented to ER on 6/6/18 with Rt dorsal wrist pain, swelling, induration and a fever. Highly complex clinical picture. Difficult to determine exact etiology of right wrist pain since presentation is atypical. It could be due to evolving right wrist cellulitis versus gout. High grade fevers, a.fib with RVR and generalized malaise on admission concerning for sepsis due to right wrist cellulitis. Hence, agree with abx use. Immunosuppressed state can mask the full clinical presentation. No clinical evidence to suggest right wrist septic arthritis on today's exam.     Acute renal failure: nephrology consult appreciated - likely due to volume depletion - diarrhea since admission/lasix may have contributed to this. improved    Diarrhea: negative c.diff pcr - resolved    low grade fevers overnight, increased sob on 6/8. Ct chest with bilateral perihilar infiltrates. Likely atypical community acquired pneumonia - improved s/p azithromycin    Recommendations:    1. discontinue clindamycin. .cont po azithromycin for CAP for total 7 days  2. Continue dolutegravir, emtricitabine/tenofovir  3. F/u with [facility] physician for continued HIV care  4. Ok to d/c patient from id standpoint        Advance Care planning: full code: discussed  with patient/surrogate decision maker:Gage Watters: [phone]     Above plan was discussed in details with patient,  and dr Qian Kelly. Please call me if any further questions or concerns. Will continue to participate in the care of this patient. subjective:    Feels better.    Patient denies subjective fever, headaches, visual disturbances, sore throat, runny nose, earaches, cp, sob, chills, cough, abdominal pain, diarrhea, burning micturition, pain or weakness in extremities. He denies back pain/flank pain.              home Medication List    Details   spironolactone (ALDACTONE) 25 mg tablet Take 37.5 mg by mouth daily. amLODIPine (NORVASC) 5 mg tablet Take 5 mg by mouth daily. digoxin (LANOXIN) 0.25 mg tablet Take  by mouth daily. albuterol (PROVENTIL HFA) 90 mcg/actuation inhaler Take 1 Puff by inhalation every four (4) hours as needed for Wheezing. Qty: 1 Inhaler, Refills: 0      guaiFENesin ER (MUCINEX) 600 mg ER tablet Take 1 Tab by mouth two (2) times a day. Qty: 12 Tab, Refills: 0      butalbital-acetaminophen-caff (FIORICET) -40 mg per capsule Take 1 Cap by mouth every four (4) hours as needed for Pain or Headache. Max Daily Amount: 6 Caps. Qty: 12 Cap, Refills: 0      methocarbamol (ROBAXIN-750) 750 mg tablet Take 1 Tab by mouth three (3) times daily as needed. Indications: pain; muscle spasms  Qty: 12 Tab, Refills: 0      furosemide (LASIX) 40 mg tablet 40 mg po daily  Qty: 30 Tab, Refills: 0      potassium chloride (K-DUR, KLOR-CON) 20 mEq tablet Take 1 Tab by mouth two (2) times a day. Qty: 30 Tab, Refills: 0      magnesium oxide 400 mg cap Take 400 mg by mouth daily. Qty: 20 Each, Refills: 0      OTHER BMP, MG in 1 week  Dx: CHF  Fax results to PCP at [facility]  Qty: 1 Each, Refills: 0      apixaban (ELIQUIS) 5 mg tablet Take 1 Tab by mouth every twelve (12) hours. Qty: 60 Tab, Refills: 0      carvedilol (COREG) 25 mg tablet Take 0.5 Tabs by mouth two (2) times daily (with meals). Qty: 60 Tab, Refills: 0      lisinopril (PRINIVIL, ZESTRIL) 10 mg tablet Take 1 Tab by mouth daily. Qty: 30 Tab, Refills: 0      pyridoxine, vitamin B6, (VITAMIN B-6) 50 mg tablet Take 50 mg by mouth daily.       aluminum-magnesium hydroxide (MAALOX) 200-200 mg/5 mL suspension Take 15 mL by mouth every six (6) hours as needed for Indigestion. Qty: 300 mL, Refills: 0      folic acid (FOLVITE) 1 mg tablet Take 1 mg by mouth daily. aspirin 81 mg tablet Take 81 mg by mouth daily. gabapentin (NEURONTIN) 300 mg capsule Take 300 mg by mouth three (3) times daily. Darunavir (PREZISTA) 600 mg tablet Take 600 mg by mouth.        pravastatin (PRAVACHOL) 40 mg tablet Take 40 mg by mouth nightly. ritonavir (NORVIR) 100 mg capsule Take 100 mg by mouth daily (with breakfast). emtricitabine (EMTRIVA) 200 mg capsule Take 200 mg by mouth daily.          Current Facility-Administered Medications   Medication Dose Route Frequency    magnesium sulfate 2 g/50 ml IVPB (premix or compounded)  2 g IntraVENous ONCE    phosphorus (K PHOS NEUTRAL) 250 mg tablet 1 Tab  1 Tab Oral TID    chlorproMAZINE (THORAZINE) tablet 25 mg  25 mg Oral TID PRN    amLODIPine (NORVASC) tablet 5 mg  5 mg Oral DAILY    budesonide-formoterol (SYMBICORT) 160-4.5 mcg/actuation HFA inhaler 2 Puff  2 Puff Inhalation BID RT    albuterol-ipratropium (DUO-NEB) 2.5 MG-0.5 MG/3 ML  3 mL Nebulization Q6H RT    lidocaine (XYLOCAINE) 2 % jelly   Mouth/Throat TID    emtricitabine (EMTRIVA) capsule 200 mg  200 mg Oral DAILY    tenofovir DISOPROXIL FUMARATE (VIREAD) tablet 300 mg  300 mg Oral DAILY    azithromycin (ZITHROMAX) tablet 500 mg  500 mg Oral DAILY    dolutegravir (TIVICAY) tablet 50 mg  50 mg Oral DAILY    apixaban (ELIQUIS) tablet 5 mg  5 mg Oral Q12H    carvedilol (COREG) tablet 12.5 mg  12.5 mg Oral BID WITH MEALS    folic acid (FOLVITE) tablet 1 mg  1 mg Oral DAILY    pravastatin (PRAVACHOL) tablet 40 mg  40 mg Oral QHS    acetaminophen (TYLENOL) tablet 650 mg  650 mg Oral Q6H PRN    naloxone (NARCAN) injection 0.4 mg  0.4 mg IntraVENous PRN    ondansetron (ZOFRAN) injection 4 mg  4 mg IntraVENous Q6H PRN    docusate sodium (COLACE) capsule 100 mg  100 mg Oral BID PRN    nicotine (NICODERM CQ) 14 mg/24 hr patch 1 Patch  1 Patch TransDERmal DAILY       Allergies: Review of patient's allergies indicates no known allergies. Temp (24hrs), Av.7 °F (37.1 °C), Min:98.4 °F (36.9 °C), Max:99.1 °F (37.3 °C)    Visit Vitals    /85 (BP 1 Location: Right arm, BP Patient Position: At rest)    Pulse 70    Temp 98.6 °F (37 °C)    Resp 19    Ht 6' 3\" (1.905 m)    Wt 76.1 kg (167 lb 12.3 oz)    SpO2 95%    BMI 20.97 kg/m2       ROS: 12 point ROS obtained in details. Pertinent positives as mentioned in HPI,   otherwise negative    Physical Exam:    Constitutional: He is oriented to person, place, and time. He appears well-developed and well-nourished. No distress. Thin. HENT:   Head: Normocephalic and atraumatic. Mouth/Throat: Mucous membranes moist   Eyes: Conjunctivae and EOM are normal. Pupils are equal, round, and reactive to light. No scleral icterus. Neck: Normal range of motion. Neck supple. No JVD present. No thyromegaly present. Cardiovascular: Normal rate, regular rhythm, S1 normal and S2 normal.  Exam reveals no gallop and no friction rub. No murmur heard. Pulmonary/Chest: Effort normal and breath sounds normal. No accessory muscle usage. No respiratory distress. Abdominal: Soft. Normal appearance. He exhibits no distension. There is no tenderness. There is no rigidity, no rebound and no guarding. Musculoskeletal: Normal range of motion. He exhibits no edema or tenderness. Resolved warmth right wrist - no overlying tenderness. No pain on active/passive movements of right wrist.   Neurological: He is alert and oriented to person, place, and time. He has normal strength. No cranial nerve deficit or sensory deficit. Coordination normal.   Skin: Skin is warm and intact. No rash noted. Psychiatric: He has a normal mood and affect. His speech is normal and behavior is normal.   Vitals reviewed.          Labs: Results:   Chemistry Recent Labs 06/11/18   0357  06/10/18   0055  06/09/18   0345   GLU   --   111*  94   NA   --   140  141   K  3.9  4.0  3.8   CL   --   107  111*   CO2   --   28  25   BUN   --   16  18   CREA   --   0.74  0.94   CA   --   9.5  9.5   AGAP   --   5  5   BUCR   --   22*  19      CBC w/Diff Recent Labs      06/10/18   0055  06/09/18 0345   WBC  7.2   --    RBC  3.29*   --    HGB  9.3*  9.3*   HCT  29.0*  28.7*   PLT  216   --    GRANS  65   --    LYMPH  17*   --    EOS  8*   --       Microbiology No results for input(s): CULT in the last 72 hours.        RADIOLOGY:    All available imaging studies/reports in [facility] care for this admission were reviewed    Dr. Quintin Heller, Infectious Disease Specialist  [phone]  June 11, 2018  1:36 PM

## 2018-06-11 NOTE — PROGRESS NOTES
SUBJECTIVE:    Feeling better. Able to eat food and tongue pain is much better. Some back pain but no chest or abdominal pain. SOB and cough better. States he called his son, Soni Sanchez, this morning and told him that he will be going to SNF. OBJECTIVE:    /77 (BP 1 Location: Left arm, BP Patient Position: At rest)  Pulse 65  Temp 99.1 °F (37.3 °C)  Resp 20  Ht 6' 3\" (1.905 m)  Wt 70.9 kg (156 lb 6.4 oz)  SpO2 96%  BMI 19.55 kg/m2    CVS: RRR, AICD +  RS: Diminished in bases, no wheezes  GI: NT, BS +  Extremities: no pedal edema  CNS: moves both LEs well while in bed  General: NAD, Follows commands    ASSESSMENT:    1. SIRS versus Sepsis sec to # 2. Resolved. 2. 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 Chronic systolic heart failure with EF of around 55%. S/p AICD. Compensated. 4. Hypokalemia and hypomagnesemia. 5. BACH could be from CHD and/or presumed COPD  6. Human immunodeficiency virus. 7. Paroxysmal Atrial fibrillation, rate controlled. 8. Hypertension. 9. Noncompliance with the medications and diet compliance. 10. History of cerebrovascular accident in the past.  11. History of nonsustained ventricular tachycardia in the past.  12. History of tuberculosis, status post treatment. 13. Dyslipidemia. 14. Cocaine and marijuana use. 15. Diarrhea, improved  16. Indeterminate elevation of troponin due to demand ischemia sec to sepsis   17. Underweight  18. [place], resolved   19. Glossitis   20. Acute on chronic bronchitis     PLAN:    Cont current management  Patient needs treatment for HIV/Lung infection/glossitis/weakness - sooner he gets transfer to [facility], better it will be for his health from recovery stand point of view.    Discharge planning in process     CMP:   Lab Results   Component Value Date/Time    K 3.9 06/11/2018 03:57 AM    MG 1.5 (L) 06/11/2018 03:57 AM    PHOS 2.3 (L) 06/11/2018 03:57 AM     CBC:   No results found for: WBC, HGB, HGBEXT, HCT, HCTEXT, PLT, PLTEXT, HGBEXT, HCTEXT, PLTEXT    Current Discharge Medication List      START taking these medications    Details   tenofovir DISOPROXIL FUMARATE (VIREAD) 300 mg tablet Take 1 Tab by mouth daily. Qty: 30 Tab, Refills: 0      phosphorus (K PHOS NEUTRAL) 250 mg tablet Take 1 Tab by mouth two (2) times a day. Qty: 5 Tab, Refills: 0      nicotine (NICODERM CQ) 14 mg/24 hr patch 1 Patch by TransDERmal route daily for 30 days. Qty: 30 Patch, Refills: 0      dolutegravir (TIVICAY) 50 mg tab tablet Take 1 Tab by mouth daily. Qty: 30 Tab, Refills: 0      budesonide-formoterol (SYMBICORT) 160-4.5 mcg/actuation HFAA Take 2 Puffs by inhalation two (2) times a day. Qty: 1 Inhaler, Refills: 0      albuterol-ipratropium (DUO-NEB) 2.5 mg-0.5 mg/3 ml nebu One nebulizer 4 times a day x 5 days then every 4 hours as needed for shortness of breath  Qty: 30 Nebule, Refills: 0      acetaminophen (TYLENOL) 500 mg tablet Take 1 Tab by mouth every six (6) hours as needed. Indications: Pain  Qty: 20 Tab, Refills: 0         CONTINUE these medications which have NOT CHANGED    Details   amLODIPine (NORVASC) 5 mg tablet Take 5 mg by mouth daily. albuterol (PROVENTIL HFA) 90 mcg/actuation inhaler Take 1 Puff by inhalation every four (4) hours as needed for Wheezing. Qty: 1 Inhaler, Refills: 0      magnesium oxide 400 mg cap Take 400 mg by mouth daily. Qty: 20 Each, Refills: 0      apixaban (ELIQUIS) 5 mg tablet Take 1 Tab by mouth every twelve (12) hours. Qty: 60 Tab, Refills: 0      carvedilol (COREG) 25 mg tablet Take 0.5 Tabs by mouth two (2) times daily (with meals). Qty: 60 Tab, Refills: 0      pyridoxine, vitamin B6, (VITAMIN B-6) 50 mg tablet Take 50 mg by mouth daily. aluminum-magnesium hydroxide (MAALOX) 200-200 mg/5 mL suspension Take 15 mL by mouth every six (6) hours as needed for Indigestion.   Qty: 300 mL, Refills: 0      folic acid (FOLVITE) 1 mg tablet Take 1 mg by mouth daily. gabapentin (NEURONTIN) 300 mg capsule Take 300 mg by mouth three (3) times daily. pravastatin (PRAVACHOL) 40 mg tablet Take 40 mg by mouth nightly. emtricitabine (EMTRIVA) 200 mg capsule Take 200 mg by mouth daily.            STOP taking these medications       spironolactone (ALDACTONE) 25 mg tablet Comments:   Reason for Stopping:         digoxin (LANOXIN) 0.25 mg tablet Comments:   Reason for Stopping:         guaiFENesin ER (MUCINEX) 600 mg ER tablet Comments:   Reason for Stopping:         butalbital-acetaminophen-caff (FIORICET) -40 mg per capsule Comments:   Reason for Stopping:         methocarbamol (ROBAXIN-750) 750 mg tablet Comments:   Reason for Stopping:         furosemide (LASIX) 40 mg tablet Comments:   Reason for Stopping:         potassium chloride (K-DUR, KLOR-CON) 20 mEq tablet Comments:   Reason for Stopping:         OTHER Comments:   Reason for Stopping:         lisinopril (PRINIVIL, ZESTRIL) 10 mg tablet Comments:   Reason for Stopping:         aspirin 81 mg tablet Comments:   Reason for Stopping:         Darunavir (PREZISTA) 600 mg tablet Comments:   Reason for Stopping:         ritonavir (NORVIR) 100 mg capsule Comments:   Reason for Stopping:         amoxicillin 500 mg tab Comments:   Reason for Stopping:

## 2018-06-11 NOTE — PROGRESS NOTES
Problem: Self Care Deficits Care Plan (Adult)  Goal: *Acute Goals and Plan of Care (Insert Text)  Occupational Therapy Goals  Initiated 6/7/2018 within 7 day(s). 1.  Patient will perform self-feeding with supervision/set-up. 2.  Patient will perform grooming with supervision/set-up while seated on EOB. 3.  Patient will perform upper body dressing with minimal assistance/contact guard assist.  4.  Patient will perform toilet transfers with minimal assistance/contact guard assist.  5.  Patient will demonstrate right wrist extension and intrinsic with 4/5 strength in preparation for his efficient completion of his self care routine  6. Patient will demonstrate independence with right hand program       Outcome: Progressing Towards Goal  Occupational Therapy TREATMENT    Patient: Brennon Bird (89 y.o. male)  Date: 6/11/2018  Diagnosis: Joint pain  Elevated troponin  HIV (human immunodeficiency virus infection) ([facility] [address] Right forearm cellulitis       Precautions: Fall, Skin   Chart, occupational therapy assessment, plan of care, and goals were reviewed. PLOF: Independent    ASSESSMENT:  Pt is pleasant and cooperative, meeting goals 2,3, and 5 this session. No c/o pain RUE and much improved wrist extension as evidenced by pt performing ADL grooming tasks standing sinkside, including container mgt. Pt reports increase difficulty w/medication mgt and educated on benefits of medication organizer. Pt left in chair w/all needs within reach. EDUCATION Pt educated on safety w/RW and benefits of medication organizer  Progression toward goals:  []          Improving appropriately and progressing toward goals  [x]          Improving slowly and progressing toward goals  []          Not making progress toward goals and plan of care will be adjusted     PLAN:  Patient continues to benefit from skilled intervention to address the above impairments. Continue treatment per established plan of care.   Discharge Recommendations: Home Health for safety check  Further Equipment Recommendations for Discharge:  N/A     SUBJECTIVE:   Patient stated I feel so much better.     OBJECTIVE DATA SUMMARY:       Cognitive/Behavioral Status:  Neurologic State: Alert  Orientation Level: Oriented X4  Cognition: Follows commands  Safety/Judgement: Awareness of environment, Fall prevention  Functional Mobility and Transfers for ADLs:   Bed Mobility:  Supine to Sit: Modified independent (w/HOB raised and SRs)   Transfers:  Sit to Stand: Stand-by assistance  Bed to Chair: Stand-by assistance (w/RW)   Bathroom Mobility: Stand-by assistance (w/RW)   Balance:  Sitting: Intact  Standing: Intact; With support  ADL Intervention:  Grooming  Washing Face: Supervision/set-up, standing sinkside  Washing Hands: Supervision/set-up, standing sinkside  Brushing Teeth: Supervision/set-up, standing sinkside  Applying Makeup: Modified independent (applying lotion to face/BUE, seated)    Pain:  Pre Treatment:0  Post Treatment:0    Activity Tolerance:    Good    Please refer to the flowsheet for vital signs taken during this treatment.   After treatment:   [x]  Patient left in no apparent distress sitting up in chair  []  Patient left in no apparent distress in bed  [x]  Call bell left within reach  []  Nursing notified  []  Caregiver present  []  Bed alarm activated    MELODY Pandey  Time Calculation: 23 mins

## 2018-06-11 NOTE — PROGRESS NOTES
1814 Post fall Vital  Signs. 96,48,74,65,684/71. Blood Glucose 118. Patient fell when he was coming out of the bathroom. [facility] evaluated the patient.

## 2018-06-12 LAB
ANION GAP SERPL CALC-SCNC: 4 MMOL/L (ref 3–18)
BUN SERPL-MCNC: 8 MG/DL (ref 7–18)
BUN/CREAT SERPL: 10 (ref 12–20)
CALCIUM SERPL-MCNC: 9.5 MG/DL (ref 8.5–10.1)
CHLORIDE SERPL-SCNC: 104 MMOL/L (ref 100–108)
CO2 SERPL-SCNC: 32 MMOL/L (ref 21–32)
CREAT SERPL-MCNC: 0.81 MG/DL (ref 0.6–1.3)
GLUCOSE SERPL-MCNC: 96 MG/DL (ref 74–99)
MAGNESIUM SERPL-MCNC: 1.7 MG/DL (ref 1.6–2.6)
O+P STL CONC: NORMAL
O+P STL MICRO: NORMAL
PHOSPHATE SERPL-MCNC: 2.5 MG/DL (ref 2.5–4.9)
POTASSIUM SERPL-SCNC: 3.6 MMOL/L (ref 3.5–5.5)
SODIUM SERPL-SCNC: 140 MMOL/L (ref 136–145)

## 2018-06-12 PROCEDURE — 83735 ASSAY OF MAGNESIUM: CPT | Performed by: INTERNAL MEDICINE

## 2018-06-12 PROCEDURE — 74011250637 HC RX REV CODE- 250/637: Performed by: INTERNAL MEDICINE

## 2018-06-12 PROCEDURE — 65660000000 HC RM CCU STEPDOWN

## 2018-06-12 PROCEDURE — 36415 COLL VENOUS BLD VENIPUNCTURE: CPT | Performed by: INTERNAL MEDICINE

## 2018-06-12 PROCEDURE — 80048 BASIC METABOLIC PNL TOTAL CA: CPT | Performed by: INTERNAL MEDICINE

## 2018-06-12 PROCEDURE — 74011250637 HC RX REV CODE- 250/637

## 2018-06-12 PROCEDURE — 97535 SELF CARE MNGMENT TRAINING: CPT

## 2018-06-12 PROCEDURE — 84100 ASSAY OF PHOSPHORUS: CPT | Performed by: INTERNAL MEDICINE

## 2018-06-12 PROCEDURE — 94640 AIRWAY INHALATION TREATMENT: CPT

## 2018-06-12 PROCEDURE — 97530 THERAPEUTIC ACTIVITIES: CPT

## 2018-06-12 PROCEDURE — 97110 THERAPEUTIC EXERCISES: CPT

## 2018-06-12 PROCEDURE — 74011000250 HC RX REV CODE- 250: Performed by: INTERNAL MEDICINE

## 2018-06-12 PROCEDURE — 97116 GAIT TRAINING THERAPY: CPT

## 2018-06-12 RX ORDER — AMLODIPINE BESYLATE 10 MG/1
10 TABLET ORAL DAILY
Status: DISCONTINUED | OUTPATIENT
Start: 2018-06-13 | End: 2018-06-14 | Stop reason: HOSPADM

## 2018-06-12 RX ORDER — LORAZEPAM 0.5 MG/1
TABLET ORAL
Status: COMPLETED
Start: 2018-06-12 | End: 2018-06-12

## 2018-06-12 RX ORDER — LANOLIN ALCOHOL/MO/W.PET/CERES
400 CREAM (GRAM) TOPICAL DAILY
Status: DISCONTINUED | OUTPATIENT
Start: 2018-06-12 | End: 2018-06-14 | Stop reason: HOSPADM

## 2018-06-12 RX ORDER — LORAZEPAM 0.5 MG/1
0.5 TABLET ORAL
Status: DISCONTINUED | OUTPATIENT
Start: 2018-06-12 | End: 2018-06-14 | Stop reason: HOSPADM

## 2018-06-12 RX ADMIN — DIBASIC SODIUM PHOSPHATE, MONOBASIC POTASSIUM PHOSPHATE AND MONOBASIC SODIUM PHOSPHATE 1 TABLET: 852; 155; 130 TABLET ORAL at 22:51

## 2018-06-12 RX ADMIN — APIXABAN 5 MG: 5 TABLET, FILM COATED ORAL at 10:24

## 2018-06-12 RX ADMIN — CHLORPROMAZINE HYDROCHLORIDE 25 MG: 50 TABLET, SUGAR COATED ORAL at 16:29

## 2018-06-12 RX ADMIN — LIDOCAINE HYDROCHLORIDE: 20 JELLY TOPICAL at 17:40

## 2018-06-12 RX ADMIN — FOLIC ACID 1 MG: 1 TABLET ORAL at 10:24

## 2018-06-12 RX ADMIN — LORAZEPAM 0.5 MG: 0.5 TABLET ORAL at 16:30

## 2018-06-12 RX ADMIN — LORAZEPAM 0.5 MG: 0.5 TABLET ORAL at 22:52

## 2018-06-12 RX ADMIN — AZITHROMYCIN 500 MG: 250 TABLET, FILM COATED ORAL at 10:24

## 2018-06-12 RX ADMIN — DIBASIC SODIUM PHOSPHATE, MONOBASIC POTASSIUM PHOSPHATE AND MONOBASIC SODIUM PHOSPHATE 1 TABLET: 852; 155; 130 TABLET ORAL at 16:29

## 2018-06-12 RX ADMIN — CARVEDILOL 12.5 MG: 6.25 TABLET, FILM COATED ORAL at 16:30

## 2018-06-12 RX ADMIN — EMTRICITABINE 200 MG: 200 CAPSULE ORAL at 10:24

## 2018-06-12 RX ADMIN — BUDESONIDE AND FORMOTEROL FUMARATE DIHYDRATE 2 PUFF: 160; 4.5 AEROSOL RESPIRATORY (INHALATION) at 20:33

## 2018-06-12 RX ADMIN — CARVEDILOL 12.5 MG: 6.25 TABLET, FILM COATED ORAL at 10:24

## 2018-06-12 RX ADMIN — IPRATROPIUM BROMIDE AND ALBUTEROL SULFATE 3 ML: .5; 3 SOLUTION RESPIRATORY (INHALATION) at 20:33

## 2018-06-12 RX ADMIN — DIBASIC SODIUM PHOSPHATE, MONOBASIC POTASSIUM PHOSPHATE AND MONOBASIC SODIUM PHOSPHATE 1 TABLET: 852; 155; 130 TABLET ORAL at 10:24

## 2018-06-12 RX ADMIN — LIDOCAINE HYDROCHLORIDE: 20 JELLY TOPICAL at 10:25

## 2018-06-12 RX ADMIN — PRAVASTATIN SODIUM 40 MG: 20 TABLET ORAL at 22:51

## 2018-06-12 RX ADMIN — APIXABAN 5 MG: 5 TABLET, FILM COATED ORAL at 22:51

## 2018-06-12 RX ADMIN — Medication 400 MG: at 17:40

## 2018-06-12 RX ADMIN — TENOFOVIR DISOPROXIL FUMARATE 300 MG: 300 TABLET, COATED ORAL at 10:24

## 2018-06-12 RX ADMIN — DOLUTEGRAVIR SODIUM 50 MG: 50 TABLET, FILM COATED ORAL at 10:24

## 2018-06-12 RX ADMIN — IPRATROPIUM BROMIDE AND ALBUTEROL SULFATE 3 ML: .5; 3 SOLUTION RESPIRATORY (INHALATION) at 01:28

## 2018-06-12 RX ADMIN — IPRATROPIUM BROMIDE AND ALBUTEROL SULFATE 3 ML: .5; 3 SOLUTION RESPIRATORY (INHALATION) at 14:56

## 2018-06-12 RX ADMIN — AMLODIPINE BESYLATE 5 MG: 5 TABLET ORAL at 10:24

## 2018-06-12 NOTE — PROGRESS NOTES
Spoke with Ilana Mireles of [place] and she requested for more info. 1500:  Called Ms Lilly Herring and Jt [place] told me she is on the phone and will call me back.

## 2018-06-12 NOTE — PROGRESS NOTES
Problem: Mobility Impaired (Adult and Pediatric)  Goal: *Acute Goals and Plan of Care (Insert Text)  Physical Therapy Goals  Initiated 6/7/2018 and to be accomplished within 7 day(s)  1. Patient will move from supine to sit and sit to supine , scoot up and down and roll side to side in bed with supervision/set-up. 2.  Patient will transfer from bed to chair and chair to bed with supervision/set-up using the least restrictive device. 3.  Patient will perform sit to stand with supervision/set-up. 4.  Patient will ambulate with supervision/set-up for >50 feet with the least restrictive device. 5.  Patient will ascend/descend 3 stairs with B handrail(s) with minimal assistance/contact guard assist.   Outcome: Progressing Towards Goal  physical Therapy TREATMENT    Patient: Zenia Kenney (47 y.o. male)  Date: 6/12/2018  Diagnosis: Joint pain  Elevated troponin  HIV (human immunodeficiency virus infection) ([facility] [address] Right forearm cellulitis       Precautions: Fall, Skin   Chart, physical therapy assessment, plan of care and goals were reviewed. OBJECTIVE/ ASSESSMENT:  Nurse Elida Turner cleared patient for therapy. Patient found supine in bed willing to work with PT with extra encouragement. Pt transferred from supine to sitting on EOB with SBA and required increased time this tx. While on EOB, pt c/o dizziness. Pt sat on EOB for 5 minutes and demonstrated good static sitting balance. Pt performed seated therex on EOB (ankle pumps and LAQ's x10 bilaterally). Educated pt on breathing techniques while on EOB, as pt tends to breathe only out of nose. Pt stood to RW and ambulated to b/s chair about 4ft with CGA. Pt demonstrated a narrowed DAVID and decreased ivonne. Pt was left in b/s chair with all needs in reach, SCD's applied, lunch tray positioned, chair alarm activated, and nursing notified. Pt instructed to call for nursing if he needed to get up out of b/s chair. Educated pt on proper use of call bell. Education: Bed mobility, transfers, gait, activity pacing, breathing techniques. Progression toward goals:  []      Improving appropriately and progressing toward goals  [x]      Improving slowly and progressing toward goals  []      Not making progress toward goals and plan of care will be adjusted     PLAN:  Patient continues to benefit from skilled intervention to address the above impairments. Continue treatment per established plan of care. Discharge Recommendations:  Rehab  Further Equipment Recommendations for Discharge:  rolling walker     SUBJECTIVE:   Patient stated I know I am a pain, but I appreciate you working with me    OBJECTIVE DATA SUMMARY:   Critical Behavior:  Neurologic State: Alert  Orientation Level: Oriented to person  Cognition: Follows commands with encouragement  Safety/Judgement: Awareness of environment, Fall prevention  Functional Mobility Training:  Bed Mobility:  Supine to Sit: Stand-by assistance   Transfers:  Sit to Stand: Contact guard assistance  Stand to Sit: Contact guard assistance     Stand Pivot Transfers: Contact guard assistance  Balance:  Sitting: Intact  Sitting - Static: Fair (occasional)  Sitting - Dynamic: Fair (occasional)  Standing: Impaired; With support  Standing - Static: Fair  Standing - Dynamic : Fair  Ambulation/Gait Training:  Distance (ft): 4 Feet (ft)  Assistive Device: Walker, rolling  Ambulation - Level of Assistance: Contact guard assistance  Gait Abnormalities: Decreased step clearance  Base of Support: Narrowed  Speed/Mallory: Pace decreased (<100 feet/min)  Step Length: Left shortened;Right shortened  Therapeutic Exercises:       EXERCISE   Sets   Reps   Active Active Assist   Passive Self- assited ROM   Comments   Ankle Pumps 1 10  [x] [] [] [] Bilaterally   Quad Sets/Glut Sets   [] [] [] []    Seated Knee Flexion   [] [] [] []    Short Arc Quads   [] [] [] []    Heel Slides   [] [] [] []    Straight Leg Raises   [] [] [] []    Hip Abd/Add   [] [] [] []    Long Arc Quads 1 10 [] [] [] [] bilaterally   Seated Marching   [] [] [] []    Standing Marching   [] [] [] []       [] [] [] []      Pain:  Pre tx pain: 0/10  Post tx pain: 0/10  Activity Tolerance:   Fair-  Please refer to the flowsheet for vital signs taken during this treatment. After treatment:   [x] Patient left in no apparent distress sitting up in chair  [] Patient left in no apparent distress in bed  [x] Call bell left within reach  [x] Nurse Rosaland Severin notified  [] Caregiver present  [x] chair alarm activated  [x] SCDs applied  [] Ice applied      Lauren E D'Amico, OLGA   Time Calculation: 38 mins    Mobility  Current  CI= 1-19%. The severity rating is based on the Level of Assistance required for Functional Mobility and ADLs. Mobility   Goal  CI= 1-19%. The severity rating is based on the Level of Assistance required for Functional Mobility and ADLs.

## 2018-06-12 NOTE — PROGRESS NOTES
Problem: Self Care Deficits Care Plan (Adult)  Goal: *Acute Goals and Plan of Care (Insert Text)  Occupational Therapy Goals  Initiated 6/7/2018 within 7 day(s). 1.  Patient will perform self-feeding with supervision/set-up. 2.  Patient will perform grooming with supervision/set-up while seated on EOB. 3.  Patient will perform upper body dressing with minimal assistance/contact guard assist.  4.  Patient will perform toilet transfers with minimal assistance/contact guard assist.  5.  Patient will demonstrate right wrist extension and intrinsic with 4/5 strength in preparation for his efficient completion of his self care routine  6. Patient will demonstrate independence with right hand program       Outcome: Progressing Towards Goal  Occupational Therapy TREATMENT    Patient: Omero Feliz (69 y.o. male)  Date: 6/12/2018  Diagnosis: Joint pain  Elevated troponin  HIV (human immunodeficiency virus infection) ([facility] [address] Right forearm cellulitis       Precautions: Fall, Skin  Chart, occupational therapy assessment, plan of care, and goals were reviewed. ASSESSMENT:  Pt reports he just returned from the bathroom, refusing to sit EOB and get OOB at this time 2/2 fatigue and hiccups. Pt demonstrates significant improvement w/functional use of RUE. Pt performed gentle AROM TherEx for RUE, reports he performs them independently. Pt was able to self-feed and perform ADL grooming task w/Set-up w/bilateral integration. Progression toward goals:  [x]          Improving appropriately and progressing toward goals  []          Improving slowly and progressing toward goals  []          Not making progress toward goals and plan of care will be adjusted     PLAN:  Patient continues to benefit from skilled intervention to address the above impairments. Continue treatment per established plan of care.   Discharge Recommendations:  Home Health w/Supervision vs SNF  Further Equipment Recommendations for Discharge:  N/A G-CODES:     Self Care  Current  CK= 40-59%  [id number] Goal  CK= 40-59%. The severity rating is based on the Level of Assistance required for Functional Mobility and ADLs. SUBJECTIVE:   Patient stated I am just too tired.     OBJECTIVE DATA SUMMARY:   Cognitive/Behavioral Status:  Neurologic State: Alert  Orientation Level: Oriented X4  Cognition: Follows commands  Safety/Judgement: Awareness of environment, Fall prevention    ADL Intervention:  Feeding  Feeding Assistance: Supervision/set-up    Grooming  Grooming Assistance: Supervision/set up (in bed)  Washing Face: Supervision/set-up  Washing Hands: Supervision/set-up    Upper Body [address]: Supervision/ set-up     Therapeutic Exercises:   See above  Pain:  Pt didn't rate pain or discomfort prior to treatment.    Pt didn't rate pain or discomfort post treatment. Activity Tolerance:    Fair    Please refer to the flowsheet for vital signs taken during this treatment.   After treatment:   []  Patient left in no apparent distress sitting up in chair  [x]  Patient left in no apparent distress in bed  [x]  Call bell left within reach  [x]  Nursing notified  []  Caregiver present  []  Bed alarm activated    ANDREW Fernandez  Time Calculation: 10 mins

## 2018-06-12 NOTE — PROGRESS NOTES
NUTRITION    Nursing Referral: [facility]   Nutrition Consult: General Nutrition Management & Supplements      RECOMMENDATIONS / PLAN:     - Encourage/monitor meal intake  - Specify flavor preference of Ensure supplement  - Continue RD inpatient monitoring and evaluation. NUTRITION INTERVENTIONS & DIAGNOSIS:     [x] Meals/snacks: modified composition  [x] Medical food supplement therapy: Ensure Enlive TID    Nutrition Diagnosis:  Inadequate oral intake related to decreased appetite as evidenced by poor meal intake PTA    ASSESSMENT:     6/12: Pt reported variable appetite and meal intake. Consuming and likes Ensure supplements; flavor preference verified    6/7: Pt unavailable at time of visit. Needs assistance with meals per nursing report; pt has difficulty lifting arms up. Only consumed 20-25% of breakfast today per report.  Per chart review, pt reported decreased appetite and meal intake and unplanned wt loss PTA    Average po intake adequate to meet patients estimated nutritional needs:   [] Yes     [x] No   [] Unable to determine at this time    Diet: DIET NUTRITIONAL SUPPLEMENTS Breakfast, Lunch, Dinner; ENSURE ENLIVE  DIET CARDIAC Mechanical Soft; 1 NECTAR      Food Allergies:  None known   Current Appetite:   [] Good     [x] Fair     [] Poor     [] Other: unknown   Appetite/meal intake prior to admission:   [] Good     [] Fair     [x] Poor: per nursing screen     [] Other:  Feeding Limitations:  [] Swallowing difficulty    [] Chewing difficulty    [] Other:  Current Meal Intake:   Patient Vitals for the past 100 hrs:   % Diet Eaten   06/11/18 1545 75 %   06/11/18 1200 50 %   06/11/18 0800 50 %   06/11/18 0656 0 %   06/11/18 0409 0 %   06/11/18 0011 0 %   06/10/18 2024 0 %   06/10/18 1813 50 %   06/09/18 1907 50 %       BM:  6/7 - loose  Skin Integrity:  No pressure injury or wound noted  Edema: none    Pertinent Medications: Reviewed: phosphorus, zofran,  folic acid, bowel regimen    Recent Labs      06/12/18 5958  06/11/18   0357  06/10/18   0055   NA  140   --   140   K  3.6  3.9  4.0   CL  104   --   107   CO2  32   --   28   GLU  96   --   111*   BUN  8   --   16   CREA  0.81   --   0.74   CA  9.5   --   9.5   MG  1.7  1.5*  1.6   PHOS  2.5  2.3*  2.3*       Intake/Output Summary (Last 24 hours) at 06/12/18 1258  Last data filed at 06/12/18 0920   Gross per 24 hour   Intake              120 ml   Output             1500 ml   Net            -1380 ml       Anthropometrics:  Ht Readings from Last 1 Encounters:   06/11/18 6' 3\" (1.905 m)     Last 3 Recorded Weights in this Encounter    06/10/18 0745 06/11/18 0800 06/12/18 0829   Weight: 70.9 kg (156 lb 6.4 oz) 76.1 kg (167 lb 12.3 oz) 75 kg (165 lb 4.8 oz)     Body mass index is 20.66 kg/(m^2). Weight History:   Pt reported unplanned wt loss PTA per chart hx. Noted weight fluctuations PTA with net wt loss of 27 lb (14.6%) x 10-11 months per chart hx    Weight Metrics 6/12/2018 8/11/2017 8/8/2017 7/15/2017 7/2/2017 1/6/2017 1/5/2017   Weight 165 lb 4.8 oz 147 lb 159 lb 185 lb 185 lb - 171 lb   BMI 20.66 kg/m2 18.87 kg/m2 20.41 kg/m2 23.75 kg/m2 23.75 kg/m2 21.37 kg/m2 -        Admitting Diagnosis: Joint pain  Elevated troponin  HIV (human immunodeficiency virus infection) ([facility] [address]  Pertinent PMHx:  HTN, HIV, stroke    Education Needs:        [x] None identified  [] Identified - Not appropriate at this time  []  Identified and addressed - refer to education log  Learning Limitations:   [x] None identified  [] Identified    Cultural, Muslim & ethnic food preferences:  [x] None identified    [] Identified and addressed     ESTIMATED NUTRITION NEEDS:     Calories: 6297-8115 kcal (MSJx1.2-1.3) based on  [] Actual BW      [] IBW   Protein: 64-80 gm (0.8-1 gm/kg) based on  [] Actual BW      [] IBW   Fluid: 1 mL/kcal     MONITORING & EVALUATION:     Nutrition Goal(s):   1. Po intake of meals will meet >75% of patient estimated nutritional needs within the next 7 days. Outcome:  [] Met/Ongoing    [x]  Not Met/progressing    [] New/Initial Goal     Monitoring:   [x] Food and beverage intake   [x] Diet order   [x] Nutrition-focused physical findings   [x] Treatment/therapy   [] Weight   [] Enteral nutrition intake        Previous Recommendations (for follow-up assessments only):     [x]   Implemented       []   Not Implemented (RD to address)      [] No Longer Appropriate     [] No Recommendation Made     Discharge Planning:  Cardiac diet   [x] Participated in care planning, discharge planning, & interdisciplinary rounds as appropriate      Rupert Platt, [address] [place] [address]   Pager: [phone]

## 2018-06-12 NOTE — PROGRESS NOTES
Problem: Mobility Impaired (Adult and Pediatric)  Goal: *Acute Goals and Plan of Care (Insert Text)  Physical Therapy Goals  Initiated 6/7/2018 and to be accomplished within 7 day(s)  1. Patient will move from supine to sit and sit to supine , scoot up and down and roll side to side in bed with supervision/set-up. 2.  Patient will transfer from bed to chair and chair to bed with supervision/set-up using the least restrictive device. 3.  Patient will perform sit to stand with supervision/set-up. 4.  Patient will ambulate with supervision/set-up for >50 feet with the least restrictive device. 5.  Patient will ascend/descend 3 stairs with B handrail(s) with minimal assistance/contact guard assist.       1139 -  Despite encouragement, pt declined OOB activities as well as supine therex at this time stating \"I don't feel like doing anything today. \" Will f/u later in day.

## 2018-06-12 NOTE — PROGRESS NOTES
Spoke with pt's son Thaddeus aSnchez and informed him we are waiting for the [place] to review information sent yesterday and call us back about transferring pt to the [place].

## 2018-06-12 NOTE — ROUTINE PROCESS
Bedside shift change report given to Alejandra Delatorre (oncoming nurse) by Xavier Bhardwaj (offgoing nurse). Report included the following information SBAR, Intake/Output, MAR, Recent Results and Cardiac Rhythm . Qiana Hagen

## 2018-06-12 NOTE — PROGRESS NOTES
Infectious Disease Progress Note    Requested by; Dr. Shanna gonzalez    Reason: right wrist septic arthritis/sepsis    Current abx Prior abx     Azithromycin since 6/8 Cefepime 6/6   vancomycin since 6/6/18-6/8  Levofloxacin 6/8  Clindamycin 6/8-6/11     Lines:       Assessment :    74 y.o.  male who has PMH of HIV/AIDS (on emtricitabine/tenofovir/dolutegravir), HTN, a-fib/flutter on Eliquis presented to ER on 6/6/18 with Rt dorsal wrist pain, swelling, induration and a fever. Highly complex clinical picture. Difficult to determine exact etiology of right wrist pain since presentation is atypical. It could be due to evolving right wrist cellulitis versus gout. High grade fevers, a.fib with RVR and generalized malaise on admission concerning for sepsis due to right wrist cellulitis. Hence, agree with abx use. Immunosuppressed state can mask the full clinical presentation. No clinical evidence to suggest right wrist septic arthritis on today's exam.     Acute renal failure: nephrology consult appreciated - likely due to volume depletion - diarrhea since admission/lasix may have contributed to this. improved    Diarrhea: negative c.diff pcr - resolved    low grade fevers overnight, increased sob on 6/8. Ct chest with bilateral perihilar infiltrates. Likely atypical community acquired pneumonia - improved s/p azithromycin    Recommendations:    1. dcont po azithromycin (d5/7)  2. Continue dolutegravir, emtricitabine/tenofovir  3. F/u with [facility] physician for continued HIV care  4. Ok to d/c patient from id standpoint        Advance Care planning: full code: discussed  with patient/surrogate decision maker:Gage Watters: [phone]     Above plan was discussed in details with patient,  and dr Bryan Nava. Please call me if any further questions or concerns. Will continue to participate in the care of this patient. subjective:    Feels better.    Patient denies subjective fever, headaches, visual disturbances, sore throat, runny nose, earaches, cp, sob, chills, cough, abdominal pain, diarrhea, burning micturition, pain or weakness in extremities. He denies back pain/flank pain.              home Medication List    Details   spironolactone (ALDACTONE) 25 mg tablet Take 37.5 mg by mouth daily. amLODIPine (NORVASC) 5 mg tablet Take 5 mg by mouth daily. digoxin (LANOXIN) 0.25 mg tablet Take  by mouth daily. albuterol (PROVENTIL HFA) 90 mcg/actuation inhaler Take 1 Puff by inhalation every four (4) hours as needed for Wheezing. Qty: 1 Inhaler, Refills: 0      guaiFENesin ER (MUCINEX) 600 mg ER tablet Take 1 Tab by mouth two (2) times a day. Qty: 12 Tab, Refills: 0      butalbital-acetaminophen-caff (FIORICET) -40 mg per capsule Take 1 Cap by mouth every four (4) hours as needed for Pain or Headache. Max Daily Amount: 6 Caps. Qty: 12 Cap, Refills: 0      methocarbamol (ROBAXIN-750) 750 mg tablet Take 1 Tab by mouth three (3) times daily as needed. Indications: pain; muscle spasms  Qty: 12 Tab, Refills: 0      furosemide (LASIX) 40 mg tablet 40 mg po daily  Qty: 30 Tab, Refills: 0      potassium chloride (K-DUR, KLOR-CON) 20 mEq tablet Take 1 Tab by mouth two (2) times a day. Qty: 30 Tab, Refills: 0      magnesium oxide 400 mg cap Take 400 mg by mouth daily. Qty: 20 Each, Refills: 0      OTHER BMP, MG in 1 week  Dx: CHF  Fax results to PCP at [facility]  Qty: 1 Each, Refills: 0      apixaban (ELIQUIS) 5 mg tablet Take 1 Tab by mouth every twelve (12) hours. Qty: 60 Tab, Refills: 0      carvedilol (COREG) 25 mg tablet Take 0.5 Tabs by mouth two (2) times daily (with meals). Qty: 60 Tab, Refills: 0      lisinopril (PRINIVIL, ZESTRIL) 10 mg tablet Take 1 Tab by mouth daily. Qty: 30 Tab, Refills: 0      pyridoxine, vitamin B6, (VITAMIN B-6) 50 mg tablet Take 50 mg by mouth daily.       aluminum-magnesium hydroxide (MAALOX) 200-200 mg/5 mL suspension Take 15 mL by mouth every six (6) hours as needed for Indigestion. Qty: 300 mL, Refills: 0      folic acid (FOLVITE) 1 mg tablet Take 1 mg by mouth daily. aspirin 81 mg tablet Take 81 mg by mouth daily. gabapentin (NEURONTIN) 300 mg capsule Take 300 mg by mouth three (3) times daily. Darunavir (PREZISTA) 600 mg tablet Take 600 mg by mouth.        pravastatin (PRAVACHOL) 40 mg tablet Take 40 mg by mouth nightly. ritonavir (NORVIR) 100 mg capsule Take 100 mg by mouth daily (with breakfast). emtricitabine (EMTRIVA) 200 mg capsule Take 200 mg by mouth daily.          Current Facility-Administered Medications   Medication Dose Route Frequency    phosphorus (K PHOS NEUTRAL) 250 mg tablet 1 Tab  1 Tab Oral TID    chlorproMAZINE (THORAZINE) tablet 25 mg  25 mg Oral TID PRN    amLODIPine (NORVASC) tablet 5 mg  5 mg Oral DAILY    budesonide-formoterol (SYMBICORT) 160-4.5 mcg/actuation HFA inhaler 2 Puff  2 Puff Inhalation BID RT    albuterol-ipratropium (DUO-NEB) 2.5 MG-0.5 MG/3 ML  3 mL Nebulization Q6H RT    lidocaine (XYLOCAINE) 2 % jelly   Mouth/Throat TID    emtricitabine (EMTRIVA) capsule 200 mg  200 mg Oral DAILY    tenofovir DISOPROXIL FUMARATE (VIREAD) tablet 300 mg  300 mg Oral DAILY    azithromycin (ZITHROMAX) tablet 500 mg  500 mg Oral DAILY    dolutegravir (TIVICAY) tablet 50 mg  50 mg Oral DAILY    apixaban (ELIQUIS) tablet 5 mg  5 mg Oral Q12H    carvedilol (COREG) tablet 12.5 mg  12.5 mg Oral BID WITH MEALS    folic acid (FOLVITE) tablet 1 mg  1 mg Oral DAILY    pravastatin (PRAVACHOL) tablet 40 mg  40 mg Oral QHS    acetaminophen (TYLENOL) tablet 650 mg  650 mg Oral Q6H PRN    naloxone (NARCAN) injection 0.4 mg  0.4 mg IntraVENous PRN    ondansetron (ZOFRAN) injection 4 mg  4 mg IntraVENous Q6H PRN    docusate sodium (COLACE) capsule 100 mg  100 mg Oral BID PRN    nicotine (NICODERM CQ) 14 mg/24 hr patch 1 Patch  1 Patch TransDERmal DAILY       Allergies: Review of patient's allergies indicates no known allergies. Temp (24hrs), Av °F (37.2 °C), Min:98.2 °F (36.8 °C), Max:99.5 °F (37.5 °C)    Visit Vitals    /76 (BP 1 Location: Left arm, BP Patient Position: Supine)    Pulse 86    Temp 98.7 °F (37.1 °C)    Resp 18    Ht 6' 3\" (1.905 m)    Wt 75 kg (165 lb 4.8 oz)    SpO2 91%    BMI 20.66 kg/m2       ROS: 12 point ROS obtained in details. Pertinent positives as mentioned in HPI,   otherwise negative    Physical Exam:    Constitutional: He is oriented to person, place, and time. He appears well-developed and well-nourished. No distress. Thin. HENT:   Head: Normocephalic and atraumatic. Mouth/Throat: Mucous membranes moist   Eyes: Conjunctivae and EOM are normal. Pupils are equal, round, and reactive to light. No scleral icterus. Neck: Normal range of motion. Neck supple. No JVD present. No thyromegaly present. Cardiovascular: Normal rate, regular rhythm, S1 normal and S2 normal.  Exam reveals no gallop and no friction rub. No murmur heard. Pulmonary/Chest: Effort normal and breath sounds normal. No accessory muscle usage. No respiratory distress. Abdominal: Soft. Normal appearance. He exhibits no distension. There is no tenderness. There is no rigidity, no rebound and no guarding. Musculoskeletal: Normal range of motion. He exhibits no edema or tenderness. Resolved warmth right wrist - no overlying tenderness. No pain on active/passive movements of right wrist.   Neurological: He is alert and oriented to person, place, and time. He has normal strength. No cranial nerve deficit or sensory deficit. Coordination normal.   Skin: Skin is warm and intact. No rash noted. Psychiatric: He has a normal mood and affect. His speech is normal and behavior is normal.   Vitals reviewed.          Labs: Results:   Chemistry Recent Labs      18   0347  18   0357  06/10/18   0055   GLU  96   --   111*   NA  140   --   140   K  3.6  3.9  4.0   CL  104   --   107 CO2  32   --   28   BUN  8   --   16   CREA  0.81   --   0.74   CA  9.5   --   9.5   AGAP  4   --   5   BUCR  10*   --   22*      CBC w/Diff Recent Labs      06/10/18   0055   WBC  7.2   RBC  3.29*   HGB  9.3*   HCT  29.0*   PLT  216   GRANS  65   LYMPH  17*   EOS  8*      Microbiology No results for input(s): CULT in the last 72 hours.        RADIOLOGY:    All available imaging studies/reports in [facility] for this admission were reviewed    Dr. Alisson Zuniga, Infectious Disease Specialist  [phone]  June 12, 2018  1:36 PM

## 2018-06-12 NOTE — PROGRESS NOTES
Problem: Falls - Risk of  Goal: *Absence of Falls  Document Kylee Fall Risk and appropriate interventions in the flowsheet. Outcome: Progressing Towards Goal  Fall Risk Interventions:  Mobility Interventions: Bed/chair exit alarm, Assess mobility with egress test, PT Consult for mobility concerns, Utilize walker, cane, or other assitive device, PT Consult for assist device competence         Medication Interventions: Bed/chair exit alarm, Patient to call before getting OOB, Teach patient to arise slowly    Elimination Interventions: Bed/chair exit alarm, Call light in reach, Patient to call for help with toileting needs, Toileting schedule/hourly rounds    History of Falls Interventions: Bed/chair exit alarm, Door open when patient unattended, Room close to nurse's station        Problem: Pressure Injury - Risk of  Goal: *Prevention of pressure injury  Document Ba Scale and appropriate interventions in the flowsheet.    Outcome: Progressing Towards Goal  Pressure Injury Interventions:  Sensory Interventions: Assess changes in LOC, Pressure redistribution bed/mattress (bed type)    Moisture Interventions: Absorbent underpads, Check for incontinence Q2 hours and as needed    Activity Interventions: Increase time out of bed, Pressure redistribution bed/mattress(bed type), PT/OT evaluation    Mobility Interventions: HOB 30 degrees or less, Pressure redistribution bed/mattress (bed type), PT/OT evaluation    Nutrition Interventions: Document food/fluid/supplement intake    Friction and Shear Interventions: HOB 30 degrees or less

## 2018-06-13 LAB
MAGNESIUM SERPL-MCNC: 2.2 MG/DL (ref 1.6–2.6)
POTASSIUM SERPL-SCNC: 3.9 MMOL/L (ref 3.5–5.5)

## 2018-06-13 PROCEDURE — 83735 ASSAY OF MAGNESIUM: CPT | Performed by: INTERNAL MEDICINE

## 2018-06-13 PROCEDURE — 74011250637 HC RX REV CODE- 250/637: Performed by: INTERNAL MEDICINE

## 2018-06-13 PROCEDURE — 36415 COLL VENOUS BLD VENIPUNCTURE: CPT | Performed by: INTERNAL MEDICINE

## 2018-06-13 PROCEDURE — 84132 ASSAY OF SERUM POTASSIUM: CPT | Performed by: INTERNAL MEDICINE

## 2018-06-13 PROCEDURE — 94640 AIRWAY INHALATION TREATMENT: CPT

## 2018-06-13 PROCEDURE — 74011000250 HC RX REV CODE- 250: Performed by: INTERNAL MEDICINE

## 2018-06-13 PROCEDURE — 65660000000 HC RM CCU STEPDOWN

## 2018-06-13 RX ADMIN — APIXABAN 5 MG: 5 TABLET, FILM COATED ORAL at 09:24

## 2018-06-13 RX ADMIN — FOLIC ACID 1 MG: 1 TABLET ORAL at 09:24

## 2018-06-13 RX ADMIN — BUDESONIDE AND FORMOTEROL FUMARATE DIHYDRATE 2 PUFF: 160; 4.5 AEROSOL RESPIRATORY (INHALATION) at 08:23

## 2018-06-13 RX ADMIN — AMLODIPINE BESYLATE 10 MG: 10 TABLET ORAL at 09:25

## 2018-06-13 RX ADMIN — CARVEDILOL 12.5 MG: 6.25 TABLET, FILM COATED ORAL at 16:29

## 2018-06-13 RX ADMIN — Medication 400 MG: at 09:25

## 2018-06-13 RX ADMIN — EMTRICITABINE 200 MG: 200 CAPSULE ORAL at 09:24

## 2018-06-13 RX ADMIN — CARVEDILOL 12.5 MG: 6.25 TABLET, FILM COATED ORAL at 09:23

## 2018-06-13 RX ADMIN — BUDESONIDE AND FORMOTEROL FUMARATE DIHYDRATE 2 PUFF: 160; 4.5 AEROSOL RESPIRATORY (INHALATION) at 20:44

## 2018-06-13 RX ADMIN — CHLORPROMAZINE HYDROCHLORIDE 25 MG: 50 TABLET, SUGAR COATED ORAL at 21:49

## 2018-06-13 RX ADMIN — IPRATROPIUM BROMIDE AND ALBUTEROL SULFATE 3 ML: .5; 3 SOLUTION RESPIRATORY (INHALATION) at 01:24

## 2018-06-13 RX ADMIN — IPRATROPIUM BROMIDE AND ALBUTEROL SULFATE 3 ML: .5; 3 SOLUTION RESPIRATORY (INHALATION) at 15:27

## 2018-06-13 RX ADMIN — IPRATROPIUM BROMIDE AND ALBUTEROL SULFATE 3 ML: .5; 3 SOLUTION RESPIRATORY (INHALATION) at 20:43

## 2018-06-13 RX ADMIN — TENOFOVIR DISOPROXIL FUMARATE 300 MG: 300 TABLET, COATED ORAL at 09:24

## 2018-06-13 RX ADMIN — AZITHROMYCIN 500 MG: 250 TABLET, FILM COATED ORAL at 09:23

## 2018-06-13 RX ADMIN — APIXABAN 5 MG: 5 TABLET, FILM COATED ORAL at 21:48

## 2018-06-13 RX ADMIN — DOLUTEGRAVIR SODIUM 50 MG: 50 TABLET, FILM COATED ORAL at 09:37

## 2018-06-13 RX ADMIN — LIDOCAINE HYDROCHLORIDE: 20 JELLY TOPICAL at 09:25

## 2018-06-13 RX ADMIN — PRAVASTATIN SODIUM 40 MG: 20 TABLET ORAL at 21:48

## 2018-06-13 RX ADMIN — IPRATROPIUM BROMIDE AND ALBUTEROL SULFATE 3 ML: .5; 3 SOLUTION RESPIRATORY (INHALATION) at 08:22

## 2018-06-13 NOTE — PROGRESS NOTES
conducted a Follow up consultation and Spiritual Assessment for Ellie Zaragoza, who is a 74 y. o.,male. The  provided the following Interventions:  Continued the relationship of care and support. Listened empathically. Offered prayer and assurance of continued prayer on patients behalf. Chart reviewed. The following outcomes were achieved:  Patient expressed gratitude for pastoral care visit. Assessment:  There are no further spiritual or Adventism issues which require Spiritual Care Services interventions at this time. Plan:  Chaplains will continue to follow and will provide pastoral care on an as needed/requested basis.  recommends bedside caregivers page  on duty if patient shows signs of acute spiritual or emotional distress.    Miriam Muñiz, [address] [facility]  [phone]

## 2018-06-13 NOTE — ROUTINE PROCESS
Bedside, Verbal and Written shift change report given to NEGRA Trammell RN (oncoming nurse) by [employer]). Report included the following information SBAR, Kardex, and MAR. Hourly rounding and  chart checks completed.

## 2018-06-13 NOTE — ROUTINE PROCESS
Bedside and Verbal shift change report given to NEGRA Martinez RN by Tamara Rosales. EDGAR Trammell. Report included the following information SBAR, Kardex and MAR.

## 2018-06-13 NOTE — PROGRESS NOTES
Called the [place] and spoke with Dupoangela Berry [phone] ext  who states they are working on the documents that are faxed but she will call me with information. [phone]:  Yanna Berry called me back from the [place] and states the recommendations are pt does not need management in the hospital and pt may benefit from SNF placement or home health with personal care aid. CM reminded Ms Kavita Lorenzo they already state pt does not qualify for SNF and that's the reason we are seeking for pt to be transferred. She wants me to call the  Ms Miya Figueredo is Ms Miya Figueredo can help. Called Ms Gloria Rommel [phone] multiple times and the line was just ringing. Called Ms Donavon Crigler ext [phone] multiple times and the line was ringing.

## 2018-06-13 NOTE — PROGRESS NOTES
SUBJECTIVE:    Feeling better. No chest or abdominal pain. No Nausea or vomiting. No more falls. Patient wants  to call his son for placement. OBJECTIVE:    /74 (BP 1 Location: Left arm, BP Patient Position: Supine)  Pulse 62  Temp 97.5 °F (36.4 °C)  Resp 18  Ht 6' 3\" (1.905 m)  Wt 74.9 kg (165 lb 3.2 oz)  SpO2 96%  BMI 20.65 kg/m2    CVS: RRR, AICD +  RS: Diminished in bases, no wheezes  GI: NT, BS +  Extremities: no pedal edema  CNS: moves both LEs well while in bed  General: NAD, Follows commands    ASSESSMENT:    1. SIRS versus Sepsis sec to # 2. Resolved. 2. 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 Chronic systolic heart failure with EF of around 55%. S/p AICD. Compensated. 4. Hypokalemia and hypomagnesemia. 5. BACH could be from CHD and/or presumed COPD  6. Human immunodeficiency virus. 7. Paroxysmal Atrial fibrillation, rate controlled. 8. Hypertension. 9. Noncompliance with the medications and diet compliance. 10. History of cerebrovascular accident in the past.  11. History of nonsustained ventricular tachycardia in the past.  12. History of tuberculosis, status post treatment. 13. Dyslipidemia. 14. Cocaine and marijuana use. 15. Diarrhea, improved  16. Indeterminate elevation of troponin due to demand ischemia sec to sepsis   17. Underweight  18. IRAIDA, resolved   19. Glossitis   20.   Acute on chronic bronchitis     PLAN:    Cont current management  Patient is not medically safe to go home, needs placement if covered by payer source  CM to work on discharge    CMP:   Lab Results   Component Value Date/Time    K 3.9 06/13/2018 01:35 AM    MG 2.2 06/13/2018 01:35 AM     CBC:   No results found for: WBC, HGB, HGBEXT, HCT, HCTEXT, PLT, PLTEXT, HGBEXT, HCTEXT, PLTEXT

## 2018-06-14 VITALS
TEMPERATURE: 98.1 F | DIASTOLIC BLOOD PRESSURE: 70 MMHG | OXYGEN SATURATION: 99 % | HEART RATE: 63 BPM | SYSTOLIC BLOOD PRESSURE: 122 MMHG | RESPIRATION RATE: 16 BRPM | HEIGHT: 75 IN | BODY MASS INDEX: 20.86 KG/M2 | WEIGHT: 167.77 LBS

## 2018-06-14 LAB
AMPHET UR QL SCN: NEGATIVE
ANION GAP SERPL CALC-SCNC: 6 MMOL/L (ref 3–18)
BARBITURATES UR QL SCN: NEGATIVE
BASOPHILS # BLD: 0 K/UL (ref 0–0.06)
BASOPHILS NFR BLD: 0 % (ref 0–3)
BENZODIAZ UR QL: NEGATIVE
BUN SERPL-MCNC: 13 MG/DL (ref 7–18)
BUN/CREAT SERPL: 16 (ref 12–20)
BZE UR CFM-MCNC: 3875 NG/ML
BZE UR QL: POSITIVE
CALCIUM SERPL-MCNC: 9.1 MG/DL (ref 8.5–10.1)
CANNABINOIDS UR QL CFM: POSITIVE
CANNABINOIDS UR QL SCN: POSITIVE
CHLORIDE SERPL-SCNC: 105 MMOL/L (ref 100–108)
CO2 SERPL-SCNC: 30 MMOL/L (ref 21–32)
COCAINE UR QL SCN: POSITIVE
CREAT SERPL-MCNC: 0.8 MG/DL (ref 0.6–1.3)
DIFFERENTIAL METHOD BLD: ABNORMAL
EOSINOPHIL # BLD: 0.1 K/UL (ref 0–0.4)
EOSINOPHIL NFR BLD: 1 % (ref 0–5)
ERYTHROCYTE [DISTWIDTH] IN BLOOD BY AUTOMATED COUNT: 12.2 % (ref 11.6–14.5)
GLUCOSE SERPL-MCNC: 94 MG/DL (ref 74–99)
HCT VFR BLD AUTO: 27.5 % (ref 36–48)
HDSCOM,HDSCOM: ABNORMAL
HGB BLD-MCNC: 9 G/DL (ref 13–16)
LYMPHOCYTES # BLD: 3.4 K/UL (ref 0.8–3.5)
LYMPHOCYTES NFR BLD: 27 % (ref 20–51)
MCH RBC QN AUTO: 29 PG (ref 24–34)
MCHC RBC AUTO-ENTMCNC: 32.7 G/DL (ref 31–37)
MCV RBC AUTO: 88.7 FL (ref 74–97)
METHADONE UR QL: NEGATIVE
MONOCYTES # BLD: 1.5 K/UL (ref 0–1)
MONOCYTES NFR BLD: 12 % (ref 2–9)
NEUTS SEG # BLD: 7.6 K/UL (ref 1.8–8)
NEUTS SEG NFR BLD: 60 % (ref 42–75)
OPIATES UR QL: NEGATIVE
PCP UR QL: NEGATIVE
PLATELET # BLD AUTO: 405 K/UL (ref 135–420)
PLATELET COMMENTS,PCOM: ABNORMAL
PMV BLD AUTO: 11.4 FL (ref 9.2–11.8)
POTASSIUM SERPL-SCNC: 3.7 MMOL/L (ref 3.5–5.5)
RBC # BLD AUTO: 3.1 M/UL (ref 4.7–5.5)
RBC MORPH BLD: ABNORMAL
SODIUM SERPL-SCNC: 141 MMOL/L (ref 136–145)
THC UR CFM-MCNC: 21 NG/ML
WBC # BLD AUTO: 12.6 K/UL (ref 4.6–13.2)

## 2018-06-14 PROCEDURE — 36415 COLL VENOUS BLD VENIPUNCTURE: CPT | Performed by: INTERNAL MEDICINE

## 2018-06-14 PROCEDURE — 74011250637 HC RX REV CODE- 250/637: Performed by: INTERNAL MEDICINE

## 2018-06-14 PROCEDURE — 85025 COMPLETE CBC W/AUTO DIFF WBC: CPT | Performed by: INTERNAL MEDICINE

## 2018-06-14 PROCEDURE — 74011000250 HC RX REV CODE- 250: Performed by: INTERNAL MEDICINE

## 2018-06-14 PROCEDURE — 94640 AIRWAY INHALATION TREATMENT: CPT

## 2018-06-14 PROCEDURE — 97535 SELF CARE MNGMENT TRAINING: CPT

## 2018-06-14 PROCEDURE — 97110 THERAPEUTIC EXERCISES: CPT

## 2018-06-14 PROCEDURE — 80048 BASIC METABOLIC PNL TOTAL CA: CPT | Performed by: INTERNAL MEDICINE

## 2018-06-14 PROCEDURE — 97116 GAIT TRAINING THERAPY: CPT

## 2018-06-14 RX ORDER — ALBUTEROL SULFATE 90 UG/1
1 AEROSOL, METERED RESPIRATORY (INHALATION)
Qty: 1 INHALER | Refills: 0 | Status: SHIPPED | OUTPATIENT
Start: 2018-06-14 | End: 2019-12-08

## 2018-06-14 RX ORDER — CARVEDILOL 25 MG/1
12.5 TABLET ORAL 2 TIMES DAILY WITH MEALS
Qty: 30 TAB | Refills: 0 | Status: SHIPPED | OUTPATIENT
Start: 2018-06-14 | End: 2022-07-07

## 2018-06-14 RX ORDER — AZITHROMYCIN 250 MG/1
TABLET, FILM COATED ORAL
Qty: 4 TAB | Refills: 0 | Status: SHIPPED | OUTPATIENT
Start: 2018-06-15 | End: 2018-06-19

## 2018-06-14 RX ORDER — DICLOFENAC SODIUM 10 MG/G
2 GEL TOPICAL
Qty: 100 G | Refills: 0 | Status: SHIPPED | OUTPATIENT
Start: 2018-06-14 | End: 2021-12-28

## 2018-06-14 RX ORDER — DICLOFENAC SODIUM 10 MG/G
2 GEL TOPICAL 3 TIMES DAILY
Status: DISCONTINUED | OUTPATIENT
Start: 2018-06-14 | End: 2018-06-14 | Stop reason: HOSPADM

## 2018-06-14 RX ORDER — DAPSONE 100 MG/1
100 TABLET ORAL DAILY
Qty: 30 TAB | Refills: 0 | Status: SHIPPED | OUTPATIENT
Start: 2018-06-14 | End: 2021-12-28

## 2018-06-14 RX ORDER — FOLIC ACID 1 MG/1
1 TABLET ORAL DAILY
Qty: 30 TAB | Refills: 0 | Status: SHIPPED | OUTPATIENT
Start: 2018-06-14 | End: 2021-12-28

## 2018-06-14 RX ORDER — CALCIUM CARBONATE/VITAMIN D3 500-10/5ML
400 LIQUID (ML) ORAL DAILY
Qty: 30 CAP | Refills: 0 | Status: SHIPPED | OUTPATIENT
Start: 2018-06-14 | End: 2021-12-28

## 2018-06-14 RX ORDER — AMLODIPINE BESYLATE 10 MG/1
10 TABLET ORAL DAILY
Qty: 30 TAB | Refills: 0 | Status: SHIPPED | OUTPATIENT
Start: 2018-06-15 | End: 2021-12-28

## 2018-06-14 RX ORDER — LANOLIN ALCOHOL/MO/W.PET/CERES
50 CREAM (GRAM) TOPICAL DAILY
Qty: 30 TAB | Refills: 0 | Status: SHIPPED | OUTPATIENT
Start: 2018-06-14

## 2018-06-14 RX ORDER — DAPSONE 100 MG/1
100 TABLET ORAL DAILY
Status: ON HOLD | COMMUNITY
End: 2018-06-14

## 2018-06-14 RX ORDER — BUDESONIDE AND FORMOTEROL FUMARATE DIHYDRATE 160; 4.5 UG/1; UG/1
2 AEROSOL RESPIRATORY (INHALATION) 2 TIMES DAILY
Qty: 1 INHALER | Refills: 1 | Status: SHIPPED | OUTPATIENT
Start: 2018-06-14 | End: 2021-12-28

## 2018-06-14 RX ORDER — PRAVASTATIN SODIUM 40 MG/1
40 TABLET ORAL
Qty: 30 TAB | Refills: 0 | Status: SHIPPED | OUTPATIENT
Start: 2018-06-14

## 2018-06-14 RX ADMIN — AMLODIPINE BESYLATE 10 MG: 10 TABLET ORAL at 09:57

## 2018-06-14 RX ADMIN — IPRATROPIUM BROMIDE AND ALBUTEROL SULFATE 3 ML: .5; 3 SOLUTION RESPIRATORY (INHALATION) at 02:02

## 2018-06-14 RX ADMIN — TENOFOVIR DISOPROXIL FUMARATE 300 MG: 300 TABLET, COATED ORAL at 09:57

## 2018-06-14 RX ADMIN — APIXABAN 5 MG: 5 TABLET, FILM COATED ORAL at 09:57

## 2018-06-14 RX ADMIN — IPRATROPIUM BROMIDE AND ALBUTEROL SULFATE 3 ML: .5; 3 SOLUTION RESPIRATORY (INHALATION) at 14:43

## 2018-06-14 RX ADMIN — CHLORPROMAZINE HYDROCHLORIDE 25 MG: 50 TABLET, SUGAR COATED ORAL at 06:49

## 2018-06-14 RX ADMIN — IPRATROPIUM BROMIDE AND ALBUTEROL SULFATE 3 ML: .5; 3 SOLUTION RESPIRATORY (INHALATION) at 08:08

## 2018-06-14 RX ADMIN — DOLUTEGRAVIR SODIUM 50 MG: 50 TABLET, FILM COATED ORAL at 10:09

## 2018-06-14 RX ADMIN — Medication 400 MG: at 09:57

## 2018-06-14 RX ADMIN — CARVEDILOL 12.5 MG: 6.25 TABLET, FILM COATED ORAL at 09:57

## 2018-06-14 RX ADMIN — AZITHROMYCIN 500 MG: 250 TABLET, FILM COATED ORAL at 09:57

## 2018-06-14 RX ADMIN — EMTRICITABINE 200 MG: 200 CAPSULE ORAL at 09:57

## 2018-06-14 RX ADMIN — FOLIC ACID 1 MG: 1 TABLET ORAL at 09:57

## 2018-06-14 RX ADMIN — CHLORPROMAZINE HYDROCHLORIDE 25 MG: 50 TABLET, SUGAR COATED ORAL at 10:09

## 2018-06-14 RX ADMIN — BUDESONIDE AND FORMOTEROL FUMARATE DIHYDRATE 2 PUFF: 160; 4.5 AEROSOL RESPIRATORY (INHALATION) at 08:07

## 2018-06-14 NOTE — DISCHARGE INSTRUCTIONS
Acquired Immunodeficiency Syndrome (AIDS): Care Instructions  Your Care Instructions    Human immunodeficiency virus (HIV) is a virus that attacks the body's immune system. This makes it hard for the body to fight infection and disease. HIV causes acquired immunodeficiency syndrome (AIDS). AIDS is the last and most severe stage of the HIV infection. HIV attacks and destroys a type of white blood cell called CD4+ cells, or helper cells. These cells are an important part of the immune system. You have AIDS when one or both of the following are true:  · Your CD4+ cell count is below 200 cells per microliter (µL) of blood. · You get certain infections or cancers that are usually only seen in people who have problems with their immune system. Follow-up care is a key part of your treatment and safety. Be sure to make and go to all appointments, and call your doctor if you are having problems. It's also a good idea to know your test results and keep a list of the medicines you take. How can you care for yourself at home? · Take your medicines exactly as prescribed. Call your doctor if you think you are having a problem with your medicine. · Get the vaccines and medicine you need to prevent infections such as pneumonia or tuberculosis. · Learn more about HIV and AIDS so you can be active in your health care decisions. · Join a support group. These let you share experiences and seek support from others in the same situation. · Do not smoke. People with HIV have an increased risk of heart attacks and lung cancer. Smoking increases these risks even more. If you need help quitting, talk to your doctor about stop-smoking programs and medicines. These can increase your chances of quitting for good. · Do not use illegal drugs. And limit your use of alcohol. · Eat a healthy, balanced diet. This keeps your immune system as strong as possible. · Exercise regularly.  It can reduce your stress, increase your energy, and lift your mood. · If you have not already done so, prepare a list of advance directives. These tell your doctor and family members what kind of care you want if you become unable to speak for yourself. Helping a partner with AIDS  If your partner has AIDS, you can help provide emotional, physical, and medical care that will improve his or her quality of life. · Give emotional support. Listen to and encourage your partner. · Protect yourself and others against HIV infection and other infections by:  ¨ Not sharing needles. ¨ Always using condoms during sex. · Protect your partner by staying away from him or her when you are sick. · Take care of yourself. Share your experiences with others and get help when you need it. · Learn how to give medicines, and know where to get help in an emergency. When should you call for help? Call 911 anytime you think you may need emergency care. For example, call if:  ? · You passed out (lost consciousness). ? · You have severe shortness of breath. ? · You have symptoms of a stroke. These may include:  ¨ Sudden numbness, tingling, weakness, or loss of movement in your face, arm, or leg, especially on only one side of your body. ¨ Sudden vision changes. ¨ Sudden trouble speaking. ¨ Sudden confusion or trouble understanding simple statements. ¨ Sudden problems with walking or balance. ¨ A sudden, severe headache that is different from past headaches. ?Call your doctor now or seek immediate medical care if:  ? · You have signs of a new or worse problem from HIV, such as:  ¨ A fever. ¨ Coughing. ¨ Diarrhea. ¨ Skin changes. ¨ Bleeding. ¨ Confusion or not thinking clearly. ? Watch closely for changes in your health, and be sure to contact your doctor if you have any problems. Where can you learn more? Go to http://mallory-bonnie.info/.   Enter U461 in the search box to learn more about \"Acquired Immunodeficiency Syndrome (AIDS): Care Instructions. \"  Current as of: March 3, 2017  Content Version: 11.4  © 3860-6196 [employer]. Care instructions adapted under license by [employer] (which disclaims liability or warranty for this information). If you have questions about a medical condition or this instruction, always ask your healthcare professional. [facility] [address] any warranty or liability for your use of this information. Atrial Fibrillation: Care Instructions  Your Care Instructions    Atrial fibrillation is an irregular and often fast heartbeat. Treating this condition is important for several reasons. It can cause blood clots, which can travel from your heart to your brain and cause a stroke. If you have a fast heartbeat, you may feel lightheaded, dizzy, and weak. An irregular heartbeat can also increase your risk for heart failure. Atrial fibrillation is often the result of another heart condition, such as high blood pressure or coronary artery disease. Making changes to improve your heart condition will help you stay healthy and active. Follow-up care is a key part of your treatment and safety. Be sure to make and go to all appointments, and call your doctor if you are having problems. It's also a good idea to know your test results and keep a list of the medicines you take. How can you care for yourself at home? Medicines  ? · Take your medicines exactly as prescribed. Call your doctor if you think you are having a problem with your medicine. You will get more details on the specific medicines your doctor prescribes. ? · If your doctor has given you a blood thinner to prevent a stroke, be sure you get instructions about how to take your medicine safely. Blood thinners can cause serious bleeding problems. ? · Do not take any vitamins, over-the-counter drugs, or herbal products without talking to your doctor first.   ? Lifestyle changes  ? · Do not smoke.  Smoking can increase your chance of a stroke and heart attack. If you need help quitting, talk to your doctor about stop-smoking programs and medicines. These can increase your chances of quitting for good. ? · Eat a heart-healthy diet. ? · Stay at a healthy weight. Lose weight if you need to.   ? · Limit alcohol to 2 drinks a day for men and 1 drink a day for women. Too much alcohol can cause health problems. ? · Avoid colds and flu. Get a pneumococcal vaccine shot. If you have had one before, ask your doctor whether you need another dose. Get a flu shot every year. If you must be around people with colds or flu, wash your hands often. Activity  ? · If your doctor recommends it, get more exercise. Walking is a good choice. Bit by bit, increase the amount you walk every day. Try for at least 30 minutes on most days of the week. You also may want to swim, bike, or do other activities. Your doctor may suggest that you join a cardiac rehabilitation program so that you can have help increasing your physical activity safely. ? · Start light exercise if your doctor says it is okay. Even a small amount will help you get stronger, have more energy, and manage stress. Walking is an easy way to get exercise. Start out by walking a little more than you did in the hospital. Gradually increase the amount you walk. ? · When you exercise, watch for signs that your heart is working too hard. You are pushing too hard if you cannot talk while you are exercising. If you become short of breath or dizzy or have chest pain, sit down and rest immediately. ? · Check your pulse regularly. Place two fingers on the artery at the palm side of your wrist, in line with your thumb. If your heartbeat seems uneven or fast, talk to your doctor. When should you call for help? Call 911 anytime you think you may need emergency care. For example, call if:  ? · You have symptoms of a heart attack.  These may include:  ¨ Chest pain or pressure, or a strange feeling in the chest.  ¨ Sweating. ¨ Shortness of breath. ¨ Nausea or vomiting. ¨ Pain, pressure, or a strange feeling in the back, neck, jaw, or upper belly or in one or both shoulders or arms. ¨ Lightheadedness or sudden weakness. ¨ A fast or irregular heartbeat. After you call 911, the  may tell you to chew 1 adult-strength or 2 to 4 low-dose aspirin. Wait for an ambulance. Do not try to drive yourself. ? · You have symptoms of a stroke. These may include:  ¨ Sudden numbness, tingling, weakness, or loss of movement in your face, arm, or leg, especially on only one side of your body. ¨ Sudden vision changes. ¨ Sudden trouble speaking. ¨ Sudden confusion or trouble understanding simple statements. ¨ Sudden problems with walking or balance. ¨ A sudden, severe headache that is different from past headaches. ? · You passed out (lost consciousness). ?Call your doctor now or seek immediate medical care if:  ? · You have new or increased shortness of breath. ? · You feel dizzy or lightheaded, or you feel like you may faint. ? · Your heart rate becomes irregular. ? · You can feel your heart flutter in your chest or skip heartbeats. Tell your doctor if these symptoms are new or worse. ? Watch closely for changes in your health, and be sure to contact your doctor if you have any problems. Where can you learn more? Go to http://mallory-bonnie.info/. Enter U020 in the search box to learn more about \"Atrial Fibrillation: Care Instructions. \"  Current as of: September 21, 2016  Content Version: 11.4  © 4253-5819 [employer]. Care instructions adapted under license by [employer] (which disclaims liability or warranty for this information). If you have questions about a medical condition or this instruction, always ask your healthcare professional. [place] [address] any warranty or liability for your use of this information.          Learning About Atrial Fibrillation  What is atrial fibrillation? Atrial fibrillation (say \"AY-tree-caleb [id number]-MQK-shun\") is the most common type of irregular heartbeat (arrhythmia). Normally, the heart beats in a strong, steady rhythm. In atrial fibrillation, a problem with the heart's electrical system causes the two upper parts of the heart (the atria) to quiver, or fibrillate. Your heart rate also may be faster than normal.  Atrial fibrillation can be dangerous because if the heartbeat isn't strong and steady, blood can collect, or pool, in the atria. And pooled blood is more likely to form clots. Clots can travel to the brain, block blood flow, and cause a stroke. Atrial fibrillation can also lead to heart failure. Treatment for atrial fibrillation helps prevent stroke and heart failure. It also helps relieve symptoms. Atrial fibrillation is often caused by another heart problem. It may happen after heart surgery. It may also be caused by other problems, such as an overactive thyroid gland or lung disease. Many people with atrial fibrillation are able to live full and active lives. What are the symptoms? Some people feel symptoms when they have episodes of atrial fibrillation. But other people don't notice any symptoms. If you have symptoms, you may feel:  · A fluttering, racing, or pounding feeling in your chest called palpitations. · Weak or tired. · Dizzy or lightheaded. · Short of breath. · Chest pain. · Confused. You may notice signs of atrial fibrillation when you check your pulse. Your pulse may seem uneven or fast.  What can you expect when you have atrial fibrillation? At first, spells of atrial fibrillation may come on suddenly and last a short time. It may go away on its own or it goes away after treatment. This is called paroxysmal atrial fibrillation. Over time, the spells may last longer and occur more often. They often don't go away on their own. How is it treated?   Treatments can help you feel better and prevent future problems, especially stroke and heart failure. The main types of treatment slow the heart rate, control the heart rhythm, and help prevent stroke. Your treatment will depend on the cause of your atrial fibrillation, your symptoms, and your risk for stroke. · Heart rate treatment. Medicine may be used to slow your heart rate. Your heartbeat may still be irregular. But these medicines keep your heart from beating too fast. They may also help relieve your symptoms. · Heart rhythm treatment. Different treatments may be used to try to stop atrial fibrillation and keep it from returning. They can also relieve symptoms. These treatments include medicine, electrical cardioversion to shock the heart back to a normal rhythm, a procedure called catheter ablation, and heart surgery. · Stroke prevention. You and your doctor can decide how to lower your risk. You may decide to take a blood-thinning medicine, such as aspirin or an anticoagulant. How can you live well with it? You can live well and help manage atrial fibrillation by having a heart-healthy lifestyle. To have a heart-healthy lifestyle:  · Don't smoke. · Eat heart-healthy foods. · Be active. Talk to your doctor about what type and level of exercise is safe for you. · Stay at a healthy weight. Lose weight if you need to. · Manage stress. · Avoid alcohol if it triggers symptoms. · Manage other health problems such as high blood pressure, high cholesterol, and diabetes. · Avoid getting sick from the flu. Get a flu shot every year. Where can you learn more? Go to http://mallory-bonnie.info/. Enter [phone] in the search box to learn more about \"Learning About Atrial Fibrillation. \"  Current as of: September 21, 2016  Content Version: 11.4  © 6910-2385 [employer]. Care instructions adapted under license by [employer] (which disclaims liability or warranty for this information).  If you have questions about a medical condition or this instruction, always ask your healthcare professional. [address] any warranty or liability for your use of this information. Atrial Flutter: Care Instructions  Your Care Instructions    Atrial flutter is a type of heartbeat problem (arrhythmia) that usually causes a fast heart rate. In atrial flutter, a problem with the heart's electrical system causes the two upper parts of the heart (the right atrium and the left atrium) to flutter, or beat very fast. Atrial flutter might be diagnosed using an an electrocardiogram (EKG). An EKG translates the heart's electrical activity into line tracings on paper. Treating atrial flutter is important for several reasons. The change in heartbeat can cause blood clots. The clots can travel from your heart to your brain and cause a stroke. A fast heartbeat can make you feel lightheaded, dizzy, and weak. And over time, it can also increase your risk for heart failure. Atrial flutter is often the result of another heart condition, such as coronary artery disease or some other heart rhythm problems. Making changes to improve your heart health will help you stay healthy and active. Your doctor may prescribe medicines to help slow down your heartbeat. You may also take medicine to help prevent a stroke. In some cases, a procedure called catheter ablation is done to stop atrial flutter. Follow-up care is a key part of your treatment and safety. Be sure to make and go to all appointments, and call your doctor if you are having problems. It's also a good idea to know your test results and keep a list of the medicines you take. How can you care for yourself at home? Medicines  ? · Take your medicines exactly as prescribed. Call your doctor if you think you are having a problem with your medicine. You will get more details on the specific medicines your doctor prescribes.    ? · If your doctor has given you a blood thinner to prevent a stroke, be sure you get instructions about how to take your medicine safely. Blood thinners can cause serious bleeding problems. ? · Do not take any vitamins, over-the-counter drugs, or herbal products without talking to your doctor first.   ? Lifestyle changes  ? · Do not smoke. Smoking can increase your chance of a stroke and heart attack. If you need help quitting, talk to your doctor about stop-smoking programs and medicines. These can increase your chances of quitting for good. ? · Eat a heart-healthy diet. ? · Stay at a healthy weight. Lose weight if you need to.   ? · Limit alcohol to 2 drinks a day for men and 1 drink a day for women. Too much alcohol can cause health problems. ? · Avoid colds and flu. Get a pneumococcal vaccine shot. If you have had one before, ask your doctor whether you need another dose. Get a flu shot every year. If you must be around people with colds or flu, wash your hands often. Activity  ? · Talk to your doctor about what type and level of exercise is safe for you. Start light exercise if your doctor says it is okay. Walking is a good choice. Try for at least 30 minutes on most days of the week. You also may want to swim, bike, or do other activities. ? · When you exercise, watch for signs that your heart is working too hard. You are pushing too hard if you can't talk while you exercise. If you become short of breath or dizzy or have chest pain, sit down and rest right away. When should you call for help? Call 911 anytime you think you may need emergency care. For example, call if:  ? · You have symptoms of a stroke. These may include:  ¨ Sudden numbness, tingling, weakness, or loss of movement in your face, arm, or leg, especially on only one side of your body. ¨ Sudden vision changes. ¨ Sudden trouble speaking. ¨ Sudden confusion or trouble understanding simple statements. ¨ Sudden problems with walking or balance.   ¨ A sudden, severe headache that is different from past headaches. ? · You passed out (lost consciousness). ?Call your doctor now or seek immediate medical care if:  ? · You have new or increased shortness of breath. ? · You feel dizzy or lightheaded, or you feel like you may faint. ? · Your heart rate becomes irregular. ? · You can feel your heart flutter in your chest or skip heartbeats. Tell your doctor if these symptoms are new or worse. ? Watch closely for changes in your health, and be sure to contact your doctor if you have any problems. Where can you learn more? Go to http://mallory-bonnie.info/. Enter R360 in the search box to learn more about \"Atrial Flutter: Care Instructions. \"  Current as of: September 21, 2016  Content Version: 11.4  © 8311-6346 [employer]. Care instructions adapted under license by [employer] (which disclaims liability or warranty for this information). If you have questions about a medical condition or this instruction, always ask your healthcare professional. [place] [address] any warranty or liability for your use of this information. Arthritis: Care Instructions  Your Care Instructions  Arthritis, also called osteoarthritis, is a breakdown of the cartilage that cushions your joints. When the cartilage wears down, your bones rub against each other. This causes pain and stiffness. Many people have some arthritis as they age. Arthritis most often affects the joints of the spine, hands, hips, knees, or feet. You can take simple measures to protect your joints, ease your pain, and help you stay active. Follow-up care is a key part of your treatment and safety. Be sure to make and go to all appointments, and call your doctor if you are having problems. It's also a good idea to know your test results and keep a list of the medicines you take. How can you care for yourself at home? · Stay at a healthy weight.  Being overweight puts extra strain on your joints. · Talk to your doctor or physical therapist about exercises that will help ease joint pain. ¨ Stretch. You may enjoy gentle forms of yoga to help keep your joints and muscles flexible. ¨ Walk instead of jog. Other types of exercise that are less stressful on the joints include riding a bicycle, swimming, holly chi, or water exercise. ¨ Lift weights. Strong muscles help reduce stress on your joints. Stronger thigh muscles, for example, take some of the stress off of the knees and hips. Learn the right way to lift weights so you do not make joint pain worse. · Take your medicines exactly as prescribed. Call your doctor if you think you are having a problem with your medicine. · Take pain medicines exactly as directed. ¨ If the doctor gave you a prescription medicine for pain, take it as prescribed. ¨ If you are not taking a prescription pain medicine, ask your doctor if you can take an over-the-counter medicine. · Use a cane, crutch, walker, or another device if you need help to get around. These can help rest your joints. You also can use other things to make life easier, such as a higher toilet seat and padded handles on kitchen utensils. · Do not sit in low chairs, which can make it hard to get up. · Put heat or cold on your sore joints as needed. Use whichever helps you most. You also can take turns with hot and cold packs. ¨ Apply heat 2 or 3 times a day for 20 to 30 minutes-using a heating pad, hot shower, or hot pack-to relieve pain and stiffness. ¨ Put ice or a cold pack on your sore joint for 10 to 20 minutes at a time. Put a thin cloth between the ice and your skin. When should you call for help? Call your doctor now or seek immediate medical care if:  ? · You have sudden swelling, warmth, or pain in any joint. ? · You have joint pain and a fever or rash. ? · You have such bad pain that you cannot use a joint. ? Watch closely for changes in your health, and be sure to contact your doctor if:  ? · You have mild joint symptoms that continue even with more than 6 weeks of care at home. ? · You have stomach pain or other problems with your medicine. Where can you learn more? Go to http://mallory-bonnie.info/. Enter D594 in the search box to learn more about \"Arthritis: Care Instructions. \"  Current as of: October 31, 2016  Content Version: 11.4  © 4122-7788 [employer]. Care instructions adapted under license by [employer] (which disclaims liability or warranty for this information). If you have questions about a medical condition or this instruction, always ask your healthcare professional. [address] any warranty or liability for your use of this information. DASH Diet: Care Instructions  Your Care Instructions    The DASH diet is an eating plan that can help lower your blood pressure. DASH stands for Dietary Approaches to Stop Hypertension. Hypertension is high blood pressure. The DASH diet focuses on eating foods that are high in calcium, potassium, and magnesium. These nutrients can lower blood pressure. The foods that are highest in these nutrients are fruits, vegetables, low-fat dairy products, nuts, seeds, and legumes. But taking calcium, potassium, and magnesium supplements instead of eating foods that are high in those nutrients does not have the same effect. The DASH diet also includes whole grains, fish, and poultry. The DASH diet is one of several lifestyle changes your doctor may recommend to lower your high blood pressure. Your doctor may also want you to decrease the amount of sodium in your diet. Lowering sodium while following the DASH diet can lower blood pressure even further than just the DASH diet alone. Follow-up care is a key part of your treatment and safety. Be sure to make and go to all appointments, and call your doctor if you are having problems.  It's also a good idea to know your test results and keep a list of the medicines you take. How can you care for yourself at home? Following the DASH diet  · Eat 4 to 5 servings of fruit each day. A serving is 1 medium-sized piece of fruit, ½ cup chopped or canned fruit, 1/4 cup dried fruit, or 4 ounces (½ cup) of fruit juice. Choose fruit more often than fruit juice. · Eat 4 to 5 servings of vegetables each day. A serving is 1 cup of lettuce or raw leafy vegetables, ½ cup of chopped or cooked vegetables, or 4 ounces (½ cup) of vegetable juice. Choose vegetables more often than vegetable juice. · Get 2 to 3 servings of low-fat and fat-free dairy each day. A serving is 8 ounces of milk, 1 cup of yogurt, or 1 ½ ounces of cheese. · Eat 6 to 8 servings of grains each day. A serving is 1 slice of bread, 1 ounce of dry cereal, or ½ cup of cooked rice, pasta, or cooked cereal. Try to choose whole-grain products as much as possible. · Limit lean meat, poultry, and fish to 2 servings each day. A serving is 3 ounces, about the size of a deck of cards. · Eat 4 to 5 servings of nuts, seeds, and legumes (cooked dried beans, lentils, and split peas) each week. A serving is 1/3 cup of nuts, 2 tablespoons of seeds, or ½ cup of cooked beans or peas. · Limit fats and oils to 2 to 3 servings each day. A serving is 1 teaspoon of vegetable oil or 2 tablespoons of salad dressing. · Limit sweets and added sugars to 5 servings or less a week. A serving is 1 tablespoon jelly or jam, ½ cup sorbet, or 1 cup of lemonade. · Eat less than 2,300 milligrams (mg) of sodium a day. If you limit your sodium to 1,500 mg a day, you can lower your blood pressure even more. Tips for success  · Start small. Do not try to make dramatic changes to your diet all at once. You might feel that you are missing out on your favorite foods and then be more likely to not follow the plan. Make small changes, and stick with them.  Once those changes become habit, add a few more changes. · Try some of the following:  ¨ Make it a goal to eat a fruit or vegetable at every meal and at snacks. This will make it easy to get the recommended amount of fruits and vegetables each day. ¨ Try yogurt topped with fruit and nuts for a snack or healthy dessert. ¨ Add lettuce, tomato, cucumber, and onion to sandwiches. ¨ Combine a ready-made pizza crust with low-fat mozzarella cheese and lots of vegetable toppings. Try using tomatoes, squash, spinach, broccoli, carrots, cauliflower, and onions. ¨ Have a variety of cut-up vegetables with a low-fat dip as an appetizer instead of chips and dip. ¨ Sprinkle sunflower seeds or chopped almonds over salads. Or try adding chopped walnuts or almonds to cooked vegetables. ¨ Try some vegetarian meals using beans and peas. Add garbanzo or kidney beans to salads. Make burritos and tacos with mashed bautista beans or black beans. Where can you learn more? Go to http://malloryFuntigo Corporationbonnie.info/. Enter C657 in the search box to learn more about \"DASH Diet: Care Instructions. \"  Current as of: September 21, 2016  Content Version: 11.4  © 8980-8836 [email or web address]. Care instructions adapted under license by [employer] (which disclaims liability or warranty for this information). If you have questions about a medical condition or this instruction, always ask your healthcare professional. [place] [address] any warranty or liability for your use of this information. Electrolyte Imbalance: Care Instructions  Your Care Instructions  Electrolytes are minerals in your blood. They include sodium, potassium, calcium, and magnesium. When they are not at the right levels, you can feel very ill. You may not know what is causing it, but you know something is wrong. You may feel weak or numb, have muscle spasms, or twitch.  Your heart may beat fast. Symptoms are different with each mineral. Too much is as bad as too little. Minerals help keep your body working as it should. Vomiting, diarrhea, and fever can cause you to lose minerals. A problem with your kidneys can tip a mineral out of balance. So can taking certain medicines. Your doctor may do more tests. He or she may change your medicine and diet. If you are low in one or more minerals, they may be given through a tube into your vein (IV). Your doctor may have you take or drink special fluids or pills. If your kidneys are failing, your blood may be filtered. This is called dialysis. It can put the minerals back in balance. Follow-up care is a key part of your treatment and safety. Be sure to make and go to all appointments, and call your doctor if you are having problems. It's also a good idea to know your test results and keep a list of the medicines you take. How can you care for yourself at home? · Take your medicines exactly as prescribed. Call your doctor if you have any problems with your medicines. You will get more details on the specific medicines your doctor prescribes. · Do not take any medicine without talking to your doctor first. This includes prescription, over-the-counter, and herbal medicines. · If you have kidney, heart, or liver disease and have to limit fluids, talk with your doctor before you increase the amount of fluids you drink. · Your doctor or dietitian may give you a diet plan to help balance your minerals. Follow the diet carefully. When should you call for help? Call 911 anytime you think you may need emergency care. For example, call if:  ? · You passed out (lost consciousness). ? · Your heartbeat seems to be irregular. It might be speeding up and then slowing down or skipping beats. ?Call your doctor now or seek immediate medical care if:  ? · You have muscle aches. ? · You feel very weak. ? · You are confused or cannot think clearly. ? Watch closely for changes in your health, and be sure to contact your doctor if:  ? · You do not get better as expected. Where can you learn more? Go to http://mallory-bonnie.info/. Enter J879 in the search box to learn more about \"Electrolyte Imbalance: Care Instructions. \"  Current as of: May 12, 2017  Content Version: 11.4  © 3388-7347 [employer]. Care instructions adapted under license by [employer] (which disclaims liability or warranty for this information). If you have questions about a medical condition or this instruction, always ask your healthcare professional. [address] any warranty or liability for your use of this information. Osteoarthritis: Care Instructions  Your Care Instructions    Arthritis is a common health problem in which the joints are inflamed. There are several kinds of arthritis. Osteoarthritis is caused by a breakdown of cartilage, the hard, thick tissue that cushions the joints. It causes pain, stiffness, and swelling, often in the spine, fingers, hips, and knees. Osteoarthritis can happen at any age, but it is most common in older people. Osteoarthritis never goes away completely, but it can be controlled. Medicine and home treatment can reduce the pain and prevent the arthritis from getting worse. Follow-up care is a key part of your treatment and safety. Be sure to make and go to all appointments, and call your doctor if you are having problems. It's also a good idea to know your test results and keep a list of the medicines you take. How can you care for yourself at home? · Take a warm shower or bath in the morning to relieve stiffness. Avoid sitting still afterwards. · If the joint is not swollen, use moist heat, like a warm, damp towel, for 20 to 30 minutes, 2 or 3 times a day. Do not use heat on a swollen joint. · If the joint is swollen, use ice or cold packs for 10 to 20 minutes, once an hour. Cold will help relieve pain and reduce inflammation.  Put a thin cloth between the ice and your skin. · To prevent stiffness, gently move the joint through its full range of motion several times a day. · If the joint hurts, avoid activities that put a strain on it for a few days. Take rest breaks throughout the day. · Get regular exercise. Walking, swimming, yoga, biking, holly chi, and water aerobics are good exercises that are gentle on the joints. · Reach and stay at a healthy weight. If you need to lose or maintain weight, regular exercise and a healthy diet will help. Extra weight can strain the joints, especially the knees and hips, and make the pain worse. Losing even a few pounds may help. · Take pain medicines exactly as directed. ¨ If the doctor gave you a prescription medicine for pain, take it as prescribed. ¨ If you are not taking a prescription pain medicine, ask your doctor if you can take an over-the-counter medicine. When should you call for help? Call your doctor now or seek immediate medical care if:  ? · The pain is so bad that you cannot use the joint. ? · You have sudden back pain with weakness in your legs or loss of bowel or bladder control. ? · Your stools are black and tarlike or have streaks of blood. ? · You have severe pain and swelling in more than one joint. ? Watch closely for changes in your health, and be sure to contact your doctor if:  ? · You have side effects from the medicines, like belly pain, ongoing heartburn, or nausea. ? · Joint pain continues for more than 6 weeks, and home treatment is not helping. Where can you learn more? Go to http://mallory-bonnie.info/. Enter E840 in the search box to learn more about \"Osteoarthritis: Care Instructions. \"  Current as of: October 31, 2016  Content Version: 11.4  © 8594-1326 [employer]. Care instructions adapted under license by [employer] (which disclaims liability or warranty for this information).  If you have questions about a medical condition or this instruction, always ask your healthcare professional. [place] [address] any warranty or liability for your use of this information.

## 2018-06-14 NOTE — PROGRESS NOTES
Spoke with son Kimmie Padilla via phone. Confirmed plans for patient to return home. Has walker at home. Son confirms patient has help at home. [facility] taken to [facility]. [facility] agency with patient within 48 hours.   Son informed of plan and will pick patient up at 500pm

## 2018-06-14 NOTE — DISCHARGE SUMMARY
Physician Discharge Summary       Patient: Rosalino Odell MRN: [id number]  SSN: [id number]    YOB: 1950  Age: 74 y.o. Sex: male    PCP: Emma Wislon MD    Allergies: Review of patient's allergies indicates no known allergies. Admit date: 6/5/2018  Admitting Provider: Summer Swenson MD    Discharge date: 6/14/2018  Discharging Provider: Hamlet Dc MD    * Admission Diagnoses: Joint pain  Elevated troponin  HIV (human immunodeficiency virus infection) ([facility] [address]       * Discharge Diagnoses:      1. Systemic inflammation response syndrome due to #2, now resolved. 2.  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 3.  Acute on chronic bronchitis, on treatment. 4.  Human immunodeficiency virus. 5.  Chronic systolic and diastolic heart failure with ejection fraction of 55%, status post AICD. 6.  Hypokalemia, magnesium and low phosphorus. 7.  Glossitis, improving. 8.  Dyspnea on exertion from chronic obstructive pulmonary disease, much better now. 9.  Paroxysmal atrial fibrillation, rate controlled. 10.  Hypertension. 11.  Medical noncompliance. 12.  History of cerebrovascular accident in the past.  13.  History of nonsustained ventricular tachycardia in the past.  14.  History of tuberculosis in the past and status post treatment. 15.  Dyslipidemia. 16.  Cocaine and marijuana use. 17.  Diarrhea, resolved. 18.  Indeterminate elevation of troponin due to demand ischemia secondary to systemic inflammation response syndrome and hypertension, resolved. 19.  Underweight. 20.  Acute renal failure, resolved    [facility] Course: PLEASE refer previous dictated transfer summary on 6/11/18 for further detail. Patient stayed here hemodynamically stable for the rest of his hospital stay. He was kept on the same medications. [facility] did not think he needs to be transferred there and can only provide [facility].   Patient walked for more than 500 steps with a walker and climbed stairs without any issues. Patient has a walker and was advised to walk with a walker. He will remain at risk of fall if he does not use a walker. I spoke to patient's son, Abby Wagner, on the day of discharge and stated he will keep an eye on him and okay with him going home with Shaq Chen. Patient does not have most of his home medications and will be given all scripts. Patient will be discharged home today. He needs to follow with [facility].     IMAGING AND PROCEDURES:  1. Blood cultures x2 negative. 2.  Stool C. difficile negative. 3.  Urine culture x1 negative. 4.  CT chest was done, reported the patient having multiple micronodules in peribronchial area, especially on the right side, could be acute bronchitis, resolution of the prior pneumonia, and right renal upper pole low density mass is consistent with a cyst.  5.  CT head was done at admission, reported no acute infarct, mass or hemorrhage. 6.  Renal ultrasound was done, showed finding consistent with chronic medical disease. 7.  Ultrasound of the right face was negative for any abscess or fluid collection. 8.  X-ray of the right wrist showed mild dorsal soft tissue swelling. 9.  Chest x-ray x1 was reported normal.  10.  Echocardiogram was done, showed ejection fraction around 55% to 60%.     CONSULTANTS:  1. Cardiology with Dr. Saundra Camacho and group. 2.  ID with Dr. Mahendra Brandt and group. 3.  Ortho with Dr. Jeniffer Quintana and group. 4.  Nephrology with Dr. Vira Hernandez and group. * Discharge Condition: improved  * Disposition: Home    Discharge Medications:  Current Discharge Medication List      START taking these medications    Details   !! amLODIPine (NORVASC) 10 mg tablet Take 1 Tab by mouth daily. Qty: 30 Tab, Refills: 0      budesonide-formoterol (SYMBICORT) 160-4.5 mcg/actuation HFAA Take 2 Puffs by inhalation two (2) times a day.   Qty: 1 Inhaler, Refills: 1      diclofenac (VOLTAREN) 1 % gel Apply 2 g to affected area three (3) times daily as needed for Pain. Apply dose (2 gm or 4 gm) to each location. If treatment site is the hands, patients should wait at least one (1) hour to wash their hands. APPLY TO right wrist  Qty: 100 g, Refills: 0      azithromycin (ZITHROMAX) 250 mg tablet 500 MG PO DAILY X 2 DAYS  Qty: 4 Tab, Refills: 0      nicotine (NICODERM CQ) 14 mg/24 hr patch 1 Patch by TransDERmal route daily for 30 days. Qty: 30 Patch, Refills: 0      acetaminophen (TYLENOL) 500 mg tablet Take 1 Tab by mouth every six (6) hours as needed. Indications: Pain  Qty: 20 Tab, Refills: 0       !! - Potential duplicate medications found. Please discuss with provider. CONTINUE these medications which have CHANGED    Details   albuterol (PROVENTIL HFA) 90 mcg/actuation inhaler Take 1 Puff by inhalation every four (4) hours as needed for Wheezing. Qty: 1 Inhaler, Refills: 0      apixaban (ELIQUIS) 5 mg tablet Take 1 Tab by mouth every twelve (12) hours. Qty: 60 Tab, Refills: 0      carvedilol (COREG) 25 mg tablet Take 0.5 Tabs by mouth two (2) times daily (with meals). Qty: 30 Tab, Refills: 0      dapsone 100 mg tablet Take 1 Tab by mouth daily. Qty: 30 Tab, Refills: 0      dolutegravir (TIVICAY) 50 mg tab tablet Take 1 Tab by mouth daily. Qty: 30 Tab, Refills: 0      emtricitabine-tenofovir alafen (DESCOVY) tablet Take 1 Tab by mouth daily. Qty: 30 Tab, Refills: 0      folic acid (FOLVITE) 1 mg tablet Take 1 Tab by mouth daily. Qty: 30 Tab, Refills: 0      magnesium oxide 400 mg cap Take 400 mg by mouth daily. Qty: 30 Cap, Refills: 0      pravastatin (PRAVACHOL) 40 mg tablet Take 1 Tab by mouth nightly. Qty: 30 Tab, Refills: 0      pyridoxine, vitamin B6, (VITAMIN B-6) 50 mg tablet Take 1 Tab by mouth daily. Qty: 30 Tab, Refills: 0         CONTINUE these medications which have NOT CHANGED    Details   !! amLODIPine (NORVASC) 5 mg tablet Take 5 mg by mouth two (2) times a day.        !! - Potential duplicate medications found. Please discuss with provider. STOP taking these medications       spironolactone (ALDACTONE) 25 mg tablet Comments:   Reason for Stopping:         digoxin (LANOXIN) 0.25 mg tablet Comments:   Reason for Stopping:         furosemide (LASIX) 40 mg tablet Comments:   Reason for Stopping:         potassium chloride (K-DUR, KLOR-CON) 20 mEq tablet Comments:   Reason for Stopping:         lisinopril (PRINIVIL, ZESTRIL) 10 mg tablet Comments:   Reason for Stopping:         amoxicillin 500 mg tab Comments:   Reason for Stopping:               * Follow-up Care/Patient Instructions:   Activity: PT/OT per Home Health  Diet: Cardiac Diet  Wound Care: None needed    Follow-up Information     Follow up With Details Comments [address] Luna MD  [facility] [address]  [address]  [phone]      Weston Sigala MD Go on 7/10/2018 follow up @1:45pm [address] [place] [address] [address] [facility] [address]  [phone]            Signed:  Silver Galarza MD  6/14/2018  3:26 PM

## 2018-06-14 NOTE — PROGRESS NOTES
Faxed Located within [facility] orders, H&P and progress notes to Esdras Lambert at [facility] [employer] number [phone].

## 2018-06-14 NOTE — PROGRESS NOTES
SUBJECTIVE:    Feeling better. No chest or abdominal pain. No Nausea or vomiting. No more falls. No SOB or new cough. Lives alone but has family to keep an eye on him. Talked to [address] [facility] [Ms. Hair Sommer and Dr. Waqar Iverson - no medical necessity for inpatient transfer, has no service connection for SNF benefit. [facility] is an option. Talked to patient's son # [phone] [gabriela] - agree with [facility], Wants to talk to . Will need all his medications. I made patient walk with a walker in hallway for more than 500 steps in presence of nursing staff, patient had no issues. Walks slowly, good balance control while using a walker. I went over his home medications list and update home MAR. Printed all scripts. OBJECTIVE:    /79 (BP 1 Location: Left arm, BP Patient Position: At rest)  Pulse 65  Temp 99 °F (37.2 °C)  Resp 18  Ht 6' 3\" (1.905 m)  Wt 76.1 kg (167 lb 12.3 oz)  SpO2 98%  BMI 20.97 kg/m2    CVS: RRR, AICD +  RS: CTA bilaterally, no wheezes  GI: NT, BS +  Extremities: no pedal edema  CNS: motor strength 5/5 in all 4 extremities but limited ROM of right wrist.  Able to hold a walker well. No ataxia. No tremors. General: NAD, Follows commands    ASSESSMENT:    1. SIRS versus Sepsis sec to # 2. Resolved. 2. 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 Chronic systolic heart failure with EF of around 55%. S/p AICD. Compensated. 4. Hypokalemia and hypomagnesemia. 5. BACH could be from CHD and/or presumed COPD, back to baseline  6. Human immunodeficiency virus. 7. Paroxysmal Atrial fibrillation, rate controlled. 8. Hypertension. 9. Noncompliance with the medications and diet compliance. 10. History of cerebrovascular accident in the past.  11. History of nonsustained ventricular tachycardia in the past.  12. History of tuberculosis, status post treatment. 13. Dyslipidemia. 14. Cocaine and marijuana use. 15. Diarrhea, improved  16. Indeterminate elevation of troponin due to demand ischemia sec to sepsis   17. Underweight  18. IRAIDA, resolved   19. Glossitis   20. Acute on chronic bronchitis     PLAN:    Cont current management  Patient is much better physically on my clinical exam.  Asked PT/OT to see him. Trying to get hold of CM for last 45 minutes but no response. Patient can be discharge home once seen by PT/OT AND CM talk to patient's son about someone to keep an eye on him, his medical transportation need and setting up [place] [id number].     --> total tie spent on this patient care more than an hour due to lack of essential services at this hospital and improper help from administration. CMP:   Lab Results   Component Value Date/Time     06/14/2018 05:24 AM    K 3.7 06/14/2018 05:24 AM     06/14/2018 05:24 AM    CO2 30 06/14/2018 05:24 AM    AGAP 6 06/14/2018 05:24 AM    GLU 94 06/14/2018 05:24 AM    BUN 13 06/14/2018 05:24 AM    CREA 0.80 06/14/2018 05:24 AM    GFRAA >60 06/14/2018 05:24 AM    GFRNA >60 06/14/2018 05:24 AM    CA 9.1 06/14/2018 05:24 AM     CBC:   Lab Results   Component Value Date/Time    WBC 12.6 06/14/2018 05:24 AM    HGB 9.0 (L) 06/14/2018 05:24 AM    HCT 27.5 (L) 06/14/2018 05:24 AM     06/14/2018 05:24 AM       Current Discharge Medication List      START taking these medications    Details   !! amLODIPine (NORVASC) 10 mg tablet Take 1 Tab by mouth daily. Qty: 30 Tab, Refills: 0      budesonide-formoterol (SYMBICORT) 160-4.5 mcg/actuation HFAA Take 2 Puffs by inhalation two (2) times a day. Qty: 1 Inhaler, Refills: 1      diclofenac (VOLTAREN) 1 % gel Apply 2 g to affected area three (3) times daily as needed for Pain. Apply dose (2 gm or 4 gm) to each location. If treatment site is the hands, patients should wait at least one (1) hour to wash their hands. APPLY TO right wrist  Qty: 100 g, Refills: 0      azithromycin (ZITHROMAX) 250 mg tablet 500 MG PO DAILY X 2 DAYS  Qty: 4 Tab, Refills: 0 nicotine (NICODERM CQ) 14 mg/24 hr patch 1 Patch by TransDERmal route daily for 30 days. Qty: 30 Patch, Refills: 0      acetaminophen (TYLENOL) 500 mg tablet Take 1 Tab by mouth every six (6) hours as needed. Indications: Pain  Qty: 20 Tab, Refills: 0       !! - Potential duplicate medications found. Please discuss with provider. CONTINUE these medications which have CHANGED    Details   albuterol (PROVENTIL HFA) 90 mcg/actuation inhaler Take 1 Puff by inhalation every four (4) hours as needed for Wheezing. Qty: 1 Inhaler, Refills: 0      apixaban (ELIQUIS) 5 mg tablet Take 1 Tab by mouth every twelve (12) hours. Qty: 60 Tab, Refills: 0      carvedilol (COREG) 25 mg tablet Take 0.5 Tabs by mouth two (2) times daily (with meals). Qty: 30 Tab, Refills: 0      dapsone 100 mg tablet Take 1 Tab by mouth daily. Qty: 30 Tab, Refills: 0      dolutegravir (TIVICAY) 50 mg tab tablet Take 1 Tab by mouth daily. Qty: 30 Tab, Refills: 0      emtricitabine-tenofovir alafen (DESCOVY) tablet Take 1 Tab by mouth daily. Qty: 30 Tab, Refills: 0      folic acid (FOLVITE) 1 mg tablet Take 1 Tab by mouth daily. Qty: 30 Tab, Refills: 0      magnesium oxide 400 mg cap Take 400 mg by mouth daily. Qty: 30 Cap, Refills: 0      pravastatin (PRAVACHOL) 40 mg tablet Take 1 Tab by mouth nightly. Qty: 30 Tab, Refills: 0      pyridoxine, vitamin B6, (VITAMIN B-6) 50 mg tablet Take 1 Tab by mouth daily. Qty: 30 Tab, Refills: 0         CONTINUE these medications which have NOT CHANGED    Details   !! amLODIPine (NORVASC) 5 mg tablet Take 5 mg by mouth two (2) times a day. !! - Potential duplicate medications found. Please discuss with provider.       STOP taking these medications       spironolactone (ALDACTONE) 25 mg tablet Comments:   Reason for Stopping:         digoxin (LANOXIN) 0.25 mg tablet Comments:   Reason for Stopping:         furosemide (LASIX) 40 mg tablet Comments:   Reason for Stopping:         potassium chloride (K-DUR, KLOR-CON) 20 mEq tablet Comments:   Reason for Stopping:         lisinopril (PRINIVIL, ZESTRIL) 10 mg tablet Comments:   Reason for Stopping:         amoxicillin 500 mg tab Comments:   Reason for Stopping:

## 2018-06-14 NOTE — PROGRESS NOTES
Problem: Falls - Risk of  Goal: *Absence of Falls  Document Kylee Fall Risk and appropriate interventions in the flowsheet. Outcome: Progressing Towards Goal  Fall Risk Interventions:  Mobility Interventions: Patient to call before getting OOB, Utilize walker, cane, or other assitive device    Mentation Interventions: Bed/chair exit alarm, Door open when patient unattended, More frequent rounding, Reorient patient    Medication Interventions: Patient to call before getting OOB    Elimination Interventions: Call light in reach    History of Falls Interventions: Bed/chair exit alarm, Door open when patient unattended        Problem: Pressure Injury - Risk of  Goal: *Prevention of pressure injury  Document Ba Scale and appropriate interventions in the flowsheet.    Outcome: Progressing Towards Goal  Pressure Injury Interventions:  Sensory Interventions: Maintain/enhance activity level    Moisture Interventions: Check for incontinence Q2 hours and as needed    Activity Interventions: Increase time out of bed    Mobility Interventions: Pressure redistribution bed/mattress (bed type)    Nutrition Interventions: Document food/fluid/supplement intake    Friction and Shear Interventions: Apply protective barrier, creams and emollients, HOB 30 degrees or less, Minimize layers

## 2018-06-14 NOTE — PROGRESS NOTES
Problem: Self Care Deficits Care Plan (Adult)  Goal: *Acute Goals and Plan of Care (Insert Text)  Occupational Therapy Goals  Initiated 6/7/2018 within 7 day(s). 1.  Patient will perform self-feeding with supervision/set-up. 2.  Patient will perform grooming with supervision/set-up while seated on EOB. 3.  Patient will perform upper body dressing with minimal assistance/contact guard assist.  4.  Patient will perform toilet transfers with minimal assistance/contact guard assist.  5.  Patient will demonstrate right wrist extension and intrinsic with 4/5 strength in preparation for his efficient completion of his self care routine  6. Patient will demonstrate independence with right hand program       Outcome: Progressing Towards Goal  Occupational Therapy TREATMENT    Patient: Liza Bowers (13 y.o. male)  Date: 6/14/2018  Diagnosis: Joint pain  Elevated troponin  HIV (human immunodeficiency virus infection) ([facility] [address] Right forearm cellulitis       Precautions: Fall, Skin  Chart, occupational therapy assessment, plan of care, and goals were reviewed. ASSESSMENT:  Pt is sitting up in the chair, reports his R wrist feels weak and hurts when he is trying to feed himself. Pt was educated on wearing RUE brace for wrist support for joint protection during self-feeding and functional mobility, pt verbalized understanding. Pt was able to don brace independently. Pt initially attempting to self-feed w/LUE, with encouragement pt was able to use RUE to self-feed, requiring CGA to initiate, following which pt was able to perform task w/set-up. Pt is adamantly refusing to participate in functional mobility to the  for toilet txfr training, stating he already walked with doctor (Dr. Bryan Major) and feels tired. Pt was educated on role of OT in acute setting and on the importance of participating in functional mobility for ADL training to improve independence and safety. Pt verbalized understanding, cont to refuse.   Progression toward goals:  []          Improving appropriately and progressing toward goals  [x]          Improving slowly and progressing toward goals  []          Not making progress toward goals and plan of care will be adjusted     PLAN:  Patient continues to benefit from skilled intervention to address the above impairments. Continue treatment per established plan of care. Discharge Recommendations:  Home Health w/Supervision vs [place]  Further Equipment Recommendations for Discharge:  N/A      G-CODES:     Self Care  Current  CJ= 20-39%   Goal  CK= 40-59%. The severity rating is based on the Level of Assistance required for Functional Mobility and ADLs. SUBJECTIVE:   Patient stated I am not getting up.     OBJECTIVE DATA SUMMARY:   Cognitive/Behavioral Status:  Neurologic State: Alert  Orientation Level: Oriented X4  Cognition: Follows commands  Safety/Judgement: Awareness of environment, Fall prevention    Functional Mobility and Transfers for ADLs:   Balance:  Sitting: Intact    ADL Intervention:       Feeding  Feeding Assistance: Supervision/set-up  Utensil Management: Supervision/set-up  Food to Mouth: Supervision/set-up    Grooming  Grooming Assistance: Supervision/set up  Brushing Teeth: Supervision/set-up     Pain:  Pt didn't rate pain or discomfort prior to treatment.    Pt didn't rate pain or discomfort post treatment. Activity Tolerance:    Fair    Please refer to the flowsheet for vital signs taken during this treatment.   After treatment:   [x]  Patient left in no apparent distress sitting up in chair  []  Patient left in no apparent distress in bed  [x]  Call bell left within reach  [x]  Nursing notified  []  Caregiver present  []  Bed alarm activated    MELODY Cooper/L  Time Calculation: 23 mins

## 2018-06-14 NOTE — PROGRESS NOTES
Problem: Mobility Impaired (Adult and Pediatric)  Goal: *Acute Goals and Plan of Care (Insert Text)  Physical Therapy Goals  Initiated 6/7/2018 and to be accomplished within 7 day(s)  1. Patient will move from supine to sit and sit to supine , scoot up and down and roll side to side in bed with supervision/set-up. 2.  Patient will transfer from bed to chair and chair to bed with supervision/set-up using the least restrictive device. 3.  Patient will perform sit to stand with supervision/set-up. 4.  Patient will ambulate with supervision/set-up for >50 feet with the least restrictive device. 5.  Patient will ascend/descend 3 stairs with B handrail(s) with minimal assistance/contact guard assist.   Outcome: Progressing Towards Goal  physical Therapy TREATMENT    Patient: Rosalino Odell (5 y.o. male)  Date: 6/14/2018  Diagnosis: Joint pain  Elevated troponin  HIV (human immunodeficiency virus infection) ([facility] [address] Right forearm cellulitis  Precautions: Fall, Skin   Chart, physical therapy assessment, plan of care and goals were reviewed. OBJECTIVE/ ASSESSMENT:  Patient found supine in bed willing to work with PT when encouraged by MD. Pt ambulated in increased distance into hallway this tx presenting with forward flexed posture and early heel rise with gait training. Pt is able to negotiate 11 stairs this tx. Pt ascends facing forward (step over step) and descends while facing handrail (Left LE descends first each step). Pt ambulated back to room and performed UE therex at EOB. Pt was left sitting at EOB. Pt educated in upper back strengthening and shoulder stretches to help correct forward posture / rounded shoulder. Education: transfers, gait, therex.   Progression toward goals:  [x]      Improving appropriately and progressing toward goals  []      Improving slowly and progressing toward goals  []      Not making progress toward goals and plan of care will be adjusted     PLAN:  Patient continues to benefit from skilled intervention to address the above impairments. Continue treatment per established plan of care. Discharge Recommendations:  Home Health  Further Equipment Recommendations for Discharge:  Pt has all needs at home met including RW, cane and and elevated commode seat. SUBJECTIVE:   Patient stated I walk bent forward and I hate it.     OBJECTIVE DATA SUMMARY:   Critical Behavior:  Neurologic State: Alert  Orientation Level: Oriented X4  Cognition: Follows commands  Safety/Judgement: Awareness of environment, Fall prevention  Functional Mobility Training:  Bed Mobility:  Supine to Sit: Supervision  Transfers:  Sit to Stand: Supervision  Stand to Sit: Supervision  Balance:  Sitting: Intact  Standing: Impaired; With support  Standing - Static: Fair  Standing - Dynamic : Fair  Ambulation/Gait Training:  Distance (ft): 185 Feet (ft)  Assistive Device: Walker, rolling  Ambulation - Level of Assistance: Contact guard assistance  Gait Abnormalities: Shuffling gait; Decreased step clearance;Early heel rise  Base of Support: Narrowed  Stance: Weight shift  Speed/Mallory: Slow;Shuffled  Step Length: Left shortened;Right shortened  Therapeutic Exercises:       EXERCISE   Sets   Reps   Active Active Assist   Passive Self- assited ROM   Comments   Rows 1 10  [x] [] [] [] Bilaterally   Quad Sets/Glut Sets   [] [] [] []    Doorway Stretch   [] [] [] [] Instructed, but not performed encouraged pt to attempt with HHPT. Short Arc Quads   [] [] [] []    Heel Slides   [] [] [] []    Straight Leg Raises   [] [] [] []    Hip Abd/Add   [] [] [] []    Long Arc Quads   [] [] [] []    Seated Marching   [] [] [] []    Standing Marching   [] [] [] []       [] [] [] []      Pain:  Pre tx pain: 0  Post tx pain: 0  Activity Tolerance:   Fair  Please refer to the flowsheet for vital signs taken during this treatment.   After treatment:   [] Patient left in no apparent distress sitting EOB  [] Patient left in no apparent distress in bed  [x] Call bell left within reach  [] Nursing notified  [] Caregiver present  [] Bed alarm activated  [] SCDs applied  [] Ice applied      Carlos Roberts PTA   Time Calculation: 23 mins    Mobility  Current  CI= 1-19%. The severity rating is based on the Level of Assistance required for Functional Mobility and ADLs. Mobility   Goal  CI= 1-19%. The severity rating is based on the Level of Assistance required for Functional Mobility and ADLs.

## 2018-09-26 ENCOUNTER — APPOINTMENT (OUTPATIENT)
Dept: GENERAL RADIOLOGY | Age: 68
End: 2018-09-26
Attending: EMERGENCY MEDICINE
Payer: OTHER GOVERNMENT

## 2018-09-26 ENCOUNTER — HOSPITAL ENCOUNTER (EMERGENCY)
Age: 68
Discharge: HOME | End: 2018-09-26
Attending: EMERGENCY MEDICINE
Payer: OTHER GOVERNMENT

## 2018-09-26 VITALS
RESPIRATION RATE: 25 BRPM | DIASTOLIC BLOOD PRESSURE: 60 MMHG | TEMPERATURE: 99.8 F | OXYGEN SATURATION: 93 % | HEART RATE: 67 BPM | SYSTOLIC BLOOD PRESSURE: 128 MMHG

## 2018-09-26 DIAGNOSIS — N41.0 ACUTE PROSTATITIS: Primary | ICD-10-CM

## 2018-09-26 DIAGNOSIS — N34.2 INFECTIVE URETHRITIS: ICD-10-CM

## 2018-09-26 LAB
ALBUMIN SERPL-MCNC: 3.7 G/DL (ref 3.4–5)
ALBUMIN/GLOB SERPL: 0.7 {RATIO} (ref 0.8–1.7)
ALP SERPL-CCNC: 140 U/L (ref 45–117)
ALT SERPL-CCNC: 17 U/L (ref 16–61)
ANION GAP SERPL CALC-SCNC: 6 MMOL/L (ref 3–18)
APPEARANCE UR: ABNORMAL
AST SERPL-CCNC: 19 U/L (ref 15–37)
BACTERIA URNS QL MICRO: ABNORMAL /HPF
BASOPHILS # BLD: 0 K/UL (ref 0–0.06)
BASOPHILS NFR BLD: 0 % (ref 0–3)
BILIRUB SERPL-MCNC: 1.5 MG/DL (ref 0.2–1)
BILIRUB UR QL: ABNORMAL
BUN SERPL-MCNC: 10 MG/DL (ref 7–18)
BUN/CREAT SERPL: 9 (ref 12–20)
CALCIUM SERPL-MCNC: 9.5 MG/DL (ref 8.5–10.1)
CHLORIDE SERPL-SCNC: 105 MMOL/L (ref 100–108)
CO2 SERPL-SCNC: 28 MMOL/L (ref 21–32)
COLOR UR: ABNORMAL
CREAT SERPL-MCNC: 1.06 MG/DL (ref 0.6–1.3)
DIFFERENTIAL METHOD BLD: ABNORMAL
EOSINOPHIL # BLD: 0 K/UL (ref 0–0.4)
EOSINOPHIL NFR BLD: 0 % (ref 0–5)
EPITH CASTS URNS QL MICRO: ABNORMAL /LPF (ref 0–5)
ERYTHROCYTE [DISTWIDTH] IN BLOOD BY AUTOMATED COUNT: 13 % (ref 11.6–14.5)
GLOBULIN SER CALC-MCNC: 5.1 G/DL (ref 2–4)
GLUCOSE SERPL-MCNC: 97 MG/DL (ref 74–99)
GLUCOSE UR STRIP.AUTO-MCNC: NEGATIVE MG/DL
HCT VFR BLD AUTO: 34.5 % (ref 36–48)
HGB BLD-MCNC: 11.4 G/DL (ref 13–16)
HGB UR QL STRIP: ABNORMAL
KETONES UR QL STRIP.AUTO: NEGATIVE MG/DL
LACTATE BLD-SCNC: 1 MMOL/L (ref 0.4–2)
LEUKOCYTE ESTERASE UR QL STRIP.AUTO: ABNORMAL
LYMPHOCYTES # BLD: 0.8 K/UL (ref 0.8–3.5)
LYMPHOCYTES NFR BLD: 9 % (ref 20–51)
MCH RBC QN AUTO: 31.5 PG (ref 24–34)
MCHC RBC AUTO-ENTMCNC: 33 G/DL (ref 31–37)
MCV RBC AUTO: 95.3 FL (ref 74–97)
MONOCYTES # BLD: 1.4 K/UL (ref 0–1)
MONOCYTES NFR BLD: 16 % (ref 2–9)
NEUTS SEG # BLD: 6.4 K/UL (ref 1.8–8)
NEUTS SEG NFR BLD: 75 % (ref 42–75)
NITRITE UR QL STRIP.AUTO: POSITIVE
PH UR STRIP: 7 [PH] (ref 5–8)
PLATELET # BLD AUTO: 252 K/UL (ref 135–420)
PLATELET COMMENTS,PCOM: ABNORMAL
PMV BLD AUTO: 12.3 FL (ref 9.2–11.8)
POTASSIUM SERPL-SCNC: 3.3 MMOL/L (ref 3.5–5.5)
PROT SERPL-MCNC: 8.8 G/DL (ref 6.4–8.2)
PROT UR STRIP-MCNC: 300 MG/DL
RBC # BLD AUTO: 3.62 M/UL (ref 4.7–5.5)
RBC #/AREA URNS HPF: ABNORMAL /HPF (ref 0–5)
RBC MORPH BLD: ABNORMAL
SODIUM SERPL-SCNC: 139 MMOL/L (ref 136–145)
SP GR UR REFRACTOMETRY: 1.02 (ref 1–1.03)
UROBILINOGEN UR QL STRIP.AUTO: 1 EU/DL (ref 0.2–1)
WBC # BLD AUTO: 8.6 K/UL (ref 4.6–13.2)
WBC URNS QL MICRO: ABNORMAL /HPF (ref 0–4)

## 2018-09-26 PROCEDURE — 93005 ELECTROCARDIOGRAM TRACING: CPT

## 2018-09-26 PROCEDURE — 87040 BLOOD CULTURE FOR BACTERIA: CPT

## 2018-09-26 PROCEDURE — 80053 COMPREHEN METABOLIC PANEL: CPT

## 2018-09-26 PROCEDURE — 85025 COMPLETE CBC W/AUTO DIFF WBC: CPT

## 2018-09-26 PROCEDURE — 74011000258 HC RX REV CODE- 258: Performed by: EMERGENCY MEDICINE

## 2018-09-26 PROCEDURE — 96365 THER/PROPH/DIAG IV INF INIT: CPT

## 2018-09-26 PROCEDURE — 83605 ASSAY OF LACTIC ACID: CPT

## 2018-09-26 PROCEDURE — 74011250637 HC RX REV CODE- 250/637: Performed by: EMERGENCY MEDICINE

## 2018-09-26 PROCEDURE — 99285 EMERGENCY DEPT VISIT HI MDM: CPT

## 2018-09-26 PROCEDURE — 96361 HYDRATE IV INFUSION ADD-ON: CPT

## 2018-09-26 PROCEDURE — 81001 URINALYSIS AUTO W/SCOPE: CPT

## 2018-09-26 PROCEDURE — 96367 TX/PROPH/DG ADDL SEQ IV INF: CPT

## 2018-09-26 PROCEDURE — 87086 URINE CULTURE/COLONY COUNT: CPT

## 2018-09-26 PROCEDURE — 74011250636 HC RX REV CODE- 250/636: Performed by: STUDENT IN AN ORGANIZED HEALTH CARE EDUCATION/TRAINING PROGRAM

## 2018-09-26 PROCEDURE — 71045 X-RAY EXAM CHEST 1 VIEW: CPT

## 2018-09-26 PROCEDURE — 74011250636 HC RX REV CODE- 250/636: Performed by: EMERGENCY MEDICINE

## 2018-09-26 RX ORDER — HYDROCODONE BITARTRATE AND ACETAMINOPHEN 5; 325 MG/1; MG/1
1 TABLET ORAL
Status: COMPLETED | OUTPATIENT
Start: 2018-09-26 | End: 2018-09-26

## 2018-09-26 RX ORDER — PHENAZOPYRIDINE HYDROCHLORIDE 200 MG/1
200 TABLET, FILM COATED ORAL ONCE
Status: COMPLETED | OUTPATIENT
Start: 2018-09-26 | End: 2018-09-26

## 2018-09-26 RX ORDER — CIPROFLOXACIN 500 MG/1
500 TABLET ORAL 2 TIMES DAILY
Qty: 20 TAB | Refills: 0 | Status: SHIPPED | OUTPATIENT
Start: 2018-09-26 | End: 2018-10-06

## 2018-09-26 RX ORDER — SODIUM CHLORIDE 9 MG/ML
1000 INJECTION, SOLUTION INTRAVENOUS ONCE
Status: COMPLETED | OUTPATIENT
Start: 2018-09-26 | End: 2018-09-26

## 2018-09-26 RX ORDER — SODIUM CHLORIDE 0.9 % (FLUSH) 0.9 %
5-10 SYRINGE (ML) INJECTION AS NEEDED
Status: DISCONTINUED | OUTPATIENT
Start: 2018-09-26 | End: 2018-09-27 | Stop reason: HOSPADM

## 2018-09-26 RX ORDER — PHENAZOPYRIDINE HYDROCHLORIDE 200 MG/1
200 TABLET, FILM COATED ORAL 3 TIMES DAILY
Qty: 6 TAB | Refills: 0 | Status: SHIPPED | OUTPATIENT
Start: 2018-09-26 | End: 2018-09-28

## 2018-09-26 RX ORDER — ACETAMINOPHEN 325 MG/1
650 TABLET ORAL
Status: COMPLETED | OUTPATIENT
Start: 2018-09-26 | End: 2018-09-26

## 2018-09-26 RX ORDER — CIPROFLOXACIN 2 MG/ML
400 INJECTION, SOLUTION INTRAVENOUS
Status: COMPLETED | OUTPATIENT
Start: 2018-09-26 | End: 2018-09-26

## 2018-09-26 RX ADMIN — PIPERACILLIN SODIUM,TAZOBACTAM SODIUM 3.38 G: 3; .375 INJECTION, POWDER, FOR SOLUTION INTRAVENOUS at 20:11

## 2018-09-26 RX ADMIN — ACETAMINOPHEN 650 MG: 325 TABLET ORAL at 19:13

## 2018-09-26 RX ADMIN — Medication 10 ML: at 20:14

## 2018-09-26 RX ADMIN — HYDROCODONE BITARTRATE AND ACETAMINOPHEN 1 TABLET: 5; 325 TABLET ORAL at 21:29

## 2018-09-26 RX ADMIN — PHENAZOPYRIDINE HYDROCHLORIDE 200 MG: 200 TABLET ORAL at 21:29

## 2018-09-26 RX ADMIN — CIPROFLOXACIN 400 MG: 2 INJECTION, SOLUTION INTRAVENOUS at 21:29

## 2018-09-26 RX ADMIN — SODIUM CHLORIDE 1000 ML: 900 INJECTION, SOLUTION INTRAVENOUS at 19:17

## 2018-09-26 NOTE — ED PROVIDER NOTES
[facility] Emergency Department Treatment Report Patient: Courtney Luu Age: 79 y.o. Sex: male YOB: 1950 Admit Date: 9/26/2018 PCP: Eneida Nam MD  
MRN: [id number]  CSN: [id number] Room: [place] [address] Time Dictated: 6:34 PM   
 
Chief Complaint Chief Complaint Patient presents with  Blood in Urine  Abdominal Pain  Anal Pain History of Present Illness 79 y.o. male with history of HTN and HIV presenting 1 day s/p prostate biopsy with fever, cough, LLQ pain, hematuria, and rectal pain. Patient denies N/V/D, dysuria, bloody or black stools, flank pain, chest pain. Smoker. Last viral load undetectable. Review of Systems Constitutional: No fever, chills, or weight loss Eyes: No visual symptoms. ENT: No sore throat, runny nose or ear pain. Respiratory: No cough, dyspnea or wheezing. Cardiovascular: No chest pain, pressure, palpitations, tightness or heaviness. Gastrointestinal: No vomiting, diarrhea or abdominal pain. Genitourinary: No dysuria, frequency, or urgency. Musculoskeletal: No joint pain or swelling. Integumentary: No rashes. Neurological: No headaches, sensory or motor symptoms. Denies complaints in all other systems. Past Medical/Surgical History Past Medical History:  
Diagnosis Date  Autoimmune disease ([address] AIDS? HIV  Hypertension  Rectal bleeding  Stroke ([facility] [address] Past Surgical History:  
Procedure Laterality Date  HX ORTHOPAEDIC    
 back Social History Social History Social History  Marital status:  Spouse name: N/A  
 Number of children: N/A  
 Years of education: N/A Social History Main Topics  Smoking status: Current Every Day Smoker Packs/day: 1.00 Types: Cigarettes  Smokeless tobacco: Never Used  Alcohol use 3.5 oz/week 7 Standard drinks or equivalent per week Comment: \"drink everyday but never alone\"  Drug use:  Yes  
 Special: Marijuana  Sexual activity: Not Currently Other Topics Concern  Not on file Social History Narrative Family History No family history on file. Home Medications Prior to Admission Medications Prescriptions Last Dose Informant Patient Reported? Taking?  
acetaminophen (TYLENOL) 500 mg tablet   No No  
Sig: Take 1 Tab by mouth every six (6) hours as needed. Indications: Pain  
albuterol (PROVENTIL HFA) 90 mcg/actuation inhaler   No No  
Sig: Take 1 Puff by inhalation every four (4) hours as needed for Wheezing. amLODIPine (NORVASC) 10 mg tablet   No No  
Sig: Take 1 Tab by mouth daily. amLODIPine (NORVASC) 5 mg tablet   Yes No  
Sig: Take 5 mg by mouth two (2) times a day. apixaban (ELIQUIS) 5 mg tablet   No No  
Sig: Take 1 Tab by mouth every twelve (12) hours. budesonide-formoterol (SYMBICORT) 160-4.5 mcg/actuation HFAA   No No  
Sig: Take 2 Puffs by inhalation two (2) times a day. carvedilol (COREG) 25 mg tablet   No No  
Sig: Take 0.5 Tabs by mouth two (2) times daily (with meals). dapsone 100 mg tablet   No No  
Sig: Take 1 Tab by mouth daily. diclofenac (VOLTAREN) 1 % gel   No No  
Sig: Apply 2 g to affected area three (3) times daily as needed for Pain. Apply dose (2 gm or 4 gm) to each location. If treatment site is the hands, patients should wait at least one (1) hour to wash their hands. APPLY TO right wrist  
dolutegravir (TIVICAY) 50 mg tab tablet   No No  
Sig: Take 1 Tab by mouth daily. emtricitabine-tenofovir alafen (DESCOVY) tablet   No No  
Sig: Take 1 Tab by mouth daily. folic acid (FOLVITE) 1 mg tablet   No No  
Sig: Take 1 Tab by mouth daily. magnesium oxide 400 mg cap   No No  
Sig: Take 400 mg by mouth daily. pravastatin (PRAVACHOL) 40 mg tablet   No No  
Sig: Take 1 Tab by mouth nightly. pyridoxine, vitamin B6, (VITAMIN B-6) 50 mg tablet   No No  
Sig: Take 1 Tab by mouth daily. Facility-Administered Medications: None Allergies No Known Allergies Physical Exam  
ED Triage Vitals ED Encounter Vitals Group BP 09/26/18 1823 143/78 Pulse (Heart Rate) 09/26/18 1823 65 Resp Rate 09/26/18 1823 20 Temp 09/26/18 1823 101.1 °F (38.4 °C) Temp src --   
   O2 Sat (%) 09/26/18 1823 99 % Weight -- Height --   
 
Constitutional: Patient appears well developed and well nourished. Appearance and behavior are age and situation appropriate. HEENT: Conjunctiva clear. PERRLA. Mucous membranes moist, non-erythematous. Surface of the pharynx, palate, and tongue are pink, moist and without lesions. Neck: supple, non tender, symmetrical, no masses or JVD. Respiratory: diffuse rhonci bilaterally, conversational dyspnea. No wheezing or accessory muscle use Cardiovascular: heart regular rate and rhythm without murmur rubs or gallops. Calves soft and non-tender. Distal pulses 2+ and equal bilaterally. No peripheral edema. Gastrointestinal:  Abdomen soft, nondistended. LLQ TTP without guarding or rebound Musculoskeletal: Nail beds pink with prompt capillary refill Integumentary: warm and dry without rashes or lesions Neurologic: alert and oriented, Sensation intact, motor strength equal and symmetric. No facial asymmetry or dysarthria. Back: no CVA tenderness Rectal: No toy blood or pus, prostate moderately tender, hemoccult negative Diagnostic Studies Lab:  
Recent Results (from the past 12 hour(s)) METABOLIC PANEL, COMPREHENSIVE Collection Time: 09/26/18  6:40 PM  
Result Value Ref Range Sodium 139 136 - 145 mmol/L Potassium 3.3 (L) 3.5 - 5.5 mmol/L Chloride 105 100 - 108 mmol/L  
 CO2 28 21 - 32 mmol/L Anion gap 6 3.0 - 18 mmol/L Glucose 97 74 - 99 mg/dL BUN 10 7.0 - 18 MG/DL Creatinine 1.06 0.6 - 1.3 MG/DL  
 BUN/Creatinine ratio 9 (L) 12 - 20 GFR est AA >60 >60 ml/min/1.73m2 GFR est non-AA >60 >60 ml/min/1.73m2  Calcium 9.5 8.5 - 10.1 MG/DL  
 Bilirubin, total 1.5 (H) 0.2 - 1.0 MG/DL  
 ALT (SGPT) 17 16 - 61 U/L  
 AST (SGOT) 19 15 - 37 U/L Alk. phosphatase 140 (H) 45 - 117 U/L Protein, total 8.8 (H) 6.4 - 8.2 g/dL Albumin 3.7 3.4 - 5.0 g/dL Globulin 5.1 (H) 2.0 - 4.0 g/dL A-G Ratio 0.7 (L) 0.8 - 1.7    
CBC WITH AUTOMATED DIFF Collection Time: 09/26/18  6:40 PM  
Result Value Ref Range WBC 8.6 4.6 - 13.2 K/uL  
 RBC 3.62 (L) 4.70 - 5.50 M/uL  
 HGB 11.4 (L) 13.0 - 16.0 g/dL HCT 34.5 (L) 36.0 - 48.0 % MCV 95.3 74.0 - 97.0 FL  
 MCH 31.5 24.0 - 34.0 PG  
 MCHC 33.0 31.0 - 37.0 g/dL  
 RDW 13.0 11.6 - 14.5 % PLATELET 880 865 - 142 K/uL MPV 12.3 (H) 9.2 - 11.8 FL  
 NEUTROPHILS PENDING % LYMPHOCYTES PENDING % MONOCYTES PENDING % EOSINOPHILS PENDING % BASOPHILS PENDING %  
 ABS. NEUTROPHILS PENDING K/UL  
 ABS. LYMPHOCYTES PENDING K/UL  
 ABS. MONOCYTES PENDING K/UL  
 ABS. EOSINOPHILS PENDING K/UL  
 ABS. BASOPHILS PENDING K/UL  
 DF PENDING   
POC LACTIC ACID Collection Time: 09/26/18  6:53 PM  
Result Value Ref Range Lactic Acid (POC) 1.0 0.4 - 2.0 mmol/L  
EKG, 12 LEAD, INITIAL Collection Time: 09/26/18  6:57 PM  
Result Value Ref Range Ventricular Rate 64 BPM  
 Atrial Rate 63 BPM  
 QRS Duration 174 ms Q-T Interval 426 ms  
 QTC Calculation (Bezet) 439 ms Calculated P Axis 80 degrees Calculated R Axis -68 degrees Calculated T Axis 96 degrees Diagnosis Electronic ventricular pacemaker When compared with ECG of 06-JUN-2018 06:14, 
Vent. rate has decreased BY  39 BPM 
  
URINALYSIS W/ RFLX MICROSCOPIC Collection Time: 09/26/18  8:13 PM  
Result Value Ref Range Color LOIS Appearance TURBID Specific gravity 1.021 1.005 - 1.030    
 pH (UA) 7.0 5.0 - 8.0 Protein 300 (A) NEG mg/dL Glucose NEGATIVE  NEG mg/dL Ketone NEGATIVE  NEG mg/dL Bilirubin SMALL (A) NEG Blood LARGE (A) NEG Urobilinogen 1.0 0.2 - 1.0 EU/dL Nitrites POSITIVE (A) NEG Leukocyte Esterase MODERATE (A) NEG URINE MICROSCOPIC ONLY Collection Time: 09/26/18  8:13 PM  
Result Value Ref Range WBC 1 to 3 0 - 4 /hpf  
 RBC TOO NUMEROUS TO COUNT 0 - 5 /hpf Epithelial cells FEW 0 - 5 /lpf Bacteria FEW (A) NEG /hpf Imaging: No results found. Stephanie 
 
ED Course/Medical Decision Making 80 y/o M with UTI and concern for prostatitis given recent prostate biopsy. Patient febrile in ED; well-appearing, hemodynamically stable, no respiratory distress. Abdominal exam without peritoneal signs. No toy pus on rectal exam, hemoccult negative. CXR without focal infiltrates suggestive of pneumonia. No leukocytosis, IRAIDA, or electrolyte abnormalities. EKG without ischemic changes. Low concern for sepsis or acute surgical process at this time. Patient given dose of IV cipro in ED; will discharge on cipro x 10 days with f/u to urology. Patient given return precautions and understands and agrees with plan. ATTENDING NOTE: Stephanie Givnes MD, interviewed and examined the patient. Discussed with the resident and agree with their evaluation and plan as documented here. 80 yo with productive cough and fever 2 days after outpatient prostate biopsy. No respiratory distress and satting 99% on RA. CXR negative for focal infiltrates. His UA shows evidence of a UTI, which is most likely the source of his fever. No signs of sepsis, so he appears safe for outpatient treatment with Abx and f/u at Urology with strict return precautions. Medications - No data to display Final Diagnosis No diagnosis found. Disposition Patient is discharged home in stable condition. Advised to follow with their primary care physician. Patient advised to return to the ER for any new or worsening symptoms. My Medications ASK your doctor about these medications Instructions Each Dose to Equal   Morning Noon Evening Bedtime acetaminophen 500 mg tablet Commonly known as:  TYLENOL Your last dose was: Your next dose is: Take 1 Tab by mouth every six (6) hours as needed. Indications: Pain 500 mg  
    
   
   
   
  
 albuterol 90 mcg/actuation inhaler Commonly known as:  PROVENTIL HFA Your last dose was: Your next dose is: Take 1 Puff by inhalation every four (4) hours as needed for Wheezing. 1 Puff * amLODIPine 5 mg tablet Commonly known as:  Winston Persaud Your last dose was: Your next dose is: Take 5 mg by mouth two (2) times a day. 5 mg * amLODIPine 10 mg tablet Commonly known as:  Winston Persaud Your last dose was: Your next dose is: Take 1 Tab by mouth daily. 10 mg  
    
   
   
   
  
 apixaban 5 mg tablet Commonly known as:  Dangelo Diane Your last dose was: Your next dose is: Take 1 Tab by mouth every twelve (12) hours. 5 mg  
    
   
   
   
  
 budesonide-formoterol 160-4.5 mcg/actuation Hfaa Commonly known as:  SYMBICORT Your last dose was: Your next dose is: Take 2 Puffs by inhalation two (2) times a day. 2 Puff  
    
   
   
   
  
 carvedilol 25 mg tablet Commonly known as:  Jinx Re Your last dose was: Your next dose is: Take 0.5 Tabs by mouth two (2) times daily (with meals). 12.5 mg  
    
   
   
   
  
 dapsone 100 mg tablet Your last dose was: Your next dose is: Take 1 Tab by mouth daily. 100 mg  
    
   
   
   
  
 diclofenac 1 % Gel Commonly known as:  VOLTAREN Your last dose was: Your next dose is:    
   
   
 Apply 2 g to affected area three (3) times daily as needed for Pain. Apply dose (2 gm or 4 gm) to each location.  If treatment site is the hands, patients should wait at least one (1) hour to wash their hands.APPLY TO right wrist  
 2 g  
    
   
   
   
  
 dolutegravir 50 mg Tab tablet Commonly known as:  Willy Plants Your last dose was: Your next dose is: Take 1 Tab by mouth daily. 50 mg  
    
   
   
   
  
 emtricitabine-tenofovir alafen tablet Commonly known as:  DESCOVY Your last dose was: Your next dose is: Take 1 Tab by mouth daily. 1 Tab  
    
   
   
   
  
 folic acid 1 mg tablet Commonly known as:  [employer] Your last dose was: Your next dose is: Take 1 Tab by mouth daily. 1 mg  
    
   
   
   
  
 magnesium oxide 400 mg Cap Your last dose was: Your next dose is: Take 400 mg by mouth daily. 400 mg  
    
   
   
   
  
 pravastatin 40 mg tablet Commonly known as:  PRAVACHOL Your last dose was: Your next dose is: Take 1 Tab by mouth nightly. 40 mg  
    
   
   
   
  
 pyridoxine (vitamin B6) 50 mg tablet Commonly known as:  VITAMIN B-6 Your last dose was: Your next dose is: Take 1 Tab by mouth daily. 50 mg  
    
   
   
   
  
 * Notice: This list has 2 medication(s) that are the same as other medications prescribed for you. Read the directions carefully, and ask your doctor or other care provider to review them with you. Claudette Marques MD 
September 26, 2018 My signature above authenticates this document and my orders, the final   
diagnosis (es), discharge prescription (s), and instructions in the Epic   
record. If you have any questions please contact [phone]. 
  
Nursing notes have been reviewed by the physician/ advanced practice   
Clinician.

## 2018-09-26 NOTE — ED NOTES
Assumed care of patient from Triage. Received patient sitting up stretcher, awake, alert, oriented X 3. Assessment in progress. Family at bedside.

## 2018-09-26 NOTE — ED TRIAGE NOTES
Patient arrived from home c/o severe abdominal pain, rectum pain and hematuria. Patient states he had a prostate biopsy on Monday. Patient states he is not feeling well. Patient denies allergies

## 2018-09-27 LAB
ATRIAL RATE: 63 BPM
CALCULATED P AXIS, ECG09: 80 DEGREES
CALCULATED R AXIS, ECG10: -68 DEGREES
CALCULATED T AXIS, ECG11: 96 DEGREES
DIAGNOSIS, 93000: NORMAL
Q-T INTERVAL, ECG07: 426 MS
QRS DURATION, ECG06: 174 MS
QTC CALCULATION (BEZET), ECG08: 439 MS
VENTRICULAR RATE, ECG03: 64 BPM

## 2018-09-27 NOTE — DISCHARGE INSTRUCTIONS
Call your urologist tomorrow for an appoinment. Return for worsening symptoms, other medical concerns. Prostatitis: Care Instructions  Your Care Instructions    The prostate gland is a small, walnut-shaped organ. It lies just below a man's bladder. It surrounds the urethra, the tube that carries urine through the penis and out of the body. Prostatitis is a painful condition caused by inflammation or infection of the prostate gland. Sometimes the condition is caused by bacteria, but often the cause is not known. Prostatitis caused by bacteria usually is treated with self-care and antibiotics. Follow-up care is a key part of your treatment and safety. Be sure to make and go to all appointments, and call your doctor if you are having problems. It's also a good idea to know your test results and keep a list of the medicines you take. How can you care for yourself at home? · If your doctor prescribed antibiotics, take them as directed. Do not stop taking them just because you feel better. You need to take the full course of antibiotics. · Take an over-the-counter pain medicine, such as acetaminophen (Tylenol), ibuprofen (Advil, Motrin), or naproxen (Aleve). Be safe with medicines. Read and follow all instructions on the label. · Take warm baths to help soothe pain. · Straining to pass stools can hurt when your prostate is inflamed. Avoid constipation. ¨ Include fruits, vegetables, beans, and whole grains in your diet each day. These foods are high in fiber. ¨ Drink plenty of fluids, enough so that your urine is light yellow or clear like water. If you have kidney, heart, or liver disease and have to limit fluids, talk with your doctor before you increase the amount of fluids you drink. ¨ Get some exercise every day. Build up slowly to 30 to 60 minutes a day on 5 or more days of the week. ¨ Take a fiber supplement, such as Citrucel or Metamucil, every day if needed.  Read and follow all instructions on the label.  ¨ Schedule time each day for a bowel movement. Having a daily routine may help. Take your time and do not strain when having a bowel movement. · Avoid alcohol, caffeine, and spicy foods, especially if they make your symptoms worse. When should you call for help? Call your doctor now or seek immediate medical care if:    · You have symptoms of a urinary tract infection. These may include:  ¨ Pain or burning when you urinate. ¨ A frequent need to urinate without being able to pass much urine. ¨ Pain in the flank, which is just below the rib cage and above the waist on either side of the back. ¨ Blood in your urine. ¨ A fever.    Watch closely for changes in your health, and be sure to contact your doctor if:    · You cannot empty your bladder completely.     · You do not get better as expected. Where can you learn more? Go to http://malloryStublisherbonnie.info/. Enter Z058 in the search box to learn more about \"Prostatitis: Care Instructions. \"  Current as of: December 3, 2017  Content Version: 11.7  © 2627-0209 [email or web address]. Care instructions adapted under license by [employer] (which disclaims liability or warranty for this information). If you have questions about a medical condition or this instruction, always ask your healthcare professional. [address] any warranty or liability for your use of this information. Urethritis: Care Instructions  Your Care Instructions    Urethritis is an infection of the tube that takes urine from the bladder to the outside of the body. This tube is called the urethra. The infection is often caused by bacteria. This can happen if you have a sexually transmitted infection (STI). But a virus may also be a cause. Urethritis is usually treated with antibiotics. Most cases clear up with treatment. Proper treatment is very important.  If you don't treat it, the infection can lead to lasting damage of the urethra. Other parts of the urinary system can also be damaged. Follow-up care is a key part of your treatment and safety. Be sure to make and go to all appointments, and call your doctor if you are having problems. It's also a good idea to know your test results and keep a list of the medicines you take. How can you care for yourself at home? · If your doctor prescribed antibiotics, take them as directed. Do not stop taking them just because you feel better. You need to take the full course of antibiotics. · Take an over-the-counter pain medicine, such as acetaminophen (Tylenol), ibuprofen (Advil, Motrin), or naproxen (Aleve), if needed. Be safe with medicines. Read and follow all instructions on the label. · Do not take two or more pain medicines at the same time unless the doctor told you to. Many pain medicines have acetaminophen, which is Tylenol. Too much acetaminophen (Tylenol) can be harmful. · Your doctor may have you take phenazopyridine (Pyridium). This is a pain medicine for the urinary tract. It can turn your urine a deep red-orange. This is normal. Call your doctor if you think you are having a problem with your medicine. · Do not have sex until you are done with treatment. If you do have sex, be sure to use a condom. Your sex partner or partners should be tested too if your urethritis was caused by an STI. · If your infection was caused by an injury or chemicals, avoid those things if you can. When should you call for help? Call your doctor now or seek immediate medical care if:    · You can't urinate.     · You have symptoms of a urinary infection. For example:  ¨ You have blood or pus in your urine. ¨ You have pain in your back just below your rib cage. This is called flank pain. ¨ You have a fever, chills, or body aches. ¨ It hurts to urinate.   ¨ You have groin or belly pain.     · You have a hard time urinating when your bladder is full.     · You notice mental changes or feel confused.    Watch closely for changes in your health, and be sure to contact your doctor if:    · You do not get better as expected. Where can you learn more? Go to http://mallory-bonnie.info/. Enter E347 in the search box to learn more about \"Urethritis: Care Instructions. \"  Current as of: May 12, 2017  Content Version: 11.7  © 8002-4458 [employer]. Care instructions adapted under license by [employer] (which disclaims liability or warranty for this information). If you have questions about a medical condition or this instruction, always ask your healthcare professional. [place] [address] any warranty or liability for your use of this information.

## 2018-09-27 NOTE — ED NOTES
I have reviewed discharge instructions with patient and pt's wife. The patient and pt's wife verbalized understanding. No acute distress noted at this time, pt alert and oriented. Stable at time of discharge. Vital signs stable. Pt is being discharged with 2 prescriptions at this time. Pt completing IV antibiotics at this time. Will remove IV and armband when complete.

## 2018-09-28 LAB
BACTERIA SPEC CULT: NORMAL
SERVICE CMNT-IMP: NORMAL

## 2018-10-02 ENCOUNTER — APPOINTMENT (OUTPATIENT)
Dept: CT IMAGING | Age: 68
End: 2018-10-02
Attending: EMERGENCY MEDICINE
Payer: MEDICARE

## 2018-10-02 ENCOUNTER — HOSPITAL ENCOUNTER (EMERGENCY)
Age: 68
Discharge: HOME | End: 2018-10-02
Attending: EMERGENCY MEDICINE
Payer: MEDICARE

## 2018-10-02 ENCOUNTER — APPOINTMENT (OUTPATIENT)
Dept: GENERAL RADIOLOGY | Age: 68
End: 2018-10-02
Attending: EMERGENCY MEDICINE
Payer: MEDICARE

## 2018-10-02 VITALS
DIASTOLIC BLOOD PRESSURE: 113 MMHG | HEIGHT: 75 IN | SYSTOLIC BLOOD PRESSURE: 153 MMHG | BODY MASS INDEX: 19.89 KG/M2 | HEART RATE: 60 BPM | OXYGEN SATURATION: 96 % | TEMPERATURE: 98.9 F | WEIGHT: 160 LBS | RESPIRATION RATE: 16 BRPM

## 2018-10-02 DIAGNOSIS — R10.84 ABDOMINAL PAIN, GENERALIZED: ICD-10-CM

## 2018-10-02 DIAGNOSIS — R06.00 DYSPNEA, UNSPECIFIED TYPE: ICD-10-CM

## 2018-10-02 DIAGNOSIS — R06.6 HICCUPS: Primary | ICD-10-CM

## 2018-10-02 LAB
ALBUMIN SERPL-MCNC: 3.6 G/DL (ref 3.4–5)
ALBUMIN/GLOB SERPL: 0.7 {RATIO} (ref 0.8–1.7)
ALP SERPL-CCNC: 128 U/L (ref 45–117)
ALT SERPL-CCNC: 21 U/L (ref 16–61)
ANION GAP SERPL CALC-SCNC: 9 MMOL/L (ref 3–18)
AST SERPL-CCNC: 31 U/L (ref 15–37)
ATRIAL RATE: 217 BPM
BACTERIA SPEC CULT: NORMAL
BACTERIA SPEC CULT: NORMAL
BASOPHILS # BLD: 0 K/UL (ref 0–0.1)
BASOPHILS NFR BLD: 0 % (ref 0–2)
BILIRUB SERPL-MCNC: 1.5 MG/DL (ref 0.2–1)
BNP SERPL-MCNC: 1642 PG/ML (ref 0–900)
BUN SERPL-MCNC: 12 MG/DL (ref 7–18)
BUN/CREAT SERPL: 15 (ref 12–20)
CALCIUM SERPL-MCNC: 9.4 MG/DL (ref 8.5–10.1)
CALCULATED R AXIS, ECG10: -70 DEGREES
CALCULATED T AXIS, ECG11: 90 DEGREES
CHLORIDE SERPL-SCNC: 108 MMOL/L (ref 100–108)
CO2 SERPL-SCNC: 25 MMOL/L (ref 21–32)
CREAT SERPL-MCNC: 0.81 MG/DL (ref 0.6–1.3)
DIAGNOSIS, 93000: NORMAL
DIFFERENTIAL METHOD BLD: ABNORMAL
EOSINOPHIL # BLD: 0.1 K/UL (ref 0–0.4)
EOSINOPHIL NFR BLD: 1 % (ref 0–5)
ERYTHROCYTE [DISTWIDTH] IN BLOOD BY AUTOMATED COUNT: 12.8 % (ref 11.6–14.5)
GLOBULIN SER CALC-MCNC: 5.2 G/DL (ref 2–4)
GLUCOSE SERPL-MCNC: 75 MG/DL (ref 74–99)
HCT VFR BLD AUTO: 32.4 % (ref 36–48)
HGB BLD-MCNC: 11.1 G/DL (ref 13–16)
LIPASE SERPL-CCNC: 231 U/L (ref 73–393)
LYMPHOCYTES # BLD: 1.5 K/UL (ref 0.9–3.6)
LYMPHOCYTES NFR BLD: 19 % (ref 21–52)
MAGNESIUM SERPL-MCNC: 1.9 MG/DL (ref 1.6–2.6)
MCH RBC QN AUTO: 32.3 PG (ref 24–34)
MCHC RBC AUTO-ENTMCNC: 34.3 G/DL (ref 31–37)
MCV RBC AUTO: 94.2 FL (ref 74–97)
MONOCYTES # BLD: 1.2 K/UL (ref 0.05–1.2)
MONOCYTES NFR BLD: 14 % (ref 3–10)
NEUTS SEG # BLD: 5.4 K/UL (ref 1.8–8)
NEUTS SEG NFR BLD: 66 % (ref 40–73)
PLATELET # BLD AUTO: 332 K/UL (ref 135–420)
PMV BLD AUTO: 11.3 FL (ref 9.2–11.8)
POTASSIUM SERPL-SCNC: 3.6 MMOL/L (ref 3.5–5.5)
PROT SERPL-MCNC: 8.8 G/DL (ref 6.4–8.2)
Q-T INTERVAL, ECG07: 484 MS
QRS DURATION, ECG06: 170 MS
QTC CALCULATION (BEZET), ECG08: 484 MS
RBC # BLD AUTO: 3.44 M/UL (ref 4.7–5.5)
SERVICE CMNT-IMP: NORMAL
SERVICE CMNT-IMP: NORMAL
SODIUM SERPL-SCNC: 142 MMOL/L (ref 136–145)
TROPONIN I SERPL-MCNC: 0.06 NG/ML (ref 0–0.04)
VENTRICULAR RATE, ECG03: 60 BPM
WBC # BLD AUTO: 8.2 K/UL (ref 4.6–13.2)

## 2018-10-02 PROCEDURE — 96374 THER/PROPH/DIAG INJ IV PUSH: CPT

## 2018-10-02 PROCEDURE — 80053 COMPREHEN METABOLIC PANEL: CPT | Performed by: EMERGENCY MEDICINE

## 2018-10-02 PROCEDURE — 83735 ASSAY OF MAGNESIUM: CPT | Performed by: EMERGENCY MEDICINE

## 2018-10-02 PROCEDURE — 74011636320 HC RX REV CODE- 636/320: Performed by: EMERGENCY MEDICINE

## 2018-10-02 PROCEDURE — 93005 ELECTROCARDIOGRAM TRACING: CPT

## 2018-10-02 PROCEDURE — 71045 X-RAY EXAM CHEST 1 VIEW: CPT

## 2018-10-02 PROCEDURE — 85025 COMPLETE CBC W/AUTO DIFF WBC: CPT | Performed by: EMERGENCY MEDICINE

## 2018-10-02 PROCEDURE — 83880 ASSAY OF NATRIURETIC PEPTIDE: CPT | Performed by: EMERGENCY MEDICINE

## 2018-10-02 PROCEDURE — 74011250636 HC RX REV CODE- 250/636: Performed by: EMERGENCY MEDICINE

## 2018-10-02 PROCEDURE — 74177 CT ABD & PELVIS W/CONTRAST: CPT

## 2018-10-02 PROCEDURE — 99285 EMERGENCY DEPT VISIT HI MDM: CPT

## 2018-10-02 PROCEDURE — 84484 ASSAY OF TROPONIN QUANT: CPT | Performed by: EMERGENCY MEDICINE

## 2018-10-02 PROCEDURE — 83690 ASSAY OF LIPASE: CPT | Performed by: EMERGENCY MEDICINE

## 2018-10-02 RX ORDER — PROCHLORPERAZINE EDISYLATE 5 MG/ML
5 INJECTION INTRAMUSCULAR; INTRAVENOUS
Status: DISCONTINUED | OUTPATIENT
Start: 2018-10-02 | End: 2018-10-02 | Stop reason: HOSPADM

## 2018-10-02 RX ORDER — ONDANSETRON HYDROCHLORIDE 4 MG/2ML
4 INJECTION, SOLUTION INTRAMUSCULAR; INTRAVENOUS
Status: DISCONTINUED | OUTPATIENT
Start: 2018-10-02 | End: 2018-10-02 | Stop reason: HOSPADM

## 2018-10-02 RX ORDER — ONDANSETRON 8 MG/1
8 TABLET, ORALLY DISINTEGRATING ORAL
Qty: 12 TAB | Refills: 0 | Status: SHIPPED | OUTPATIENT
Start: 2018-10-02 | End: 2021-12-28

## 2018-10-02 RX ADMIN — IOPAMIDOL 80 ML: 612 INJECTION, SOLUTION INTRAVENOUS at 17:26

## 2018-10-02 RX ADMIN — PROCHLORPERAZINE EDISYLATE 5 MG: 5 INJECTION INTRAMUSCULAR; INTRAVENOUS at 18:16

## 2018-10-02 RX ADMIN — SODIUM CHLORIDE 1000 ML: 900 INJECTION, SOLUTION INTRAVENOUS at 18:15

## 2018-10-02 NOTE — ED PROVIDER NOTES
EMERGENCY DEPARTMENT HISTORY AND PHYSICAL EXAM 
 
3:38 PM 
 
 
Date: 10/2/2018 Patient Name: Bryan Peterson History of Presenting Illness No chief complaint on file. History Provided By: Patient 
  
Chief Complaint: Hiccups Duration:  September 24, 2018 Timing:  Worsening Location: Abdomen Quality: Not obtained Severity: Moderate Modifying Factors: There are no modifying factors Associated Symptoms: Stomach pain, fever,nausea, vomiting 
  
  
Additional History (Context): 4:36 PM Bryan Peterson is a 79 y.o. male with h/o HTN, stroke, and drinks ETOH who presents to ED complaining of moderate worsening hiccups onset September 24, 2018. Patient says that he got a prostate biopsy on September 24, 2018 and after the biopsy, he started having hiccups and stomach pain that he thinks have gotten worse. He says he has generalized abd pain and that his stomach hurts while he hiccups. He said today he started vomiting. A week ago he came into the ED here with c/o fever and nausea. At the visit he got a chest xray but not a scan of his belly. He did get pain medication at that visit. He says he's never been in alcohol withdrawal and he does not have a history of kidney disease.  
  
  
 
PCP: Andre Armstrong MD 
 
Current Facility-Administered Medications Medication Dose Route Frequency Provider Last Rate Last Dose  ondansetron HCl (PF) (ZOFRAN) syringe 4 mg  4 mg IntraVENous NOW Sylvia Razo DO      
 prochlorperazine (COMPAZINE) injection 5 mg  5 mg IntraVENous Q6H PRN Brittany Razo DO   5 mg at 10/02/18 1816 Current Outpatient Prescriptions Medication Sig Dispense Refill  ondansetron (ZOFRAN ODT) 8 mg disintegrating tablet Take 1 Tab by mouth every eight (8) hours as needed for Nausea for up to 12 doses. 12 Tab 0  
 ciprofloxacin HCl (CIPRO) 500 mg tablet Take 1 Tab by mouth two (2) times a day for 10 days.  20 Tab 0  
  albuterol (PROVENTIL HFA) 90 mcg/actuation inhaler Take 1 Puff by inhalation every four (4) hours as needed for Wheezing. 1 Inhaler 0  
 amLODIPine (NORVASC) 10 mg tablet Take 1 Tab by mouth daily. 30 Tab 0  
 apixaban (ELIQUIS) 5 mg tablet Take 1 Tab by mouth every twelve (12) hours. 60 Tab 0  
 budesonide-formoterol (SYMBICORT) 160-4.5 mcg/actuation HFAA Take 2 Puffs by inhalation two (2) times a day. 1 Inhaler 1  
 carvedilol (COREG) 25 mg tablet Take 0.5 Tabs by mouth two (2) times daily (with meals). 30 Tab 0  
 dapsone 100 mg tablet Take 1 Tab by mouth daily. 30 Tab 0  
 dolutegravir (TIVICAY) 50 mg tab tablet Take 1 Tab by mouth daily. 30 Tab 0  
 emtricitabine-tenofovir alafen (DESCOVY) tablet Take 1 Tab by mouth daily. 30 Tab 0  
 folic acid (FOLVITE) 1 mg tablet Take 1 Tab by mouth daily. 30 Tab 0  
 magnesium oxide 400 mg cap Take 400 mg by mouth daily. 30 Cap 0  
 pravastatin (PRAVACHOL) 40 mg tablet Take 1 Tab by mouth nightly. 30 Tab 0  pyridoxine, vitamin B6, (VITAMIN B-6) 50 mg tablet Take 1 Tab by mouth daily. 30 Tab 0  
 diclofenac (VOLTAREN) 1 % gel Apply 2 g to affected area three (3) times daily as needed for Pain. Apply dose (2 gm or 4 gm) to each location. If treatment site is the hands, patients should wait at least one (1) hour to wash their hands. APPLY TO right wrist 100 g 0  
 acetaminophen (TYLENOL) 500 mg tablet Take 1 Tab by mouth every six (6) hours as needed. Indications: Pain 20 Tab 0  
 amLODIPine (NORVASC) 5 mg tablet Take 5 mg by mouth two (2) times a day. Past History Past Medical History: 
Past Medical History:  
Diagnosis Date  Autoimmune disease ([facility] [address] AIDS? HIV  Hypertension  Rectal bleeding  Stroke ([facility] [address] Past Surgical History: 
Past Surgical History:  
Procedure Laterality Date  HX ORTHOPAEDIC    
 back Family History: 
History reviewed. No pertinent family history. Social History: 
Social History Substance Use Topics  Smoking status: Current Every Day Smoker Packs/day: 1.00 Types: Cigarettes  Smokeless tobacco: Never Used  Alcohol use 3.5 oz/week 7 Standard drinks or equivalent per week Comment: \"drink everyday but never alone\" Allergies: 
No Known Allergies Review of Systems Review of Systems Constitutional: Negative for activity change, fatigue and fever. Hiccups HENT: Negative for congestion and rhinorrhea. Eyes: Negative for visual disturbance. Respiratory: Negative for shortness of breath. Cardiovascular: Negative for chest pain and palpitations. Gastrointestinal: Positive for abdominal pain, nausea and vomiting. Negative for diarrhea. Genitourinary: Negative for dysuria and hematuria. Musculoskeletal: Negative for back pain. Skin: Negative for rash. Neurological: Negative for dizziness, weakness and light-headedness. All other systems reviewed and are negative. Physical Exam  
 
Visit Vitals  BP (!) 153/113  Pulse 60  Temp 98.9 °F (37.2 °C)  Resp 16  
 Ht 6' 3\" (1.905 m)  Wt 72.6 kg (160 lb)  SpO2 96%  BMI 20 kg/m2 Physical Exam  
Constitutional: He is oriented to person, place, and time. He appears well-developed and well-nourished. No distress. Hiccupping in room HENT:  
Head: Normocephalic and atraumatic. Right Ear: External ear normal.  
Left Ear: External ear normal.  
Nose: Nose normal.  
Mouth/Throat: Oropharynx is clear and moist. Mucous membranes are dry. Eyes: Conjunctivae and EOM are normal. Pupils are equal, round, and reactive to light. No scleral icterus. Neck: Normal range of motion. Neck supple. No JVD present. No tracheal deviation present. No thyromegaly present. Cardiovascular: Normal rate, regular rhythm, normal heart sounds and intact distal pulses. Exam reveals no gallop and no friction rub. No murmur heard. Pulmonary/Chest: Effort normal and breath sounds normal. He exhibits no tenderness. Abdominal: Soft. Bowel sounds are normal. He exhibits no distension. There is tenderness. There is no rebound and no guarding. Diffuse mild tenderness upon distension Musculoskeletal: Normal range of motion. He exhibits no edema or tenderness. Lymphadenopathy:  
  He has no cervical adenopathy. Neurological: He is alert and oriented to person, place, and time. No cranial nerve deficit. Coordination normal.  
Skin: Skin is warm and dry. Psychiatric: He has a normal mood and affect. His behavior is normal. Judgment and thought content normal.  
Nursing note and vitals reviewed. Diagnostic Study Results Labs - Recent Results (from the past 12 hour(s)) CBC WITH AUTOMATED DIFF Collection Time: 10/02/18  3:29 PM  
Result Value Ref Range WBC 8.2 4.6 - 13.2 K/uL  
 RBC 3.44 (L) 4.70 - 5.50 M/uL  
 HGB 11.1 (L) 13.0 - 16.0 g/dL HCT 32.4 (L) 36.0 - 48.0 % MCV 94.2 74.0 - 97.0 FL  
 MCH 32.3 24.0 - 34.0 PG  
 MCHC 34.3 31.0 - 37.0 g/dL  
 RDW 12.8 11.6 - 14.5 % PLATELET 359 125 - 292 K/uL MPV 11.3 9.2 - 11.8 FL  
 NEUTROPHILS 66 40 - 73 % LYMPHOCYTES 19 (L) 21 - 52 % MONOCYTES 14 (H) 3 - 10 % EOSINOPHILS 1 0 - 5 % BASOPHILS 0 0 - 2 %  
 ABS. NEUTROPHILS 5.4 1.8 - 8.0 K/UL  
 ABS. LYMPHOCYTES 1.5 0.9 - 3.6 K/UL  
 ABS. MONOCYTES 1.2 0.05 - 1.2 K/UL  
 ABS. EOSINOPHILS 0.1 0.0 - 0.4 K/UL  
 ABS. BASOPHILS 0.0 0.0 - 0.1 K/UL  
 DF AUTOMATED METABOLIC PANEL, COMPREHENSIVE Collection Time: 10/02/18  3:29 PM  
Result Value Ref Range Sodium 142 136 - 145 mmol/L Potassium 3.6 3.5 - 5.5 mmol/L Chloride 108 100 - 108 mmol/L  
 CO2 25 21 - 32 mmol/L Anion gap 9 3.0 - 18 mmol/L Glucose 75 74 - 99 mg/dL BUN 12 7.0 - 18 MG/DL Creatinine 0.81 0.6 - 1.3 MG/DL  
 BUN/Creatinine ratio 15 12 - 20 GFR est AA >60 >60 ml/min/1.73m2 GFR est non-AA >60 >60 ml/min/1.73m2 Calcium 9.4 8.5 - 10.1 MG/DL Bilirubin, total 1.5 (H) 0.2 - 1.0 MG/DL  
 ALT (SGPT) 21 16 - 61 U/L  
 AST (SGOT) 31 15 - 37 U/L Alk. phosphatase 128 (H) 45 - 117 U/L Protein, total 8.8 (H) 6.4 - 8.2 g/dL Albumin 3.6 3.4 - 5.0 g/dL Globulin 5.2 (H) 2.0 - 4.0 g/dL A-G Ratio 0.7 (L) 0.8 - 1.7 LIPASE Collection Time: 10/02/18  3:29 PM  
Result Value Ref Range Lipase 231 73 - 393 U/L MAGNESIUM Collection Time: 10/02/18  3:29 PM  
Result Value Ref Range Magnesium 1.9 1.6 - 2.6 mg/dL TROPONIN I Collection Time: 10/02/18  3:29 PM  
Result Value Ref Range Troponin-I, Qt. 0.06 (H) 0.0 - 0.045 NG/ML  
NT-PRO BNP Collection Time: 10/02/18  3:29 PM  
Result Value Ref Range NT pro-BNP 1642 (H) 0 - 900 PG/ML  
EKG, 12 LEAD, INITIAL Collection Time: 10/02/18  3:43 PM  
Result Value Ref Range Ventricular Rate 60 BPM  
 Atrial Rate 217 BPM  
 QRS Duration 170 ms Q-T Interval 484 ms QTC Calculation (Bezet) 484 ms Calculated R Axis -70 degrees Calculated T Axis 90 degrees Diagnosis Ventricular-paced rhythm Probable Atrial flutter When compared with ECG of 26-SEP-2018 18:57, 
Vent. rate has decreased BY   4 BPM 
Confirmed by Nancy Davison MD, Lucia Dewey ([id number]) on 10/2/2018 5:19:23 PM 
  
 
 
Radiologic Studies -  
CT ABD PELV W CONT Final Result XR CHEST PORT    (Results Pending) Medical Decision Making I am the first provider for this patient. I reviewed the vital signs, available nursing notes, past medical history, past surgical history, family history and social history. Vital Signs-Reviewed the patient's vital signs. Pulse Oximetry Analysis -  97% on room air WNL Cardiac Monitor: 
Rate: 62 bpm 
Rhythm:  Normal Sinus Rhythm Records Reviewed: Nursing Notes and Old Medical Records (Time of Review: 3:38 PM) ED Course: Progress Notes, Reevaluation, and Consults: 
9851 CT with no acute findings except stool burden / proctitis, likely due to recent biopsy Patient received compazine. No further N/V or pain. Wants to eat No SOB at rest or with exertion. Will d/c home with Zofran Rx. Agrees with plan. C/w cipro. F/u PMd this week. Provider Notes (Medical Decision Making): 
No acute findings on CT 
NAD 
AVSS Feels better D/c homr with zofran, f/u PMD 
Agress with plan. Of note, no more hiccups in ED Diagnosis Clinical Impression: 1. Hiccups 2. Abdominal pain, generalized 3. Dyspnea, unspecified type Disposition: D/c to home Follow-up Information Follow up With Details Comments Contact Info Perri Montanez MD Call in 1 day re-evaluation [address]  [facility] [address] [place] [address] 
[phone] Patient's Medications Start Taking ONDANSETRON (ZOFRAN ODT) 8 MG DISINTEGRATING TABLET    Take 1 Tab by mouth every eight (8) hours as needed for Nausea for up to 12 doses. Continue Taking ACETAMINOPHEN (TYLENOL) 500 MG TABLET    Take 1 Tab by mouth every six (6) hours as needed. Indications: Pain ALBUTEROL (PROVENTIL HFA) 90 MCG/ACTUATION INHALER    Take 1 Puff by inhalation every four (4) hours as needed for Wheezing. AMLODIPINE (NORVASC) 10 MG TABLET    Take 1 Tab by mouth daily. AMLODIPINE (NORVASC) 5 MG TABLET    Take 5 mg by mouth two (2) times a day. APIXABAN (ELIQUIS) 5 MG TABLET    Take 1 Tab by mouth every twelve (12) hours. BUDESONIDE-FORMOTEROL (SYMBICORT) 160-4.5 MCG/ACTUATION HFAA    Take 2 Puffs by inhalation two (2) times a day. CARVEDILOL (COREG) 25 MG TABLET    Take 0.5 Tabs by mouth two (2) times daily (with meals). CIPROFLOXACIN HCL (CIPRO) 500 MG TABLET    Take 1 Tab by mouth two (2) times a day for 10 days. DAPSONE 100 MG TABLET    Take 1 Tab by mouth daily.   
 DICLOFENAC (VOLTAREN) 1 % GEL    Apply 2 g to affected area three (3) times daily as needed for Pain. Apply dose (2 gm or 4 gm) to each location. If treatment site is the hands, patients should wait at least one (1) hour to wash their hands. APPLY TO right wrist  
 DOLUTEGRAVIR (TIVICAY) 50 MG TAB TABLET    Take 1 Tab by mouth daily. EMTRICITABINE-TENOFOVIR ALAFEN (DESCOVY) TABLET    Take 1 Tab by mouth daily. FOLIC ACID (FOLVITE) 1 MG TABLET    Take 1 Tab by mouth daily. MAGNESIUM OXIDE 400 MG CAP    Take 400 mg by mouth daily. PRAVASTATIN (PRAVACHOL) 40 MG TABLET    Take 1 Tab by mouth nightly. PYRIDOXINE, VITAMIN B6, (VITAMIN B-6) 50 MG TABLET    Take 1 Tab by mouth daily. These Medications have changed No medications on file Stop Taking No medications on file  
 
_______________________________ Attestations: 
Scribe Attestation Kendal Jacob acting as a scribe for and in the presence of Fran Rm DO October 02, 2018 at 3:38 PM 
    
Provider Attestation:     
I personally performed the services described in the documentation, reviewed the documentation, as recorded by the scribe in my presence, and it accurately and completely records my words and actions. October 02, 2018 at 3:38 PM - Fran Rm DO   
_______________________________

## 2018-10-02 NOTE — DISCHARGE INSTRUCTIONS
Hiccups: Care Instructions  Your Care Instructions    Hiccups occur when a spasm contracts the diaphragm, a large sheet of muscle that separates the chest cavity from the abdominal cavity. The spasm causes an intake of breath that is suddenly stopped by the closure of the vocal cords (glottis). This closure causes the \"hiccup\" sound. A very full stomach can cause hiccups that go away on their own. A full stomach can be caused by things like eating too much food too quickly or swallowing too much air. Most hiccups go away on their own within a few minutes to a few hours and do not require any treatment. Hiccups that last longer than 48 hours are called persistent hiccups. Hiccups that last longer than a month are called intractable hiccups. Both persistent and intractable hiccups may be a sign of a more serious health problem. Follow-up care is a key part of your treatment and safety. Be sure to make and go to all appointments, and call your doctor if you are having problems. It's also a good idea to know your test results and keep a list of the medicines you take. How can you care for yourself at home? · Try these safe and easy home remedies if your hiccups are making you uncomfortable. ¨ Eat a teaspoon of sugar or honey. ¨ Hold your breath and count slowly to 10. ¨ Quickly drink a glass of cold water. · If your doctor prescribed medicine, take it as directed. Call your doctor if you think you are having a problem with your medicine. To help prevent hiccups  · Take steps to avoid swallowing air:  ¨ Eat slowly. Avoid gulping food or beverages. ¨ Chew your food thoroughly before you swallow. ¨ Avoid drinking through a straw. ¨ Avoid chewing gum or eating hard candy. ¨ Do not smoke or use other tobacco products. ¨ If you wear dentures, check with a dentist to make sure they fit properly. · Do not eat large meals. · Do not drink alcohol.   · Avoid sudden changes in stomach temperature, such as drinking a hot beverage and then a cold beverage. · Avoid emotional stress or excitement. When should you call for help? Call your doctor now or seek immediate medical care if:    · You have trouble swallowing and are unable to swallow food or fluids.     · You have hiccups for more than 2 days.     · You have new symptoms, such as belly pain, constipation, diarrhea, heartburn, or vomiting.    Watch closely for changes in your health, and be sure to contact your doctor if:    · You have trouble swallowing but are able to swallow food and fluids.     · Hiccups occur often and get in the way of your activities.     · You think medicine may be causing your symptoms.     · You do not get better as expected. Where can you learn more? Go to http://mallory-bonnie.info/. Enter S798 in the search box to learn more about \"Hiccups: Care Instructions. \"  Current as of: November 20, 2017  Content Version: 11.7  © 6527-4612 [employer]. Care instructions adapted under license by [employer] (which disclaims liability or warranty for this information). If you have questions about a medical condition or this instruction, always ask your healthcare professional. [address] any warranty or liability for your use of this information. Abdominal Pain: Care Instructions  Your Care Instructions    Abdominal pain has many possible causes. Some aren't serious and get better on their own in a few days. Others need more testing and treatment. If your pain continues or gets worse, you need to be rechecked and may need more tests to find out what is wrong. You may need surgery to correct the problem. Don't ignore new symptoms, such as fever, nausea and vomiting, urination problems, pain that gets worse, and dizziness. These may be signs of a more serious problem. Your doctor may have recommended a follow-up visit in the next 8 to 12 hours.  If you are not getting better, you may need more tests or treatment. The doctor has checked you carefully, but problems can develop later. If you notice any problems or new symptoms, get medical treatment right away. Follow-up care is a key part of your treatment and safety. Be sure to make and go to all appointments, and call your doctor if you are having problems. It's also a good idea to know your test results and keep a list of the medicines you take. How can you care for yourself at home? · Rest until you feel better. · To prevent dehydration, drink plenty of fluids, enough so that your urine is light yellow or clear like water. Choose water and other caffeine-free clear liquids until you feel better. If you have kidney, heart, or liver disease and have to limit fluids, talk with your doctor before you increase the amount of fluids you drink. · If your stomach is upset, eat mild foods, such as rice, dry toast or crackers, bananas, and applesauce. Try eating several small meals instead of two or three large ones. · Wait until 48 hours after all symptoms have gone away before you have spicy foods, alcohol, and drinks that contain caffeine. · Do not eat foods that are high in fat. · Avoid anti-inflammatory medicines such as aspirin, ibuprofen (Advil, Motrin), and naproxen (Aleve). These can cause stomach upset. Talk to your doctor if you take daily aspirin for another health problem. When should you call for help? Call 911 anytime you think you may need emergency care.  For example, call if:    · You passed out (lost consciousness).     · You pass maroon or very bloody stools.     · You vomit blood or what looks like coffee grounds.     · You have new, severe belly pain.    Call your doctor now or seek immediate medical care if:    · Your pain gets worse, especially if it becomes focused in one area of your belly.     · You have a new or higher fever.     · Your stools are black and look like tar, or they have streaks of blood.     · You have unexpected vaginal bleeding.     · You have symptoms of a urinary tract infection. These may include:  ¨ Pain when you urinate. ¨ Urinating more often than usual.  ¨ Blood in your urine.     · You are dizzy or lightheaded, or you feel like you may faint.    Watch closely for changes in your health, and be sure to contact your doctor if:    · You are not getting better after 1 day (24 hours). Where can you learn more? Go to http://mallory-bonnie.info/. Enter Z908 in the search box to learn more about \"Abdominal Pain: Care Instructions. \"  Current as of: November 20, 2017  Content Version: 11.7  © 3344-6225 [employer]. Care instructions adapted under license by [employer] (which disclaims liability or warranty for this information). If you have questions about a medical condition or this instruction, always ask your healthcare professional. [place] [address] any warranty or liability for your use of this information. Shortness of Breath: Care Instructions  Your Care Instructions  Shortness of breath has many causes. Sometimes conditions such as anxiety can lead to shortness of breath. Some people get mild shortness of breath when they exercise. Trouble breathing also can be a symptom of a serious problem, such as asthma, lung disease, emphysema, heart problems, and pneumonia. If your shortness of breath continues, you may need tests and treatment. Watch for any changes in your breathing and other symptoms. Follow-up care is a key part of your treatment and safety. Be sure to make and go to all appointments, and call your doctor if you are having problems. It's also a good idea to know your test results and keep a list of the medicines you take. How can you care for yourself at home? · Do not smoke or allow others to smoke around you. If you need help quitting, talk to your doctor about stop-smoking programs and medicines. These can increase your chances of quitting for good. · Get plenty of rest and sleep. · Take your medicines exactly as prescribed. Call your doctor if you think you are having a problem with your medicine. · Find healthy ways to deal with stress. ¨ Exercise daily. ¨ Get plenty of sleep. ¨ Eat regularly and well. When should you call for help? Call 911 anytime you think you may need emergency care. For example, call if:    · You have severe shortness of breath.     · You have symptoms of a heart attack. These may include:  ¨ Chest pain or pressure, or a strange feeling in the chest.  ¨ Sweating. ¨ Shortness of breath. ¨ Nausea or vomiting. ¨ Pain, pressure, or a strange feeling in the back, neck, jaw, or upper belly or in one or both shoulders or arms. ¨ Lightheadedness or sudden weakness. ¨ A fast or irregular heartbeat. After you call 911, the  may tell you to chew 1 adult-strength or 2 to 4 low-dose aspirin. Wait for an ambulance. Do not try to drive yourself.    Call your doctor now or seek immediate medical care if:    · Your shortness of breath gets worse or you start to wheeze. Wheezing is a high-pitched sound when you breathe.     · You wake up at night out of breath or have to prop your head up on several pillows to breathe.     · You are short of breath after only light activity or while at rest.    Watch closely for changes in your health, and be sure to contact your doctor if:    · You do not get better over the next 1 to 2 days. Where can you learn more? Go to http://mallory-bonnie.info/. Enter S780 in the search box to learn more about \"Shortness of Breath: Care Instructions. \"  Current as of: December 6, 2017  Content Version: 11.7  © 3882-2412 [employer]. Care instructions adapted under license by [employer] (which disclaims liability or warranty for this information).  If you have questions about a medical condition or this instruction, always ask your healthcare professional. Fatou Higgins any warranty or liability for your use of this information.

## 2019-09-28 ENCOUNTER — HOSPITAL ENCOUNTER (EMERGENCY)
Age: 69
Discharge: HOME | End: 2019-09-28
Attending: EMERGENCY MEDICINE
Payer: MEDICARE

## 2019-09-28 ENCOUNTER — APPOINTMENT (OUTPATIENT)
Dept: GENERAL RADIOLOGY | Age: 69
End: 2019-09-28
Attending: EMERGENCY MEDICINE
Payer: MEDICARE

## 2019-09-28 VITALS
WEIGHT: 195 LBS | BODY MASS INDEX: 24.25 KG/M2 | HEART RATE: 112 BPM | HEIGHT: 75 IN | RESPIRATION RATE: 28 BRPM | DIASTOLIC BLOOD PRESSURE: 103 MMHG | SYSTOLIC BLOOD PRESSURE: 149 MMHG

## 2019-09-28 DIAGNOSIS — M16.12 ARTHRITIS OF LEFT HIP: ICD-10-CM

## 2019-09-28 DIAGNOSIS — S92.502A CLOSED FRACTURE OF PHALANX OF LEFT SECOND TOE, INITIAL ENCOUNTER: Primary | ICD-10-CM

## 2019-09-28 DIAGNOSIS — M79.675 TOE PAIN, LEFT: ICD-10-CM

## 2019-09-28 DIAGNOSIS — R25.2 BILATERAL LEG CRAMPS: ICD-10-CM

## 2019-09-28 LAB
ALBUMIN SERPL-MCNC: 3.3 G/DL (ref 3.4–5)
ALBUMIN/GLOB SERPL: 0.7 {RATIO} (ref 0.8–1.7)
ALP SERPL-CCNC: 134 U/L (ref 45–117)
ALT SERPL-CCNC: 17 U/L (ref 16–61)
ANION GAP SERPL CALC-SCNC: 4 MMOL/L (ref 3–18)
AST SERPL-CCNC: 17 U/L (ref 10–38)
BASOPHILS # BLD: 0 K/UL (ref 0–0.1)
BASOPHILS NFR BLD: 0 % (ref 0–2)
BILIRUB SERPL-MCNC: 0.7 MG/DL (ref 0.2–1)
BUN SERPL-MCNC: 14 MG/DL (ref 7–18)
BUN/CREAT SERPL: 14 (ref 12–20)
CALCIUM SERPL-MCNC: 10.2 MG/DL (ref 8.5–10.1)
CHLORIDE SERPL-SCNC: 109 MMOL/L (ref 100–111)
CK SERPL-CCNC: 58 U/L (ref 39–308)
CO2 SERPL-SCNC: 28 MMOL/L (ref 21–32)
CREAT SERPL-MCNC: 1.02 MG/DL (ref 0.6–1.3)
DIFFERENTIAL METHOD BLD: ABNORMAL
EOSINOPHIL # BLD: 0.1 K/UL (ref 0–0.4)
EOSINOPHIL NFR BLD: 2 % (ref 0–5)
ERYTHROCYTE [DISTWIDTH] IN BLOOD BY AUTOMATED COUNT: 12.2 % (ref 11.6–14.5)
GLOBULIN SER CALC-MCNC: 4.8 G/DL (ref 2–4)
GLUCOSE SERPL-MCNC: 86 MG/DL (ref 74–99)
HCT VFR BLD AUTO: 42.1 % (ref 36–48)
HGB BLD-MCNC: 14 G/DL (ref 13–16)
LYMPHOCYTES # BLD: 1 K/UL (ref 0.9–3.6)
LYMPHOCYTES NFR BLD: 15 % (ref 21–52)
MCH RBC QN AUTO: 31.2 PG (ref 24–34)
MCHC RBC AUTO-ENTMCNC: 33.3 G/DL (ref 31–37)
MCV RBC AUTO: 93.8 FL (ref 74–97)
MONOCYTES # BLD: 0.8 K/UL (ref 0.05–1.2)
MONOCYTES NFR BLD: 12 % (ref 3–10)
NEUTS SEG # BLD: 4.7 K/UL (ref 1.8–8)
NEUTS SEG NFR BLD: 71 % (ref 40–73)
PLATELET # BLD AUTO: 291 K/UL (ref 135–420)
PMV BLD AUTO: 11.7 FL (ref 9.2–11.8)
POTASSIUM SERPL-SCNC: 3.6 MMOL/L (ref 3.5–5.5)
PROT SERPL-MCNC: 8.1 G/DL (ref 6.4–8.2)
RBC # BLD AUTO: 4.49 M/UL (ref 4.7–5.5)
SODIUM SERPL-SCNC: 141 MMOL/L (ref 136–145)
URATE SERPL-MCNC: 6.3 MG/DL (ref 2.6–7.2)
WBC # BLD AUTO: 6.6 K/UL (ref 4.6–13.2)

## 2019-09-28 PROCEDURE — 73660 X-RAY EXAM OF TOE(S): CPT

## 2019-09-28 PROCEDURE — 73502 X-RAY EXAM HIP UNI 2-3 VIEWS: CPT

## 2019-09-28 PROCEDURE — 99284 EMERGENCY DEPT VISIT MOD MDM: CPT

## 2019-09-28 PROCEDURE — 84550 ASSAY OF BLOOD/URIC ACID: CPT

## 2019-09-28 PROCEDURE — 82550 ASSAY OF CK (CPK): CPT

## 2019-09-28 PROCEDURE — 85025 COMPLETE CBC W/AUTO DIFF WBC: CPT

## 2019-09-28 PROCEDURE — 80053 COMPREHEN METABOLIC PANEL: CPT

## 2019-09-28 RX ORDER — TRAMADOL HYDROCHLORIDE 50 MG/1
50 TABLET ORAL
Qty: 10 TAB | Refills: 0 | Status: SHIPPED | OUTPATIENT
Start: 2019-09-28 | End: 2019-10-01

## 2019-09-28 NOTE — ED NOTES
Pt broke his second digit on his right foot. Wrapped pt toe and placed a boot on his foot. He is aware to call the orthopedics on Monday. No other complaints.

## 2019-09-28 NOTE — ED TRIAGE NOTES
Per pt, he is having some left sided pain. Unsure if his foot is fractured but wants to get checked out.

## 2019-09-28 NOTE — ED PROVIDER NOTES
EMERGENCY DEPARTMENT HISTORY AND PHYSICAL EXAM    3:28 PM      Date: 9/28/2019  Patient Name: Sammie Culver    History of Presenting Illness     Chief Complaint   Patient presents with    Leg Pain         History Provided By: Patient    Additional History (Context): Sammie Culver is a 81 y.o. male with Past medical history of hypertension, autoimmune disease, HIV, who presents with chief complaint of moderate left foot pain for the past few days. Patient complains that his second left toe has been hurting but does not recall injuring it. He also complains of some bilateral leg aches,  and left hip pain. He states that his legs ache intermittently when he gets up and walks. He reports that his left hip pain is worse at night when he is in bed or just sitting down for a length of time. He states the hip pain improves throughout the day when he is out and about and walking. He denies any trauma, fever, cough, chills, back pain, shortness of breath, chest pain, numbness and no other complaint. PCP: Karina Velásquez MD        Past History     Past Medical History:  Past Medical History:   Diagnosis Date    Autoimmune disease ([address]     AIDS? HIV    Hypertension     Rectal bleeding     Stroke [facility])        Past Surgical History:  Past Surgical History:   Procedure Laterality Date    HX ORTHOPAEDIC      back       Family History:  No family history on file. Social History:  Social History     Tobacco Use    Smoking status: Current Every Day Smoker     Packs/day: 1.00     Types: Cigarettes    Smokeless tobacco: Never Used   Substance Use Topics    Alcohol use: Yes     Alcohol/week: 5.8 standard drinks     Types: 7 Standard drinks or equivalent per week     Comment: \"drink everyday but never alone\"     Drug use: Yes     Types: Marijuana       Allergies:  No Known Allergies      Review of Systems       Review of Systems   Constitutional: Negative for chills and fever.    HENT: Negative for congestion, rhinorrhea, sore throat and trouble swallowing. Eyes: Negative for visual disturbance. Respiratory: Negative for cough, shortness of breath and wheezing. Cardiovascular: Negative for chest pain and leg swelling. Gastrointestinal: Negative for abdominal pain, nausea and vomiting. Endocrine: Negative for polyuria. Genitourinary: Negative for difficulty urinating and dysuria. Musculoskeletal: Positive for arthralgias and myalgias. Negative for gait problem, neck pain and neck stiffness. Left second toe pain    Left hip pain   Skin: Negative for rash. Neurological: Negative for dizziness, weakness, numbness and headaches. Hematological: Does not bruise/bleed easily. Psychiatric/Behavioral: Negative for confusion and dysphoric mood. All other systems reviewed and are negative. Physical Exam     Visit Vitals  Ht 6' 3\" (1.905 m)   Wt 88.5 kg (195 lb)   BMI 24.37 kg/m²         Physical Exam   Constitutional: He is oriented to person, place, and time. He appears well-developed and well-nourished. No distress. HENT:   Head: Normocephalic and atraumatic. Mouth/Throat: Oropharynx is clear and moist.   Eyes: Pupils are equal, round, and reactive to light. Conjunctivae are normal. No scleral icterus. Neck: Normal range of motion. Neck supple. Cardiovascular: Normal rate and intact distal pulses. Capillary refill < 3 seconds   Pulmonary/Chest: Effort normal and breath sounds normal. No respiratory distress. He has no wheezes. Abdominal: Soft. Bowel sounds are normal. He exhibits no distension. There is no tenderness. Musculoskeletal: Normal range of motion. He exhibits tenderness. He exhibits no edema.    Mild tenderness of left hip  Tenderness of the distal region of second toe on the left, and some mild swelling at that region  No other foot or ankle tenderness  No open wounds on the foot  Has symmetric dorsalis pedal pulses  Capillary refill less than 2 seconds  No lower extremity edema  No calf tenderness  No tenderness to palpate lower extremities at the leg regions   Lymphadenopathy:     He has no cervical adenopathy. Neurological: He is alert and oriented to person, place, and time. No cranial nerve deficit. He exhibits normal muscle tone. Coordination normal.   Sensation intact  Has full range of motion bilateral upper and lower extremity  Good dorsiflexion and plantarflexion of both feet  Moving all toes but has some pain associated with the left second toe with movement   Skin: Skin is warm and dry. No rash noted. He is not diaphoretic. Psychiatric: He has a normal mood and affect. His behavior is normal.   Nursing note and vitals reviewed. Diagnostic Study Results     Labs -  Recent Results (from the past 12 hour(s))   CBC WITH AUTOMATED DIFF    Collection Time: 09/28/19  4:46 PM   Result Value Ref Range    WBC 6.6 4.6 - 13.2 K/uL    RBC 4.49 (L) 4.70 - 5.50 M/uL    HGB 14.0 13.0 - 16.0 g/dL    HCT 42.1 36.0 - 48.0 %    MCV 93.8 74.0 - 97.0 FL    MCH 31.2 24.0 - 34.0 PG    MCHC 33.3 31.0 - 37.0 g/dL    RDW 12.2 11.6 - 14.5 %    PLATELET 631 338 - 291 K/uL    MPV 11.7 9.2 - 11.8 FL    NEUTROPHILS 71 40 - 73 %    LYMPHOCYTES 15 (L) 21 - 52 %    MONOCYTES 12 (H) 3 - 10 %    EOSINOPHILS 2 0 - 5 %    BASOPHILS 0 0 - 2 %    ABS. NEUTROPHILS 4.7 1.8 - 8.0 K/UL    ABS. LYMPHOCYTES 1.0 0.9 - 3.6 K/UL    ABS. MONOCYTES 0.8 0.05 - 1.2 K/UL    ABS. EOSINOPHILS 0.1 0.0 - 0.4 K/UL    ABS.  BASOPHILS 0.0 0.0 - 0.1 K/UL    DF AUTOMATED     METABOLIC PANEL, COMPREHENSIVE    Collection Time: 09/28/19  4:46 PM   Result Value Ref Range    Sodium 141 136 - 145 mmol/L    Potassium 3.6 3.5 - 5.5 mmol/L    Chloride 109 100 - 111 mmol/L    CO2 28 21 - 32 mmol/L    Anion gap 4 3.0 - 18 mmol/L    Glucose 86 74 - 99 mg/dL    BUN 14 7.0 - 18 MG/DL    Creatinine 1.02 0.6 - 1.3 MG/DL    BUN/Creatinine ratio 14 12 - 20      GFR est AA >60 >60 ml/min/1.73m2    GFR est non-AA >60 >60 ml/min/1.73m2 Calcium 10.2 (H) 8.5 - 10.1 MG/DL    Bilirubin, total 0.7 0.2 - 1.0 MG/DL    ALT (SGPT) 17 16 - 61 U/L    AST (SGOT) 17 10 - 38 U/L    Alk. phosphatase 134 (H) 45 - 117 U/L    Protein, total 8.1 6.4 - 8.2 g/dL    Albumin 3.3 (L) 3.4 - 5.0 g/dL    Globulin 4.8 (H) 2.0 - 4.0 g/dL    A-G Ratio 0.7 (L) 0.8 - 1.7     CK    Collection Time: 09/28/19  4:46 PM   Result Value Ref Range    CK 58 39 - 308 U/L   URIC ACID    Collection Time: 09/28/19  4:46 PM   Result Value Ref Range    Uric acid 6.3 2.6 - 7.2 MG/DL       Radiologic Studies -   XR 2ND TOE LT MIN 2 V   Final Result   IMPRESSION:      Fracture involving the middle phalanx of the second digit      XR HIP LT W OR WO PELV 2-3 VWS   Final Result   IMPRESSION:      Mild osteoarthritis of the left hip. Medical Decision Making   I am the first provider for this patient. I reviewed the vital signs, available nursing notes, past medical history, past surgical history, family history and social history. Vital Signs-Reviewed the patient's vital signs. Records Reviewed: Nursing Notes and Old Medical Records (Time of Review: 3:28 PM)    Provider Notes (Medical Decision Making): DDX: Metabolic, toe fracture, gout, contusion, arthritis, hip strain    Labs, x-rays  MDM    Medications - No data to display      ED Course: Progress Notes, Reevaluation, and Consults:  Labs reassuring    Has fracture of the middle phalanx of the toe    Enoc tape, cast shoe. Follow-up podiatry and PCP    I have reassessed the patient. I have discussed the workup, results and plan with the patient and patient is in agreement. Patient has no new complaint. Patient will be prescribed tramadol. Patient was discharge in stable condition. Patient was given outpatient follow up. Patient is to return to emergency department if any new or worsening condition. Diagnosis     Clinical Impression:   1. Closed fracture of phalanx of left second toe, initial encounter    2. Arthritis of left hip    3. Bilateral leg cramps    4. Toe pain, left        Disposition: Discharged    Follow-up Information     Follow up With Specialties Details Why Contact Info    Bina Torres DPM Podiatry Schedule an appointment as soon as possible for a visit in 3 days  [address]      Delon Paul MD Internal Medicine Schedule an appointment as soon as possible for a visit in 1 week  [address]      [facility] EMERGENCY DEPT Emergency Medicine  As needed, If symptoms worsen [address] [facility] [address]  [phone]           Patient's Medications   Start Taking    TRAMADOL (ULTRAM) 50 MG TABLET    Take 1 Tab by mouth every six (6) hours as needed for Pain for up to 3 days. Max Daily Amount: 200 mg. Indications: pain   Continue Taking    ACETAMINOPHEN (TYLENOL) 500 MG TABLET    Take 1 Tab by mouth every six (6) hours as needed. Indications: Pain    ALBUTEROL (PROVENTIL HFA) 90 MCG/ACTUATION INHALER    Take 1 Puff by inhalation every four (4) hours as needed for Wheezing. AMLODIPINE (NORVASC) 10 MG TABLET    Take 1 Tab by mouth daily. AMLODIPINE (NORVASC) 5 MG TABLET    Take 5 mg by mouth two (2) times a day. APIXABAN (ELIQUIS) 5 MG TABLET    Take 1 Tab by mouth every twelve (12) hours. BUDESONIDE-FORMOTEROL (SYMBICORT) 160-4.5 MCG/ACTUATION HFAA    Take 2 Puffs by inhalation two (2) times a day. CARVEDILOL (COREG) 25 MG TABLET    Take 0.5 Tabs by mouth two (2) times daily (with meals). DAPSONE 100 MG TABLET    Take 1 Tab by mouth daily. DICLOFENAC (VOLTAREN) 1 % GEL    Apply 2 g to affected area three (3) times daily as needed for Pain. Apply dose (2 gm or 4 gm) to each location. If treatment site is the hands, patients should wait at least one (1) hour to wash their hands. APPLY TO right wrist    DOLUTEGRAVIR (TIVICAY) 50 MG TAB TABLET    Take 1 Tab by mouth daily.     EMTRICITABINE-TENOFOVIR ALAFEN (DESCOVY) TABLET    Take 1 Tab by mouth daily. FOLIC ACID (FOLVITE) 1 MG TABLET    Take 1 Tab by mouth daily. MAGNESIUM OXIDE 400 MG CAP    Take 400 mg by mouth daily. ONDANSETRON (ZOFRAN ODT) 8 MG DISINTEGRATING TABLET    Take 1 Tab by mouth every eight (8) hours as needed for Nausea for up to 12 doses. PRAVASTATIN (PRAVACHOL) 40 MG TABLET    Take 1 Tab by mouth nightly. PYRIDOXINE, VITAMIN B6, (VITAMIN B-6) 50 MG TABLET    Take 1 Tab by mouth daily. These Medications have changed    No medications on file   Stop Taking    No medications on file         Ginger Cook DO    Dragon medical dictation software was used for portions of this report. Unintended transcription errors may occur. My signature above authenticates this document and my orders, the final    diagnosis (es), discharge prescription (s), and instructions in the Epic    record.

## 2019-09-28 NOTE — ED TRIAGE NOTES
Per EMS, Patient c/o bilateral leg pain. He states the pain is going down the back of his left leg. He states the pain is exacerbated with movement. He has prostate CA and the pain became worse after his last radiation tx. Hx of pacemaker and HTN, CHF.

## 2019-09-28 NOTE — DISCHARGE INSTRUCTIONS
Patient Education     If you were prescribed any medication take as directed. Do not drive or use heavy equipment if prescribed narcotics. Follow up with your primary care physician or with specialist as directed. Return to the emergency room with any new or worsening conditions. Arthritis: Care Instructions  Your Care Instructions  Arthritis, also called osteoarthritis, is a breakdown of the cartilage that cushions your joints. When the cartilage wears down, your bones rub against each other. This causes pain and stiffness. Many people have some arthritis as they age. Arthritis most often affects the joints of the spine, hands, hips, knees, or feet. You can take simple measures to protect your joints, ease your pain, and help you stay active. Follow-up care is a key part of your treatment and safety. Be sure to make and go to all appointments, and call your doctor if you are having problems. It's also a good idea to know your test results and keep a list of the medicines you take. How can you care for yourself at home? · Stay at a healthy weight. Being overweight puts extra strain on your joints. · Talk to your doctor or physical therapist about exercises that will help ease joint pain. ? Stretch. You may enjoy gentle forms of yoga to help keep your joints and muscles flexible. ? Walk instead of jog. Other types of exercise that are less stressful on the joints include riding a bicycle, swimming, holly chi, or water exercise. ? Lift weights. Strong muscles help reduce stress on your joints. Stronger thigh muscles, for example, take some of the stress off of the knees and hips. Learn the right way to lift weights so you do not make joint pain worse. · Take your medicines exactly as prescribed. Call your doctor if you think you are having a problem with your medicine. · Take pain medicines exactly as directed. ? If the doctor gave you a prescription medicine for pain, take it as prescribed.   ? If you are not taking a prescription pain medicine, ask your doctor if you can take an over-the-counter medicine. · Use a cane, crutch, walker, or another device if you need help to get around. These can help rest your joints. You also can use other things to make life easier, such as a higher toilet seat and padded handles on kitchen utensils. · Do not sit in low chairs, which can make it hard to get up. · Put heat or cold on your sore joints as needed. Use whichever helps you most. You also can take turns with hot and cold packs. ? Apply heat 2 or 3 times a day for 20 to 30 minutes--using a heating pad, hot shower, or hot pack--to relieve pain and stiffness. ? Put ice or a cold pack on your sore joint for 10 to 20 minutes at a time. Put a thin cloth between the ice and your skin. When should you call for help? Call your doctor now or seek immediate medical care if:    · You have sudden swelling, warmth, or pain in any joint.     · You have joint pain and a fever or rash.     · You have such bad pain that you cannot use a joint.    Watch closely for changes in your health, and be sure to contact your doctor if:    · You have mild joint symptoms that continue even with more than 6 weeks of care at home.     · You have stomach pain or other problems with your medicine. Where can you learn more? Go to http://mallory-bonnie.info/. Enter K849 in the search box to learn more about \"Arthritis: Care Instructions. \"  Current as of: April 1, 2019  Content Version: 12.2  © 2411-0965 [employer]. Care instructions adapted under license by [employer] (which disclaims liability or warranty for this information). If you have questions about a medical condition or this instruction, always ask your healthcare professional. [address] any warranty or liability for your use of this information.          Patient Education        Muscle Cramps: Care Instructions  Your Care Instructions    A muscle cramp occurs when a muscle tightens up suddenly. A cramp often happens in the legs. A muscle cramp is also called a muscle spasm or a charley horse. Muscle cramps usually last less than a minute. However, the pain may last for several minutes. Leg cramps that occur at night may wake you up. Heavy exercise, dehydration, and being overweight can increase your risk of getting cramps. An imbalance of certain chemicals in your blood, called electrolytes, can also lead to muscle cramps. Pregnant women sometimes get muscle cramps during sleep. Muscle cramps can be treated by stretching and massaging the muscle. If cramps keep coming back, your doctor may prescribe medicine that relaxes your muscles. Follow-up care is a key part of your treatment and safety. Be sure to make and go to all appointments, and call your doctor if you are having problems. It's also a good idea to know your test results and keep a list of the medicines you take. How can you care for yourself at home? · Drink plenty of fluids to prevent dehydration. Choose water and other caffeine-free clear liquids until you feel better. If you have kidney, heart, or liver disease and have to limit fluids, talk with your doctor before you increase the amount of fluids you drink. · Stretch your muscles every day, especially before and after exercise and at bedtime. Regular stretching can relax your muscles and may prevent cramps. · Do not suddenly increase the amount of exercise you get. Increase your exercise a little each week. · When you get a cramp, stretch and massage the muscle. You can also take a warm shower or bath to relax the muscle. A heating pad placed on the muscle can also help. · Take a daily multivitamin supplement. · Ask your doctor if you can take an over-the-counter pain medicine, such as acetaminophen (Tylenol), ibuprofen (Advil, Motrin), or naproxen (Aleve). Be safe with medicines.  Read and follow all instructions on the label. When should you call for help? Watch closely for changes in your health, and be sure to contact your doctor if:    · You get muscle cramps often that do not go away after home treatment.     · Your muscle cramps often wake you up at night.     · You do not get better as expected. Where can you learn more? Go to http://mallory-bonnie.info/. Enter N543 in the search box to learn more about \"Muscle Cramps: Care Instructions. \"  Current as of: June 26, 2019  Content Version: 12.2  © 2772-2534 [employer]. Care instructions adapted under license by [employer] (which disclaims liability or warranty for this information). If you have questions about a medical condition or this instruction, always ask your healthcare professional. [address] any warranty or liability for your use of this information. Patient Education        Broken Foot: Care Instructions  Your Care Instructions    A broken foot, or foot fracture, is a break in one or more of the bones in your foot. It may happen because of a sports injury, a fall, or other accident. A compound, or open, fracture occurs when a bone breaks through the skin. A break that does not poke through the skin is a closed fracture. Your treatment depends on the location and type of break in your foot. You may need a splint, a cast, or an orthopedic shoe. Certain kinds of injuries may need surgery at some time. Whatever your treatment, you can ease symptoms and help your foot heal with care at home. You may need 6 to 8 weeks or more to fully heal.  You heal best when you take good care of yourself. Eat a variety of healthy foods, and don't smoke. Follow-up care is a key part of your treatment and safety. Be sure to make and go to all appointments, and call your doctor if you are having problems.  It's also a good idea to know your test results and keep a list of the medicines you take.  How can you care for yourself at home? · Be safe with medicines. Take pain medicines exactly as directed. ? If the doctor gave you a prescription medicine for pain, take it as prescribed. ? If you are not taking a prescription pain medicine, ask your doctor if you can take an over-the-counter medicine. · Leave the splint on until your follow-up appointment. Do not put any weight on the injured foot. If you were given crutches, use them as directed. · Put ice or a cold pack on your foot for 10 to 20 minutes at a time. Try to do this every 1 to 2 hours for the next 3 days (when you are awake) or until the swelling goes down. Put a thin cloth between the ice and your skin. · Prop up the sore foot on a pillow anytime you sit or lie down during the next 3 days. Try to keep it above the level of your heart. This will help reduce swelling. · Follow the cast care instructions your doctor gives you. If you have a splint, do not take it off unless your doctor tells you to. Cast and splint care  · If you have a removable splint, ask your doctor if it is okay to remove it to bathe. Your doctor may want you to keep it on as much as possible. · Keep your plaster splint covered by taping a sheet of plastic around it when you bathe. Water under the plaster can cause your skin to itch and hurt. · Never cut your splint. When should you call for help? Call 911 anytime you think you may need emergency care. For example, call if:    · You have chest pain, are short of breath, or you cough up blood.    Call your doctor now or seek immediate medical care if:    · You have new or worse pain.     · Your foot is cool or pale or changes color.     · You have tingling, weakness, or numbness in your toes.     · Your cast or splint feels too tight.     · You have signs of a blood clot in your leg (called a deep vein thrombosis), such as:  ? Pain in your calf, back of the knee, thigh, or groin. ?  Redness or swelling in your leg.    Watch closely for changes in your health, and be sure to contact your doctor if:    · You have a problem with your splint or cast.     · You do not get better as expected. Where can you learn more? Go to http://mallory-bonnie.info/. Enter A534 in the search box to learn more about \"Broken Foot: Care Instructions. \"  Current as of: June 26, 2019  Content Version: 12.2  © 6432-4454 [employer]. Care instructions adapted under license by [employer] (which disclaims liability or warranty for this information). If you have questions about a medical condition or this instruction, always ask your healthcare professional. [place] [address] any warranty or liability for your use of this information. Patient Education        Hip Arthritis: Care Instructions  Your Care Instructions    Arthritis, also called osteoarthritis, is a breakdown of the tissue (cartilage) that cushions your joints. Many people have some arthritis as they age. When the cartilage in your hip joints wears down, your hip bone rubs against the hip socket. This causes pain and stiffness. Work with your doctor to find the right mix of treatments for your arthritis. There are things you can do at home to protect your hip joints, ease your pain, and help you stay active. But if your arthritis becomes so bad that you cannot walk, you may need surgery to replace the hip joint. Follow-up care is a key part of your treatment and safety. Be sure to make and go to all appointments, and call your doctor if you are having problems. It's also a good idea to know your test results and keep a list of the medicines you take. How can you care for yourself at home? · Stay at a healthy weight. Being overweight puts extra strain on your hip joints. · Talk to your doctor or physical therapist about exercises that will help ease hip pain. These tips may help.   ? Stretch to help prevent stiffness and to prevent injury before you exercise. You may enjoy gentle forms of yoga to help keep your joints and muscles flexible. ? Walk instead of jog. Other types of exercise that are less stressful on the joints include riding a bike, swimming, and doing water exercise. ? Lift weights. Strong muscles help reduce stress on your joints. Stronger thigh muscles, for example, take some of the stress off of the knees and hips. Learn the right way to lift weights so you do not make joint pain worse. · Take pain medicines exactly as directed. ? If the doctor gave you a prescription medicine for pain, take it as prescribed. ? If you are not taking a prescription pain medicine, ask your doctor if you can take an over-the-counter medicine. · Use a cane, crutch, walker, or another device if you need help to get around. These can help rest your hips. You also can use other things to make life easier, such as a higher toilet seat. · Do not sit in low chairs, which can make it painful to get up. · Put heat or cold on your sore hips as needed. Use whichever helps you most. You also can go back and forth between hot and cold packs. ? Apply heat 2 or 3 times a day for 20 to 30 minutes--using a heating pad, hot shower, or hot pack--to relieve pain and stiffness. ? Put ice or a cold pack on your sore hips for 10 to 20 minutes at a time to numb the area. Put a thin cloth between the ice and your skin. · Think about talking to your doctor about using capsaicin, a cream you apply to the skin for pain relief. When should you call for help?   Call your doctor now or seek immediate medical care if:    · You have sudden swelling, warmth, or pain in any joint.     · You have joint pain and a fever or rash.     · You have such bad pain that you cannot use the joint.    Watch closely for changes in your health, and be sure to contact your doctor if:    · You have mild joint symptoms that continue even with more than 6 weeks of care at home.     · You do not get better as expected.     · You have stomach pain or other problems with your medicine. Where can you learn more? Go to http://mallory-bonnie.info/. Enter Y381 in the search box to learn more about \"Hip Arthritis: Care Instructions. \"  Current as of: April 1, 2019  Content Version: 12.2  © 4832-9091 [employer]. Care instructions adapted under license by [employer] (which disclaims liability or warranty for this information). If you have questions about a medical condition or this instruction, always ask your healthcare professional. [place] [address] any warranty or liability for your use of this information.

## 2019-12-08 ENCOUNTER — HOSPITAL ENCOUNTER (EMERGENCY)
Age: 69
Discharge: HOME | End: 2019-12-08
Attending: EMERGENCY MEDICINE
Payer: OTHER GOVERNMENT

## 2019-12-08 ENCOUNTER — APPOINTMENT (OUTPATIENT)
Dept: GENERAL RADIOLOGY | Age: 69
End: 2019-12-08
Attending: PHYSICIAN ASSISTANT
Payer: OTHER GOVERNMENT

## 2019-12-08 VITALS
TEMPERATURE: 98 F | BODY MASS INDEX: 23.62 KG/M2 | SYSTOLIC BLOOD PRESSURE: 140 MMHG | HEIGHT: 75 IN | OXYGEN SATURATION: 97 % | HEART RATE: 62 BPM | DIASTOLIC BLOOD PRESSURE: 77 MMHG | RESPIRATION RATE: 14 BRPM | WEIGHT: 190 LBS

## 2019-12-08 DIAGNOSIS — J20.9 ACUTE BRONCHITIS, UNSPECIFIED ORGANISM: Primary | ICD-10-CM

## 2019-12-08 LAB
ANION GAP SERPL CALC-SCNC: 5 MMOL/L (ref 3–18)
ATRIAL RATE: 394 BPM
BASOPHILS # BLD: 0 K/UL (ref 0–0.1)
BASOPHILS NFR BLD: 0 % (ref 0–2)
BNP SERPL-MCNC: 1266 PG/ML (ref 0–900)
BUN SERPL-MCNC: 14 MG/DL (ref 7–18)
BUN/CREAT SERPL: 14 (ref 12–20)
CALCIUM SERPL-MCNC: 10.1 MG/DL (ref 8.5–10.1)
CALCULATED R AXIS, ECG10: -73 DEGREES
CALCULATED T AXIS, ECG11: 97 DEGREES
CHLORIDE SERPL-SCNC: 107 MMOL/L (ref 100–111)
CK MB CFR SERPL CALC: NORMAL % (ref 0–4)
CK MB SERPL-MCNC: <1 NG/ML (ref 5–25)
CK SERPL-CCNC: 108 U/L (ref 39–308)
CO2 SERPL-SCNC: 28 MMOL/L (ref 21–32)
CREAT SERPL-MCNC: 1.01 MG/DL (ref 0.6–1.3)
DIAGNOSIS, 93000: NORMAL
DIFFERENTIAL METHOD BLD: ABNORMAL
EOSINOPHIL # BLD: 0 K/UL (ref 0–0.4)
EOSINOPHIL NFR BLD: 1 % (ref 0–5)
ERYTHROCYTE [DISTWIDTH] IN BLOOD BY AUTOMATED COUNT: 12.4 % (ref 11.6–14.5)
GLUCOSE SERPL-MCNC: 98 MG/DL (ref 74–99)
HCT VFR BLD AUTO: 40.8 % (ref 36–48)
HGB BLD-MCNC: 13.8 G/DL (ref 13–16)
LYMPHOCYTES # BLD: 0.7 K/UL (ref 0.9–3.6)
LYMPHOCYTES NFR BLD: 15 % (ref 21–52)
MCH RBC QN AUTO: 30.7 PG (ref 24–34)
MCHC RBC AUTO-ENTMCNC: 33.8 G/DL (ref 31–37)
MCV RBC AUTO: 90.7 FL (ref 74–97)
MONOCYTES # BLD: 1.4 K/UL (ref 0.05–1.2)
MONOCYTES NFR BLD: 28 % (ref 3–10)
NEUTS SEG # BLD: 2.7 K/UL (ref 1.8–8)
NEUTS SEG NFR BLD: 56 % (ref 40–73)
PLATELET # BLD AUTO: 228 K/UL (ref 135–420)
PMV BLD AUTO: 11.7 FL (ref 9.2–11.8)
POTASSIUM SERPL-SCNC: 4.2 MMOL/L (ref 3.5–5.5)
Q-T INTERVAL, ECG07: 452 MS
QRS DURATION, ECG06: 170 MS
QTC CALCULATION (BEZET), ECG08: 470 MS
RBC # BLD AUTO: 4.5 M/UL (ref 4.7–5.5)
SODIUM SERPL-SCNC: 140 MMOL/L (ref 136–145)
TROPONIN I SERPL-MCNC: 0.03 NG/ML (ref 0–0.04)
VENTRICULAR RATE, ECG03: 65 BPM
WBC # BLD AUTO: 4.9 K/UL (ref 4.6–13.2)

## 2019-12-08 PROCEDURE — 93005 ELECTROCARDIOGRAM TRACING: CPT

## 2019-12-08 PROCEDURE — 82550 ASSAY OF CK (CPK): CPT

## 2019-12-08 PROCEDURE — 71046 X-RAY EXAM CHEST 2 VIEWS: CPT

## 2019-12-08 PROCEDURE — 83880 ASSAY OF NATRIURETIC PEPTIDE: CPT

## 2019-12-08 PROCEDURE — 85025 COMPLETE CBC W/AUTO DIFF WBC: CPT

## 2019-12-08 PROCEDURE — 74011000250 HC RX REV CODE- 250: Performed by: EMERGENCY MEDICINE

## 2019-12-08 PROCEDURE — 80048 BASIC METABOLIC PNL TOTAL CA: CPT

## 2019-12-08 PROCEDURE — 99282 EMERGENCY DEPT VISIT SF MDM: CPT

## 2019-12-08 PROCEDURE — 94640 AIRWAY INHALATION TREATMENT: CPT

## 2019-12-08 RX ORDER — ALBUTEROL SULFATE 90 UG/1
2 AEROSOL, METERED RESPIRATORY (INHALATION)
Qty: 1 INHALER | Refills: 0 | Status: SHIPPED | OUTPATIENT
Start: 2019-12-08

## 2019-12-08 RX ORDER — AZITHROMYCIN 250 MG/1
TABLET, FILM COATED ORAL
Qty: 6 TAB | Refills: 0 | Status: SHIPPED | OUTPATIENT
Start: 2019-12-08 | End: 2019-12-13

## 2019-12-08 RX ORDER — PREDNISONE 20 MG/1
40 TABLET ORAL DAILY
Qty: 6 TAB | Refills: 0 | Status: SHIPPED | OUTPATIENT
Start: 2019-12-08 | End: 2019-12-11

## 2019-12-08 RX ORDER — IPRATROPIUM BROMIDE AND ALBUTEROL SULFATE 2.5; .5 MG/3ML; MG/3ML
3 SOLUTION RESPIRATORY (INHALATION)
Status: COMPLETED | OUTPATIENT
Start: 2019-12-08 | End: 2019-12-08

## 2019-12-08 RX ORDER — BENZONATATE 100 MG/1
200 CAPSULE ORAL
Qty: 30 CAP | Refills: 0 | Status: SHIPPED | OUTPATIENT
Start: 2019-12-08 | End: 2019-12-15

## 2019-12-08 RX ADMIN — IPRATROPIUM BROMIDE AND ALBUTEROL SULFATE 3 ML: .5; 3 SOLUTION RESPIRATORY (INHALATION) at 13:02

## 2019-12-08 NOTE — ED PROVIDER NOTES
EMERGENCY DEPARTMENT HISTORY AND PHYSICAL EXAM    10:43 AM      Date: 12/8/2019  Patient Name: Lakshmi Alvarado    History of Presenting Illness     Chief Complaint   Patient presents with    Cough    Chest Pain         History Provided By: Patient    Additional History (Context): Lakshmi Alvarado is a 81 y.o. male with Past medical history of HIV, hypertension, stroke who presents with complaint of cough for the past week. He states that the excessive coughing makes his chest hurts and only when he coughs. Patient states that his cough is productive with yellowish-greenish sputum. He denies any fever, chills, neck pain, nausea, runny nose, sore throat, no other complaint. He admits that he does not take anything for his symptoms. PCP: Yuval Jason MD        Past History     Past Medical History:  Past Medical History:   Diagnosis Date    Autoimmune disease ([facility] [address]     AIDS? HIV    Hypertension     Rectal bleeding     Stroke [facility])        Past Surgical History:  Past Surgical History:   Procedure Laterality Date    HX ORTHOPAEDIC      back       Family History:  History reviewed. No pertinent family history. Social History:  Social History     Tobacco Use    Smoking status: Current Every Day Smoker     Packs/day: 1.00     Types: Cigarettes    Smokeless tobacco: Never Used   Substance Use Topics    Alcohol use: Yes     Alcohol/week: 5.8 standard drinks     Types: 7 Standard drinks or equivalent per week     Comment: \"drink everyday but never alone\"     Drug use: Yes     Types: Marijuana       Allergies:  No Known Allergies      Review of Systems       Review of Systems   Constitutional: Negative for chills and fever. HENT: Positive for congestion. Negative for rhinorrhea, sore throat and trouble swallowing. Eyes: Negative for visual disturbance. Respiratory: Positive for cough and chest tightness. Negative for shortness of breath and wheezing.     Cardiovascular: Negative for chest pain and leg swelling. Gastrointestinal: Negative for abdominal pain, nausea and vomiting. Endocrine: Negative for polyuria. Genitourinary: Negative for difficulty urinating and dysuria. Musculoskeletal: Negative for arthralgias and neck stiffness. Skin: Negative for rash. Neurological: Negative for dizziness, weakness, numbness and headaches. Hematological: Does not bruise/bleed easily. Psychiatric/Behavioral: Negative for confusion and dysphoric mood. All other systems reviewed and are negative. Physical Exam     Visit Vitals  /77 (BP 1 Location: Left arm, BP Patient Position: At rest)   Pulse 62   Temp 98 °F (36.7 °C)   Resp 14   Ht 6' 3\" (1.905 m)   Wt 86.2 kg (190 lb)   SpO2 97%   BMI 23.75 kg/m²         Physical Exam  Vitals signs and nursing note reviewed. Constitutional:       General: He is not in acute distress. Appearance: He is well-developed. He is not diaphoretic. HENT:      Head: Normocephalic and atraumatic. Nose: Nose normal.      Mouth/Throat:      Mouth: Mucous membranes are moist.      Pharynx: No oropharyngeal exudate or posterior oropharyngeal erythema. Eyes:      General: No scleral icterus. Conjunctiva/sclera: Conjunctivae normal.      Pupils: Pupils are equal, round, and reactive to light. Neck:      Musculoskeletal: Normal range of motion and neck supple. No neck rigidity or muscular tenderness. Cardiovascular:      Rate and Rhythm: Normal rate. Heart sounds: Normal heart sounds. Comments: Capillary refill < 3 seconds  Pulmonary:      Effort: Pulmonary effort is normal. No respiratory distress. Breath sounds: Rhonchi present. No wheezing or rales. Comments: Active cough  Abdominal:      General: Bowel sounds are normal. There is no distension. Palpations: Abdomen is soft. Tenderness: There is no tenderness. Musculoskeletal: Normal range of motion. Right lower leg: He exhibits no tenderness. No edema.       Left lower leg: He exhibits no tenderness. No edema. Lymphadenopathy:      Cervical: No cervical adenopathy. Skin:     General: Skin is warm and dry. Coloration: Skin is not jaundiced or pale. Neurological:      Mental Status: He is alert and oriented to person, place, and time. Cranial Nerves: No cranial nerve deficit. Motor: No weakness. Diagnostic Study Results     Labs -  Recent Results (from the past 12 hour(s))   CBC WITH AUTOMATED DIFF    Collection Time: 12/08/19 10:09 AM   Result Value Ref Range    WBC 4.9 4.6 - 13.2 K/uL    RBC 4.50 (L) 4.70 - 5.50 M/uL    HGB 13.8 13.0 - 16.0 g/dL    HCT 40.8 36.0 - 48.0 %    MCV 90.7 74.0 - 97.0 FL    MCH 30.7 24.0 - 34.0 PG    MCHC 33.8 31.0 - 37.0 g/dL    RDW 12.4 11.6 - 14.5 %    PLATELET 192 922 - 656 K/uL    MPV 11.7 9.2 - 11.8 FL    NEUTROPHILS 56 40 - 73 %    LYMPHOCYTES 15 (L) 21 - 52 %    MONOCYTES 28 (H) 3 - 10 %    EOSINOPHILS 1 0 - 5 %    BASOPHILS 0 0 - 2 %    ABS. NEUTROPHILS 2.7 1.8 - 8.0 K/UL    ABS. LYMPHOCYTES 0.7 (L) 0.9 - 3.6 K/UL    ABS. MONOCYTES 1.4 (H) 0.05 - 1.2 K/UL    ABS. EOSINOPHILS 0.0 0.0 - 0.4 K/UL    ABS.  BASOPHILS 0.0 0.0 - 0.1 K/UL    DF AUTOMATED     METABOLIC PANEL, BASIC    Collection Time: 12/08/19 10:09 AM   Result Value Ref Range    Sodium 140 136 - 145 mmol/L    Potassium 4.2 3.5 - 5.5 mmol/L    Chloride 107 100 - 111 mmol/L    CO2 28 21 - 32 mmol/L    Anion gap 5 3.0 - 18 mmol/L    Glucose 98 74 - 99 mg/dL    BUN 14 7.0 - 18 MG/DL    Creatinine 1.01 0.6 - 1.3 MG/DL    BUN/Creatinine ratio 14 12 - 20      GFR est AA >60 >60 ml/min/1.73m2    GFR est non-AA >60 >60 ml/min/1.73m2    Calcium 10.1 8.5 - 10.1 MG/DL   CARDIAC PANEL,(CK, CKMB & TROPONIN)    Collection Time: 12/08/19 10:09 AM   Result Value Ref Range     39 - 308 U/L    CK - MB <1.0 <3.6 ng/ml    CK-MB Index  0.0 - 4.0 %     CALCULATION NOT PERFORMED WHEN RESULT IS BELOW LINEAR LIMIT    Troponin-I, QT 0.03 0.0 - 0.045 NG/ML   NT-PRO BNP Collection Time: 12/08/19 10:09 AM   Result Value Ref Range    NT pro-BNP 1,266 (H) 0 - 900 PG/ML       Radiologic Studies -   XR CHEST PA LAT   Final Result   IMPRESSION:      No acute pulmonic disease. AICD. Medical Decision Making   I am the first provider for this patient. I reviewed the vital signs, available nursing notes, past medical history, past surgical history, family history and social history. Vital Signs-Reviewed the patient's vital signs. Records Reviewed: Nursing Notes and Old Medical Records (Time of Review: 10:43 AM)    Provider Notes (Medical Decision Making): DDX: Pneumonia, bronchitis, URI, metabolic, CHF  MDM    Medications   albuterol-ipratropium (DUO-NEB) 2.5 MG-0.5 MG/3 ML (3 mL Nebulization Given 12/8/19 1302)         ED Course: Progress Notes, Reevaluation, and Consults:  No alarming labs    Doing better after the breathing treatment    I have reassessed the patient. I have discussed the workup, results and plan with the patient and patient is in agreement. Patient is feeling better. Patient will be prescribed albuterol inhaler, prednisone, Tessalon, Z-Diego. Patient was discharge in stable condition. Patient was given outpatient follow up. Patient is to return to emergency department if any new or worsening condition. Diagnosis     Clinical Impression:   1.  Acute bronchitis, unspecified organism        Disposition: Discharged    Follow-up Information     Follow up With Specialties Details Why Contact Info    Waldemar Hassan MD Internal Medicine Schedule an appointment as soon as possible for a visit in 2 days  [address]      [facility] EMERGENCY DEPT Emergency Medicine  As needed, If symptoms worsen [address] [id number]  [phone]           Discharge Medication List as of 12/8/2019 12:01 PM      START taking these medications    Details   benzonatate (TESSALON PERLES) 100 mg capsule Take 2 Caps by mouth three (3) times daily as needed for Cough for up to 7 days. , Print, Disp-30 Cap, R-0      azithromycin (ZITHROMAX Z-MAIK) 250 mg tablet Take as written, Print, Disp-6 Tab, R-0      predniSONE (DELTASONE) 20 mg tablet Take 40 mg by mouth daily for 3 days. , Print, Disp-6 Tab, R-0         CONTINUE these medications which have CHANGED    Details   albuterol (PROVENTIL HFA) 90 mcg/actuation inhaler Take 2 Puffs by inhalation every four (4) hours as needed for Wheezing or Shortness of Breath. Indications: bronchospasm prevention, Print, Disp-1 Inhaler, R-0         CONTINUE these medications which have NOT CHANGED    Details   ondansetron (ZOFRAN ODT) 8 mg disintegrating tablet Take 1 Tab by mouth every eight (8) hours as needed for Nausea for up to 12 doses. , Print, Disp-12 Tab, R-0      !! amLODIPine (NORVASC) 10 mg tablet Take 1 Tab by mouth daily. , Print, Disp-30 Tab, R-0      apixaban (ELIQUIS) 5 mg tablet Take 1 Tab by mouth every twelve (12) hours. , Print, Disp-60 Tab, R-0      budesonide-formoterol (SYMBICORT) 160-4.5 mcg/actuation HFAA Take 2 Puffs by inhalation two (2) times a day., Print, Disp-1 Inhaler, R-1      carvedilol (COREG) 25 mg tablet Take 0.5 Tabs by mouth two (2) times daily (with meals). , Print, Disp-30 Tab, R-0      dapsone 100 mg tablet Take 1 Tab by mouth daily. , Print, Disp-30 Tab, R-0      dolutegravir (TIVICAY) 50 mg tab tablet Take 1 Tab by mouth daily. , Print, Disp-30 Tab, R-0      emtricitabine-tenofovir alafen (DESCOVY) tablet Take 1 Tab by mouth daily. , Print, Disp-30 Tab, R-0      folic acid (FOLVITE) 1 mg tablet Take 1 Tab by mouth daily. , Print, Disp-30 Tab, R-0      magnesium oxide 400 mg cap Take 400 mg by mouth daily. , Print, Disp-30 Cap, R-0      pravastatin (PRAVACHOL) 40 mg tablet Take 1 Tab by mouth nightly. , Print, Disp-30 Tab, R-0      pyridoxine, vitamin B6, (VITAMIN B-6) 50 mg tablet Take 1 Tab by mouth daily. , Print, Disp-30 Tab, R-0      diclofenac (VOLTAREN) 1 % gel Apply 2 g to affected area three (3) times daily as needed for Pain. Apply dose (2 gm or 4 gm) to each location. If treatment site is the hands, patients should wait at least one (1) hour to wash their hands. APPLY TO right wrist, Print, Disp-100 g, R-0      acetaminophen (TYLENOL) 500 mg tablet Take 1 Tab by mouth every six (6) hours as needed. Indications: Pain, No Print, Disp-20 Tab, R-0      !! amLODIPine (NORVASC) 5 mg tablet Take 5 mg by mouth two (2) times a day., Historical Med       !! - Potential duplicate medications found. Please discuss with provider. Vince Hyde DO    Dragon medical dictation software was used for portions of this report. Unintended transcription errors may occur. My signature above authenticates this document and my orders, the final    diagnosis (es), discharge prescription (s), and instructions in the Epic    record.

## 2019-12-08 NOTE — DISCHARGE INSTRUCTIONS
Patient Education        If you were prescribed any medication take as directed. Do not drive or use heavy equipment if prescribed narcotics. Follow up with your primary care physician or with specialist as directed. Return to the emergency room with any new or worsening conditions. Bronchitis: Care Instructions  Your Care Instructions    Bronchitis is inflammation of the bronchial tubes, which carry air to the lungs. The tubes swell and produce mucus, or phlegm. The mucus and inflamed bronchial tubes make you cough. You may have trouble breathing. Most cases of bronchitis are caused by viruses like those that cause colds. Antibiotics usually do not help and they may be harmful. Bronchitis usually develops rapidly and lasts about 2 to 3 weeks in otherwise healthy people. Follow-up care is a key part of your treatment and safety. Be sure to make and go to all appointments, and call your doctor if you are having problems. It's also a good idea to know your test results and keep a list of the medicines you take. How can you care for yourself at home? · Take all medicines exactly as prescribed. Call your doctor if you think you are having a problem with your medicine. · Get some extra rest.  · Take an over-the-counter pain medicine, such as acetaminophen (Tylenol), ibuprofen (Advil, Motrin), or naproxen (Aleve) to reduce fever and relieve body aches. Read and follow all instructions on the label. · Do not take two or more pain medicines at the same time unless the doctor told you to. Many pain medicines have acetaminophen, which is Tylenol. Too much acetaminophen (Tylenol) can be harmful. · Take an over-the-counter cough medicine that contains dextromethorphan to help quiet a dry, hacking cough so that you can sleep. Avoid cough medicines that have more than one active ingredient. Read and follow all instructions on the label.   · Breathe moist air from a humidifier, hot shower, or sink filled with hot water. The heat and moisture will thin mucus so you can cough it out. · Do not smoke. Smoking can make bronchitis worse. If you need help quitting, talk to your doctor about stop-smoking programs and medicines. These can increase your chances of quitting for good. When should you call for help? Call 911 anytime you think you may need emergency care. For example, call if:    · You have severe trouble breathing.    Call your doctor now or seek immediate medical care if:    · You have new or worse trouble breathing.     · You cough up dark brown or bloody mucus (sputum).     · You have a new or higher fever.     · You have a new rash.    Watch closely for changes in your health, and be sure to contact your doctor if:    · You cough more deeply or more often, especially if you notice more mucus or a change in the color of your mucus.     · You are not getting better as expected. Where can you learn more? Go to http://mallory-bonnie.info/. Enter H333 in the search box to learn more about \"Bronchitis: Care Instructions. \"  Current as of: June 9, 2019  Content Version: 12.2  © 3018-3662 [employer]. Care instructions adapted under license by [employer] (which disclaims liability or warranty for this information). If you have questions about a medical condition or this instruction, always ask your healthcare professional. [address] any warranty or liability for your use of this information.

## 2019-12-26 ENCOUNTER — APPOINTMENT (OUTPATIENT)
Dept: GENERAL RADIOLOGY | Age: 69
End: 2019-12-26
Attending: NURSE PRACTITIONER
Payer: MEDICARE

## 2019-12-26 ENCOUNTER — HOSPITAL ENCOUNTER (EMERGENCY)
Age: 69
Discharge: LEFT BEFORE DISCHARGE | End: 2019-12-27
Attending: EMERGENCY MEDICINE
Payer: MEDICARE

## 2019-12-26 VITALS
TEMPERATURE: 97.9 F | HEART RATE: 92 BPM | SYSTOLIC BLOOD PRESSURE: 135 MMHG | OXYGEN SATURATION: 99 % | DIASTOLIC BLOOD PRESSURE: 76 MMHG | HEIGHT: 75 IN | BODY MASS INDEX: 23.62 KG/M2 | RESPIRATION RATE: 14 BRPM | WEIGHT: 190 LBS

## 2019-12-26 PROCEDURE — 75810000275 HC EMERGENCY DEPT VISIT NO LEVEL OF CARE

## 2019-12-26 NOTE — ED TRIAGE NOTES
The patient presents for evaluation of bowel and bladder incontinence x one week. He is also complaining of a cough. States, \"I was here for bronchitis last week, but I still have the cough. \"

## 2020-02-27 ENCOUNTER — APPOINTMENT (OUTPATIENT)
Dept: CT IMAGING | Age: 70
End: 2020-02-27
Attending: PHYSICIAN ASSISTANT
Payer: MEDICARE

## 2020-02-27 ENCOUNTER — HOSPITAL ENCOUNTER (EMERGENCY)
Age: 70
Discharge: HOME | End: 2020-02-27
Attending: EMERGENCY MEDICINE
Payer: MEDICARE

## 2020-02-27 VITALS
TEMPERATURE: 98 F | OXYGEN SATURATION: 100 % | SYSTOLIC BLOOD PRESSURE: 155 MMHG | HEART RATE: 76 BPM | DIASTOLIC BLOOD PRESSURE: 96 MMHG | RESPIRATION RATE: 19 BRPM

## 2020-02-27 DIAGNOSIS — K52.9 PROCTOCOLITIS WITHOUT COMPLICATION: Primary | ICD-10-CM

## 2020-02-27 LAB
ALBUMIN SERPL-MCNC: 3.7 G/DL (ref 3.4–5)
ALBUMIN/GLOB SERPL: 0.7 {RATIO} (ref 0.8–1.7)
ALP SERPL-CCNC: 131 U/L (ref 45–117)
ALT SERPL-CCNC: 26 U/L (ref 16–61)
ANION GAP SERPL CALC-SCNC: 6 MMOL/L (ref 3–18)
AST SERPL-CCNC: 14 U/L (ref 10–38)
BASOPHILS # BLD: 0 K/UL (ref 0–0.1)
BASOPHILS NFR BLD: 0 % (ref 0–2)
BILIRUB SERPL-MCNC: 1.2 MG/DL (ref 0.2–1)
BUN SERPL-MCNC: 9 MG/DL (ref 7–18)
BUN/CREAT SERPL: 10 (ref 12–20)
CALCIUM SERPL-MCNC: 10.7 MG/DL (ref 8.5–10.1)
CHLORIDE SERPL-SCNC: 108 MMOL/L (ref 100–111)
CO2 SERPL-SCNC: 27 MMOL/L (ref 21–32)
CREAT SERPL-MCNC: 0.87 MG/DL (ref 0.6–1.3)
DIFFERENTIAL METHOD BLD: ABNORMAL
EOSINOPHIL # BLD: 0.1 K/UL (ref 0–0.4)
EOSINOPHIL NFR BLD: 1 % (ref 0–5)
ERYTHROCYTE [DISTWIDTH] IN BLOOD BY AUTOMATED COUNT: 12.3 % (ref 11.6–14.5)
GLOBULIN SER CALC-MCNC: 5.2 G/DL (ref 2–4)
GLUCOSE SERPL-MCNC: 96 MG/DL (ref 74–99)
HCT VFR BLD AUTO: 40.5 % (ref 36–48)
HGB BLD-MCNC: 13.5 G/DL (ref 13–16)
LIPASE SERPL-CCNC: 675 U/L (ref 73–393)
LYMPHOCYTES # BLD: 1.7 K/UL (ref 0.9–3.6)
LYMPHOCYTES NFR BLD: 19 % (ref 21–52)
MCH RBC QN AUTO: 29.9 PG (ref 24–34)
MCHC RBC AUTO-ENTMCNC: 33.3 G/DL (ref 31–37)
MCV RBC AUTO: 89.8 FL (ref 74–97)
MONOCYTES # BLD: 0.5 K/UL (ref 0.05–1.2)
MONOCYTES NFR BLD: 5 % (ref 3–10)
NEUTS SEG # BLD: 6.6 K/UL (ref 1.8–8)
NEUTS SEG NFR BLD: 75 % (ref 40–73)
PLATELET # BLD AUTO: 297 K/UL (ref 135–420)
PMV BLD AUTO: 12.4 FL (ref 9.2–11.8)
POTASSIUM SERPL-SCNC: 3.6 MMOL/L (ref 3.5–5.5)
PROT SERPL-MCNC: 8.9 G/DL (ref 6.4–8.2)
RBC # BLD AUTO: 4.51 M/UL (ref 4.7–5.5)
SODIUM SERPL-SCNC: 141 MMOL/L (ref 136–145)
WBC # BLD AUTO: 8.9 K/UL (ref 4.6–13.2)

## 2020-02-27 PROCEDURE — 74177 CT ABD & PELVIS W/CONTRAST: CPT

## 2020-02-27 PROCEDURE — 80053 COMPREHEN METABOLIC PANEL: CPT

## 2020-02-27 PROCEDURE — 83690 ASSAY OF LIPASE: CPT

## 2020-02-27 PROCEDURE — 85025 COMPLETE CBC W/AUTO DIFF WBC: CPT

## 2020-02-27 PROCEDURE — 99282 EMERGENCY DEPT VISIT SF MDM: CPT

## 2020-02-27 PROCEDURE — 74011636320 HC RX REV CODE- 636/320: Performed by: EMERGENCY MEDICINE

## 2020-02-27 RX ORDER — NAPROXEN 500 MG/1
500 TABLET ORAL 2 TIMES DAILY WITH MEALS
Qty: 20 TAB | Refills: 0 | Status: SHIPPED | OUTPATIENT
Start: 2020-02-27 | End: 2020-03-08

## 2020-02-27 RX ORDER — BISACODYL 5 MG
TABLET, DELAYED RELEASE (ENTERIC COATED) ORAL
Qty: 20 TAB | Refills: 0 | Status: SHIPPED | OUTPATIENT
Start: 2020-02-27

## 2020-02-27 RX ADMIN — IOPAMIDOL 100 ML: 612 INJECTION, SOLUTION INTRAVENOUS at 17:18

## 2020-02-27 NOTE — ED TRIAGE NOTES
Constipation X 4 days. States has tried prune juice. Pt also has complaints of rectal pain.  Denies abdominal pain, nausea and vomiting reports flatulence

## 2020-02-27 NOTE — DISCHARGE INSTRUCTIONS
Patient Education        Learning About Colitis  What is colitis? Colitis is swelling (inflammation) of the colon. The colon makes up most of the large intestine. Many conditions can cause colitis. What are some types of colitis? · Infectious colitis is a type that appears suddenly. It's caused by a bacteria or virus, such as salmonella, shigella, or campylobacter. · Ischemic colitis is caused by problems with blood flow to the colon. This can happen after surgery. It can also be caused by other health problems. · Microscopic colitis doesn't always have a clear cause. It can only be found with special tests. · Crohn's disease and ulcerative colitis are lifelong diseases. They can cause swelling, inflammation, and deep sores in the lining of the digestive tract. What are the symptoms? Symptoms may include fever, diarrhea that may be bloody, or belly pain. You may also have an urgent need to move your bowels or pain when you move your bowels. Or you may have bleeding from the rectum or weight loss. Your symptoms may depend on the type of colitis you have. For example, microscopic colitis may cause watery diarrhea. Sometimes symptoms go away on their own. If they don't go away, or if you have bleeding or severe pain, call your doctor right away. How is it diagnosed? You may need blood tests or a stool test. You also may need imaging tests like a CT scan. You may have a colonoscopy so that a doctor can look inside your colon. In some cases, the doctor may want to test a sample of tissue from the intestine. This test is called a biopsy. How is it treated? Treatment for colitis depends on the condition that is causing it. · Antibiotics may be used to treat an infection. · Diet changes may help with symptoms. · Medicines can also help to relieve inflammation and control symptoms. · In some cases, surgery to remove parts of the intestine may be needed.   Follow-up care is a key part of your treatment and safety. Be sure to make and go to all appointments, and call your doctor if you are having problems. It's also a good idea to know your test results and keep a list of the medicines you take. Where can you learn more? Go to http://mallory-bonnie.info/. Enter C130 in the search box to learn more about \"Learning About Colitis. \"  Current as of: November 7, 2018  Content Version: 12.2  © 4940-7372 [employer]. Care instructions adapted under license by [employer] (which disclaims liability or warranty for this information). If you have questions about a medical condition or this instruction, always ask your healthcare professional. [address] any warranty or liability for your use of this information.

## 2020-02-27 NOTE — ED PROVIDER NOTES
EMERGENCY DEPARTMENT HISTORY AND PHYSICAL EXAM    Date: 2/27/2020  Patient Name: Blu Davis    History of Presenting Illness     Chief Complaint   Patient presents with    Constipation    Anal Pain         History Provided By: Patient      Additional History (Context): Blu Davis is a 61-year-old male with h/o HTN, HIV (\"undetectable\" per patient) presenting to the emergency department with complaint of constipation and rectal pain. Patient states he has been constipated for the past 3 days. Did have a bowel movement today, states it was loose without blood. Patient denies abdominal pain at this time,  noting any blood in stool or from rectum. No fever, chills, nausea or vomiting, urinary symptoms or penile discharge/ symptoms. .  No history of abdominal surgeries. No chest pain or shortness of breath. Patient denies recent narcotic use or change in diet. No other complaints at this time. Patient does have primary care for follow-up. Last colonoscopy unknown     PCP: Rissa, MD Deisy    Current Outpatient Medications   Medication Sig Dispense Refill    albuterol (PROVENTIL HFA) 90 mcg/actuation inhaler Take 2 Puffs by inhalation every four (4) hours as needed for Wheezing or Shortness of Breath. Indications: bronchospasm prevention 1 Inhaler 0    ondansetron (ZOFRAN ODT) 8 mg disintegrating tablet Take 1 Tab by mouth every eight (8) hours as needed for Nausea for up to 12 doses. 12 Tab 0    amLODIPine (NORVASC) 10 mg tablet Take 1 Tab by mouth daily. 30 Tab 0    apixaban (ELIQUIS) 5 mg tablet Take 1 Tab by mouth every twelve (12) hours. 60 Tab 0    budesonide-formoterol (SYMBICORT) 160-4.5 mcg/actuation HFAA Take 2 Puffs by inhalation two (2) times a day. 1 Inhaler 1    carvedilol (COREG) 25 mg tablet Take 0.5 Tabs by mouth two (2) times daily (with meals). 30 Tab 0    dapsone 100 mg tablet Take 1 Tab by mouth daily.  30 Tab 0    dolutegravir (TIVICAY) 50 mg tab tablet Take 1 Tab by mouth daily. 30 Tab 0    emtricitabine-tenofovir alafen (DESCOVY) tablet Take 1 Tab by mouth daily. 30 Tab 0    folic acid (FOLVITE) 1 mg tablet Take 1 Tab by mouth daily. 30 Tab 0    magnesium oxide 400 mg cap Take 400 mg by mouth daily. 30 Cap 0    pravastatin (PRAVACHOL) 40 mg tablet Take 1 Tab by mouth nightly. 30 Tab 0    pyridoxine, vitamin B6, (VITAMIN B-6) 50 mg tablet Take 1 Tab by mouth daily. 30 Tab 0    diclofenac (VOLTAREN) 1 % gel Apply 2 g to affected area three (3) times daily as needed for Pain. Apply dose (2 gm or 4 gm) to each location. If treatment site is the hands, patients should wait at least one (1) hour to wash their hands. APPLY TO right wrist 100 g 0    acetaminophen (TYLENOL) 500 mg tablet Take 1 Tab by mouth every six (6) hours as needed. Indications: Pain 20 Tab 0    amLODIPine (NORVASC) 5 mg tablet Take 5 mg by mouth two (2) times a day. Past History     Past Medical History:  Past Medical History:   Diagnosis Date    Autoimmune disease ([facility] [address]     AIDS? HIV    Hypertension     Rectal bleeding     Stroke [facility])        Past Surgical History:  Past Surgical History:   Procedure Laterality Date    HX ORTHOPAEDIC      back       Family History:  History reviewed. No pertinent family history. Social History:  Social History     Tobacco Use    Smoking status: Current Every Day Smoker     Packs/day: 1.00     Types: Cigarettes    Smokeless tobacco: Never Used   Substance Use Topics    Alcohol use: Yes     Alcohol/week: 5.8 standard drinks     Types: 7 Standard drinks or equivalent per week     Comment: \"drink everyday but never alone\"     Drug use: Yes     Types: Marijuana       Allergies:  No Known Allergies      Review of Systems     Review of Systems   Constitutional: Negative for chills and fever. HENT: Negative for nasal congestion, sore throat, rhinorrhea  Eyes: Negative. Respiratory: negative  cough and negative for shortness of breath.     Cardiovascular: Negative for chest pain and palpitations. Gastrointestinal: neg for abdominal pain, pos for constipation, diarrhea, neg nausea and vomiting. Genitourinary: Negative. Negative for difficulty urinating and flank pain. Musculoskeletal: Negative for back pain. Negative for gait problem and neck pain. Skin: Negative. Allergic/Immunologic: Negative. Neurological: Negative for dizziness, weakness, numbness and headaches. Psychiatric/Behavioral: Negative. All other systems reviewed and are negative. All Other Systems Negative  Physical Exam     Vitals:    02/27/20 1418 02/27/20 1541   BP: (!) 155/96    Pulse: 76    Resp: 19    Temp: 98 °F (36.7 °C)    SpO2: 100% 100%     Physical Exam  Vitals signs and nursing note reviewed. Constitutional:       General: He is not in acute distress. Appearance: He is well-developed. He is not diaphoretic. HENT:      Head: Normocephalic and atraumatic. Nose: Nose normal.   Eyes:      Conjunctiva/sclera: Conjunctivae normal.      Pupils: Pupils are equal, round, and reactive to light. Neck:      Musculoskeletal: Normal range of motion and neck supple. Cardiovascular:      Rate and Rhythm: Normal rate and regular rhythm. Pulmonary:      Effort: Pulmonary effort is normal. No respiratory distress. Breath sounds: Normal breath sounds. Abdominal:      General: Bowel sounds are normal. There is no distension. Palpations: Abdomen is soft. There is no mass. Tenderness: There is no abdominal tenderness. There is no right CVA tenderness, left CVA tenderness or rebound. Hernia: No hernia is present. Comments: No tenderness appreciated on exam, abdomen is soft. No guarding or peritoneal signs. Genitourinary:     Comments: Deferred by patient  Musculoskeletal: Normal range of motion. Skin:     General: Skin is warm. Findings: No rash. Neurological:      General: No focal deficit present.       Mental Status: He is alert and oriented to person, place, and time. Mental status is at baseline. Cranial Nerves: No cranial nerve deficit. Coordination: Coordination normal.   Psychiatric:         Behavior: Behavior normal.           Diagnostic Study Results     Labs -     Recent Results (from the past 12 hour(s))   CBC WITH AUTOMATED DIFF    Collection Time: 02/27/20  2:22 PM   Result Value Ref Range    WBC 8.9 4.6 - 13.2 K/uL    RBC 4.51 (L) 4.70 - 5.50 M/uL    HGB 13.5 13.0 - 16.0 g/dL    HCT 40.5 36.0 - 48.0 %    MCV 89.8 74.0 - 97.0 FL    MCH 29.9 24.0 - 34.0 PG    MCHC 33.3 31.0 - 37.0 g/dL    RDW 12.3 11.6 - 14.5 %    PLATELET 107 653 - 840 K/uL    MPV 12.4 (H) 9.2 - 11.8 FL    NEUTROPHILS 75 (H) 40 - 73 %    LYMPHOCYTES 19 (L) 21 - 52 %    MONOCYTES 5 3 - 10 %    EOSINOPHILS 1 0 - 5 %    BASOPHILS 0 0 - 2 %    ABS. NEUTROPHILS 6.6 1.8 - 8.0 K/UL    ABS. LYMPHOCYTES 1.7 0.9 - 3.6 K/UL    ABS. MONOCYTES 0.5 0.05 - 1.2 K/UL    ABS. EOSINOPHILS 0.1 0.0 - 0.4 K/UL    ABS. BASOPHILS 0.0 0.0 - 0.1 K/UL    DF AUTOMATED     METABOLIC PANEL, COMPREHENSIVE    Collection Time: 02/27/20  2:22 PM   Result Value Ref Range    Sodium 141 136 - 145 mmol/L    Potassium 3.6 3.5 - 5.5 mmol/L    Chloride 108 100 - 111 mmol/L    CO2 27 21 - 32 mmol/L    Anion gap 6 3.0 - 18 mmol/L    Glucose 96 74 - 99 mg/dL    BUN 9 7.0 - 18 MG/DL    Creatinine 0.87 0.6 - 1.3 MG/DL    BUN/Creatinine ratio 10 (L) 12 - 20      GFR est AA >60 >60 ml/min/1.73m2    GFR est non-AA >60 >60 ml/min/1.73m2    Calcium 10.7 (H) 8.5 - 10.1 MG/DL    Bilirubin, total 1.2 (H) 0.2 - 1.0 MG/DL    ALT (SGPT) 26 16 - 61 U/L    AST (SGOT) 14 10 - 38 U/L    Alk.  phosphatase 131 (H) 45 - 117 U/L    Protein, total 8.9 (H) 6.4 - 8.2 g/dL    Albumin 3.7 3.4 - 5.0 g/dL    Globulin 5.2 (H) 2.0 - 4.0 g/dL    A-G Ratio 0.7 (L) 0.8 - 1.7     LIPASE    Collection Time: 02/27/20  2:22 PM   Result Value Ref Range    Lipase 675 (H) 73 - 393 U/L       Radiologic Studies -   CT ABD PELV W CONT   Final Result   IMPRESSION[de-identified]      1.  Proctocolitis without obstruction, perforation, or abscess. 2. Stable appearance of renal cysts. 3. Stable appearance of subtle chronic adrenal nodules; adenomas by attenuation   on remote noncontrast CT scans. Thank you for this referral.        CT Results  (Last 48 hours)               02/27/20 1718  CT ABD PELV W CONT Final result    Impression:  IMPRESSION[de-identified]       1.  Proctocolitis without obstruction, perforation, or abscess. 2. Stable appearance of renal cysts. 3. Stable appearance of subtle chronic adrenal nodules; adenomas by attenuation   on remote noncontrast CT scans. Thank you for this referral.       Narrative:  CT SCAN ABDOMEN AND PELVIS WITH IV CONTRAST       HISTORY: constipation, rectal pain, lower abdominal pain ; symptoms for 4 days       COMPARISON: October 2018, April 2007       TECHNIQUE: After the intravenous administration of iodinated IV contrast,   without oral contrast, contiguous 5 mm axial scans were obtained through the   abdomen and pelvis. Contrast used: 100cc Isovue-300        CT scans at this facility are performed using dose optimization technique as   appropriate with performed exam, to include automated exposure control,   adjustment of mA and/or kV according to patient's size (including appropriate   matching for site-specific examinations), or use of iterative reconstruction   technique. FINDINGS:        Lower chest: Unremarkable       No intraperitoneal free air. No free fluid. No fluid collection. Liver: Unremarkable        Gallbladder/biliary: Unremarkable       Spleen: Unremarkable       Pancreas: Unremarkable       Left Kidney: Multiple small caliber cystic hypodensities. No stones or   hydronephrosis. Unchanged. Right kidney: Several generally small cystic hypodensities. The largest is about   4 cm diameter superiorly.  Small nonobstructing stone versus vascular   calcification in the mid aspect. Unchanged. Adrenals: No change in bilateral adrenal nodularity. There are tandem foci   within the left adrenal, largest is 3.1 x 2.6 cm within marginal calcification   perhaps along the septum. Bowels/mesentery: There is moderate concentric mural thickening at the region of   anorectal junction. There is a moderately sized bowel containing left inguinal   hernia. It contains a portion of the sigmoid bowel. No associated inflammation. This is unchanged. Appendix: Unremarkable       Urinary bladder: Unremarkable        Pelvic organs: Unremarkable       Vascular: Aorta unremarkable for age. IVC unremarkable. Lymph Nodes: No adenopathy. Bones: No acute findings. Unremarkable for age. CXR Results  (Last 48 hours)    None            Medical Decision Making   I am the first provider for this patient. I reviewed the vital signs, available nursing notes, past medical history, past surgical history, family history and social history. Vital Signs-Reviewed the patient's vital signs. Records Reviewed: Nursing notes, old medical records and any previous labs, imaging, visits, consultations pertinent to patient care    Procedures:  Procedures      ED Course: Progress Notes, Reevaluation, and Consults:      Provider Notes (Medical Decision Making):   71-year-old male here with constipation and rectal pain x3 days. Significant history including HIV however patient states he takes medications and CD4 count is within normal limits. States he is undetectable at this time. No abdominal pain, vomiting or nausea. No fevers or chills. Vital signs are stable and exam is unremarkable. Patient has no abdominal pain on exam, soft and nontender. No peritoneal signs. No guarding. Labs within normal limits. Patient has no urinary symptoms at this time. No dysuria or hematuria.     CT of abdomen pelvis consistent with proctocolitis, discussed this with my ED attending,  Dorota Mancuso. she recommends anti-inflammatory medication and GI follow-up. No further emergency department work-up needed at this time. Clears patient for d/c home. MED RECONCILIATION:  No current facility-administered medications for this encounter. Current Outpatient Medications   Medication Sig    albuterol (PROVENTIL HFA) 90 mcg/actuation inhaler Take 2 Puffs by inhalation every four (4) hours as needed for Wheezing or Shortness of Breath. Indications: bronchospasm prevention    ondansetron (ZOFRAN ODT) 8 mg disintegrating tablet Take 1 Tab by mouth every eight (8) hours as needed for Nausea for up to 12 doses.  amLODIPine (NORVASC) 10 mg tablet Take 1 Tab by mouth daily.  apixaban (ELIQUIS) 5 mg tablet Take 1 Tab by mouth every twelve (12) hours.  budesonide-formoterol (SYMBICORT) 160-4.5 mcg/actuation HFAA Take 2 Puffs by inhalation two (2) times a day.  carvedilol (COREG) 25 mg tablet Take 0.5 Tabs by mouth two (2) times daily (with meals).  dapsone 100 mg tablet Take 1 Tab by mouth daily.  dolutegravir (TIVICAY) 50 mg tab tablet Take 1 Tab by mouth daily.  emtricitabine-tenofovir alafen (DESCOVY) tablet Take 1 Tab by mouth daily.  folic acid (FOLVITE) 1 mg tablet Take 1 Tab by mouth daily.  magnesium oxide 400 mg cap Take 400 mg by mouth daily.  pravastatin (PRAVACHOL) 40 mg tablet Take 1 Tab by mouth nightly.  pyridoxine, vitamin B6, (VITAMIN B-6) 50 mg tablet Take 1 Tab by mouth daily.  diclofenac (VOLTAREN) 1 % gel Apply 2 g to affected area three (3) times daily as needed for Pain. Apply dose (2 gm or 4 gm) to each location. If treatment site is the hands, patients should wait at least one (1) hour to wash their hands. APPLY TO right wrist    acetaminophen (TYLENOL) 500 mg tablet Take 1 Tab by mouth every six (6) hours as needed. Indications: Pain    amLODIPine (NORVASC) 5 mg tablet Take 5 mg by mouth two (2) times a day. Disposition:  home    DISCHARGE NOTE:     Pt has been reexamined. Patient has no new complaints, changes, or physical findings. Care plan outlined and precautions discussed. Discussed proper way to take medications. Discussed treatment plan, return precautions, symptomatic relief, and expected time to improvement. All questions answered. Patient is stable for discharge and outpatient management. Patient is ready to go home. Follow-up Information    None         Current Discharge Medication List                Diagnosis     Clinical Impression: No diagnosis found. Dictation disclaimer:  Please note that this dictation was completed with [employer], the [employer] voice recognition software. Quite often unanticipated grammatical, syntax, homophones, and other interpretive errors are inadvertently transcribed by the computer software. Please disregard these errors. Please excuse any errors that have escaped final proofreading.

## 2020-07-29 ENCOUNTER — HOSPITAL ENCOUNTER (EMERGENCY)
Age: 70
Discharge: HOME | End: 2020-07-29
Attending: EMERGENCY MEDICINE
Payer: MEDICARE

## 2020-07-29 ENCOUNTER — APPOINTMENT (OUTPATIENT)
Dept: GENERAL RADIOLOGY | Age: 70
End: 2020-07-29
Attending: EMERGENCY MEDICINE
Payer: MEDICARE

## 2020-07-29 VITALS
DIASTOLIC BLOOD PRESSURE: 96 MMHG | HEART RATE: 60 BPM | TEMPERATURE: 98.5 F | OXYGEN SATURATION: 98 % | SYSTOLIC BLOOD PRESSURE: 141 MMHG | RESPIRATION RATE: 26 BRPM

## 2020-07-29 DIAGNOSIS — R07.89 ATYPICAL CHEST PAIN: Primary | ICD-10-CM

## 2020-07-29 LAB
ALBUMIN SERPL-MCNC: 3.4 G/DL (ref 3.4–5)
ALBUMIN/GLOB SERPL: 0.9 {RATIO} (ref 0.8–1.7)
ALP SERPL-CCNC: 135 U/L (ref 45–117)
ALT SERPL-CCNC: 28 U/L (ref 16–61)
ANION GAP SERPL CALC-SCNC: 2 MMOL/L (ref 3–18)
AST SERPL-CCNC: 15 U/L (ref 10–38)
ATRIAL RATE: 227 BPM
BASOPHILS # BLD: 0 K/UL (ref 0–0.1)
BASOPHILS NFR BLD: 0 % (ref 0–2)
BILIRUB SERPL-MCNC: 0.7 MG/DL (ref 0.2–1)
BUN SERPL-MCNC: 16 MG/DL (ref 7–18)
BUN/CREAT SERPL: 16 (ref 12–20)
CALCIUM SERPL-MCNC: 10 MG/DL (ref 8.5–10.1)
CALCULATED R AXIS, ECG10: -71 DEGREES
CALCULATED T AXIS, ECG11: 99 DEGREES
CHLORIDE SERPL-SCNC: 112 MMOL/L (ref 100–111)
CK MB CFR SERPL CALC: 2.7 % (ref 0–4)
CK MB SERPL-MCNC: 1.6 NG/ML (ref 5–25)
CK SERPL-CCNC: 59 U/L (ref 39–308)
CO2 SERPL-SCNC: 30 MMOL/L (ref 21–32)
CREAT SERPL-MCNC: 1.01 MG/DL (ref 0.6–1.3)
DIAGNOSIS, 93000: NORMAL
DIFFERENTIAL METHOD BLD: ABNORMAL
EOSINOPHIL # BLD: 0.2 K/UL (ref 0–0.4)
EOSINOPHIL NFR BLD: 2 % (ref 0–5)
ERYTHROCYTE [DISTWIDTH] IN BLOOD BY AUTOMATED COUNT: 12.6 % (ref 11.6–14.5)
GLOBULIN SER CALC-MCNC: 4 G/DL (ref 2–4)
GLUCOSE SERPL-MCNC: 99 MG/DL (ref 74–99)
HCT VFR BLD AUTO: 39.7 % (ref 36–48)
HGB BLD-MCNC: 12.8 G/DL (ref 13–16)
LYMPHOCYTES # BLD: 1 K/UL (ref 0.9–3.6)
LYMPHOCYTES NFR BLD: 15 % (ref 21–52)
MCH RBC QN AUTO: 29.4 PG (ref 24–34)
MCHC RBC AUTO-ENTMCNC: 32.2 G/DL (ref 31–37)
MCV RBC AUTO: 91.1 FL (ref 74–97)
MONOCYTES # BLD: 0.9 K/UL (ref 0.05–1.2)
MONOCYTES NFR BLD: 13 % (ref 3–10)
NEUTS SEG # BLD: 4.6 K/UL (ref 1.8–8)
NEUTS SEG NFR BLD: 70 % (ref 40–73)
PLATELET # BLD AUTO: 265 K/UL (ref 135–420)
PMV BLD AUTO: 12.3 FL (ref 9.2–11.8)
POTASSIUM SERPL-SCNC: 3.8 MMOL/L (ref 3.5–5.5)
PROT SERPL-MCNC: 7.4 G/DL (ref 6.4–8.2)
Q-T INTERVAL, ECG07: 454 MS
QRS DURATION, ECG06: 170 MS
QTC CALCULATION (BEZET), ECG08: 454 MS
RBC # BLD AUTO: 4.36 M/UL (ref 4.7–5.5)
SODIUM SERPL-SCNC: 144 MMOL/L (ref 136–145)
TROPONIN I SERPL-MCNC: 0.04 NG/ML (ref 0–0.04)
VENTRICULAR RATE, ECG03: 60 BPM
WBC # BLD AUTO: 6.6 K/UL (ref 4.6–13.2)

## 2020-07-29 PROCEDURE — 93005 ELECTROCARDIOGRAM TRACING: CPT

## 2020-07-29 PROCEDURE — 71045 X-RAY EXAM CHEST 1 VIEW: CPT

## 2020-07-29 PROCEDURE — 80053 COMPREHEN METABOLIC PANEL: CPT

## 2020-07-29 PROCEDURE — 99285 EMERGENCY DEPT VISIT HI MDM: CPT

## 2020-07-29 PROCEDURE — 82550 ASSAY OF CK (CPK): CPT

## 2020-07-29 PROCEDURE — 85025 COMPLETE CBC W/AUTO DIFF WBC: CPT

## 2020-07-29 NOTE — ED TRIAGE NOTES
Pt BIB EMS intermittent R sided CP for last 4 days. Has PCP appt later this week but \"couldn't wait. \" Ventricular paced. Alert and oriented. Past Medical History:   Diagnosis Date    Autoimmune disease ([facility] [address]     AIDS?  HIV    Hypertension     Rectal bleeding     Stroke [facility])

## 2021-07-06 ENCOUNTER — APPOINTMENT (OUTPATIENT)
Dept: GENERAL RADIOLOGY | Age: 71
End: 2021-07-06
Attending: EMERGENCY MEDICINE
Payer: MEDICARE

## 2021-07-06 ENCOUNTER — HOSPITAL ENCOUNTER (EMERGENCY)
Age: 71
Discharge: HOME | End: 2021-07-07
Attending: EMERGENCY MEDICINE
Payer: MEDICARE

## 2021-07-06 DIAGNOSIS — R60.9 PERIPHERAL EDEMA: Primary | ICD-10-CM

## 2021-07-06 DIAGNOSIS — H53.2 DIPLOPIA: ICD-10-CM

## 2021-07-06 LAB
ALBUMIN SERPL-MCNC: 3 G/DL (ref 3.4–5)
ALBUMIN/GLOB SERPL: 0.5 {RATIO} (ref 0.8–1.7)
ALP SERPL-CCNC: 127 U/L (ref 45–117)
ALT SERPL-CCNC: 24 U/L (ref 16–61)
ANION GAP SERPL CALC-SCNC: 2 MMOL/L (ref 3–18)
AST SERPL-CCNC: 17 U/L (ref 10–38)
BASOPHILS # BLD: 0 K/UL (ref 0–0.1)
BASOPHILS NFR BLD: 0 % (ref 0–2)
BILIRUB SERPL-MCNC: 0.4 MG/DL (ref 0.2–1)
BNP SERPL-MCNC: 1036 PG/ML (ref 0–900)
BUN SERPL-MCNC: 12 MG/DL (ref 7–18)
BUN/CREAT SERPL: 11 (ref 12–20)
CALCIUM SERPL-MCNC: 10.2 MG/DL (ref 8.5–10.1)
CHLORIDE SERPL-SCNC: 107 MMOL/L (ref 100–111)
CK MB CFR SERPL CALC: NORMAL % (ref 0–4)
CK MB SERPL-MCNC: <1 NG/ML (ref 5–25)
CK SERPL-CCNC: 60 U/L (ref 39–308)
CO2 SERPL-SCNC: 32 MMOL/L (ref 21–32)
CREAT SERPL-MCNC: 1.05 MG/DL (ref 0.6–1.3)
DIFFERENTIAL METHOD BLD: ABNORMAL
EOSINOPHIL # BLD: 0 K/UL (ref 0–0.4)
EOSINOPHIL NFR BLD: 0 % (ref 0–5)
ERYTHROCYTE [DISTWIDTH] IN BLOOD BY AUTOMATED COUNT: 11.9 % (ref 11.6–14.5)
ETHANOL SERPL-MCNC: <3 MG/DL (ref 0–3)
GLOBULIN SER CALC-MCNC: 5.5 G/DL (ref 2–4)
GLUCOSE SERPL-MCNC: 101 MG/DL (ref 74–99)
HCT VFR BLD AUTO: 35.7 % (ref 36–48)
HGB BLD-MCNC: 11.6 G/DL (ref 13–16)
LYMPHOCYTES # BLD: 2.2 K/UL (ref 0.9–3.6)
LYMPHOCYTES NFR BLD: 30 % (ref 21–52)
MCH RBC QN AUTO: 29.4 PG (ref 24–34)
MCHC RBC AUTO-ENTMCNC: 32.5 G/DL (ref 31–37)
MCV RBC AUTO: 90.6 FL (ref 74–97)
MONOCYTES # BLD: 0.8 K/UL (ref 0.05–1.2)
MONOCYTES NFR BLD: 11 % (ref 3–10)
NEUTS SEG # BLD: 4.3 K/UL (ref 1.8–8)
NEUTS SEG NFR BLD: 59 % (ref 40–73)
PLATELET # BLD AUTO: 302 K/UL (ref 135–420)
PLATELET COMMENTS,PCOM: ABNORMAL
PMV BLD AUTO: 11.6 FL (ref 9.2–11.8)
POTASSIUM SERPL-SCNC: 3.5 MMOL/L (ref 3.5–5.5)
PROT SERPL-MCNC: 8.5 G/DL (ref 6.4–8.2)
RBC # BLD AUTO: 3.94 M/UL (ref 4.35–5.65)
RBC MORPH BLD: ABNORMAL
SODIUM SERPL-SCNC: 141 MMOL/L (ref 136–145)
TROPONIN I SERPL-MCNC: 0.04 NG/ML (ref 0–0.04)
WBC # BLD AUTO: 7.3 K/UL (ref 4.6–13.2)

## 2021-07-06 PROCEDURE — 80053 COMPREHEN METABOLIC PANEL: CPT

## 2021-07-06 PROCEDURE — 83880 ASSAY OF NATRIURETIC PEPTIDE: CPT

## 2021-07-06 PROCEDURE — 93005 ELECTROCARDIOGRAM TRACING: CPT

## 2021-07-06 PROCEDURE — 71045 X-RAY EXAM CHEST 1 VIEW: CPT

## 2021-07-06 PROCEDURE — 85025 COMPLETE CBC W/AUTO DIFF WBC: CPT

## 2021-07-06 PROCEDURE — 82553 CREATINE MB FRACTION: CPT

## 2021-07-06 PROCEDURE — 99284 EMERGENCY DEPT VISIT MOD MDM: CPT

## 2021-07-06 PROCEDURE — 96374 THER/PROPH/DIAG INJ IV PUSH: CPT

## 2021-07-06 PROCEDURE — 82077 ASSAY SPEC XCP UR&BREATH IA: CPT

## 2021-07-07 VITALS
HEART RATE: 60 BPM | SYSTOLIC BLOOD PRESSURE: 159 MMHG | HEIGHT: 75 IN | RESPIRATION RATE: 18 BRPM | TEMPERATURE: 98.7 F | DIASTOLIC BLOOD PRESSURE: 70 MMHG | WEIGHT: 190 LBS | BODY MASS INDEX: 23.62 KG/M2 | OXYGEN SATURATION: 97 %

## 2021-07-07 LAB
AMPHET UR QL SCN: NEGATIVE
ATRIAL RATE: 70 BPM
BARBITURATES UR QL SCN: NEGATIVE
BENZODIAZ UR QL: NEGATIVE
CALCULATED R AXIS, ECG10: -68 DEGREES
CALCULATED T AXIS, ECG11: 95 DEGREES
CANNABINOIDS UR QL SCN: NEGATIVE
COCAINE UR QL SCN: NEGATIVE
DIAGNOSIS, 93000: NORMAL
HDSCOM,HDSCOM: NORMAL
METHADONE UR QL: NEGATIVE
OPIATES UR QL: NEGATIVE
PCP UR QL: NEGATIVE
Q-T INTERVAL, ECG07: 472 MS
QRS DURATION, ECG06: 172 MS
QTC CALCULATION (BEZET), ECG08: 494 MS
TROPONIN I SERPL-MCNC: 0.02 NG/ML (ref 0–0.04)
VENTRICULAR RATE, ECG03: 66 BPM

## 2021-07-07 PROCEDURE — 96374 THER/PROPH/DIAG INJ IV PUSH: CPT

## 2021-07-07 PROCEDURE — 74011250636 HC RX REV CODE- 250/636: Performed by: EMERGENCY MEDICINE

## 2021-07-07 PROCEDURE — 80307 DRUG TEST PRSMV CHEM ANLYZR: CPT

## 2021-07-07 PROCEDURE — 84484 ASSAY OF TROPONIN QUANT: CPT

## 2021-07-07 RX ORDER — FUROSEMIDE 10 MG/ML
40 INJECTION INTRAMUSCULAR; INTRAVENOUS ONCE
Status: COMPLETED | OUTPATIENT
Start: 2021-07-07 | End: 2021-07-07

## 2021-07-07 RX ADMIN — FUROSEMIDE 40 MG: 10 INJECTION, SOLUTION INTRAMUSCULAR; INTRAVENOUS at 01:06
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
## 2021-07-07 NOTE — ED TRIAGE NOTES
Patient presents to the ED via EMS from home for evaluation of bilateral ankle swelling  that started today. Per medics, \"He has a history of HTN, heart attack and stroke. Patient is non compliant with his medications. He is also complaining of blurry vision. \"    Patient admits to drinking alcohol today

## 2021-09-16 NOTE — PROGRESS NOTES
SUBJECTIVE:    Feeling better. Patient wants to go out and smoke. No chest or abdominal pain. Non Nausea or vomiting. He had a fall yesterday but denies for any hip pain or headaches. OBJECTIVE:    /76 (BP 1 Location: Left arm, BP Patient Position: Supine)  Pulse 86  Temp 98.7 °F (37.1 °C)  Resp 18  Ht 6' 3\" (1.905 m)  Wt 75 kg (165 lb 4.8 oz)  SpO2 91%  BMI 20.66 kg/m2    CVS: RRR, AICD +  RS: Diminished in bases, no wheezes  GI: NT, BS +  Extremities: no pedal edema  CNS: moves both LEs well while in bed  General: NAD, Follows commands    ASSESSMENT:    1. SIRS versus Sepsis sec to # 2. Resolved. 2. 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 Chronic systolic heart failure with EF of around 55%. S/p AICD. Compensated. 4. Hypokalemia and hypomagnesemia. 5. BACH could be from CHD and/or presumed COPD  6. Human immunodeficiency virus. 7. Paroxysmal Atrial fibrillation, rate controlled. 8. Hypertension. 9. Noncompliance with the medications and diet compliance. 10. History of cerebrovascular accident in the past.  11. History of nonsustained ventricular tachycardia in the past.  12. History of tuberculosis, status post treatment. 13. Dyslipidemia. 14. Cocaine and marijuana use. 15. Diarrhea, improved  16. Indeterminate elevation of troponin due to demand ischemia sec to sepsis   17. Underweight  18. [place], resolved   19. Glossitis   20. Acute on chronic bronchitis     PLAN:    Cont current management  Patient is not medically safe to go home, needs placement if covered by payer source  Increase Norvasc  Advised patient not to go outside to smoke  Ativan prn ordered.    Asked nursing to place him close to nursing station so we can keep an eye on him    CMP:   Lab Results   Component Value Date/Time     06/12/2018 03:47 AM    K 3.6 06/12/2018 03:47 AM     06/12/2018 03:47 AM    CO2 32 06/12/2018 03:47 AM    AGAP 4 06/12/2018 normal... 03:47 AM    GLU 96 06/12/2018 03:47 AM    BUN 8 06/12/2018 03:47 AM    CREA 0.81 06/12/2018 03:47 AM    GFRAA >60 06/12/2018 03:47 AM    GFRNA >60 06/12/2018 03:47 AM    CA 9.5 06/12/2018 03:47 AM    MG 1.7 06/12/2018 03:47 AM    PHOS 2.5 06/12/2018 03:47 AM     CBC:   No results found for: WBC, HGB, HGBEXT, HCT, HCTEXT, PLT, PLTEXT, HGBEXT, HCTEXT, PLTEXT

## 2021-12-28 ENCOUNTER — APPOINTMENT (OUTPATIENT)
Dept: GENERAL RADIOLOGY | Age: 71
DRG: 177 | End: 2021-12-28
Attending: STUDENT IN AN ORGANIZED HEALTH CARE EDUCATION/TRAINING PROGRAM
Payer: MEDICARE

## 2021-12-28 ENCOUNTER — HOSPITAL ENCOUNTER (INPATIENT)
Age: 71
LOS: 2 days | Discharge: HOME | DRG: 177 | End: 2021-12-30
Attending: STUDENT IN AN ORGANIZED HEALTH CARE EDUCATION/TRAINING PROGRAM | Admitting: STUDENT IN AN ORGANIZED HEALTH CARE EDUCATION/TRAINING PROGRAM
Payer: MEDICARE

## 2021-12-28 DIAGNOSIS — R77.8 ELEVATED TROPONIN: ICD-10-CM

## 2021-12-28 DIAGNOSIS — I48.92 ATRIAL FLUTTER, UNSPECIFIED TYPE (HCC): ICD-10-CM

## 2021-12-28 DIAGNOSIS — U07.1 COVID-19: Primary | ICD-10-CM

## 2021-12-28 LAB
ANION GAP SERPL CALC-SCNC: 5 MMOL/L (ref 3–18)
BASOPHILS # BLD: 0.1 K/UL (ref 0–0.1)
BASOPHILS NFR BLD: 1 % (ref 0–2)
BNP SERPL-MCNC: 2514 PG/ML (ref 0–900)
BUN SERPL-MCNC: 13 MG/DL (ref 7–18)
BUN/CREAT SERPL: 13 (ref 12–20)
CALCIUM SERPL-MCNC: 10.4 MG/DL (ref 8.5–10.1)
CHLORIDE SERPL-SCNC: 107 MMOL/L (ref 100–111)
CO2 SERPL-SCNC: 29 MMOL/L (ref 21–32)
COVID-19 RAPID TEST, COVR: DETECTED
CREAT SERPL-MCNC: 0.98 MG/DL (ref 0.6–1.3)
D DIMER PPP FEU-MCNC: 0.74 UG/ML(FEU)
DIFFERENTIAL METHOD BLD: ABNORMAL
EOSINOPHIL # BLD: 0 K/UL (ref 0–0.4)
EOSINOPHIL NFR BLD: 0 % (ref 0–5)
ERYTHROCYTE [DISTWIDTH] IN BLOOD BY AUTOMATED COUNT: 12 % (ref 11.6–14.5)
GLUCOSE BLD STRIP.AUTO-MCNC: 85 MG/DL (ref 70–110)
GLUCOSE SERPL-MCNC: 94 MG/DL (ref 74–99)
HCT VFR BLD AUTO: 42.6 % (ref 36–48)
HGB BLD-MCNC: 13.3 G/DL (ref 13–16)
IMM GRANULOCYTES # BLD AUTO: 0 K/UL (ref 0–0.04)
IMM GRANULOCYTES NFR BLD AUTO: 0 % (ref 0–0.5)
LYMPHOCYTES # BLD: 0.8 K/UL (ref 0.9–3.6)
LYMPHOCYTES NFR BLD: 10 % (ref 21–52)
MCH RBC QN AUTO: 28.7 PG (ref 24–34)
MCHC RBC AUTO-ENTMCNC: 31.2 G/DL (ref 31–37)
MCV RBC AUTO: 91.8 FL (ref 78–100)
MONOCYTES # BLD: 1.4 K/UL (ref 0.05–1.2)
MONOCYTES NFR BLD: 16 % (ref 3–10)
NEUTS SEG # BLD: 6.1 K/UL (ref 1.8–8)
NEUTS SEG NFR BLD: 73 % (ref 40–73)
NRBC # BLD: 0 K/UL (ref 0–0.01)
NRBC BLD-RTO: 0 PER 100 WBC
PLATELET # BLD AUTO: 223 K/UL (ref 135–420)
PMV BLD AUTO: 12 FL (ref 9.2–11.8)
POTASSIUM SERPL-SCNC: 4 MMOL/L (ref 3.5–5.5)
PROCALCITONIN SERPL-MCNC: <0.05 NG/ML
RBC # BLD AUTO: 4.64 M/UL (ref 4.35–5.65)
SODIUM SERPL-SCNC: 141 MMOL/L (ref 136–145)
SOURCE, COVRS: ABNORMAL
TROPONIN-HIGH SENSITIVITY: 74 NG/L (ref 0–78)
TROPONIN-HIGH SENSITIVITY: 90 NG/L (ref 0–78)
WBC # BLD AUTO: 8.4 K/UL (ref 4.6–13.2)

## 2021-12-28 PROCEDURE — 80048 BASIC METABOLIC PNL TOTAL CA: CPT

## 2021-12-28 PROCEDURE — 87635 SARS-COV-2 COVID-19 AMP PRB: CPT

## 2021-12-28 PROCEDURE — 99285 EMERGENCY DEPT VISIT HI MDM: CPT

## 2021-12-28 PROCEDURE — 84484 ASSAY OF TROPONIN QUANT: CPT

## 2021-12-28 PROCEDURE — 65660000000 HC RM CCU STEPDOWN

## 2021-12-28 PROCEDURE — 93005 ELECTROCARDIOGRAM TRACING: CPT

## 2021-12-28 PROCEDURE — 82962 GLUCOSE BLOOD TEST: CPT

## 2021-12-28 PROCEDURE — 85025 COMPLETE CBC W/AUTO DIFF WBC: CPT

## 2021-12-28 PROCEDURE — 85379 FIBRIN DEGRADATION QUANT: CPT

## 2021-12-28 PROCEDURE — 71045 X-RAY EXAM CHEST 1 VIEW: CPT

## 2021-12-28 PROCEDURE — 83880 ASSAY OF NATRIURETIC PEPTIDE: CPT

## 2021-12-28 PROCEDURE — 84145 PROCALCITONIN (PCT): CPT

## 2021-12-28 RX ORDER — ACETAMINOPHEN 650 MG/1
650 SUPPOSITORY RECTAL
Status: DISCONTINUED | OUTPATIENT
Start: 2021-12-28 | End: 2021-12-30 | Stop reason: HOSPADM

## 2021-12-28 RX ORDER — CARVEDILOL 25 MG/1
0.5 TABLET ORAL EVERY 12 HOURS
COMMUNITY
Start: 2020-08-14 | End: 2021-12-28

## 2021-12-28 RX ORDER — ACETAMINOPHEN 325 MG/1
650 TABLET ORAL
Status: DISCONTINUED | OUTPATIENT
Start: 2021-12-28 | End: 2021-12-30 | Stop reason: HOSPADM

## 2021-12-28 RX ORDER — EMTRICITABINE AND TENOFOVIR ALAFENAMIDE 200; 25 MG/1; MG/1
1 TABLET ORAL DAILY
COMMUNITY
Start: 2021-06-01 | End: 2021-12-28

## 2021-12-28 NOTE — ED TRIAGE NOTES
Brought in via EMS by stretcher, complains of Shortness of breath for the past day and a half with body aches, T99.0, /120, pacemaker per EMS.

## 2021-12-28 NOTE — ED PROVIDER NOTES
EMERGENCY DEPARTMENT HISTORY AND PHYSICAL EXAM      Date: 12/28/2021  Patient Name: Jeremie Bangura    History of Presenting Illness     Chief Complaint   Patient presents with    Breathing Problem       History (Context): Jeremie Bangura is a 73 y.o. male with a past medical history significant for COPD, CHF (with ICD and last echo 3 years ago with EF of 55 to 60%), CVA with residual right-sided weakness and chronic a-fib (on apixaban) comes into the ED today due to chest pain and shortness of breath. 3 days ago, patient began experiencing fevers, shortness of breath with exertion, and central chest pain, headaches, myalgias, cough productive for phlegm. The chest pain is worse with coughing and not exertional. He characterizes the pain as pressure and it is associated with a SOB that he feels is related. He denies syncope, hemoptysis, new lower extremity edema, rashes, arthralgia, nausea, vomiting, emesis, decreased p.o. intake. PCP: Rissa, MD Deisy    Current Outpatient Medications   Medication Sig Dispense Refill    bisacodyL (DULCOLAX, BISACODYL,) 5 mg EC tablet Take 1 tablet by mouth twice a day as needed for constipation. 20 Tab 0    albuterol (PROVENTIL HFA) 90 mcg/actuation inhaler Take 2 Puffs by inhalation every four (4) hours as needed for Wheezing or Shortness of Breath. Indications: bronchospasm prevention 1 Inhaler 0    ondansetron (ZOFRAN ODT) 8 mg disintegrating tablet Take 1 Tab by mouth every eight (8) hours as needed for Nausea for up to 12 doses. 12 Tab 0    amLODIPine (NORVASC) 10 mg tablet Take 1 Tab by mouth daily. 30 Tab 0    apixaban (ELIQUIS) 5 mg tablet Take 1 Tab by mouth every twelve (12) hours. 60 Tab 0    budesonide-formoterol (SYMBICORT) 160-4.5 mcg/actuation HFAA Take 2 Puffs by inhalation two (2) times a day. 1 Inhaler 1    carvedilol (COREG) 25 mg tablet Take 0.5 Tabs by mouth two (2) times daily (with meals).  30 Tab 0    dapsone 100 mg tablet Take 1 Tab by mouth daily. 30 Tab 0    dolutegravir (TIVICAY) 50 mg tab tablet Take 1 Tab by mouth daily. 30 Tab 0    emtricitabine-tenofovir alafen (DESCOVY) tablet Take 1 Tab by mouth daily. 30 Tab 0    folic acid (FOLVITE) 1 mg tablet Take 1 Tab by mouth daily. 30 Tab 0    magnesium oxide 400 mg cap Take 400 mg by mouth daily. 30 Cap 0    pravastatin (PRAVACHOL) 40 mg tablet Take 1 Tab by mouth nightly. 30 Tab 0    pyridoxine, vitamin B6, (VITAMIN B-6) 50 mg tablet Take 1 Tab by mouth daily. 30 Tab 0    diclofenac (VOLTAREN) 1 % gel Apply 2 g to affected area three (3) times daily as needed for Pain. Apply dose (2 gm or 4 gm) to each location. If treatment site is the hands, patients should wait at least one (1) hour to wash their hands. APPLY TO right wrist 100 g 0    acetaminophen (TYLENOL) 500 mg tablet Take 1 Tab by mouth every six (6) hours as needed. Indications: Pain 20 Tab 0    amLODIPine (NORVASC) 5 mg tablet Take 5 mg by mouth two (2) times a day. Past History     Past Medical History:   Past Medical History:   Diagnosis Date    Autoimmune disease ([facility] [address]     AIDS? HIV    Hypertension     Rectal bleeding     Stroke [facility])        Past Surgical History:  Past Surgical History:   Procedure Laterality Date    HX ORTHOPAEDIC      back       Family History:  History reviewed. No pertinent family history. Social History:   Social History     Tobacco Use    Smoking status: Current Every Day Smoker     Packs/day: 1.00     Types: Cigarettes    Smokeless tobacco: Never Used   Substance Use Topics    Alcohol use: Yes     Alcohol/week: 5.8 standard drinks     Types: 7 Standard drinks or equivalent per week     Comment: \"drink everyday but never alone\"     Drug use: Yes     Types: Marijuana       Allergies:  No Known Allergies    PMH, PSH, family history, social history, allergies reviewed with the patient with significant items noted above.   Review of Systems   Review of Systems   Constitutional: Positive for chills and fatigue. Negative for appetite change and diaphoresis. HENT: Positive for congestion. Negative for sore throat. Respiratory: Positive for cough, chest tightness and shortness of breath. Cardiovascular: Positive for chest pain and leg swelling (chronic). Gastrointestinal: Negative for abdominal pain, blood in stool, constipation, diarrhea and vomiting. Genitourinary: Negative for dysuria. Musculoskeletal: Positive for myalgias. Negative for arthralgias, neck pain and neck stiffness. Skin: Negative for rash. Neurological: Positive for tremors (baseline) and headaches. Negative for syncope. Psychiatric/Behavioral: Negative for behavioral problems. Physical Exam     Vitals:    12/28/21 1715 12/28/21 1730 12/28/21 1745 12/28/21 1800   BP: (!) 182/88 (!) 175/89 (!) 168/94 (!) 190/88   Pulse: 81 80 81 79   Resp: 25 29 28 30   Temp:       SpO2: 98% 98% 99% 97%   Weight:       Height:           Physical Exam  Vitals and nursing note reviewed. Constitutional:       General: He is not in acute distress. Appearance: Normal appearance. He is normal weight. He is not ill-appearing, toxic-appearing or diaphoretic. HENT:      Head: Normocephalic and atraumatic. Mouth/Throat:      Mouth: Mucous membranes are moist.   Eyes:      General: No scleral icterus. Right eye: No discharge. Left eye: No discharge. Extraocular Movements: Extraocular movements intact. Conjunctiva/sclera: Conjunctivae normal.      Pupils: Pupils are equal, round, and reactive to light. Cardiovascular:      Rate and Rhythm: Normal rate and regular rhythm. Heart sounds: Murmur (systolic) heard. Pulmonary:      Effort: Pulmonary effort is normal. No respiratory distress. Breath sounds: Normal breath sounds. No stridor. No wheezing, rhonchi or rales. Chest:      Chest wall: No tenderness. Abdominal:      General: Abdomen is flat. There is no distension.       Palpations: Abdomen is soft. There is no mass. Tenderness: There is no abdominal tenderness. There is no guarding or rebound. Hernia: No hernia is present. Musculoskeletal:         General: No swelling, tenderness, deformity or signs of injury. Cervical back: No rigidity. Skin:     General: Skin is warm and dry. Capillary Refill: Capillary refill takes less than 2 seconds. Coloration: Skin is not jaundiced or pale. Findings: No bruising, erythema, lesion or rash. Neurological:      General: No focal deficit present. Mental Status: He is alert and oriented to person, place, and time. Psychiatric:         Mood and Affect: Mood normal.         Diagnostic Study Results     Labs -     Recent Results (from the past 12 hour(s))   COVID-19 RAPID TEST    Collection Time: 12/28/21  4:10 PM   Result Value Ref Range    Specimen source Please find results under separate order      COVID-19 rapid test Detected (AA) NOTD     CBC WITH AUTOMATED DIFF    Collection Time: 12/28/21  4:20 PM   Result Value Ref Range    WBC 8.4 4.6 - 13.2 K/uL    RBC 4.64 4.35 - 5.65 M/uL    HGB 13.3 13.0 - 16.0 g/dL    HCT 42.6 36.0 - 48.0 %    MCV 91.8 78.0 - 100.0 FL    MCH 28.7 24.0 - 34.0 PG    MCHC 31.2 31.0 - 37.0 g/dL    RDW 12.0 11.6 - 14.5 %    PLATELET 279 487 - 401 K/uL    MPV 12.0 (H) 9.2 - 11.8 FL    NRBC 0.0 0  WBC    ABSOLUTE NRBC 0.00 0.00 - 0.01 K/uL    NEUTROPHILS 73 40 - 73 %    LYMPHOCYTES 10 (L) 21 - 52 %    MONOCYTES 16 (H) 3 - 10 %    EOSINOPHILS 0 0 - 5 %    BASOPHILS 1 0 - 2 %    IMMATURE GRANULOCYTES 0 0.0 - 0.5 %    ABS. NEUTROPHILS 6.1 1.8 - 8.0 K/UL    ABS. LYMPHOCYTES 0.8 (L) 0.9 - 3.6 K/UL    ABS. MONOCYTES 1.4 (H) 0.05 - 1.2 K/UL    ABS. EOSINOPHILS 0.0 0.0 - 0.4 K/UL    ABS. BASOPHILS 0.1 0.0 - 0.1 K/UL    ABS. IMM.  GRANS. 0.0 0.00 - 0.04 K/UL    DF AUTOMATED     METABOLIC PANEL, BASIC    Collection Time: 12/28/21  4:20 PM   Result Value Ref Range    Sodium 141 136 - 145 mmol/L Potassium 4.0 3.5 - 5.5 mmol/L    Chloride 107 100 - 111 mmol/L    CO2 29 21 - 32 mmol/L    Anion gap 5 3.0 - 18 mmol/L    Glucose 94 74 - 99 mg/dL    BUN 13 7.0 - 18 MG/DL    Creatinine 0.98 0.6 - 1.3 MG/DL    BUN/Creatinine ratio 13 12 - 20      GFR est AA >60 >60 ml/min/1.73m2    GFR est non-AA >60 >60 ml/min/1.73m2    Calcium 10.4 (H) 8.5 - 10.1 MG/DL   TROPONIN-HIGH SENSITIVITY    Collection Time: 12/28/21  4:20 PM   Result Value Ref Range    Troponin-High Sensitivity 74 0 - 78 ng/L   NT-PRO BNP    Collection Time: 12/28/21  4:20 PM   Result Value Ref Range    NT pro-BNP 2,514 (H) 0 - 900 PG/ML   EKG, 12 LEAD, INITIAL    Collection Time: 12/28/21  4:21 PM   Result Value Ref Range    Ventricular Rate 61 BPM    Atrial Rate 65 BPM    QRS Duration 178 ms    Q-T Interval 470 ms    QTC Calculation (Bezet) 473 ms    Calculated P Axis 93 degrees    Calculated R Axis -68 degrees    Calculated T Axis 100 degrees    Diagnosis       Ventricular-paced rhythm  Abnormal ECG  When compared with ECG of 06-JUL-2021 22:04,  Vent. rate has decreased BY   5 BPM     GLUCOSE, POC    Collection Time: 12/28/21  5:56 PM   Result Value Ref Range    Glucose (POC) 85 70 - 110 mg/dL      Labs Reviewed   COVID-19 RAPID TEST - Abnormal; Notable for the following components:       Result Value    COVID-19 rapid test Detected (*)     All other components within normal limits   CBC WITH AUTOMATED DIFF - Abnormal; Notable for the following components:    MPV 12.0 (*)     LYMPHOCYTES 10 (*)     MONOCYTES 16 (*)     ABS. LYMPHOCYTES 0.8 (*)     ABS.  MONOCYTES 1.4 (*)     All other components within normal limits   METABOLIC PANEL, BASIC - Abnormal; Notable for the following components:    Calcium 10.4 (*)     All other components within normal limits   NT-PRO BNP - Abnormal; Notable for the following components:    NT pro-BNP 2,514 (*)     All other components within normal limits   TROPONIN-HIGH SENSITIVITY   TROPONIN-HIGH SENSITIVITY   GLUCOSE, POC       Radiologic Studies -   XR CHEST PORT   Final Result   1. No acute infiltrate or effusion. 2.  Unchanged cardiac enlargement. CT Results  (Last 48 hours)    None        CXR Results  (Last 48 hours)               12/28/21 1651  XR CHEST PORT Final result    Impression:  1. No acute infiltrate or effusion. 2.  Unchanged cardiac enlargement. Narrative:  EXAM: Chest Radiograph       INDICATION:  chest pain shortness of breath       TECHNIQUE: AP view of the chest       COMPARISON: 7/6/2021, 7/29/2020, 12/8/2019       FINDINGS: No pneumothorax identified. The lungs are clear. No infiltrates   appreciated. No effusions identified. The cardiac silhouette is enlarged. Pacemaker is present and unchanged. The pulmonary vasculature is unremarkable. The osseous structures are unremarkable. The laboratory results, imaging results, and other diagnostic exams were reviewed in the EMR. Medical Decision Making   I am the first provider for this patient. I reviewed the vital signs, available nursing notes, past medical history, past surgical history, family history and social history. Vital Signs-Reviewed the patient's vital signs. ED EKG interpretation:  Rhythm: normal sinus rhythm and atrial flutter; and regular . Rate (approx.): 60; Axis: normal; P wave: normal; QRS interval: prolonged; ST/T wave: non-specific changes; Other findings: abnormal ekg: ventricularly paced, a flutter, does not meet Sgarbossa criteria. This EKG was interpreted by Jeimy Hedrick MD       Records Reviewed: Personally, on initial evaluation    MDM:   Milka Valdes presents with complaint of chest pain, SOB. DDX includes but is not limited to: ACS, PE, aortic dissection, viral respiratory illness, pneumonia, new dysrhythmia, myocarditis, pericarditis, COPD exacerbation, CHF exacerbation.      Will obtain lab work and imaging including CBC, BMP, BNP, troponin, EKG, chest x-ray, for further evaluation of patients complaint. Will continue to monitor and evaluate patient while in the ED. Patient hemodynamically stable and nontoxic-appearing making pneumothorax and aortic dissection unlikely. Patient is currently anticoagulated on apixaban, without tachycardia (though currently paced), no hemoptysis, no hypoxia, no lower extremity, no malignancy in the past 6 months, no pleuritic pain-chest pain just with coughing-making PE unlikely. Chest x-ray without lobar consolidation bacterial pneumonia unlikely. Patient with JVD 2 cm above the manubrium, orthopnea, baseline cardiac enlargement on CXR, and mild bump in his baseline BNP but no clear lung exam, no lower extremity edema making mild CHF exacerbation a potential complication of his current infection. While pt EKG is unchanged from baseline and no associated diaphoresis or nausea or vomiting, patient troponin is mildly elevated to 94. In the setting of his chest pressure with associated shortness of breath concern for unstable angina versus NSTEMI. Discussed the case with cardiology and they recommend admission for cardiac work-up. Hospitalist will admit patient for observation overnight and cardiology work-up tomorrow. Orders as below:  Orders Placed This Encounter    COVID-19 RAPID TEST    XR CHEST PORT    CBC WITH AUTOMATED DIFF    METABOLIC PANEL, BASIC    TROPONIN-HIGH SENSITIVITY    Pro BNP    TROPONIN-HIGH SENSITIVITY    DROPLET PLUS    GLUCOSE, POC    EKG, 12 LEAD, INITIAL        ED Course:   ED Course as of 12/28/21 2244   Tue Dec 28, 2021   1745 Covid positive 5:45 PM   [JS]   1913 Unable to access patient's IV both have blown, cannot grab troponin at this time. Nursing is working to reestablish vascular access. [JS]   1959 TROPONIN-HIGH SENSITIVITY [JS]   2114 Spoke to Dr. Gerardo Brown from cardiology who recommend admission for ACS workup. No further interventions indicated at this time.   [JS]      ED Course User Index  [JS] Xavier Watkins MD           Procedures:  Procedures        Diagnosis and Disposition     CLINICAL IMPRESSION:  No diagnosis found. Current Discharge Medication List          Disposition: admission    Dragon Disclaimer     Please note that this dictation was completed with Traackr, the computer voice recognition software. Quite often unanticipated grammatical, syntax, homophones, and other interpretive errors are inadvertently transcribed by the computer software. Please disregard these errors. Please excuse any errors that have escaped final proofreading.       Sagrario Still MD

## 2021-12-28 NOTE — ED NOTES
Assumed care of patient. Alert and oriented x 4, denies pain. Laying in bed, semi nolan position, calm, cooperative. Skin is warm and dry to touch. No signs of respiratory distress. will continue to monitor.

## 2021-12-29 ENCOUNTER — APPOINTMENT (OUTPATIENT)
Dept: VASCULAR SURGERY | Age: 71
DRG: 177 | End: 2021-12-29
Attending: STUDENT IN AN ORGANIZED HEALTH CARE EDUCATION/TRAINING PROGRAM
Payer: MEDICARE

## 2021-12-29 ENCOUNTER — APPOINTMENT (OUTPATIENT)
Dept: NON INVASIVE DIAGNOSTICS | Age: 71
DRG: 177 | End: 2021-12-29
Attending: STUDENT IN AN ORGANIZED HEALTH CARE EDUCATION/TRAINING PROGRAM
Payer: MEDICARE

## 2021-12-29 LAB
ATRIAL RATE: 65 BPM
CALCULATED P AXIS, ECG09: 93 DEGREES
CALCULATED R AXIS, ECG10: -68 DEGREES
CALCULATED T AXIS, ECG11: 100 DEGREES
DIAGNOSIS, 93000: NORMAL
ECHO LA VOL 2C: 62 ML (ref 18–58)
ECHO LA VOL 4C: 33 ML (ref 18–58)
ECHO LA VOLUME AREA LENGTH: 52 ML
ECHO LA VOLUME INDEX A2C: 29 ML/M2 (ref 16–34)
ECHO LA VOLUME INDEX A4C: 15 ML/M2 (ref 16–34)
ECHO LA VOLUME INDEX AREA LENGTH: 24 ML/M2 (ref 16–34)
ECHO LV E' LATERAL VELOCITY: 6 CM/S
ECHO LV E' SEPTAL VELOCITY: 5 CM/S
ECHO LV FRACTIONAL SHORTENING: 20 % (ref 28–44)
ECHO LV INTERNAL DIMENSION DIASTOLE INDEX: 2.49 CM/M2
ECHO LV INTERNAL DIMENSION DIASTOLIC: 5.4 CM (ref 4.2–5.9)
ECHO LV INTERNAL DIMENSION SYSTOLIC INDEX: 1.98 CM/M2
ECHO LV INTERNAL DIMENSION SYSTOLIC: 4.3 CM
ECHO LV IVSD: 1.6 CM (ref 0.6–1)
ECHO LV MASS 2D: 362.7 G (ref 88–224)
ECHO LV MASS INDEX 2D: 167.2 G/M2 (ref 49–115)
ECHO LV POSTERIOR WALL DIASTOLIC: 1.4 CM (ref 0.6–1)
ECHO LV RELATIVE WALL THICKNESS RATIO: 0.52
ECHO TV REGURGITANT MAX VELOCITY: 2.14 M/S
ECHO TV REGURGITANT PEAK GRADIENT: 18 MMHG
Q-T INTERVAL, ECG07: 470 MS
QRS DURATION, ECG06: 178 MS
QTC CALCULATION (BEZET), ECG08: 473 MS
TROPONIN-HIGH SENSITIVITY: 85 NG/L (ref 0–78)
VENTRICULAR RATE, ECG03: 61 BPM

## 2021-12-29 PROCEDURE — 65660000000 HC RM CCU STEPDOWN

## 2021-12-29 PROCEDURE — 36415 COLL VENOUS BLD VENIPUNCTURE: CPT

## 2021-12-29 PROCEDURE — 74011250636 HC RX REV CODE- 250/636: Performed by: STUDENT IN AN ORGANIZED HEALTH CARE EDUCATION/TRAINING PROGRAM

## 2021-12-29 PROCEDURE — 74011250637 HC RX REV CODE- 250/637: Performed by: STUDENT IN AN ORGANIZED HEALTH CARE EDUCATION/TRAINING PROGRAM

## 2021-12-29 PROCEDURE — 99223 1ST HOSP IP/OBS HIGH 75: CPT | Performed by: INTERNAL MEDICINE

## 2021-12-29 PROCEDURE — 99232 SBSQ HOSP IP/OBS MODERATE 35: CPT | Performed by: INTERNAL MEDICINE

## 2021-12-29 PROCEDURE — 99223 1ST HOSP IP/OBS HIGH 75: CPT | Performed by: STUDENT IN AN ORGANIZED HEALTH CARE EDUCATION/TRAINING PROGRAM

## 2021-12-29 PROCEDURE — 84484 ASSAY OF TROPONIN QUANT: CPT

## 2021-12-29 PROCEDURE — 93970 EXTREMITY STUDY: CPT

## 2021-12-29 PROCEDURE — 93321 DOPPLER ECHO F-UP/LMTD STD: CPT

## 2021-12-29 PROCEDURE — 74011250637 HC RX REV CODE- 250/637: Performed by: INTERNAL MEDICINE

## 2021-12-29 PROCEDURE — 86361 T CELL ABSOLUTE COUNT: CPT

## 2021-12-29 RX ORDER — CARVEDILOL 12.5 MG/1
12.5 TABLET ORAL 2 TIMES DAILY WITH MEALS
Status: DISCONTINUED | OUTPATIENT
Start: 2021-12-29 | End: 2021-12-29

## 2021-12-29 RX ORDER — CHOLECALCIFEROL (VITAMIN D3) 125 MCG
5000 CAPSULE ORAL DAILY
Status: DISCONTINUED | OUTPATIENT
Start: 2021-12-30 | End: 2021-12-30 | Stop reason: HOSPADM

## 2021-12-29 RX ORDER — FAMOTIDINE 20 MG/1
20 TABLET, FILM COATED ORAL EVERY EVENING
Status: DISCONTINUED | OUTPATIENT
Start: 2021-12-29 | End: 2021-12-30 | Stop reason: HOSPADM

## 2021-12-29 RX ORDER — LISINOPRIL 5 MG/1
5 TABLET ORAL DAILY
Status: DISCONTINUED | OUTPATIENT
Start: 2021-12-30 | End: 2021-12-30 | Stop reason: HOSPADM

## 2021-12-29 RX ORDER — HYDRALAZINE HYDROCHLORIDE 20 MG/ML
10 INJECTION INTRAMUSCULAR; INTRAVENOUS
Status: DISCONTINUED | OUTPATIENT
Start: 2021-12-29 | End: 2021-12-30 | Stop reason: HOSPADM

## 2021-12-29 RX ORDER — CARVEDILOL 12.5 MG/1
12.5 TABLET ORAL 2 TIMES DAILY WITH MEALS
Status: DISCONTINUED | OUTPATIENT
Start: 2021-12-29 | End: 2021-12-30 | Stop reason: HOSPADM

## 2021-12-29 RX ORDER — FUROSEMIDE 10 MG/ML
20 INJECTION INTRAMUSCULAR; INTRAVENOUS 2 TIMES DAILY
Status: DISCONTINUED | OUTPATIENT
Start: 2021-12-29 | End: 2021-12-30

## 2021-12-29 RX ADMIN — CARVEDILOL 12.5 MG: 12.5 TABLET, FILM COATED ORAL at 10:02

## 2021-12-29 RX ADMIN — FUROSEMIDE 20 MG: 10 INJECTION, SOLUTION INTRAMUSCULAR; INTRAVENOUS at 21:02

## 2021-12-29 RX ADMIN — APIXABAN 5 MG: 5 TABLET, FILM COATED ORAL at 10:02

## 2021-12-29 RX ADMIN — DOLUTEGRAVIR SODIUM 50 MG: 50 TABLET, FILM COATED ORAL at 10:02

## 2021-12-29 RX ADMIN — CARVEDILOL 12.5 MG: 12.5 TABLET, FILM COATED ORAL at 21:02

## 2021-12-29 RX ADMIN — FUROSEMIDE 20 MG: 10 INJECTION, SOLUTION INTRAMUSCULAR; INTRAVENOUS at 10:02

## 2021-12-29 RX ADMIN — CARVEDILOL 12.5 MG: 12.5 TABLET, FILM COATED ORAL at 04:20

## 2021-12-29 RX ADMIN — FAMOTIDINE 20 MG: 20 TABLET ORAL at 21:02

## 2021-12-29 RX ADMIN — APIXABAN 5 MG: 5 TABLET, FILM COATED ORAL at 21:02

## 2021-12-29 RX ADMIN — EMTRICITABINE AND TENOFOVIR ALAFENAMIDE 1 TABLET: 200; 25 TABLET ORAL at 10:02

## 2021-12-29 NOTE — PROGRESS NOTES
Hospitalist Progress Note    Patient: Colonel Saucedo Age: 73 y.o. : 1950 MR#: [id number] SSN: [id number]  Date/Time: 2021 4:42 PM    DOA: 2021  PCP: Deisy Kwok MD    Subjective:     He expressed that he felt much better today compared to yesterday  Shortness of breath improved. Chest pain resolved   Still cough. No hypoxia, no oxygen requirement   Low grade fever today  Patient has elevated troponin  Rapid covid-19 test positive     Interval Hospital Course:        ROS: No current fever/chills, no headache, no dizziness, no facial pain, no sinus congestion,   No swallowing pain, No chest pain, no palpitation, no shortness of breath, no abd pain,  No diarrhea, no urinary complaint, no leg pain or swelling      Assessment/Plan:   1. Atypical chest pain on presentation, could be pleuritic from cough and covid-19 infection   2. COVID-19 infection  3. Indeterminate troponin elevation   4. HTN urgency   5. Non ischemic cardiomyopathy with acute systolic CHF, proBNP elevated  6. Persistent AFIB, on DOAC for elevated thromboembolic event risk   7.  asymptotic HIV  8. Tobacco smoking dependence    Continue with droplet precaution, bronchodilator prn   Pepcid, vitamin D supplement   Cont coreg, tivicay/descovy  Add lasix.    Cardiac enzyme trending   PT/OT   ICS     Full cod      Disposition planning: tomorrow   Family update (.none.)  Additional Notes:    Time spent >30  minutes    Case discussed with:  [x]Patient  []Family  [x]Nursing  []Case Management  DVT Prophylaxis:  []Lovenox  [x]Eliquis  []SCDs  []Coumadin   []On Heparin gtt    Signed By: Jesus Rouse MD     2021 4:42 PM              Objective:   VS:   Visit Vitals  BP (!) 151/83 (BP 1 Location: Right upper arm, BP Patient Position: Semi fowlers)   Pulse 61   Temp 99.3 °F (37.4 °C)   Resp 22   Ht 6' 4\" (1.93 m)   Wt 86.2 kg (190 lb)   SpO2 98%   BMI 23.13 kg/m²      Tmax/24hrs: Temp (24hrs), Av.8 °F (37.7 °C), Min:99.3 °F (37.4 °C), Max:100.3 °F (37.9 °C)      Intake/Output Summary (Last 24 hours) at 12/29/2021 1642  Last data filed at 12/29/2021 1348  Gross per 24 hour   Intake --   Output 2 ml   Net -2 ml       Tele: afib  General:  Cooperative, Not in acute distress, speaks in short sentence while in bed  HEENT: PERRL, EOMI, supple neck, no JVD, dry oral mucosa  Cardiovascular: S1S2 regular, no rub/gallop   Pulmonary: air entry bilaterally, no wheezing, no crackle  GI:  Soft, non tender, non distended, +bs, no guarding   Extremities:  No pedal edema, +distal pulses appreciated   Neuro: AOx3, moving all extremities, no gross deficit. Additional:       Current Facility-Administered Medications   Medication Dose Route Frequency    apixaban (ELIQUIS) tablet 5 mg  5 mg Oral Q12H    dolutegravir (TIVICAY) tablet 50 mg  50 mg Oral DAILY    emtricitabine-tenofovir alafen (DESCOVY) 200-25 mg tablet 1 Tablet  1 Tablet Oral DAILY    carvediloL (COREG) tablet 12.5 mg  12.5 mg Oral BID WITH MEALS    furosemide (LASIX) injection 20 mg  20 mg IntraVENous BID    hydrALAZINE (APRESOLINE) 20 mg/mL injection 10 mg  10 mg IntraVENous Q6H PRN    famotidine (PEPCID) tablet 20 mg  20 mg Oral QPM    [START ON 12/30/2021] cholecalciferol (VITAMIN D3) capsule 5,000 Units  5,000 Units Oral DAILY    ipratropium-albuterol (COMBIVENT RESPIMAT) 20 mcg-100 mcg inhalation spray  1 Puff Inhalation Q6H RT    acetaminophen (TYLENOL) tablet 650 mg  650 mg Oral Q6H PRN    Or    acetaminophen (TYLENOL) suppository 650 mg  650 mg Rectal Q6H PRN     Current Outpatient Medications   Medication Sig    bisacodyL (DULCOLAX, BISACODYL,) 5 mg EC tablet Take 1 tablet by mouth twice a day as needed for constipation.  albuterol (PROVENTIL HFA) 90 mcg/actuation inhaler Take 2 Puffs by inhalation every four (4) hours as needed for Wheezing or Shortness of Breath.  Indications: bronchospasm prevention    apixaban (ELIQUIS) 5 mg tablet Take 1 Tab by mouth every twelve (12) hours.  carvedilol (COREG) 25 mg tablet Take 0.5 Tabs by mouth two (2) times daily (with meals).  dolutegravir (TIVICAY) 50 mg tab tablet Take 1 Tab by mouth daily.  emtricitabine-tenofovir alafen (DESCOVY) tablet Take 1 Tab by mouth daily.  pravastatin (PRAVACHOL) 40 mg tablet Take 1 Tab by mouth nightly.  pyridoxine, vitamin B6, (VITAMIN B-6) 50 mg tablet Take 1 Tab by mouth daily.  acetaminophen (TYLENOL) 500 mg tablet Take 1 Tab by mouth every six (6) hours as needed. Indications: Pain            Lab/Data Review:  Labs: Results:       Chemistry Recent Labs     12/28/21  1620   GLU 94      K 4.0      CO2 29   BUN 13   CREA 0.98   BUCR 13   AGAP 5   CA 10.4*     No results for input(s): TBIL, ALT, ALKP, TP, ALB, GLOB, AGRAT in the last 72 hours. No lab exists for component: SGOT   CBC w/Diff Recent Labs     12/28/21  1620   WBC 8.4   RBC 4.64   HGB 13.3   HCT 42.6   MCV 91.8   MCH 28.7   MCHC 31.2   RDW 12.0      GRANS 73   LYMPH 10*   EOS 0      Coagulation No results for input(s): PTP, INR, APTT, INREXT in the last 72 hours.     Iron/Ferritin Lab Results   Component Value Date/Time    Iron 63 06/10/2018 12:55 AM    TIBC 257 06/10/2018 12:55 AM    Iron % saturation 25 06/10/2018 12:55 AM    Ferritin 253 06/10/2018 12:55 AM       BNP    Cardiac Enzymes Lab Results   Component Value Date/Time    CK 60 07/06/2021 09:55 PM    CK - MB <1.0 07/06/2021 09:55 PM    CK-MB Index  07/06/2021 09:55 PM     CALCULATION NOT PERFORMED WHEN RESULT IS BELOW LINEAR LIMIT    Troponin-I, QT 0.02 07/07/2021 02:41 AM        Lactic Acid    Thyroid Studies          All Micro Results     Procedure Component Value Units Date/Time    COVID-19 RAPID TEST [[id number]]  (Abnormal) Collected: 12/28/21 1610    Order Status: Completed Specimen: Nasopharyngeal Updated: 12/28/21 1359     Specimen source       Please find results under separate order           COVID-19 rapid test Detected Comment: CALLED TO AND CORRECTLY REPEATED BY:  [facility] AT [address] BY KDA 12/28/21       The specimen is POSITIVE for SARS-CoV-2, the novel coronavirus associated with COVID-19. This test has been authorized by the FDA under an Emergency Use Authorization (EUA) for use by authorized laboratories. Fact sheet for Healthcare Providers: [employer]nz  Fact sheet for Patients: [employer]       Methodology: Isothermal Nucleic Acid Amplification                 Images:    CT (Most Recent). XRAY (Most Recent)      EKG No results found for this or any previous visit. 2D ECHO 12/28/21    ECHO ADULT FOLLOW-UP OR LIMITED 12/29/2021 12/29/2021    Interpretation Summary    Left Ventricle: Left ventricle size is normal. Increased wall thickness. Findings consistent with moderate concentric hypertrophy. See diagram for wall motion findings. Mildly reduced left ventricular systolic function with a visually estimated EF of 40 - 45%.     Signed by: Candelaria Ross MD on 12/29/2021  2:16 PM

## 2021-12-29 NOTE — ED NOTES
Pt with incont of urine episode  Pt assisted to bedside commode per request  Linen changed  New brief provided     Pt had 1 BM today

## 2021-12-29 NOTE — CONSULTS
Cardiology Initial Patient Referral Note    Cardiology referral request from Dr. Dianna Callahan  for evaluation and management/treatment of elevated troponin       Date of  Admission: 12/28/2021  3:55 PM   Primary Care Physician:  Deisy Kwok MD    Attending Cardiologist: Dr. Hima Marshallct:     -COVID-19. Presented with multiple complaints, including: dyspnea, HA, myalgias, productive cough and pleuritic chest pain.   -Indeterminate troponin, not c/w ACS. Suspect demand ischemia in setting of COVID-19 and hypertensive urgency. HS troponin 90. .   -Hypertensive Urgency. Max /159 mmHg. Improving.    -H/o Non Ischemic CMY. LV function improved with GDMT. Most recent EF now 55% on ECHO from 06/2018,  previously 15-20% in 11/2016.   -s/p AICD, [employer].   -Chronic Afib/flutter, on Apixaban as outpatient. -COPD  -HTN, on coreg as outpatient   -H/o CVA, residual right sided deficits. -HIV  -Tobacco Abuse, cessation advised. Primary cardiologist [facility]     Plan:       I saw, evaluated, interviewed and examined the patient personally. I agree with the findings and plan of care as documented below with PA-RAIEL note  Patient admitted with fatigue, feeling poorly, dyspnea and chest discomfort  ekg with paed rhythm. Underlying a.fib  MI ruled out. Troponin flat and not consistent with ACS  ECHO with mild LV dysfunction. Known CHF and AICD in past  Continue anticoagulation for chronic a.fib  Continue coreg  Start lisinopril for LV dysfunction and HTN  No fluid overload on exam. Use diuretics as needed  No further cardiac work up planned at this time unless clinical status changes. Call us back if needed. Will be available as needed. Will need to follow up in cardiology clinic in 2-3 weeks after discharge. Thank you. Anoop Wagner MD       -Will order ECHO to assess LV function and WMA. -Continue to trend troponin. Do not suspect ACS. Will also add on Ck and CK-MB labs. -Continue Coreg.  Atrial flutter rates stable. -Continue Eliquis for thromboembolic prophylaxis.   -Continue pulse IV lasix as renal indices and hemodynamics allow. Monitor strict I/Os. -Continue supportive care for underlying illness.   -Continue to encourage lifestyle modifications, including tobacco cessation.   -Further recommendations pending hospital course/test results. History of Present Illness: This is a 73 y.o. male admitted for Elevated troponin [R77.8]. Patient complains of: SOB, COVID, CP, NUNES    Ryan Gruber is a 73 y.o. male, pm hx as stated above, who we are seeing for elevated troponin. Patient was found to be covid-19 positive in the ER. He presented with multiple complaints, including dyspnea, HA, myalgias, productive cough and pleuritic chest pain. He endorses these symptoms started two days ago. Patient reports onset of symptoms occurring with other constitutional symptoms. Denies exertional CP. Currently he is without chest pain. Cardiac risk factors: smoking/ tobacco exposure, dyslipidemia, male gender, hypertension    Review of Symptoms:  Except as stated above include:  Constitutional:  negative  Respiratory:  negative  Cardiovascular:  negative  Gastrointestinal: negative  Genitourinary:  negative  Musculoskeletal:  Negative  Neurological:  Negative  Dermatological:  Negative  Endocrinological: Negative  Psychological:  Negative    A comprehensive review of systems was negative except for that written in the HPI. Past Medical History:     Past Medical History:   Diagnosis Date    Autoimmune disease ([facility] [address]     AIDS? HIV    Hypertension     Rectal bleeding     Stroke [facility])          Social History:     Social History     Socioeconomic History    Marital status:    Tobacco Use    Smoking status: Current Every Day Smoker     Packs/day: 1.00     Types: Cigarettes    Smokeless tobacco: Never Used   Substance and Sexual Activity    Alcohol use:  Yes     Alcohol/week: 5.8 standard drinks Types: 7 Standard drinks or equivalent per week     Comment: \"drink everyday but never alone\"     Drug use: Yes     Types: Marijuana    Sexual activity: Not Currently        Family History:   History reviewed. No pertinent family history. Medications:   No Known Allergies     Current Facility-Administered Medications   Medication Dose Route Frequency    apixaban (ELIQUIS) tablet 5 mg  5 mg Oral Q12H    dolutegravir (TIVICAY) tablet 50 mg  50 mg Oral DAILY    emtricitabine-tenofovir alafen (DESCOVY) 200-25 mg tablet 1 Tablet  1 Tablet Oral DAILY    carvediloL (COREG) tablet 12.5 mg  12.5 mg Oral BID WITH MEALS    furosemide (LASIX) injection 20 mg  20 mg IntraVENous BID    hydrALAZINE (APRESOLINE) 20 mg/mL injection 10 mg  10 mg IntraVENous Q6H PRN    acetaminophen (TYLENOL) tablet 650 mg  650 mg Oral Q6H PRN    Or    acetaminophen (TYLENOL) suppository 650 mg  650 mg Rectal Q6H PRN     Current Outpatient Medications   Medication Sig    bisacodyL (DULCOLAX, BISACODYL,) 5 mg EC tablet Take 1 tablet by mouth twice a day as needed for constipation.  albuterol (PROVENTIL HFA) 90 mcg/actuation inhaler Take 2 Puffs by inhalation every four (4) hours as needed for Wheezing or Shortness of Breath. Indications: bronchospasm prevention    apixaban (ELIQUIS) 5 mg tablet Take 1 Tab by mouth every twelve (12) hours.  carvedilol (COREG) 25 mg tablet Take 0.5 Tabs by mouth two (2) times daily (with meals).  dolutegravir (TIVICAY) 50 mg tab tablet Take 1 Tab by mouth daily.  emtricitabine-tenofovir alafen (DESCOVY) tablet Take 1 Tab by mouth daily.  pravastatin (PRAVACHOL) 40 mg tablet Take 1 Tab by mouth nightly.  pyridoxine, vitamin B6, (VITAMIN B-6) 50 mg tablet Take 1 Tab by mouth daily.  acetaminophen (TYLENOL) 500 mg tablet Take 1 Tab by mouth every six (6) hours as needed.  Indications: Pain         Physical Exam:     Visit Vitals  BP (!) 159/46   Pulse (!) 53   Temp 97.8 °F (36.6 °C) Resp 29   Ht 6' 4\" (1.93 m)   Wt 86.2 kg (190 lb)   SpO2 97%   BMI 23.13 kg/m²       TELE: flutter     BP Readings from Last 3 Encounters:   12/29/21 (!) 159/46   07/07/21 (!) 159/70   07/29/20 (!) 141/96     Pulse Readings from Last 3 Encounters:   12/29/21 (!) 53   07/07/21 60   07/29/20 60     Wt Readings from Last 3 Encounters:   12/28/21 86.2 kg (190 lb)   07/06/21 86.2 kg (190 lb)   12/26/19 86.2 kg (190 lb)     Physical Exam: LTD in setting of COVID-19  General:  alert, cooperative, no distress, appears stated age  Neck:  Supple   Lungs: equal rise and fall, normal effort   Heart:  irregularly irregular rhythm  Abdomen:  abdomen is non distended   Extremities:  extremities normal, atraumatic, no cyanosis or edema  Skin: Warm and dry  Neuro: alert, oriented x3, affect appropriate  Psych: non focal     Data Review:     Recent Labs     12/28/21  1620   WBC 8.4   HGB 13.3   HCT 42.6        Recent Labs     12/28/21  1620      K 4.0      CO2 29   GLU 94   BUN 13   CREA 0.98   CA 10.4*       Results for orders placed or performed during the hospital encounter of 12/28/21   EKG, 12 LEAD, INITIAL   Result Value Ref Range    Ventricular Rate 61 BPM    Atrial Rate 65 BPM    QRS Duration 178 ms    Q-T Interval 470 ms    QTC Calculation (Bezet) 473 ms    Calculated P Axis 93 degrees    Calculated R Axis -68 degrees    Calculated T Axis 100 degrees    Diagnosis       Atrial flutter  Ventricular-paced rhythm  Abnormal ECG  When compared with ECG of 06-JUL-2021 22:04,  No significant change was found  Confirmed by Isaac Cade MD, Ophelia Cedeno ([id number]) on 12/29/2021 8:21:06 AM         All Cardiac Markers in the last 24 hours:  No results found for: CPK, CK, CKMMB, CKMB, RCK3, CKMBT, CKNDX, CKND1, SHIV, TROPT, TROIQ, NA, TROPT, TNIPOC, BNP, BNPP    Last Lipid:    Lab Results   Component Value Date/Time    Cholesterol, total 131 09/13/2012 02:20 AM    HDL Cholesterol 66 (H) 09/13/2012 02:20 AM    LDL, calculated 51.4 09/13/2012 02:20 AM    Triglyceride 68 09/13/2012 02:20 AM    CHOL/HDL Ratio 2.0 09/13/2012 02:20 AM       Cardiographics:     EKG Results     Procedure 720 Value Units Date/Time    EKG, 12 LEAD, INITIAL [[id number]] Collected: 12/28/21 1621    Order Status: Completed Updated: 12/29/21 0821     Ventricular Rate 61 BPM      Atrial Rate 65 BPM      QRS Duration 178 ms      Q-T Interval 470 ms      QTC Calculation (Bezet) 473 ms      Calculated P Axis 93 degrees      Calculated R Axis -68 degrees      Calculated T Axis 100 degrees      Diagnosis --     Atrial flutter  Ventricular-paced rhythm  Abnormal ECG  When compared with ECG of 06-JUL-2021 22:04,  No significant change was found  Confirmed by Erika Dumont MD, Barbara Middleton ([id number]) on 12/29/2021 8:21:06 AM                    XR Results (most recent):  Results from [place] encounter on 12/28/21    XR CHEST PORT    Narrative  EXAM: Chest Radiograph    INDICATION:  chest pain shortness of breath    TECHNIQUE: AP view of the chest    COMPARISON: 7/6/2021, 7/29/2020, 12/8/2019    FINDINGS: No pneumothorax identified. The lungs are clear. No infiltrates  appreciated. No effusions identified. The cardiac silhouette is enlarged. Pacemaker is present and unchanged. The pulmonary vasculature is unremarkable. The osseous structures are unremarkable. Impression  1. No acute infiltrate or effusion. 2.  Unchanged cardiac enlargement.         Signed By: Sohail Lee PA-C     December 29, 2021

## 2021-12-29 NOTE — DISCHARGE INSTRUCTIONS
Patient Education        Learning How To Care for Someone Who Has COVID-19  Things to know  Most people who get COVID-19 will recover with time and home care. Here are some things to know if you're caring for someone who's sick. · Treat the symptoms. Common symptoms include a fever, coughing, and feeling short of breath. Urge the person to get extra rest and drink plenty of fluids to replace fluids lost from fever. To reduce a fever, offer acetaminophen (Tylenol) or ibuprofen (Advil, Motrin). It may also help with muscle aches. Read and follow all instructions on the label. · Watch for signs that the illness is getting worse. The person may need medical care if they're getting sicker (for example, if it's hard to breathe). But call the doctor's office before you go. They can tell you what to do. Call 911  or emergency services if the person has any of these symptoms:  ? Severe trouble breathing or shortness of breath. ? Constant pain or pressure in their chest.  ? Confusion, or trouble thinking clearly. ? Pale, gray, or blue-colored skin or lips. Some people are more likely to get very sick and need medical care. Call the doctor as soon as symptoms start if the person you're caring for is over 72, smokes, or has a serious health problem, like asthma, heart disease, diabetes, or an immune system problem. · Protect yourself and others. The virus spreads easily from person to person, so take extra care to avoid catching or spreading the infection. ? Keep the sick person away from others as much as you can. § Have the person stay in one room. If you can, give them their own bathroom to use. § Have only one person take care of them. Keep other people--and pets--out of the sickroom. § Have the person wear a mask around other people. This includes when anyone is in the room with them or if they leave their room (for example, to go to the bathroom). § Don't share personal items.  These include dishes, cups, towels, and bedding. ? Wash your hands often and well. Use soap and water, and scrub for at least 20 seconds. This is especially important after you've been around the sick person or touched things they've touched. If soap and water aren't handy, use an alcohol-based hand . ? Wear a mask when you're around other people after you've cared for someone who's sick. ? Avoid touching your mouth, nose, and eyes. ? Take care with the person's laundry. It's okay to wash the sick person's laundry with yours. If you have them, wear disposable gloves when handling their dirty laundry, and wash your hands well after you touch it. Wash items in the warmest water allowed for the fabric type, and dry them completely. ? Clean high-touch items every day and anytime the sick person touches them. These include doorknobs, light switches, toilets, counters, and remote controls. Use a household disinfectant or a homemade bleach solution. (Follow the directions on the label.) If the sick person has their own room, have them disinfect it every day. ? Avoid having visitors. If you have to have visitors, they need to wear a mask and stay at least 6 feet (2 meters) away from you. And keep the visit as short as possible. To help protect family and friends, stay in touch with them only by phone or computer. ? If you haven't had COVID-19 in the past 3 months or aren't fully vaccinated, you may need to quarantine. Where can you learn more? Go to http://www.gray.com/  Enter A137 in the search box to learn more about \"Learning How To Care for Someone Who Has COVID-19. \"  Current as of: March 26, 2021               Content Version: 13.0  © 9977-7702 [employer]. Care instructions adapted under license by "[employer] (which disclaims liability or warranty for this information).  If you have questions about a medical condition or this instruction, always ask your healthcare professional. [address] any warranty or liability for your use of this information. Patient Education        10 Things to Do When You Have COVID-19    Stay home. Don't go to school, work, or public areas. And don't use public transportation, ride-shares, or taxis unless you have no choice. Leave your home only if you need to get medical care. But call the doctor's office first so they know you're coming. And wear a mask. Ask before leaving isolation. Follow your doctor's advice about when it is safe for you to leave isolation. Wear a mask when you are around other people. It can help stop the spread of the virus. Limit contact with people in your home. If possible, stay in a separate bedroom and use a separate bathroom. Avoid contact with pets and other animals. If possible, have a friend or family member care for them while you're sick. Cover your mouth and nose with a tissue when you cough or sneeze. Then throw the tissue in the trash right away. Wash your hands often, especially after you cough or sneeze. Use soap and water, and scrub for at least 20 seconds. If soap and water aren't available, use an alcohol-based hand . Don't share personal household items. These include bedding, towels, cups and glasses, and eating utensils. Clean and disinfect your home every day. Use household  or disinfectant wipes or sprays. If needed, take acetaminophen (Tylenol) or ibuprofen (Advil, Motrin) to relieve fever and body aches. Read and follow all instructions on the label. Current as of: March 26, 2021               Content Version: 13.0  © 2006-2021 [employer]. Care instructions adapted under license by [employer] (which disclaims liability or warranty for this information).  If you have questions about a medical condition or this instruction, always ask your healthcare professional. [employer] disclaims any warranty or liability for your use of this information. DISCHARGE SUMMARY from Nurse    PATIENT INSTRUCTIONS:    After general anesthesia or intravenous sedation, for 24 hours or while taking prescription Narcotics:  · Limit your activities  · Do not drive and operate hazardous machinery  · Do not make important personal or business decisions  · Do  not drink alcoholic beverages  · If you have not urinated within 8 hours after discharge, please contact your surgeon on call. Report the following to your surgeon:  · Excessive pain, swelling, redness or odor of or around the surgical area  · Temperature over 100.5  · Nausea and vomiting lasting longer than 4 hours or if unable to take medications  · Any signs of decreased circulation or nerve impairment to extremity: change in color, persistent  numbness, tingling, coldness or increase pain  · Any questions    What to do at Home:  Recommended activity: Activity as tolerated, rest as needed     If you experience any of the following symptoms Worsening shortness of breath, Fever greater than 100.4, chest pains, dizziness, fainting please follow up with Primary Care Provider or got to the nearest Emergency Room. *  Please give a list of your current medications to your Primary Care Provider. *  Please update this list whenever your medications are discontinued, doses are      changed, or new medications (including over-the-counter products) are added. *  Please carry medication information at all times in case of emergency situations. These are general instructions for a healthy lifestyle:    No smoking/ No tobacco products/ Avoid exposure to second hand smoke  Surgeon General's Warning:  Quitting smoking now greatly reduces serious risk to your health.     Obesity, smoking, and sedentary lifestyle greatly increases your risk for illness    A healthy diet, regular physical exercise & weight monitoring are important for maintaining a healthy lifestyle    You may be retaining fluid if you have a history of heart failure or if you experience any of the following symptoms:  Weight gain of 3 pounds or more overnight or 5 pounds in a week, increased swelling in our hands or feet or shortness of breath while lying flat in bed. Please call your doctor as soon as you notice any of these symptoms; do not wait until your next office visit. The discharge information has been reviewed with the patient. The patient verbalized understanding. Discharge medications reviewed with the patient and appropriate educational materials and side effects teaching were provided. Patient armband removed and shredded  ___________________________________________________________________________________________________________________________________Your evaluated in the emergency department today for fevers, headaches, body aches and cough. You tested positive for Covid and your symptoms are consistent with this diagnosis. There is no evidence of bacterial pneumonia, heart disease, other lung disease. Please quarantine per CDC guidelines. Do not interact with other people until you test negative, or without symptoms for 10 days. Discharge Instructions    Patient: Cyndi Nicole MRN: [id number]  [facility]: [id number]    YOB: 1950  Age: 73 y.o. Sex: male    DOA: 12/28/2021 LOS:  LOS: 2 days   Discharge Date:      ACUTE DIAGNOSES:  1. Atypical chest pain on presentation, could be pleuritic from cough and covid-19 infection   2. COVID-19 infection  3. Indeterminate troponin elevation, not acute coronary syndrome   4. HTN urgency   5. Non ischemic cardiomyopathy with acute systolic CHF, proBNP elevated  6. Persistent AFIB, on DOAC for elevated thromboembolic event risk   7.  asymptotic HIV  8.   Tobacco smoking dependence        DISCHARGE MEDICATIONS:       · It is important that you take the medication exactly as they are prescribed. · Keep your medication in the bottles provided by the pharmacist and keep a list of the medication names, dosages, and times to be taken in your wallet. · Do not take other medications without consulting your doctor. DIET:  Cardiac Diet and Low fat, Low cholesterol  Limit your fluid intake to 1.5  liters daily. Avoid salty food     ACTIVITY: Activity as tolerated  Please stop smoking tobacco    ADDITIONAL INFORMATION: If you experience any of the following symptoms then please call your primary care physician or return to the emergency room if you cannot get hold of your doctor: Fever, chills, nausea, vomiting, diarrhea, change in mentation, falling, bleeding, shortness of breath. FOLLOW UP CARE:  Primary care physician,  you are to call and set up an appointment to see them in 2 weeks. Follow-up with Dr. Lisa Barreto, cardiologist in 2-4 weeks       Information obtained by :  I understand that if any problems occur once I am at home I am to contact my physician. I understand and acknowledge receipt of the instructions indicated above.                                                                                                                                            Physician's or R.N.'s Signature                                                                  Date/Time                                                                                                                                              Patient or Representative Signature                                                          Date/Time    Jesus Rouse MD  12/30/2021  10:20 AM

## 2021-12-29 NOTE — CONSULTS
Infectious Disease Consultation Note        Reason: COVID-19 infection, HIV    Current abx Prior abx         Lines:       Assessment :    74 y.o.  male who has PMH of HIV/AIDS (on emtricitabine/tenofovir/dolutegravir), HTN, a-fib/flutter on Eliquis, COPD, nonischemic cardiomyopathy with AICD in place,  hypertension presented to [facility] ED on 12/28/2021 secondary to chest pain and shortness of breath. Clinical presentation consistent with acute COVID-19 infection  Currently no evidence of hypoxia/infiltrates on chest x-ray    Elevated troponin-concern for acute coronary syndrome. Cardiology consult appreciated    HIV/AIDS-follows up with [facility]. Compliant with emtricitabine/tenofovir/tolerated reviewed. Last CD4 count not known    Recommendations:    1. Recommend emtricitabine/tenofovir/dolutegravir  2. Obtain absolute CD4 count  3. Follow-up cardiology recommendations  4. No indication for steroid or COVID-19 treatment at this time  5. Anticoagulation per primary team    Thank you for consultation request. Above plan was discussed in details with patient. Please call me if any further questions or concerns. Will continue to participate in the care of this patient. HPI:    74 y.o.  male who has PMH of HIV/AIDS (on emtricitabine/tenofovir/dolutegravir), HTN, a-fib/flutter on Eliquis, COPD, nonischemic cardiomyopathy with AICD in place,  hypertension presented to [facility] ED on 12/28/2021 secondary to chest pain and shortness of breath. Shortness of breath is worse with exertion. Chest pain is described as midsternum and worsened also with exertion. He has had  myalgias and headaches. Patient found to be Covid positive. He is fully vaccinated. He is currently on room air without any increased work of breathing. ED - Troponin 74-->90.  COVID+. Cardiology consulted. I have been consulted for further recommendations.     Patient states that he is compliant with his HIV medications. He has had several deaths in the family recently. He last went to a  about 2 weeks ago. Everyone who attended the  subsequently got Covid infection. Patient states he was relatively okay except for some tiredness and muscle aches. Yesterday he had significant chest pain and hence came to the emergency room         Past Medical History:   Diagnosis Date    Autoimmune disease ([address]     AIDS? HIV    Hypertension     Rectal bleeding     Stroke [facility])        Past Surgical History:   Procedure Laterality Date    HX ORTHOPAEDIC      back       Patient's Medications   Start Taking    No medications on file   Continue Taking    ACETAMINOPHEN (TYLENOL) 500 MG TABLET    Take 1 Tab by mouth every six (6) hours as needed. Indications: Pain    ALBUTEROL (PROVENTIL HFA) 90 MCG/ACTUATION INHALER    Take 2 Puffs by inhalation every four (4) hours as needed for Wheezing or Shortness of Breath. Indications: bronchospasm prevention    APIXABAN (ELIQUIS) 5 MG TABLET    Take 1 Tab by mouth every twelve (12) hours. BISACODYL (DULCOLAX, BISACODYL,) 5 MG EC TABLET    Take 1 tablet by mouth twice a day as needed for constipation. CARVEDILOL (COREG) 25 MG TABLET    Take 0.5 Tabs by mouth two (2) times daily (with meals). DOLUTEGRAVIR (TIVICAY) 50 MG TAB TABLET    Take 1 Tab by mouth daily. EMTRICITABINE-TENOFOVIR ALAFEN (DESCOVY) TABLET    Take 1 Tab by mouth daily. PRAVASTATIN (PRAVACHOL) 40 MG TABLET    Take 1 Tab by mouth nightly. PYRIDOXINE, VITAMIN B6, (VITAMIN B-6) 50 MG TABLET    Take 1 Tab by mouth daily. These Medications have changed    No medications on file   Stop Taking    AMLODIPINE (NORVASC) 10 MG TABLET    Take 1 Tab by mouth daily. AMLODIPINE (NORVASC) 5 MG TABLET    Take 5 mg by mouth two (2) times a day. APIXABAN (ELIQUIS) 5 MG TABLET    Take 5 mg by mouth every twelve (12) hours.     BUDESONIDE-FORMOTEROL (SYMBICORT) 160-4.5 MCG/ACTUATION HFAA    Take 2 Puffs by inhalation two (2) times a day. CARVEDILOL (COREG) 25 MG TABLET    Take 0.5 Tablets by mouth every twelve (12) hours. DAPSONE 100 MG TABLET    Take 1 Tab by mouth daily. DICLOFENAC (VOLTAREN) 1 % GEL    Apply 2 g to affected area three (3) times daily as needed for Pain. Apply dose (2 gm or 4 gm) to each location. If treatment site is the hands, patients should wait at least one (1) hour to wash their hands. APPLY TO right wrist    DOLUTEGRAVIR (TIVICAY) 50 MG TAB TABLET    Take 50 mg by mouth daily. EMTRICITABINE-TENOFOVIR ALAFEN (DESCOVY) TABLET    Take 1 Tablet by mouth daily. FOLIC ACID (FOLVITE) 1 MG TABLET    Take 1 Tab by mouth daily. MAGNESIUM OXIDE 400 MG CAP    Take 400 mg by mouth daily. ONDANSETRON (ZOFRAN ODT) 8 MG DISINTEGRATING TABLET    Take 1 Tab by mouth every eight (8) hours as needed for Nausea for up to 12 doses. Current Facility-Administered Medications   Medication Dose Route Frequency    apixaban (ELIQUIS) tablet 5 mg  5 mg Oral Q12H    dolutegravir (TIVICAY) tablet 50 mg  50 mg Oral DAILY    emtricitabine-tenofovir alafen (DESCOVY) 200-25 mg tablet 1 Tablet  1 Tablet Oral DAILY    carvediloL (COREG) tablet 12.5 mg  12.5 mg Oral BID WITH MEALS    furosemide (LASIX) injection 20 mg  20 mg IntraVENous BID    hydrALAZINE (APRESOLINE) 20 mg/mL injection 10 mg  10 mg IntraVENous Q6H PRN    acetaminophen (TYLENOL) tablet 650 mg  650 mg Oral Q6H PRN    Or    acetaminophen (TYLENOL) suppository 650 mg  650 mg Rectal Q6H PRN     Current Outpatient Medications   Medication Sig    bisacodyL (DULCOLAX, BISACODYL,) 5 mg EC tablet Take 1 tablet by mouth twice a day as needed for constipation.  albuterol (PROVENTIL HFA) 90 mcg/actuation inhaler Take 2 Puffs by inhalation every four (4) hours as needed for Wheezing or Shortness of Breath. Indications: bronchospasm prevention    apixaban (ELIQUIS) 5 mg tablet Take 1 Tab by mouth every twelve (12) hours.     carvedilol (COREG) 25 mg tablet Take 0.5 Tabs by mouth two (2) times daily (with meals).  dolutegravir (TIVICAY) 50 mg tab tablet Take 1 Tab by mouth daily.  emtricitabine-tenofovir alafen (DESCOVY) tablet Take 1 Tab by mouth daily.  pravastatin (PRAVACHOL) 40 mg tablet Take 1 Tab by mouth nightly.  pyridoxine, vitamin B6, (VITAMIN B-6) 50 mg tablet Take 1 Tab by mouth daily.  acetaminophen (TYLENOL) 500 mg tablet Take 1 Tab by mouth every six (6) hours as needed. Indications: Pain       Allergies: Patient has no known allergies. History reviewed. No pertinent family history. Social History     Socioeconomic History    Marital status:      Spouse name: Not on file    Number of children: Not on file    Years of education: Not on file    Highest education level: Not on file   Occupational History    Not on file   Tobacco Use    Smoking status: Current Every Day Smoker     Packs/day: 1.00     Types: Cigarettes    Smokeless tobacco: Never Used   Substance and Sexual Activity    Alcohol use: Yes     Alcohol/week: 5.8 standard drinks     Types: 7 Standard drinks or equivalent per week     Comment: \"drink everyday but never alone\"     Drug use: Yes     Types: Marijuana    Sexual activity: Not Currently   Other Topics Concern    Not on file   Social History Narrative    Not on file     Social Determinants of Health     Financial Resource Strain:     Difficulty of Paying Living Expenses: Not on file   Food Insecurity:     Worried About Running Out of Food in the Last Year: Not on file    Miki of Food in the Last Year: Not on file   Transportation Needs:     Lack of Transportation (Medical): Not on file    Lack of Transportation (Non-Medical):  Not on file   Physical Activity:     Days of Exercise per Week: Not on file    Minutes of Exercise per Session: Not on file   Stress:     Feeling of Stress : Not on file   Social Connections:     Frequency of Communication with Friends and Family: Not on file    Frequency of Social Gatherings with Friends and Family: Not on file    Attends [employer]ism Services: Not on file    Active Member of Clubs or Organizations: Not on file    Attends Club or Organization Meetings: Not on file    Marital Status: Not on file   Intimate Partner Violence:     Fear of Current or Ex-Partner: Not on file    Emotionally Abused: Not on file    Physically Abused: Not on file    Sexually Abused: Not on file   Housing Stability:     Unable to Pay for Housing in the Last Year: Not on file    Number of [place] in the Last Year: Not on file    Unstable Housing in the Last Year: Not on file     Social History     Tobacco Use   Smoking Status Current Every Day Smoker    Packs/day: 1.00    Types: Cigarettes   Smokeless Tobacco Never Used        Temp (24hrs), Av.1 °F (37.3 °C), Min:97.8 °F (36.6 °C), Max:100.3 °F (37.9 °C)    Visit Vitals  BP (!) 142/42   Pulse 81   Temp 100.3 °F (37.9 °C)   Resp 28   Ht 6' 4\" (1.93 m)   Wt 86.2 kg (190 lb)   SpO2 97%   BMI 23.13 kg/m²       ROS: 12 point ROS obtained in details. Pertinent positives as mentioned in HPI,   otherwise negative    Physical Exam:    Vitals signs and nursing note reviewed. Constitutional:       General: He is not in acute distress. Appearance: He is well-developed. HENT:      Head: Normocephalic. Eyes:      Conjunctiva/sclera: Conjunctivae normal.      Neck:      Musculoskeletal: Normal range of motion and neck supple. Cardiovascular:      Rate and Rhythm: Normal rate and regular rhythm on monitor  Chest:      Bilateral chest movements equal.  Auscultation deferred due to Covid positive  Abdominal:      General: There is no distension. Palpations: Abdomen is soft. Tenderness: There is no abdominal tenderness. There is no rebound. Musculoskeletal: Normal range of motion. General: No tenderness. Skin:     General: Skin is warm and dry. Findings: No rash.    Neurological: Mental Status: He is alert and oriented to person, place, and time. Cranial Nerves: No cranial nerve deficit. Motor: No abnormal muscle tone. Coordination: Coordination normal.   Psychiatric:         Behavior: Behavior normal.         Thought Content: Thought content normal.         Judgment: Judgment normal.       Labs: Results:   Chemistry Recent Labs     12/28/21  1620   GLU 94      K 4.0      CO2 29   BUN 13   CREA 0.98   CA 10.4*   AGAP 5   BUCR 13      CBC w/Diff Recent Labs     12/28/21  1620   WBC 8.4   RBC 4.64   HGB 13.3   HCT 42.6      GRANS 73   LYMPH 10*   EOS 0      Microbiology No results for input(s): CULT in the last 72 hours. RADIOLOGY:    All available imaging studies/reports in [facility] for this admission were reviewed      Disclaimer: Sections of this note are dictated utilizing voice recognition software, which may have resulted in some phonetic based errors in grammar and contents. Even though attempts were made to correct all the mistakes, some may have been missed, and remained in the body of the document. If questions arise, please contact our department.     Dr. Masha Terry, Infectious Disease Specialist  [phone]  December 29, 2021  11:04 AM

## 2021-12-29 NOTE — H&P
History & Physical    Patient: Colonel Saucedo MRN: [id number]  CSN: [id number]    YOB: 1950  Age: 73 y.o. Sex: male      DOA: 12/28/2021    Chief Complaint:   Chief Complaint   Patient presents with    Breathing Problem          HPI:     Colonel Saucedo is a 73 y.o.  male with hx of COPD, nonischemic cardiomyopathy with AICD in place, HIV, atrial fibrillation on Eliquis, hypertension. Patient presented to [facility] ED secondary to chest pain and shortness of breath. Shortness of breath is worse with exertion. Chest pain is described as midsternum and worsened also with exertion. He has had a productive cough as well as myalgias and headaches. Patient found to be Covid positive. He is fully vaccinated. He is currently on room air without any increased work of breathing. ED - Troponin 74-->90.  COVID+. Cardiology consulted. Past Medical History:   Diagnosis Date    Autoimmune disease ([facility] [address]     AIDS? HIV    Hypertension     Rectal bleeding     Stroke [facility])        Past Surgical History:   Procedure Laterality Date    HX ORTHOPAEDIC      back       History reviewed. No pertinent family history. Social History     Socioeconomic History    Marital status:    Tobacco Use    Smoking status: Current Every Day Smoker     Packs/day: 1.00     Types: Cigarettes    Smokeless tobacco: Never Used   Substance and Sexual Activity    Alcohol use: Yes     Alcohol/week: 5.8 standard drinks     Types: 7 Standard drinks or equivalent per week     Comment: \"drink everyday but never alone\"     Drug use: Yes     Types: Marijuana    Sexual activity: Not Currently       Prior to Admission medications    Medication Sig Start Date End Date Taking? Authorizing Provider   bisacodyL (DULCOLAX, BISACODYL,) 5 mg EC tablet Take 1 tablet by mouth twice a day as needed for constipation.  2/27/20  Yes Link GLORIA Garcia   albuterol (PROVENTIL HFA) 90 mcg/actuation inhaler Take 2 Puffs by inhalation every four (4) hours as needed for Wheezing or Shortness of Breath. Indications: bronchospasm prevention 19  Yes Lani Latham,    apixaban (ELIQUIS) 5 mg tablet Take 1 Tab by mouth every twelve (12) hours. 18  Yes Nickie Quiroz MD   carvedilol (COREG) 25 mg tablet Take 0.5 Tabs by mouth two (2) times daily (with meals). 18  Yes Nickie Quiroz MD   dolutegravir (TIVICAY) 50 mg tab tablet Take 1 Tab by mouth daily. 18  Yes Nickie Quiroz MD   emtricitabine-tenofovir alafen (DESCOVY) tablet Take 1 Tab by mouth daily. 18  Yes Nickie Quiroz MD   pravastatin (PRAVACHOL) 40 mg tablet Take 1 Tab by mouth nightly. 18  Yes Nickie Quiroz MD   acetaminophen (TYLENOL) 500 mg tablet Take 1 Tab by mouth every six (6) hours as needed. Indications: Pain 18  Yes Nickie Quiroz MD   pyridoxine, vitamin B6, (VITAMIN B-6) 50 mg tablet Take 1 Tab by mouth daily. 18   Nickie Quiroz MD       No Known Allergies      Review of Systems  GENERAL: +fever, +chills, +malaise, no weight changes  HEENT: No sore throat or sinus congestion. NECK: No pain or stiffness. PULMONARY: No shortness of breath, cough or wheeze. Cardiovascular: no pnd or orthopnea, no CP  GASTROINTESTINAL: No abdominal pain, nausea, vomiting or diarrhea  GENITOURINARY: No urinary frequency, urgency, hesitancy or dysuria. MUSCULOSKELETAL: + Arthralgias  DERMATOLOGIC: No rash, no itching, no lesions. ENDOCRINE:no heat or cold intolerance. HEMATOLOGICAL: No anemia or easy bruising or bleeding.    NEUROLOGIC: +headache, + essential tremor      Physical Exam:     Physical Exam:  Visit Vitals  BP (!) 190/88 (BP 1 Location: Right upper arm, BP Patient Position: Semi fowlers)   Pulse 79   Temp 97.8 °F (36.6 °C)   Resp 30   Ht 6' 4\" (1.93 m)   Wt 86.2 kg (190 lb)   SpO2 97%   BMI 23.13 kg/m²      O2 Device: None (Room air)    Temp (24hrs), Av.8 °F (36.6 °C), Min:97.8 °F (36.6 °C), Max:97.8 °F (36.6 °C)    No intake/output data recorded. No intake/output data recorded. General:  Alert, cooperative, ill-appearing, appears stated age. Head: Normocephalic, without obvious abnormality, atraumatic. Eyes:  Conjunctivae/corneas clear. PERRL, EOMs intact. Nose: Nares normal.    Neck: Supple, symmetrical, trachea midline   Lungs:   Clear to auscultation bilaterally. On room air. No increased work of breathing   Heart:  Regular rate and rhythm, systolic ejection murmur     Abdomen: Soft, non-tender. Nondistended   Extremities: Extremities normal, no calf tenderness. No calf asymmetry. Pulses: 2+ and symmetric all extremities. Skin:  No rashes or lesions   Neurologic: AAOx3, No focal motor or sensory deficit. Labs Reviewed: All lab results for the last 24 hours reviewed. Recent Results (from the past 24 hour(s))   COVID-19 RAPID TEST    Collection Time: 12/28/21  4:10 PM   Result Value Ref Range    Specimen source Please find results under separate order      COVID-19 rapid test Detected (AA) NOTD     CBC WITH AUTOMATED DIFF    Collection Time: 12/28/21  4:20 PM   Result Value Ref Range    WBC 8.4 4.6 - 13.2 K/uL    RBC 4.64 4.35 - 5.65 M/uL    HGB 13.3 13.0 - 16.0 g/dL    HCT 42.6 36.0 - 48.0 %    MCV 91.8 78.0 - 100.0 FL    MCH 28.7 24.0 - 34.0 PG    MCHC 31.2 31.0 - 37.0 g/dL    RDW 12.0 11.6 - 14.5 %    PLATELET 312 292 - 585 K/uL    MPV 12.0 (H) 9.2 - 11.8 FL    NRBC 0.0 0  WBC    ABSOLUTE NRBC 0.00 0.00 - 0.01 K/uL    NEUTROPHILS 73 40 - 73 %    LYMPHOCYTES 10 (L) 21 - 52 %    MONOCYTES 16 (H) 3 - 10 %    EOSINOPHILS 0 0 - 5 %    BASOPHILS 1 0 - 2 %    IMMATURE GRANULOCYTES 0 0.0 - 0.5 %    ABS. NEUTROPHILS 6.1 1.8 - 8.0 K/UL    ABS. LYMPHOCYTES 0.8 (L) 0.9 - 3.6 K/UL    ABS. MONOCYTES 1.4 (H) 0.05 - 1.2 K/UL    ABS. EOSINOPHILS 0.0 0.0 - 0.4 K/UL    ABS. BASOPHILS 0.1 0.0 - 0.1 K/UL    ABS. IMM.  GRANS. 0.0 0.00 - 0.04 K/UL    DF AUTOMATED     METABOLIC PANEL, BASIC Collection Time: 12/28/21  4:20 PM   Result Value Ref Range    Sodium 141 136 - 145 mmol/L    Potassium 4.0 3.5 - 5.5 mmol/L    Chloride 107 100 - 111 mmol/L    CO2 29 21 - 32 mmol/L    Anion gap 5 3.0 - 18 mmol/L    Glucose 94 74 - 99 mg/dL    BUN 13 7.0 - 18 MG/DL    Creatinine 0.98 0.6 - 1.3 MG/DL    BUN/Creatinine ratio 13 12 - 20      GFR est AA >60 >60 ml/min/1.73m2    GFR est non-AA >60 >60 ml/min/1.73m2    Calcium 10.4 (H) 8.5 - 10.1 MG/DL   TROPONIN-HIGH SENSITIVITY    Collection Time: 12/28/21  4:20 PM   Result Value Ref Range    Troponin-High Sensitivity 74 0 - 78 ng/L   NT-PRO BNP    Collection Time: 12/28/21  4:20 PM   Result Value Ref Range    NT pro-BNP 2,514 (H) 0 - 900 PG/ML   PROCALCITONIN    Collection Time: 12/28/21  4:20 PM   Result Value Ref Range    Procalcitonin <0.05 ng/mL   D DIMER    Collection Time: 12/28/21  4:20 PM   Result Value Ref Range    D DIMER 0.74 (H) <0.46 ug/ml(FEU)   EKG, 12 LEAD, INITIAL    Collection Time: 12/28/21  4:21 PM   Result Value Ref Range    Ventricular Rate 61 BPM    Atrial Rate 65 BPM    QRS Duration 178 ms    Q-T Interval 470 ms    QTC Calculation (Bezet) 473 ms    Calculated P Axis 93 degrees    Calculated R Axis -68 degrees    Calculated T Axis 100 degrees    Diagnosis       Ventricular-paced rhythm  Abnormal ECG  When compared with ECG of 06-JUL-2021 22:04,  Vent. rate has decreased BY   5 BPM     GLUCOSE, POC    Collection Time: 12/28/21  5:56 PM   Result Value Ref Range    Glucose (POC) 85 70 - 110 mg/dL   TROPONIN-HIGH SENSITIVITY    Collection Time: 12/28/21  7:08 PM   Result Value Ref Range    Troponin-High Sensitivity 90 (HH) 0 - 78 ng/L        XR Results (most recent):  Results from Hospital Encounter encounter on 12/28/21    XR CHEST PORT    Narrative  EXAM: Chest Radiograph    INDICATION:  chest pain shortness of breath    TECHNIQUE: AP view of the chest    COMPARISON: 7/6/2021, 7/29/2020, 12/8/2019    FINDINGS: No pneumothorax identified.   The lungs are clear. No infiltrates  appreciated. No effusions identified. The cardiac silhouette is enlarged. Pacemaker is present and unchanged. The pulmonary vasculature is unremarkable. The osseous structures are unremarkable. Impression  1. No acute infiltrate or effusion. 2.  Unchanged cardiac enlargement. CT Results  (Last 48 hours)    None            Procedures/imaging: see electronic medical records for all procedures/Xrays and details which were not copied into this note but were reviewed prior to creation of Plan      Assessment/Plan     Active Problems:    Elevated troponin (6/6/2018)       Assessment  1. Acute on chronic diastolic CHF  2. Elevated troponin  3. Covid positive -on room air. No evidence of infiltrate on chest x-ray  4. Elevated D-dimer   #1-4. Admit. Cardiology consult. Trend troponin. Will hold off on heparin drip for now. Elevated troponin possibly secondary to demand ischemia from acute heart failure. Lasix 20 mg twice daily. ID consultation given Covid and immunocompromise state. Patient remains on room air. Will hold off on antibiotics, steroids. 5. History of nonischemic cardiomyopathy with AICD in place  6. History of HIV   #5,6. Continue home medications. Obtain CD4 count. Patient without lab work in quite some time. ID consultation. 7.   Uncontrolled hypertension -restart home Coreg, Lasix 20 mg twice daily. Hydralazine as needed    Diet: regular   DVT/GI Prophylaxis: eliquis   Code Status: full     Contact:    Discussed with patient at bedside about hospital admission and plan care. He understands and agrees. All questions answered. Disclaimer: Sections of this note are dictated using utilizing voice recognition software. Minor typographical errors may be present. If questions arise, please do not hesitate to contact me or call our department.         Claudean Bogus, [facility] OF THE [place] [facility]ist [facility]

## 2021-12-30 VITALS
RESPIRATION RATE: 20 BRPM | SYSTOLIC BLOOD PRESSURE: 154 MMHG | BODY MASS INDEX: 23.14 KG/M2 | DIASTOLIC BLOOD PRESSURE: 87 MMHG | TEMPERATURE: 97.9 F | HEART RATE: 72 BPM | OXYGEN SATURATION: 96 % | WEIGHT: 190 LBS | HEIGHT: 76 IN

## 2021-12-30 LAB
ALBUMIN SERPL-MCNC: 3.3 G/DL (ref 3.4–5)
ALBUMIN/GLOB SERPL: 0.8 {RATIO} (ref 0.8–1.7)
ALP SERPL-CCNC: 114 U/L (ref 45–117)
ALT SERPL-CCNC: 19 U/L (ref 16–61)
ANION GAP SERPL CALC-SCNC: 8 MMOL/L (ref 3–18)
AST SERPL-CCNC: 16 U/L (ref 10–38)
BASOPHILS # BLD AUTO: 0.1 X10E3/UL (ref 0–0.2)
BASOPHILS # BLD: 0 K/UL (ref 0–0.1)
BASOPHILS NFR BLD AUTO: 1 %
BASOPHILS NFR BLD: 1 % (ref 0–2)
BILIRUB SERPL-MCNC: 1.1 MG/DL (ref 0.2–1)
BUN SERPL-MCNC: 19 MG/DL (ref 7–18)
BUN/CREAT SERPL: 17 (ref 12–20)
CA-I SERPL-SCNC: 1.29 MMOL/L (ref 1.15–1.33)
CALCIUM SERPL-MCNC: 9.9 MG/DL (ref 8.5–10.1)
CD3+CD4+ CELLS # BLD: 265 /UL (ref 359–1519)
CD3+CD4+ CELLS NFR BLD: 26.5 % (ref 30.8–58.5)
CHLORIDE SERPL-SCNC: 106 MMOL/L (ref 100–111)
CO2 SERPL-SCNC: 25 MMOL/L (ref 21–32)
CREAT SERPL-MCNC: 1.09 MG/DL (ref 0.6–1.3)
CRP SERPL-MCNC: 2.1 MG/DL (ref 0–0.3)
DIFFERENTIAL METHOD BLD: ABNORMAL
EOSINOPHIL # BLD AUTO: 0 X10E3/UL (ref 0–0.4)
EOSINOPHIL # BLD: 0 K/UL (ref 0–0.4)
EOSINOPHIL NFR BLD AUTO: 0 %
EOSINOPHIL NFR BLD: 0 % (ref 0–5)
ERYTHROCYTE [DISTWIDTH] IN BLOOD BY AUTOMATED COUNT: 11.3 % (ref 11.6–15.4)
ERYTHROCYTE [DISTWIDTH] IN BLOOD BY AUTOMATED COUNT: 11.9 % (ref 11.6–14.5)
GLOBULIN SER CALC-MCNC: 4.4 G/DL (ref 2–4)
GLUCOSE SERPL-MCNC: 98 MG/DL (ref 74–99)
HCT VFR BLD AUTO: 41.5 % (ref 36–48)
HCT VFR BLD AUTO: 43.3 % (ref 37.5–51)
HGB BLD-MCNC: 13 G/DL (ref 13–16)
HGB BLD-MCNC: 14.4 G/DL (ref 13–17.7)
IMM GRANULOCYTES # BLD AUTO: 0 K/UL (ref 0–0.04)
IMM GRANULOCYTES # BLD: 0 X10E3/UL (ref 0–0.1)
IMM GRANULOCYTES NFR BLD AUTO: 0 % (ref 0–0.5)
IMM GRANULOCYTES NFR BLD: 0 %
LYMPHOCYTES # BLD AUTO: 1 X10E3/UL (ref 0.7–3.1)
LYMPHOCYTES # BLD: 1 K/UL (ref 0.9–3.6)
LYMPHOCYTES NFR BLD AUTO: 14 %
LYMPHOCYTES NFR BLD: 20 % (ref 21–52)
MCH RBC QN AUTO: 28.7 PG (ref 24–34)
MCH RBC QN AUTO: 29.5 PG (ref 26.6–33)
MCHC RBC AUTO-ENTMCNC: 31.3 G/DL (ref 31–37)
MCHC RBC AUTO-ENTMCNC: 33.3 G/DL (ref 31.5–35.7)
MCV RBC AUTO: 89 FL (ref 79–97)
MCV RBC AUTO: 91.6 FL (ref 78–100)
MONOCYTES # BLD AUTO: 1 X10E3/UL (ref 0.1–0.9)
MONOCYTES # BLD: 1.2 K/UL (ref 0.05–1.2)
MONOCYTES NFR BLD AUTO: 14 %
MONOCYTES NFR BLD: 22 % (ref 3–10)
NEUTROPHILS # BLD AUTO: 5.3 X10E3/UL (ref 1.4–7)
NEUTROPHILS NFR BLD AUTO: 71 %
NEUTS SEG # BLD: 3 K/UL (ref 1.8–8)
NEUTS SEG NFR BLD: 57 % (ref 40–73)
NRBC # BLD: 0 K/UL (ref 0–0.01)
NRBC BLD-RTO: 0 PER 100 WBC
PLATELET # BLD AUTO: 229 K/UL (ref 135–420)
PLATELET # BLD AUTO: 230 X10E3/UL (ref 150–450)
PMV BLD AUTO: 12.6 FL (ref 9.2–11.8)
POTASSIUM SERPL-SCNC: 3.6 MMOL/L (ref 3.5–5.5)
PROT SERPL-MCNC: 7.7 G/DL (ref 6.4–8.2)
RBC # BLD AUTO: 4.53 M/UL (ref 4.35–5.65)
RBC # BLD AUTO: 4.88 X10E6/UL (ref 4.14–5.8)
SODIUM SERPL-SCNC: 139 MMOL/L (ref 136–145)
TROPONIN-HIGH SENSITIVITY: 79 NG/L (ref 0–78)
TSH SERPL DL<=0.05 MIU/L-ACNC: 0.52 UIU/ML (ref 0.36–3.74)
WBC # BLD AUTO: 5.3 K/UL (ref 4.6–13.2)
WBC # BLD AUTO: 7.4 X10E3/UL (ref 3.4–10.8)

## 2021-12-30 PROCEDURE — 74011250636 HC RX REV CODE- 250/636: Performed by: STUDENT IN AN ORGANIZED HEALTH CARE EDUCATION/TRAINING PROGRAM

## 2021-12-30 PROCEDURE — 74011250637 HC RX REV CODE- 250/637: Performed by: INTERNAL MEDICINE

## 2021-12-30 PROCEDURE — 97530 THERAPEUTIC ACTIVITIES: CPT

## 2021-12-30 PROCEDURE — 97161 PT EVAL LOW COMPLEX 20 MIN: CPT

## 2021-12-30 PROCEDURE — 74011250637 HC RX REV CODE- 250/637: Performed by: STUDENT IN AN ORGANIZED HEALTH CARE EDUCATION/TRAINING PROGRAM

## 2021-12-30 PROCEDURE — 84484 ASSAY OF TROPONIN QUANT: CPT

## 2021-12-30 PROCEDURE — 97166 OT EVAL MOD COMPLEX 45 MIN: CPT

## 2021-12-30 PROCEDURE — 84443 ASSAY THYROID STIM HORMONE: CPT

## 2021-12-30 PROCEDURE — 85025 COMPLETE CBC W/AUTO DIFF WBC: CPT

## 2021-12-30 PROCEDURE — 80053 COMPREHEN METABOLIC PANEL: CPT

## 2021-12-30 PROCEDURE — 99239 HOSP IP/OBS DSCHRG MGMT >30: CPT | Performed by: INTERNAL MEDICINE

## 2021-12-30 PROCEDURE — 82330 ASSAY OF CALCIUM: CPT

## 2021-12-30 PROCEDURE — 97535 SELF CARE MNGMENT TRAINING: CPT

## 2021-12-30 PROCEDURE — 36415 COLL VENOUS BLD VENIPUNCTURE: CPT

## 2021-12-30 PROCEDURE — 86140 C-REACTIVE PROTEIN: CPT

## 2021-12-30 PROCEDURE — 2709999900 HC NON-CHARGEABLE SUPPLY

## 2021-12-30 RX ORDER — CHOLECALCIFEROL (VITAMIN D3) 125 MCG
5000 CAPSULE ORAL DAILY
Qty: 30 CAPSULE | Refills: 0 | Status: SHIPPED | OUTPATIENT
Start: 2021-12-30

## 2021-12-30 RX ORDER — LISINOPRIL 5 MG/1
5 TABLET ORAL DAILY
Qty: 30 TABLET | Refills: 2 | Status: SHIPPED | OUTPATIENT
Start: 2021-12-30 | End: 2022-07-07

## 2021-12-30 RX ADMIN — DOLUTEGRAVIR SODIUM 50 MG: 50 TABLET, FILM COATED ORAL at 11:32

## 2021-12-30 RX ADMIN — APIXABAN 5 MG: 5 TABLET, FILM COATED ORAL at 11:27

## 2021-12-30 RX ADMIN — LISINOPRIL 5 MG: 5 TABLET ORAL at 11:27

## 2021-12-30 RX ADMIN — Medication 5000 UNITS: at 11:27

## 2021-12-30 RX ADMIN — FUROSEMIDE 20 MG: 10 INJECTION, SOLUTION INTRAMUSCULAR; INTRAVENOUS at 09:00

## 2021-12-30 RX ADMIN — EMTRICITABINE AND TENOFOVIR ALAFENAMIDE 1 TABLET: 200; 25 TABLET ORAL at 11:32

## 2021-12-30 RX ADMIN — CARVEDILOL 12.5 MG: 12.5 TABLET, FILM COATED ORAL at 11:27

## 2021-12-30 NOTE — DISCHARGE SUMMARY
Discharge Summary    Patient: Sarina Baumgarten               Sex: male          DOA: 12/28/2021         YOB: 1950      Age:  73 y.o.        LOS:  LOS: 2 days                Admit Date: 12/28/2021    Discharge Date: 12/30/2021    Primary care physician: Rissa, MD Deisy    Discharge Diagnoses:    1. Atypical chest pain on presentation, could be pleuritic from cough and covid-19 infection   2. COVID-19 infection, no evidence of pneumonia on CXR (vaccinated)  3. Indeterminate troponin elevation, not acute coronary syndrome   4. HTN urgency, resolved. Lisinopril has been added  5. Non ischemic cardiomyopathy with acute systolic CHF, proBNP elevated  6. Persistent AFIB, on DOAC for elevated thromboembolic event risk   7.  asymptotic HIV, continue on ART  8. Tobacco smoking dependence    Discharge Condition: Good  Disposition: home   Code Status: full code     Follow up for Primary Care Physician:  1. He needs to follow-up with his HIV clinic for further care  2. He can remain self quarantine at home for another 7 days he has been advised that if he has worsening symptom then he can be present back to the ER for further care  3. Please note lisinopril has been added, monitor and adjust medication for systolic BP goal <447, consider adding diuretic as needed    Hospital Course:   43-year-old male with hypertension, persistent atrial fibrillation on anticoagulation for elevated thrombotic embolic event risks, nonischemic cardiomyopathy, AICD in place, asymptomatic HIV, presented from home with worsening shortness of breath, cough, chest pain with cough that has been worsening over the last days. Apparently was found to have Covid positive. He has been fully vaccinated. He does not need any oxygen supplement however he felt overall malaise, myalgia. No fever. In the ER he was found to have a slight elevation of his troponin. Chest x-ray did not show any evidence of infiltrates.   His blood pressure was elevated otherwise he was restarted on Coreg and Lasix twice daily. He was seen by cardiology which deemed that his chest pain was atypical and associated with Covid infection. No further work-up indicated at this time. proBNP slightly elevated but he tolerated Lasix. Was resumed on his Eliquis. ID was consulted as well. He has no indication for IV steroid since he was not hypoxic. He was resumed on his ART regimen. PT and OT evaluated him without any functional deficit. He was recommended for home health and outpatient care  Blood pressure has been stabilized. Last 24 Hours: He shortness of breath has improved, he still has cough, no fever, no need for oxygen supplement. He has participate with PT OT and ambulated without needing oxygen supplementation. Laboratory work-up has been benign. Cardiology has no further recommendation besides restarting his home medication for his chronic comorbidities.     Tobacco smoking cessation advised    ROS[de-identified]  No current fever/chills, no headache, no dizziness, no facial pain, no sinus congestion,   No swallowing pain, No chest pain, no palpitation, no shortness of breath, no abd pain,  No diarrhea, no urinary complaint, no leg pain or swelling    VS:   Visit Vitals  BP (!) 152/83 (BP 1 Location: Left upper arm, BP Patient Position: Sitting)   Pulse 60   Temp 97.2 °F (36.2 °C)   Resp 20   Ht 6' 4\" (1.93 m)   Wt 86.2 kg (190 lb)   SpO2 100%   BMI 23.13 kg/m²      Tmax/24hrs: Temp (24hrs), Av.3 °F (36.8 °C), Min:97.2 °F (36.2 °C), Max:99.3 °F (37.4 °C)      Intake/Output Summary (Last 24 hours) at 2021 1026  Last data filed at 2021 5357  Gross per 24 hour   Intake 240 ml   Output 102 ml   Net 138 ml       Tele:   General:  Cooperative, Not in acute distress, speaks in full sentence while in bed  HEENT: PERRL, EOMI, supple neck, no JVD, dry oral mucosa  Cardiovascular: S1S2 irregular, no rub/gallop   Pulmonary: air entry bilaterally, no wheezing, + crackle  GI:  Soft, non tender, non distended, +bs, no guarding   Extremities:  No pedal edema, +distal pulses appreciated   Neuro: AOx3, moving all extremities, no gross deficit. Consults:   Cardiology: Corby Maya  ID: Alicia Alcantara     Significant Diagnostic Studies:  XR Results (most recent):  Results from Hospital Encounter encounter on 12/28/21    XR CHEST PORT    Narrative  EXAM: Chest Radiograph    INDICATION:  chest pain shortness of breath    TECHNIQUE: AP view of the chest    COMPARISON: 7/6/2021, 7/29/2020, 12/8/2019    FINDINGS: No pneumothorax identified. The lungs are clear. No infiltrates  appreciated. No effusions identified. The cardiac silhouette is enlarged. Pacemaker is present and unchanged. The pulmonary vasculature is unremarkable. The osseous structures are unremarkable. Impression  1. No acute infiltrate or effusion. 2.  Unchanged cardiac enlargement. Lab/Data Review:  Labs: Results:       Chemistry Recent Labs     12/30/21  0124 12/28/21  1620   GLU 98 94    141   K 3.6 4.0    107   CO2 25 29   BUN 19* 13   CREA 1.09 0.98   CA 9.9 10.4*   AGAP 8 5   BUCR 17 13     --    TP 7.7  --    ALB 3.3*  --    GLOB 4.4*  --    AGRAT 0.8  --       CBC w/Diff Recent Labs     12/30/21  0124 12/29/21  1819 12/28/21  1620   WBC 5.3 7.4 8.4   RBC 4.53  --  4.64   HGB 13.0 14.4 13.3   HCT 41.5 43.3 42.6    230 223   GRANS 57 71 73   LYMPH 20*  --  10*   EOS 0 0 0      Coagulation No results for input(s): PTP, INR, APTT, INREXT in the last 72 hours. Iron/Ferritin No results for input(s): IRON in the last 72 hours. No lab exists for component: TIBCCALC   BNP No results for input(s): BNPP in the last 72 hours. Cardiac Enzymes No results for input(s): CPK, CKND1, SHIV in the last 72 hours.     No lab exists for component: CKRMB, TROIP   Liver Enzymes Recent Labs     12/30/21  0124   TP 7.7   ALB 3.3*         Thyroid Studies Recent Labs 12/30/21  0124   TSH 0.52          All Micro Results     Procedure Component Value Units Date/Time    COVID-19 RAPID TEST [[id number]]  (Abnormal) Collected: 12/28/21 1610    Order Status: Completed Specimen: Nasopharyngeal Updated: 12/28/21 1653     Specimen source       Please find results under separate order           COVID-19 rapid test Detected        Comment: CALLED TO AND CORRECTLY REPEATED BY:  [facility] AT 1650 BY KDA 12/28/21       The specimen is POSITIVE for SARS-CoV-2, the novel coronavirus associated with COVID-19. This test has been authorized by the FDA under an Emergency Use Authorization (EUA) for use by authorized laboratories. Fact sheet for Healthcare Providers: ConventionUpdate.co.nz  Fact sheet for Patients: ConventionUpdate.co.nz       Methodology: Isothermal Nucleic Acid Amplification                     Medications at discharge  including reasons for change and indications for new ones:   Current Discharge Medication List      START taking these medications    Details   lisinopriL (PRINIVIL, ZESTRIL) 5 mg tablet Take 1 Tablet by mouth daily. Indications: chronic heart failure, high blood pressure  Qty: 30 Tablet, Refills: 2  Start date: 12/30/2021      cholecalciferol (VITAMIN D3) (5000 Units /125 mcg) capsule Take 1 Capsule by mouth daily. Indications: prevention of vitamin D deficiency  Qty: 30 Capsule, Refills: 0  Start date: 12/30/2021         CONTINUE these medications which have NOT CHANGED    Details   bisacodyL (DULCOLAX, BISACODYL,) 5 mg EC tablet Take 1 tablet by mouth twice a day as needed for constipation. Qty: 20 Tab, Refills: 0      albuterol (PROVENTIL HFA) 90 mcg/actuation inhaler Take 2 Puffs by inhalation every four (4) hours as needed for Wheezing or Shortness of Breath.  Indications: bronchospasm prevention  Qty: 1 Inhaler, Refills: 0      apixaban (ELIQUIS) 5 mg tablet Take 1 Tab by mouth every twelve (12) hours.  Qty: 60 Tab, Refills: 0      carvedilol (COREG) 25 mg tablet Take 0.5 Tabs by mouth two (2) times daily (with meals). Qty: 30 Tab, Refills: 0      dolutegravir (TIVICAY) 50 mg tab tablet Take 1 Tab by mouth daily. Qty: 30 Tab, Refills: 0      emtricitabine-tenofovir alafen (DESCOVY) tablet Take 1 Tab by mouth daily. Qty: 30 Tab, Refills: 0      pravastatin (PRAVACHOL) 40 mg tablet Take 1 Tab by mouth nightly. Qty: 30 Tab, Refills: 0      pyridoxine, vitamin B6, (VITAMIN B-6) 50 mg tablet Take 1 Tab by mouth daily. Qty: 30 Tab, Refills: 0      acetaminophen (TYLENOL) 500 mg tablet Take 1 Tab by mouth every six (6) hours as needed.  Indications: Pain  Qty: 20 Tab, Refills: 0                     Pending laboratory work and tests: CD4 count    Activity: Activity as tolerated    Diet: Cardiac Diet and Low fat, Low cholesterol    Wound Care: None needed      Time spent >30 minutes  Jesus Rouse MD  12/30/2021  10:26 AM

## 2021-12-30 NOTE — PROGRESS NOTES
Infectious Disease progress Note        Reason: COVID-19 infection, HIV    Current abx Prior abx         Lines:       Assessment :    74 y.o.  male who has PMH of HIV/AIDS (on emtricitabine/tenofovir/dolutegravir), HTN, a-fib/flutter on Eliquis, COPD, nonischemic cardiomyopathy with AICD in place,  hypertension presented to [facility] on 12/28/2021 secondary to chest pain and shortness of breath. Clinical presentation consistent with acute COVID-19 infection  Currently no evidence of hypoxia/infiltrates on chest x-ray    Elevated troponin-concern for acute coronary syndrome. Cardiology consult appreciated    HIV/AIDS-follows up with [facility]. Compliant with emtricitabine/tenofovir/tolerated reviewed. Last CD4 count not known    Recommendations:    1. Recommend emtricitabine/tenofovir/dolutegravir  2. F/u  absolute CD4 count  3. Follow-up cardiology recommendations  4. No indication for steroid or COVID-19 treatment at this time  5. Anticoagulation per primary team  6. Discharge planning per primary team    Above plan was discussed in details with patient, dr. Delon Swenson. Please call me if any further questions or concerns. Will continue to participate in the care of this patient. HPI:    Feels better no new complaints           Current Discharge Medication List        CONTINUE these medications which have NOT CHANGED    Details   bisacodyL (DULCOLAX, BISACODYL,) 5 mg EC tablet Take 1 tablet by mouth twice a day as needed for constipation. Qty: 20 Tab, Refills: 0      albuterol (PROVENTIL HFA) 90 mcg/actuation inhaler Take 2 Puffs by inhalation every four (4) hours as needed for Wheezing or Shortness of Breath. Indications: bronchospasm prevention  Qty: 1 Inhaler, Refills: 0      apixaban (ELIQUIS) 5 mg tablet Take 1 Tab by mouth every twelve (12) hours. Qty: 60 Tab, Refills: 0      carvedilol (COREG) 25 mg tablet Take 0.5 Tabs by mouth two (2) times daily (with meals).   Qty: 30 Tab, Refills: 0      dolutegravir (TIVICAY) 50 mg tab tablet Take 1 Tab by mouth daily. Qty: 30 Tab, Refills: 0      emtricitabine-tenofovir alafen (DESCOVY) tablet Take 1 Tab by mouth daily. Qty: 30 Tab, Refills: 0      pravastatin (PRAVACHOL) 40 mg tablet Take 1 Tab by mouth nightly. Qty: 30 Tab, Refills: 0      pyridoxine, vitamin B6, (VITAMIN B-6) 50 mg tablet Take 1 Tab by mouth daily. Qty: 30 Tab, Refills: 0      acetaminophen (TYLENOL) 500 mg tablet Take 1 Tab by mouth every six (6) hours as needed. Indications: Pain  Qty: 20 Tab, Refills: 0             Current Facility-Administered Medications   Medication Dose Route Frequency    apixaban (ELIQUIS) tablet 5 mg  5 mg Oral Q12H    dolutegravir (TIVICAY) tablet 50 mg  50 mg Oral DAILY    emtricitabine-tenofovir alafen (DESCOVY) 200-25 mg tablet 1 Tablet  1 Tablet Oral DAILY    carvediloL (COREG) tablet 12.5 mg  12.5 mg Oral BID WITH MEALS    furosemide (LASIX) injection 20 mg  20 mg IntraVENous BID    hydrALAZINE (APRESOLINE) 20 mg/mL injection 10 mg  10 mg IntraVENous Q6H PRN    famotidine (PEPCID) tablet 20 mg  20 mg Oral QPM    cholecalciferol (VITAMIN D3) capsule 5,000 Units  5,000 Units Oral DAILY    ipratropium-albuterol (COMBIVENT RESPIMAT) 20 mcg-100 mcg inhalation spray  1 Puff Inhalation Q6H RT    lisinopriL (PRINIVIL, ZESTRIL) tablet 5 mg  5 mg Oral DAILY    acetaminophen (TYLENOL) tablet 650 mg  650 mg Oral Q6H PRN    Or    acetaminophen (TYLENOL) suppository 650 mg  650 mg Rectal Q6H PRN       Allergies: Patient has no known allergies. History reviewed. No pertinent family history.   Social History     Socioeconomic History    Marital status:      Spouse name: Not on file    Number of children: Not on file    Years of education: Not on file    Highest education level: Not on file   Occupational History    Not on file   Tobacco Use    Smoking status: Current Every Day Smoker     Packs/day: 1.00     Types: Cigarettes    Smokeless tobacco: Never Used   Substance and Sexual Activity    Alcohol use: Yes     Alcohol/week: 5.8 standard drinks     Types: 7 Standard drinks or equivalent per week     Comment: \"drink everyday but never alone\"     Drug use: Yes     Types: Marijuana    Sexual activity: Not Currently   Other Topics Concern    Not on file   Social History Narrative    Not on file     Social Determinants of Health     Financial Resource Strain:     Difficulty of Paying Living Expenses: Not on file   Food Insecurity:     Worried About [employer] [employer] in the Last Year: Not on file    Miki of Food in the Last Year: Not on file   Transportation Needs:     Lack of Transportation (Medical): Not on file    Lack of Transportation (Non-Medical):  Not on file   Physical Activity:     Days of Exercise per Week: Not on file    Minutes of Exercise per Session: Not on file   Stress:     Feeling of Stress : Not on file   Social Connections:     Frequency of Communication with Friends and Family: Not on file    Frequency of Social Gatherings with Friends and Family: Not on file    Attends Tenriism Services: Not on file    Active Member of Clubs or Organizations: Not on file    Attends Club or Organization Meetings: Not on file    Marital Status: Not on file   Intimate Partner Violence:     Fear of Current or Ex-Partner: Not on file    Emotionally Abused: Not on file    Physically Abused: Not on file    Sexually Abused: Not on file   Housing Stability:     Unable to Pay for Housing in the Last Year: Not on file    Number of [place] in the Last Year: Not on file    Unstable Housing in the Last Year: Not on file     Social History     Tobacco Use   Smoking Status Current Every Day Smoker    Packs/day: 1.00    Types: Cigarettes   Smokeless Tobacco Never Used        Temp (24hrs), Av.6 °F (37 °C), Min:97.2 °F (36.2 °C), Max:100.3 °F (37.9 °C)    Visit Vitals  BP (!) 152/83 (BP 1 Location: Left upper arm, BP Patient Position: Sitting)   Pulse 60   Temp 97.2 °F (36.2 °C)   Resp 20   Ht 6' 4\" (1.93 m)   Wt 86.2 kg (190 lb)   SpO2 100%   BMI 23.13 kg/m²       ROS: 12 point ROS obtained in details. Pertinent positives as mentioned in HPI,   otherwise negative    Physical Exam:    Vitals signs and nursing note reviewed. Constitutional:       General: He is not in acute distress. Appearance: He is well-developed. HENT:      Head: Normocephalic. Eyes:      Conjunctiva/sclera: Conjunctivae normal.      Neck:      Musculoskeletal: Normal range of motion and neck supple. Cardiovascular:      Rate and Rhythm: Normal rate and regular rhythm on monitor  Chest:      Bilateral chest movements equal.  Auscultation deferred due to Covid positive  Abdominal:      General: There is no distension. Palpations: Abdomen is soft. Tenderness: There is no abdominal tenderness. There is no rebound. Musculoskeletal: Normal range of motion. General: No tenderness. Skin:     General: Skin is warm and dry. Findings: No rash. Neurological:      Mental Status: He is alert and oriented to person, place, and time. Cranial Nerves: No cranial nerve deficit. Motor: No abnormal muscle tone. Coordination: Coordination normal.   Psychiatric:         Behavior: Behavior normal.         Thought Content: Thought content normal.         Judgment: Judgment normal.       Labs: Results:   Chemistry Recent Labs     12/30/21  0124 12/28/21  1620   GLU 98 94    141   K 3.6 4.0    107   CO2 25 29   BUN 19* 13   CREA 1.09 0.98   CA 9.9 10.4*   AGAP 8 5   BUCR 17 13     --    TP 7.7  --    ALB 3.3*  --    GLOB 4.4*  --    AGRAT 0.8  --       CBC w/Diff Recent Labs     12/30/21  0124 12/29/21  1819 12/28/21  1620   WBC 5.3 7.4 8.4   RBC 4.53  --  4.64   HGB 13.0 14.4 13.3   HCT 41.5 43.3 42.6    230 223   GRANS 57 71 73   LYMPH 20*  --  10*   EOS 0 0 0      Microbiology No results for input(s): CULT in the last 72 hours. RADIOLOGY:    All available imaging studies/reports in [facility] for this admission were reviewed      Disclaimer: Sections of this note are dictated utilizing voice recognition software, which may have resulted in some phonetic based errors in grammar and contents. Even though attempts were made to correct all the mistakes, some may have been missed, and remained in the body of the document. If questions arise, please contact our department.     Dr. Reed Padgett, Infectious Disease Specialist  [phone]  December 30, 2021  11:04 AM

## 2021-12-30 NOTE — PROGRESS NOTES
Problem: Mobility Impaired (Adult and Pediatric)  Goal: *Acute Goals and Plan of Care (Insert Text)  Description: Physical Therapy Goals  Initiated 12/30/2021 and to be accomplished within 7 day(s)  1. Patient will move from supine to sit and sit to supine , scoot up and down, and roll side to side in bed with modified independence. 2.  Patient will transfer from bed to chair and chair to bed with modified independence using the least restrictive device. 3.  Patient will perform sit to stand with modified independence. 4.  Patient will ambulate with modified independence for 100 feet with the least restrictive device. 5.  Patient will participate in LE exercise to tolerance      PLOF: Pt reporting he lives in 1 story house with wheelchair lift, uses RW inside home and wheelchair in community. Lives with wife. Outcome: Progressing Towards Goal   PHYSICAL THERAPY EVALUATION    Patient: Nan Emmanuel (52 y.o. male)  Date: 12/30/2021  Primary Diagnosis: Elevated troponin [R77.8]        Precautions: Other (comment) (droplet plus )    ASSESSMENT :  Pt cleared to participate in PT session, pt received semi-reclined in bed and agreeable to therapy session. Based on the objective data described below, the patient presents with decreased endurance, decreased strength, decreased balance reactions, gait deviations, and decreased independence in functional mobility. Pt completing supine to sit with supervision. Pt standing with supervision and increased time to stand to RW. Pt ambulating x7 feet to toilet, standing to urinate with SBA. Pt performing hand hygiene in standing, returning to bedside recliner. Pt positioned for comfort and educated to call for assist before getting up, pt verbalized understanding. Pt left with all needs met and call bell in reach. RN notified of position and participation. Patient will benefit from skilled intervention to address the above impairments.   Patient's rehabilitation potential is considered to be Fair  Factors which may influence rehabilitation potential include:   [x]         None noted  []         Mental ability/status  []         Medical condition  []         Home/family situation and support systems  []         Safety awareness  []         Pain tolerance/management  []         Other:      PLAN :  Recommendations and Planned Interventions:   [x]           Bed Mobility Training             []    Neuromuscular Re-Education  [x]           Transfer Training                   []    Orthotic/Prosthetic Training  [x]           Gait Training                          []    Modalities  [x]           Therapeutic Exercises           []    Edema Management/Control  [x]           Therapeutic Activities            [x]    Family Training/Education  [x]           Patient Education  []           Other (comment):    Frequency/Duration: Patient will be followed by physical therapy 1x/week. Discharge Recommendations: Home Health with family support   Further Equipment Recommendations for Discharge: rolling walker     SUBJECTIVE:   Patient stated I can sit up.     OBJECTIVE DATA SUMMARY:     Past Medical History:   Diagnosis Date    Autoimmune disease ([facility] [address]     AIDS? HIV    Hypertension     Rectal bleeding     Stroke [facility])      Past Surgical History:   Procedure Laterality Date    HX ORTHOPAEDIC      back     Barriers to Learning/Limitations: None  Compensate with: N/A  Home Situation:  Home Situation  Home Environment: Private residence  # Steps to Enter:  (w/c lift )  One/Two Story Residence: One story  Living Alone: No  Support Systems: Spouse/Significant Other  Patient Expects to be Discharged to[de-identified] House  Current DME Used/Available at Home: [place] Behavior:  Neurologic State: Alert  Orientation Level: Oriented X4  Cognition: Appropriate decision making  Safety/Judgement: Awareness of environment; Fall prevention  Psychosocial  Patient Behaviors: Calm; Cooperative    Strength: Strength: Within functional limits    Tone & Sensation:   Tone: Normal    Sensation: Intact     Range Of Motion:  AROM: Within functional limits  Posture:  Posture (WDL): Exceptions to WDL  Posture Assessment: Forward head;Trunk flexion  Functional Mobility:  Bed Mobility:     Supine to Sit: Supervision  Sit to Supine: Supervision  Scooting: Supervision  Transfers:  Sit to Stand: Supervision  Stand to Sit: Supervision    Balance:   Sitting: Intact  Standing: Impaired; With support  Standing - Static: Good  Standing - Dynamic : Fair (+)    Ambulation/Gait Training:  Distance (ft): 7 Feet (ft) (x2)  Assistive Device: Walker, rolling  Ambulation - Level of Assistance: Supervision     Gait Description (WDL): Exceptions to WDL  Gait Abnormalities: Decreased step clearance    Base of Support: Narrowed; Center of gravity altered     Speed/Mallory: Slow;Shuffled  Step Length: Right shortened;Left shortened    Pain:  Pain level pre-treatment: 0/10   Pain level post-treatment: 0/10     Activity Tolerance:   Fair activity tolerance     Please refer to the flowsheet for vital signs taken during this treatment. After treatment:   [x]         Patient left in no apparent distress sitting up in chair  []         Patient left in no apparent distress in bed  [x]         Call bell left within reach  [x]         Nursing notified  []         Caregiver present  []         Bed alarm activated  []         SCDs applied    COMMUNICATION/EDUCATION:   [x]         Role of Physical Therapy in the acute care setting. [x]         Fall prevention education was provided and the patient/caregiver indicated understanding. [x]         Patient/family have participated as able in goal setting and plan of care. [x]         Patient/family agree to work toward stated goals and plan of care. []         Patient understands intent and goals of therapy, but is neutral about his/her participation.   []         Patient is unable to participate in goal setting/plan of care: ongoing with therapy staff.  []         Other:     Thank you for this referral.  Martha Jones, PT   Time Calculation: 31 mins      Eval Complexity: History: MEDIUM  Complexity : 1-2 comorbidities / personal factors will impact the outcome/ POC Exam:LOW Complexity : 1-2 Standardized tests and measures addressing body structure, function, activity limitation and / or participation in recreation  Presentation: LOW Complexity : Stable, uncomplicated  Clinical Decision Making:Low Complexity low  Overall Complexity:LOW

## 2021-12-30 NOTE — PROGRESS NOTES
Problem: Self Care Deficits Care Plan (Adult)  Goal: *Acute Goals and Plan of Care (Insert Text)  Outcome: Resolved/Met   OCCUPATIONAL THERAPY EVALUATION/DISCHARGE    Patient: Colonel Saucedo (39 y.o. male)  Date: 12/30/2021  Primary Diagnosis: Elevated troponin [R77.8]        Precautions:   Contact,Fall,Other (comment) (droplet+)  PLOF: Pt reports he performed ADLs with spouse present to assist as needed, Mod I using RW and electric scooter for functional mobility    ASSESSMENT AND RECOMMENDATIONS:  Nursing/RN cleared for pt to participate in OT evaluation and tx session. Patient presents sitting up in armchair, A & O x 4, no c/o pain. Bed mobility: Mod I supine <-> sit edge of bed. Toilet transfer: Mod I-Supv using RW with good balance. Pt reports completing ADL routine earlier today with nursing. Pt washed face and applied lotion to face seated on toilet. LB dress: doff and don slipper socks seated in arm chair. Nursing notified that pt demonstrated emotional lability re: wear of pull up adult briefs at [facility] and loss of IPhone charge blue cord with white plug). Nursing notified of pt's level of assist with toilet transfer using RW and grab bar with recommendation of placement of 3 in 1 commode over toilet to raise seat height to grade STS easier. Patient verbalized understanding to utilize call bell to request assist as needed. Patient presents at baseline as PLOF with ADLs and functional mobility. Patient verbalized understanding of skilled OT services not indicated at this time while in acute hospital.     Skilled occupational therapy is not indicated at this time. Discharge Recommendations: Home Health   Further Equipment Recommendations for Discharge: bedside commode      SUBJECTIVE:   Patient stated I use my electric scooter to go to the store.     OBJECTIVE DATA SUMMARY:     Past Medical History:   Diagnosis Date    Autoimmune disease ([facility] [address]     AIDS?  HIV    Hypertension     Rectal bleeding     Stroke [facility])      Past Surgical History:   Procedure Laterality Date    HX ORTHOPAEDIC      back     Barriers to Learning/Limitations: None  Compensate with: visual, verbal, tactile, kinesthetic cues/model    Home Situation:   Home Situation  Home Environment: Private residence  # Steps to Enter:  (has w/c lift )  One/Two Story Residence: One story  Living Alone: No  Support Systems: Spouse/Significant Other,Other Family Member(s)  Patient Expects to be Discharged to[de-identified] House  Current DME Used/Available at Home: Vidhi Bile, rolling,Shower chair,Grab bars,Raised toilet seat (motorized scooter)  Tub or Shower Type: Tub/Shower combination  [x]     Right hand dominant   []     Left hand dominant    Cognitive/Behavioral Status:  Neurologic State: Alert  Orientation Level: Oriented X4  Cognition: Follows commands  Safety/Judgement: Fall prevention    Skin: appears intact  Edema: none noted    Vision/Perceptual:  appears intact, able to tell correct time on wall clock       Corrective Lenses: Reading glasses    Coordination: BUE  Coordination: Within functional limits  Fine Motor Skills-Upper: Left Intact; Right Intact    Gross Motor Skills-Upper: Left Intact; Right Intact    Balance:  Sitting: Intact  Standing: Intact; With support  Standing - Static: Good  Standing - Dynamic : Good    Strength: BUE  Strength:  Within functional limits     Tone & Sensation: BUE  Tone:  (RUE mild hypotonic, LUE WFL)  Sensation: Intact        Range of Motion: BUE  AROM: Within functional limits     Functional Mobility and Transfers for ADLs:  Bed Mobility:     Supine to Sit: Supervision  Sit to Supine: Supervision  Scooting: Supervision  Transfers:  Sit to Stand: Supervision;Modified independent  Stand to Sit: Supervision;Modified independent     Toilet Transfer : Supervision;Modified independent      Bathroom Mobility: Supervision/set up;Modified independent    ADL Assessment:  Feeding: Modified independent    Oral Facial Hygiene/Grooming: Supervision;Setup;Modified Independent    Bathing: Other (comment) (pt reports he completed with nsg, spouse assists as needed)    Upper Body Dressing: Other (comment) (pt reports he completed with nsg, spouse assists as needed)    Lower Body Dressing: Modified independent;Setup    Toileting: Supervision;Modified independent  ADL Intervention:  Lower Body Dressing Assistance  Socks: Modified independent  Leg Crossed Method Used: Yes  Position Performed: Seated in chair  Cognitive Retraining  Safety/Judgement: Fall prevention    Pain:  Pain level pre-treatment: 0/10   Pain level post-treatment: 0/10   Activity Tolerance:   fair  Please refer to the flowsheet for vital signs taken during this treatment. After treatment:   []  Patient left in no apparent distress sitting up in chair  [x]  Patient left in no apparent distress in bed  [x]  Call bell left within reach  [x]  Nursing notified  []  Caregiver present  []  Bed alarm activated    COMMUNICATION/EDUCATION:   [x]      Role of Occupational Therapy in the acute care setting  [x]      Home safety education was provided and the patient/caregiver indicated understanding. [x]      Patient/family have participated as able and agree with findings and recommendations. []      Patient is unable to participate in plan of care at this time. Thank you for this referral.  Alejandro Robles  Time Calculation: 38 mins      Eval Complexity: History: MEDIUM Complexity : Expanded review of history including physical, cognitive and psychosocial  history ; Examination: MEDIUM Complexity : 3-5 performance deficits relating to physical, cognitive , or psychosocial skils that result in activity limitations and / or participation restrictions; Decision Making:MEDIUM Complexity : Patient may present with comorbidities that affect occupational performnce.  Miniml to moderate modification of tasks or assistance (eg, physical or verbal ) with assesment(s) is necessary to enable patient to complete evaluation

## 2022-01-03 ENCOUNTER — PATIENT OUTREACH (OUTPATIENT)
Dept: CASE MANAGEMENT | Age: 72
End: 2022-01-03

## 2022-01-03 NOTE — PROGRESS NOTES
Date/Time:  1/3/2022 3:08 PM   Call within 2 business days of discharge: Yes   Attempted to reach patient by telephone. Left HIPPA compliant message requesting a return call. Will attempt to reach patient again.

## 2022-01-04 ENCOUNTER — PATIENT OUTREACH (OUTPATIENT)
Dept: CASE MANAGEMENT | Age: 72
End: 2022-01-04

## 2022-01-04 NOTE — PROGRESS NOTES
Patient contacted regarding [id number] diagnosis\". Discussed COVID-19 related testing which was available at this time. Test results were positive. Patient informed of results, if available? yes     Ambulatory Care Manager contacted the patient by telephone to perform post discharge assessment. Call within 2 business days of discharge: Yes Verified name and  with patient as identifiers. Provided introduction to self, and explanation of the CTN/ACM role, and reason for call due to risk factors for infection and/or exposure to COVID-19. Symptoms reviewed with patient who verbalized the following symptoms: no new symptoms      Due to no new or worsening symptoms encounter was not routed to provider for escalation. Discussed follow-up appointments. If no appointment was previously scheduled, appointment scheduling offered:  yes   [facility] follow up appointment(s): No future appointments. Advance Care Planning:   Does patient have an Advance Directive:  not on file. Patient has following risk factors of: no known risk factors. ACM reviewed discharge instructions, medical action plan and red flags such as increased shortness of breath, increasing fever and signs of decompensation with patient who verbalized understanding. Discussed exposure protocols and quarantine with CDC Guidelines What to do if you are sick with coronavirus disease 2019.  Patient was given an opportunity for questions and concerns. The patient agrees to contact the [facility] exposure line [phone], [facility]  [phone] and PCP office for questions related to their healthcare. ACM provided contact information for future needs. Reviewed and educated patient on any new and changed medications related to discharge diagnosis     Was patient discharged with a pulse oximeter?  no Discussed and confirmed pulse oximeter discharge instructions and when to notify provider or seek emergency care.      Plan for follow-up call in 5-7 days based on severity of symptoms and risk factors.

## 2022-01-10 ENCOUNTER — PATIENT OUTREACH (OUTPATIENT)
Dept: CASE MANAGEMENT | Age: 72
End: 2022-01-10

## 2022-01-10 NOTE — PROGRESS NOTES
Date/Time:  1/10/2022 1:07 PM   Call within 2 business days of discharge: Yes   Attempted to reach patient by telephone. Left HIPPA compliant message requesting a return call. Will attempt to reach patient again.

## 2022-01-12 ENCOUNTER — PATIENT OUTREACH (OUTPATIENT)
Dept: CASE MANAGEMENT | Age: 72
End: 2022-01-12

## 2022-01-12 NOTE — PROGRESS NOTES
Date/Time:  1/12/2022 3:17 PM   Call within 2 business days of discharge: Yes   Attempted to reach patient by telephone. Left HIPPA compliant message requesting a return call. Date/Time:  1/12/2022 3:17 PM   Call within 2 business days of discharge: Yes   Attempted to reach patient by telephone. Left HIPPA compliant message     Patient resolved from Transition of Care episode on  1/12/2022  Patient/family has been provided the following resources and education related to COVID-19:                         Signs, symptoms and red flags related to COVID-19            CDC exposure and quarantine guidelines            [facility] exposure contact - [phone]            Contact for their local Department of Health                 No further outreach scheduled with this CTN/ACM. Episode of Care resolved. Patient has this CTN/ACM contact information if future needs arise.

## 2022-03-18 PROBLEM — R79.89 ELEVATED TROPONIN: Status: ACTIVE | Noted: 2018-06-06

## 2022-03-19 PROBLEM — L03.113 RIGHT FOREARM CELLULITIS: Status: ACTIVE | Noted: 2018-06-06

## 2022-03-19 PROBLEM — Z79.01 CHRONIC ANTICOAGULATION: Status: ACTIVE | Noted: 2018-06-06

## 2022-03-19 PROBLEM — M25.50 JOINT PAIN: Status: ACTIVE | Noted: 2018-06-06

## 2022-04-22 NOTE — PROGRESS NOTES
SUBJECTIVE:    BACH present. No chest or abdominal pain. Right wrist pain present. No nausea or vomiting. No cough. States he used to have a walker but was trying a cane for a while using right hand. Has run out all his medications and was off of them. He gets his [facility] from [facility].      OBJECTIVE:    /77 (BP 1 Location: Left arm, BP Patient Position: At rest)  Pulse 99  Temp (!) 101.9 °F (38.8 °C)  Resp 20  Ht 6' 3\" (1.905 m)  Wt 72 kg (158 lb 11.2 oz)  SpO2 96%  BMI 19.84 kg/m2    HEENT:  No pallor. No icterus  Neck: no JVD  CVS: RRR, AICD +  RS: Diminished in bases, no wheezes  GI: NT, BS +  Extremities: no pedal edema  Musculoskeletal: right wrist tenderness present but no erythema or increased warmth  CNS: moves both LEs well while in bed  General: NAD, Follows commands    ASSESSMENT:    1. SIRS versus Sepsis sec to # 2.  2. Possible inflammatory versus septic right wrist arthritis  3. Chronic systolic heart failure with EF of around 15%. S/p AICD. 4. Hypokalemia. 5. BACH could be from CHD and/or presumed COPD  6. Human immunodeficiency virus. 7. Paroxysmal Atrial fibrillation on Eliquis. 8. Hypertension. 9. Noncompliance with the medications and diet compliance. 10. History of cerebrovascular accident in the past.  11. History of nonsustained ventricular tachycardia in the past.  12. History of tuberculosis, status post treatment. 13. Dyslipidemia. 14. History of cocaine abuse. 15. History of marijuana abuse. 16. Indeterminate elevation of troponin due to demand ischemia sec to sepsis   17. Underweight    PLAN:    Patient is clinically stable at this point.  Cont antibiotic and follow c/s  Going to IR for US guided joint aspiration, add Voltaren   Check UDS  Cardiology consulted for optimization of his cardiac medications   ID and ortho to follow this patient   Monitor him off of oxygen     CMP:   Lab Results   Component Value Date/Time     06/05/2018 08:34 PM    CODY 3.2 (L) 06/05/2018 08:34 PM     06/05/2018 08:34 PM    CO2 34 (H) 06/05/2018 08:34 PM    AGAP 2 (L) 06/05/2018 08:34 PM     (H) 06/05/2018 08:34 PM    BUN 10 06/05/2018 08:34 PM    CREA 0.96 06/05/2018 08:34 PM    GFRAA >60 06/05/2018 08:34 PM    GFRNA >60 06/05/2018 08:34 PM    CA 10.2 (H) 06/05/2018 08:34 PM    MG 1.9 06/05/2018 08:34 PM    ALB 3.4 06/05/2018 08:34 PM    TP 10.0 (H) 06/05/2018 08:34 PM    GLOB 6.6 (H) 06/05/2018 08:34 PM    AGRAT 0.5 (L) 06/05/2018 08:34 PM    SGOT 15 06/05/2018 08:34 PM    ALT 16 06/05/2018 08:34 PM     CBC:   Lab Results   Component Value Date/Time    WBC 5.4 06/05/2018 08:34 PM    HGB 13.0 06/05/2018 08:34 PM    HCT 39.0 06/05/2018 08:34 PM     06/05/2018 08:34 PM fall

## 2022-07-05 ENCOUNTER — APPOINTMENT (OUTPATIENT)
Dept: GENERAL RADIOLOGY | Age: 72
DRG: 246 | End: 2022-07-05
Attending: EMERGENCY MEDICINE
Payer: MEDICARE

## 2022-07-05 ENCOUNTER — APPOINTMENT (OUTPATIENT)
Dept: CT IMAGING | Age: 72
DRG: 246 | End: 2022-07-05
Attending: EMERGENCY MEDICINE
Payer: MEDICARE

## 2022-07-05 ENCOUNTER — HOSPITAL ENCOUNTER (INPATIENT)
Age: 72
LOS: 2 days | Discharge: HOME | DRG: 246 | End: 2022-07-07
Attending: EMERGENCY MEDICINE | Admitting: STUDENT IN AN ORGANIZED HEALTH CARE EDUCATION/TRAINING PROGRAM
Payer: MEDICARE

## 2022-07-05 DIAGNOSIS — R53.83 MALAISE AND FATIGUE: ICD-10-CM

## 2022-07-05 DIAGNOSIS — I21.4 NSTEMI (NON-ST ELEVATED MYOCARDIAL INFARCTION) (HCC): ICD-10-CM

## 2022-07-05 DIAGNOSIS — R77.8 ELEVATED TROPONIN: ICD-10-CM

## 2022-07-05 DIAGNOSIS — R53.81 MALAISE AND FATIGUE: ICD-10-CM

## 2022-07-05 DIAGNOSIS — R06.00 DYSPNEA, UNSPECIFIED TYPE: Primary | ICD-10-CM

## 2022-07-05 LAB
ALBUMIN SERPL-MCNC: 3 G/DL (ref 3.4–5)
ALBUMIN/GLOB SERPL: 0.6 {RATIO} (ref 0.8–1.7)
ALP SERPL-CCNC: 99 U/L (ref 45–117)
ALT SERPL-CCNC: 22 U/L (ref 16–61)
ANION GAP SERPL CALC-SCNC: 7 MMOL/L (ref 3–18)
APTT PPP: 37.8 SEC (ref 23–36.4)
AST SERPL-CCNC: 44 U/L (ref 10–38)
BASOPHILS # BLD: 0 K/UL (ref 0–0.1)
BASOPHILS NFR BLD: 0 % (ref 0–2)
BILIRUB SERPL-MCNC: 1.3 MG/DL (ref 0.2–1)
BNP SERPL-MCNC: 2564 PG/ML (ref 0–900)
BUN SERPL-MCNC: 19 MG/DL (ref 7–18)
BUN/CREAT SERPL: 22 (ref 12–20)
CALCIUM SERPL-MCNC: 9.9 MG/DL (ref 8.5–10.1)
CHLORIDE SERPL-SCNC: 104 MMOL/L (ref 100–111)
CO2 SERPL-SCNC: 26 MMOL/L (ref 21–32)
CREAT SERPL-MCNC: 0.86 MG/DL (ref 0.6–1.3)
DIFFERENTIAL METHOD BLD: ABNORMAL
EOSINOPHIL # BLD: 0.1 K/UL (ref 0–0.4)
EOSINOPHIL NFR BLD: 1 % (ref 0–5)
ERYTHROCYTE [DISTWIDTH] IN BLOOD BY AUTOMATED COUNT: 12 % (ref 11.6–14.5)
FLUAV RNA SPEC QL NAA+PROBE: NOT DETECTED
FLUBV RNA SPEC QL NAA+PROBE: NOT DETECTED
GLOBULIN SER CALC-MCNC: 4.7 G/DL (ref 2–4)
GLUCOSE SERPL-MCNC: 106 MG/DL (ref 74–99)
HCT VFR BLD AUTO: 40.6 % (ref 36–48)
HGB BLD-MCNC: 13.2 G/DL (ref 13–16)
IMM GRANULOCYTES # BLD AUTO: 0 K/UL (ref 0–0.04)
IMM GRANULOCYTES NFR BLD AUTO: 0 % (ref 0–0.5)
INR PPP: 1.4 (ref 0.8–1.2)
LYMPHOCYTES # BLD: 0.9 K/UL (ref 0.9–3.6)
LYMPHOCYTES NFR BLD: 10 % (ref 21–52)
MAGNESIUM SERPL-MCNC: 1.8 MG/DL (ref 1.6–2.6)
MCH RBC QN AUTO: 28.6 PG (ref 24–34)
MCHC RBC AUTO-ENTMCNC: 32.5 G/DL (ref 31–37)
MCV RBC AUTO: 88.1 FL (ref 78–100)
MONOCYTES # BLD: 1.4 K/UL (ref 0.05–1.2)
MONOCYTES NFR BLD: 17 % (ref 3–10)
NEUTS SEG # BLD: 6.2 K/UL (ref 1.8–8)
NEUTS SEG NFR BLD: 72 % (ref 40–73)
NRBC # BLD: 0 K/UL (ref 0–0.01)
NRBC BLD-RTO: 0 PER 100 WBC
PLATELET # BLD AUTO: 262 K/UL (ref 135–420)
PMV BLD AUTO: 12.6 FL (ref 9.2–11.8)
POTASSIUM SERPL-SCNC: 3.3 MMOL/L (ref 3.5–5.5)
PROT SERPL-MCNC: 7.7 G/DL (ref 6.4–8.2)
PROTHROMBIN TIME: 17.1 SEC (ref 11.5–15.2)
RBC # BLD AUTO: 4.61 M/UL (ref 4.35–5.65)
SARS-COV-2, COV2: NOT DETECTED
SODIUM SERPL-SCNC: 137 MMOL/L (ref 136–145)
TROPONIN-HIGH SENSITIVITY: 6346 NG/L (ref 0–78)
WBC # BLD AUTO: 8.6 K/UL (ref 4.6–13.2)

## 2022-07-05 PROCEDURE — 93005 ELECTROCARDIOGRAM TRACING: CPT

## 2022-07-05 PROCEDURE — 65270000046 HC RM TELEMETRY

## 2022-07-05 PROCEDURE — 85610 PROTHROMBIN TIME: CPT

## 2022-07-05 PROCEDURE — 84484 ASSAY OF TROPONIN QUANT: CPT

## 2022-07-05 PROCEDURE — 74011250636 HC RX REV CODE- 250/636: Performed by: EMERGENCY MEDICINE

## 2022-07-05 PROCEDURE — 80053 COMPREHEN METABOLIC PANEL: CPT

## 2022-07-05 PROCEDURE — 99222 1ST HOSP IP/OBS MODERATE 55: CPT | Performed by: STUDENT IN AN ORGANIZED HEALTH CARE EDUCATION/TRAINING PROGRAM

## 2022-07-05 PROCEDURE — 96374 THER/PROPH/DIAG INJ IV PUSH: CPT

## 2022-07-05 PROCEDURE — 71275 CT ANGIOGRAPHY CHEST: CPT

## 2022-07-05 PROCEDURE — 99285 EMERGENCY DEPT VISIT HI MDM: CPT

## 2022-07-05 PROCEDURE — 74011000250 HC RX REV CODE- 250: Performed by: EMERGENCY MEDICINE

## 2022-07-05 PROCEDURE — 83735 ASSAY OF MAGNESIUM: CPT

## 2022-07-05 PROCEDURE — 85025 COMPLETE CBC W/AUTO DIFF WBC: CPT

## 2022-07-05 PROCEDURE — 85730 THROMBOPLASTIN TIME PARTIAL: CPT

## 2022-07-05 PROCEDURE — 87636 SARSCOV2 & INF A&B AMP PRB: CPT

## 2022-07-05 PROCEDURE — 83880 ASSAY OF NATRIURETIC PEPTIDE: CPT

## 2022-07-05 PROCEDURE — 94640 AIRWAY INHALATION TREATMENT: CPT

## 2022-07-05 PROCEDURE — 71045 X-RAY EXAM CHEST 1 VIEW: CPT

## 2022-07-05 PROCEDURE — 74011000636 HC RX REV CODE- 636: Performed by: STUDENT IN AN ORGANIZED HEALTH CARE EDUCATION/TRAINING PROGRAM

## 2022-07-05 RX ORDER — IPRATROPIUM BROMIDE AND ALBUTEROL SULFATE 2.5; .5 MG/3ML; MG/3ML
3 SOLUTION RESPIRATORY (INHALATION)
Status: COMPLETED | OUTPATIENT
Start: 2022-07-05 | End: 2022-07-05

## 2022-07-05 RX ORDER — HEPARIN SODIUM 1000 [USP'U]/ML
4000 INJECTION, SOLUTION INTRAVENOUS; SUBCUTANEOUS ONCE
Status: COMPLETED | OUTPATIENT
Start: 2022-07-05 | End: 2022-07-05

## 2022-07-05 RX ADMIN — IPRATROPIUM BROMIDE AND ALBUTEROL SULFATE 3 ML: 2.5; .5 SOLUTION RESPIRATORY (INHALATION) at 20:28

## 2022-07-05 RX ADMIN — HEPARIN SODIUM 12 UNITS/KG/HR: 1000 INJECTION INTRAVENOUS; SUBCUTANEOUS at 22:35

## 2022-07-05 RX ADMIN — IPRATROPIUM BROMIDE AND ALBUTEROL SULFATE 3 ML: 2.5; .5 SOLUTION RESPIRATORY (INHALATION) at 20:24

## 2022-07-05 RX ADMIN — METHYLPREDNISOLONE SODIUM SUCCINATE 125 MG: 125 INJECTION, POWDER, FOR SOLUTION INTRAMUSCULAR; INTRAVENOUS at 20:28

## 2022-07-05 RX ADMIN — HEPARIN SODIUM 4000 UNITS: 1000 INJECTION INTRAVENOUS; SUBCUTANEOUS at 22:34

## 2022-07-05 RX ADMIN — IOPAMIDOL 100 ML: 755 INJECTION, SOLUTION INTRAVENOUS at 23:05

## 2022-07-05 NOTE — Clinical Note
TRANSFER - OUT REPORT:     Verbal report given to: Marci Arreguin RN. Report consisted of patient's Situation, Background, Assessment and   Recommendations(SBAR). Opportunity for questions and clarification was provided. Patient transported with a Registered Nurse. Patient transported to: [address].

## 2022-07-05 NOTE — ED TRIAGE NOTES
Patient via EMS with c/o SOB, productive cough, and left sided chest pain for the past three days. States he has not been taking his medications because he has been sleeping a lot. Temp 99.9.

## 2022-07-05 NOTE — Clinical Note
TRANSFER - IN REPORT:     Verbal report received from: Greg Joe RN. Report consisted of patient's Situation, Background, Assessment and   Recommendations(SBAR). Opportunity for questions and clarification was provided. Assessment completed upon patient's arrival to unit and care assumed. Patient transported with a Registered Nurse.

## 2022-07-06 ENCOUNTER — APPOINTMENT (OUTPATIENT)
Dept: NON INVASIVE DIAGNOSTICS | Age: 72
DRG: 246 | End: 2022-07-06
Attending: STUDENT IN AN ORGANIZED HEALTH CARE EDUCATION/TRAINING PROGRAM
Payer: MEDICARE

## 2022-07-06 LAB
ACT BLD: 219 SECS (ref 79–138)
ACT BLD: 260 SECS (ref 79–138)
ALBUMIN SERPL-MCNC: 2.9 G/DL (ref 3.4–5)
ALBUMIN/GLOB SERPL: 0.5 {RATIO} (ref 0.8–1.7)
ALP SERPL-CCNC: 95 U/L (ref 45–117)
ALT SERPL-CCNC: 21 U/L (ref 16–61)
ANION GAP SERPL CALC-SCNC: 5 MMOL/L (ref 3–18)
APTT PPP: 37 SEC (ref 23–36.4)
APTT PPP: 79.8 SEC (ref 23–36.4)
AST SERPL-CCNC: 39 U/L (ref 10–38)
BASOPHILS # BLD: 0 K/UL (ref 0–0.1)
BASOPHILS NFR BLD: 0 % (ref 0–2)
BILIRUB SERPL-MCNC: 1.2 MG/DL (ref 0.2–1)
BUN SERPL-MCNC: 18 MG/DL (ref 7–18)
BUN/CREAT SERPL: 20 (ref 12–20)
CALCIUM SERPL-MCNC: 10.1 MG/DL (ref 8.5–10.1)
CHLORIDE SERPL-SCNC: 104 MMOL/L (ref 100–111)
CHOLEST SERPL-MCNC: 199 MG/DL
CO2 SERPL-SCNC: 28 MMOL/L (ref 21–32)
CREAT SERPL-MCNC: 0.92 MG/DL (ref 0.6–1.3)
DIFFERENTIAL METHOD BLD: ABNORMAL
ECHO LV FRACTIONAL SHORTENING: 18 % (ref 28–44)
ECHO LV INTERNAL DIMENSION DIASTOLE INDEX: 2.59 CM/M2
ECHO LV INTERNAL DIMENSION DIASTOLIC: 5.5 CM (ref 4.2–5.9)
ECHO LV INTERNAL DIMENSION SYSTOLIC INDEX: 2.12 CM/M2
ECHO LV INTERNAL DIMENSION SYSTOLIC: 4.5 CM
ECHO LV IVSD: 1.6 CM (ref 0.6–1)
ECHO LV MASS 2D: 373.1 G (ref 88–224)
ECHO LV MASS INDEX 2D: 176 G/M2 (ref 49–115)
ECHO LV POSTERIOR WALL DIASTOLIC: 1.4 CM (ref 0.6–1)
ECHO LV RELATIVE WALL THICKNESS RATIO: 0.51
ECHO RV TAPSE: 1.5 CM (ref 1.7–?)
EOSINOPHIL # BLD: 0 K/UL (ref 0–0.4)
EOSINOPHIL NFR BLD: 0 % (ref 0–5)
ERYTHROCYTE [DISTWIDTH] IN BLOOD BY AUTOMATED COUNT: 12.1 % (ref 11.6–14.5)
EST. AVERAGE GLUCOSE BLD GHB EST-MCNC: 94 MG/DL
GLOBULIN SER CALC-MCNC: 5.4 G/DL (ref 2–4)
GLUCOSE SERPL-MCNC: 111 MG/DL (ref 74–99)
HBA1C MFR BLD: 4.9 % (ref 4.2–5.6)
HCT VFR BLD AUTO: 42.6 % (ref 36–48)
HDLC SERPL-MCNC: 64 MG/DL (ref 40–60)
HDLC SERPL: 3.1 {RATIO} (ref 0–5)
HGB BLD-MCNC: 13.8 G/DL (ref 13–16)
IMM GRANULOCYTES # BLD AUTO: 0 K/UL (ref 0–0.04)
IMM GRANULOCYTES NFR BLD AUTO: 0 % (ref 0–0.5)
LDLC SERPL CALC-MCNC: 120 MG/DL (ref 0–100)
LIPID PROFILE,FLP: ABNORMAL
LYMPHOCYTES # BLD: 0.4 K/UL (ref 0.9–3.6)
LYMPHOCYTES NFR BLD: 5 % (ref 21–52)
MCH RBC QN AUTO: 29.1 PG (ref 24–34)
MCHC RBC AUTO-ENTMCNC: 32.4 G/DL (ref 31–37)
MCV RBC AUTO: 89.7 FL (ref 78–100)
MONOCYTES # BLD: 0.2 K/UL (ref 0.05–1.2)
MONOCYTES NFR BLD: 2 % (ref 3–10)
NEUTS SEG # BLD: 7.7 K/UL (ref 1.8–8)
NEUTS SEG NFR BLD: 92 % (ref 40–73)
NRBC # BLD: 0 K/UL (ref 0–0.01)
NRBC BLD-RTO: 0 PER 100 WBC
PLATELET # BLD AUTO: 267 K/UL (ref 135–420)
PMV BLD AUTO: 12.2 FL (ref 9.2–11.8)
POTASSIUM SERPL-SCNC: 3.5 MMOL/L (ref 3.5–5.5)
PROT SERPL-MCNC: 8.3 G/DL (ref 6.4–8.2)
RBC # BLD AUTO: 4.75 M/UL (ref 4.35–5.65)
SODIUM SERPL-SCNC: 137 MMOL/L (ref 136–145)
TRIGL SERPL-MCNC: 75 MG/DL (ref ?–150)
TROPONIN-HIGH SENSITIVITY: 4050 NG/L (ref 0–78)
TROPONIN-HIGH SENSITIVITY: 5666 NG/L (ref 0–78)
VLDLC SERPL CALC-MCNC: 15 MG/DL
WBC # BLD AUTO: 8.4 K/UL (ref 4.6–13.2)

## 2022-07-06 PROCEDURE — 74011000250 HC RX REV CODE- 250: Performed by: INTERNAL MEDICINE

## 2022-07-06 PROCEDURE — 74011250636 HC RX REV CODE- 250/636: Performed by: INTERNAL MEDICINE

## 2022-07-06 PROCEDURE — 99152 MOD SED SAME PHYS/QHP 5/>YRS: CPT | Performed by: INTERNAL MEDICINE

## 2022-07-06 PROCEDURE — 85025 COMPLETE CBC W/AUTO DIFF WBC: CPT

## 2022-07-06 PROCEDURE — 93308 TTE F-UP OR LMTD: CPT

## 2022-07-06 PROCEDURE — 92928 PRQ TCAT PLMT NTRAC ST 1 LES: CPT | Performed by: INTERNAL MEDICINE

## 2022-07-06 PROCEDURE — 77030015766: Performed by: INTERNAL MEDICINE

## 2022-07-06 PROCEDURE — 77030013519 HC DEV INFL BASIX MRTM -B: Performed by: INTERNAL MEDICINE

## 2022-07-06 PROCEDURE — 99223 1ST HOSP IP/OBS HIGH 75: CPT | Performed by: INTERNAL MEDICINE

## 2022-07-06 PROCEDURE — 027034Z DILATION OF CORONARY ARTERY, ONE ARTERY WITH DRUG-ELUTING INTRALUMINAL DEVICE, PERCUTANEOUS APPROACH: ICD-10-PCS | Performed by: INTERNAL MEDICINE

## 2022-07-06 PROCEDURE — 85730 THROMBOPLASTIN TIME PARTIAL: CPT

## 2022-07-06 PROCEDURE — 36415 COLL VENOUS BLD VENIPUNCTURE: CPT

## 2022-07-06 PROCEDURE — B2151ZZ FLUOROSCOPY OF LEFT HEART USING LOW OSMOLAR CONTRAST: ICD-10-PCS | Performed by: INTERNAL MEDICINE

## 2022-07-06 PROCEDURE — 84484 ASSAY OF TROPONIN QUANT: CPT

## 2022-07-06 PROCEDURE — 74011000250 HC RX REV CODE- 250: Performed by: STUDENT IN AN ORGANIZED HEALTH CARE EDUCATION/TRAINING PROGRAM

## 2022-07-06 PROCEDURE — 74011250636 HC RX REV CODE- 250/636

## 2022-07-06 PROCEDURE — 77030013761 HC KT HRT LFT ANGI -B: Performed by: INTERNAL MEDICINE

## 2022-07-06 PROCEDURE — 99153 MOD SED SAME PHYS/QHP EA: CPT | Performed by: INTERNAL MEDICINE

## 2022-07-06 PROCEDURE — 80053 COMPREHEN METABOLIC PANEL: CPT

## 2022-07-06 PROCEDURE — 86361 T CELL ABSOLUTE COUNT: CPT

## 2022-07-06 PROCEDURE — B2111ZZ FLUOROSCOPY OF MULTIPLE CORONARY ARTERIES USING LOW OSMOLAR CONTRAST: ICD-10-PCS | Performed by: INTERNAL MEDICINE

## 2022-07-06 PROCEDURE — 85347 COAGULATION TIME ACTIVATED: CPT

## 2022-07-06 PROCEDURE — APPSS45 APP SPLIT SHARED TIME 31-45 MINUTES: Performed by: NURSE PRACTITIONER

## 2022-07-06 PROCEDURE — 2709999900 HC NON-CHARGEABLE SUPPLY

## 2022-07-06 PROCEDURE — C1887 CATHETER, GUIDING: HCPCS | Performed by: INTERNAL MEDICINE

## 2022-07-06 PROCEDURE — C1894 INTRO/SHEATH, NON-LASER: HCPCS | Performed by: INTERNAL MEDICINE

## 2022-07-06 PROCEDURE — 74011250636 HC RX REV CODE- 250/636: Performed by: STUDENT IN AN ORGANIZED HEALTH CARE EDUCATION/TRAINING PROGRAM

## 2022-07-06 PROCEDURE — 4A023N7 MEASUREMENT OF CARDIAC SAMPLING AND PRESSURE, LEFT HEART, PERCUTANEOUS APPROACH: ICD-10-PCS | Performed by: INTERNAL MEDICINE

## 2022-07-06 PROCEDURE — 74011250637 HC RX REV CODE- 250/637: Performed by: INTERNAL MEDICINE

## 2022-07-06 PROCEDURE — 93458 L HRT ARTERY/VENTRICLE ANGIO: CPT | Performed by: INTERNAL MEDICINE

## 2022-07-06 PROCEDURE — C1769 GUIDE WIRE: HCPCS | Performed by: INTERNAL MEDICINE

## 2022-07-06 PROCEDURE — 83036 HEMOGLOBIN GLYCOSYLATED A1C: CPT

## 2022-07-06 PROCEDURE — 77030013797 HC KT TRNSDUC PRSSR EDWD -A: Performed by: INTERNAL MEDICINE

## 2022-07-06 PROCEDURE — APPNB30 APP NON BILLABLE TIME 0-30 MINS: Performed by: PHYSICIAN ASSISTANT

## 2022-07-06 PROCEDURE — 77030012597: Performed by: INTERNAL MEDICINE

## 2022-07-06 PROCEDURE — C1725 CATH, TRANSLUMIN NON-LASER: HCPCS | Performed by: INTERNAL MEDICINE

## 2022-07-06 PROCEDURE — 65660000004 HC RM CVT STEPDOWN

## 2022-07-06 PROCEDURE — 74011250637 HC RX REV CODE- 250/637: Performed by: STUDENT IN AN ORGANIZED HEALTH CARE EDUCATION/TRAINING PROGRAM

## 2022-07-06 PROCEDURE — C1874 STENT, COATED/COV W/DEL SYS: HCPCS | Performed by: INTERNAL MEDICINE

## 2022-07-06 PROCEDURE — 80061 LIPID PANEL: CPT

## 2022-07-06 DEVICE — STENT RONYX25026UX RESOLUTE ONYX 2.50X26
Type: IMPLANTABLE DEVICE | Status: FUNCTIONAL
Brand: RESOLUTE ONYX™

## 2022-07-06 RX ORDER — MIDAZOLAM HYDROCHLORIDE 1 MG/ML
INJECTION, SOLUTION INTRAMUSCULAR; INTRAVENOUS
Status: DISCONTINUED
Start: 2022-07-06 | End: 2022-07-06 | Stop reason: CLARIF

## 2022-07-06 RX ORDER — ACETAMINOPHEN 325 MG/1
650 TABLET ORAL
Status: DISCONTINUED | OUTPATIENT
Start: 2022-07-06 | End: 2022-07-07 | Stop reason: HOSPADM

## 2022-07-06 RX ORDER — CLOPIDOGREL 300 MG/1
TABLET, FILM COATED ORAL AS NEEDED
Status: DISCONTINUED | OUTPATIENT
Start: 2022-07-06 | End: 2022-07-07

## 2022-07-06 RX ORDER — BISACODYL 5 MG
5 TABLET, DELAYED RELEASE (ENTERIC COATED) ORAL DAILY PRN
Status: DISCONTINUED | OUTPATIENT
Start: 2022-07-06 | End: 2022-07-07 | Stop reason: HOSPADM

## 2022-07-06 RX ORDER — ACETAMINOPHEN 650 MG/1
650 SUPPOSITORY RECTAL
Status: DISCONTINUED | OUTPATIENT
Start: 2022-07-06 | End: 2022-07-07 | Stop reason: HOSPADM

## 2022-07-06 RX ORDER — VERAPAMIL HYDROCHLORIDE 2.5 MG/ML
INJECTION, SOLUTION INTRAVENOUS
Status: DISCONTINUED
Start: 2022-07-06 | End: 2022-07-06 | Stop reason: CLARIF

## 2022-07-06 RX ORDER — CARVEDILOL 12.5 MG/1
12.5 TABLET ORAL 2 TIMES DAILY WITH MEALS
Status: DISCONTINUED | OUTPATIENT
Start: 2022-07-06 | End: 2022-07-07 | Stop reason: HOSPADM

## 2022-07-06 RX ORDER — MIDAZOLAM HYDROCHLORIDE 1 MG/ML
INJECTION, SOLUTION INTRAMUSCULAR; INTRAVENOUS AS NEEDED
Status: DISCONTINUED | OUTPATIENT
Start: 2022-07-06 | End: 2022-07-07 | Stop reason: HOSPADM

## 2022-07-06 RX ORDER — HEPARIN SODIUM 1000 [USP'U]/ML
INJECTION, SOLUTION INTRAVENOUS; SUBCUTANEOUS AS NEEDED
Status: DISCONTINUED | OUTPATIENT
Start: 2022-07-06 | End: 2022-07-07 | Stop reason: HOSPADM

## 2022-07-06 RX ORDER — HEPARIN SODIUM 200 [USP'U]/100ML
INJECTION, SOLUTION INTRAVENOUS
Status: DISCONTINUED
Start: 2022-07-06 | End: 2022-07-06 | Stop reason: CLARIF

## 2022-07-06 RX ORDER — LIDOCAINE HYDROCHLORIDE 10 MG/ML
INJECTION, SOLUTION EPIDURAL; INFILTRATION; INTRACAUDAL; PERINEURAL
Status: DISCONTINUED
Start: 2022-07-06 | End: 2022-07-06 | Stop reason: CLARIF

## 2022-07-06 RX ORDER — HEPARIN SODIUM 1000 [USP'U]/ML
INJECTION, SOLUTION INTRAVENOUS; SUBCUTANEOUS
Status: COMPLETED
Start: 2022-07-06 | End: 2022-07-06

## 2022-07-06 RX ORDER — NITROGLYCERIN 40 MG/100ML
5 INJECTION INTRAVENOUS CONTINUOUS
Status: DISCONTINUED | OUTPATIENT
Start: 2022-07-06 | End: 2022-07-06

## 2022-07-06 RX ORDER — SULFAMETHOXAZOLE AND TRIMETHOPRIM 800; 160 MG/1; MG/1
1 TABLET ORAL DAILY
Status: DISCONTINUED | OUTPATIENT
Start: 2022-07-06 | End: 2022-07-06

## 2022-07-06 RX ORDER — LISINOPRIL 5 MG/1
5 TABLET ORAL DAILY
Status: DISCONTINUED | OUTPATIENT
Start: 2022-07-06 | End: 2022-07-07

## 2022-07-06 RX ORDER — FENTANYL CITRATE 50 UG/ML
INJECTION, SOLUTION INTRAMUSCULAR; INTRAVENOUS
Status: DISCONTINUED
Start: 2022-07-06 | End: 2022-07-06 | Stop reason: CLARIF

## 2022-07-06 RX ORDER — SODIUM CHLORIDE 0.9 % (FLUSH) 0.9 %
5-40 SYRINGE (ML) INJECTION AS NEEDED
Status: DISCONTINUED | OUTPATIENT
Start: 2022-07-06 | End: 2022-07-07 | Stop reason: HOSPADM

## 2022-07-06 RX ORDER — SODIUM CHLORIDE 0.9 % (FLUSH) 0.9 %
5-40 SYRINGE (ML) INJECTION EVERY 8 HOURS
Status: DISCONTINUED | OUTPATIENT
Start: 2022-07-06 | End: 2022-07-07 | Stop reason: HOSPADM

## 2022-07-06 RX ORDER — HYDROCHLOROTHIAZIDE 25 MG/1
25 TABLET ORAL DAILY
Status: DISCONTINUED | OUTPATIENT
Start: 2022-07-06 | End: 2022-07-06

## 2022-07-06 RX ORDER — HEPARIN SODIUM 200 [USP'U]/100ML
INJECTION, SOLUTION INTRAVENOUS AS NEEDED
Status: DISCONTINUED | OUTPATIENT
Start: 2022-07-06 | End: 2022-07-07 | Stop reason: HOSPADM

## 2022-07-06 RX ORDER — PRAVASTATIN SODIUM 20 MG/1
40 TABLET ORAL
Status: DISCONTINUED | OUTPATIENT
Start: 2022-07-06 | End: 2022-07-07 | Stop reason: HOSPADM

## 2022-07-06 RX ORDER — CLOPIDOGREL 300 MG/1
TABLET, FILM COATED ORAL
Status: DISCONTINUED
Start: 2022-07-06 | End: 2022-07-07

## 2022-07-06 RX ORDER — ALBUTEROL SULFATE 0.83 MG/ML
2.5 SOLUTION RESPIRATORY (INHALATION)
Status: DISCONTINUED | OUTPATIENT
Start: 2022-07-06 | End: 2022-07-07 | Stop reason: HOSPADM

## 2022-07-06 RX ORDER — GUAIFENESIN 100 MG/5ML
81 LIQUID (ML) ORAL DAILY
Status: DISCONTINUED | OUTPATIENT
Start: 2022-07-06 | End: 2022-07-07 | Stop reason: HOSPADM

## 2022-07-06 RX ORDER — LANOLIN ALCOHOL/MO/W.PET/CERES
50 CREAM (GRAM) TOPICAL DAILY
Status: DISCONTINUED | OUTPATIENT
Start: 2022-07-06 | End: 2022-07-07 | Stop reason: HOSPADM

## 2022-07-06 RX ORDER — FENTANYL CITRATE 50 UG/ML
INJECTION, SOLUTION INTRAMUSCULAR; INTRAVENOUS AS NEEDED
Status: DISCONTINUED | OUTPATIENT
Start: 2022-07-06 | End: 2022-07-07 | Stop reason: HOSPADM

## 2022-07-06 RX ORDER — HEPARIN SODIUM 1000 [USP'U]/ML
INJECTION, SOLUTION INTRAVENOUS; SUBCUTANEOUS
Status: DISCONTINUED
Start: 2022-07-06 | End: 2022-07-06 | Stop reason: CLARIF

## 2022-07-06 RX ADMIN — SODIUM CHLORIDE, PRESERVATIVE FREE 10 ML: 5 INJECTION INTRAVENOUS at 00:51

## 2022-07-06 RX ADMIN — HEPARIN SODIUM 14 UNITS/KG/HR: 1000 INJECTION INTRAVENOUS; SUBCUTANEOUS at 11:36

## 2022-07-06 RX ADMIN — PRAVASTATIN SODIUM 40 MG: 20 TABLET ORAL at 00:55

## 2022-07-06 RX ADMIN — SULFAMETHOXAZOLE AND TRIMETHOPRIM 1 TABLET: 800; 160 TABLET ORAL at 08:51

## 2022-07-06 RX ADMIN — DOLUTEGRAVIR SODIUM 50 MG: 50 TABLET, FILM COATED ORAL at 08:54

## 2022-07-06 RX ADMIN — PYRIDOXINE HCL TAB 50 MG 50 MG: 50 TAB at 08:51

## 2022-07-06 RX ADMIN — HEPARIN SODIUM 3000 UNITS: 1000 INJECTION INTRAVENOUS; SUBCUTANEOUS at 11:37

## 2022-07-06 RX ADMIN — EMTRICITABINE AND TENOFOVIR ALAFENAMIDE 1 TABLET: 200; 25 TABLET ORAL at 08:55

## 2022-07-06 RX ADMIN — SODIUM CHLORIDE, PRESERVATIVE FREE 10 ML: 5 INJECTION INTRAVENOUS at 05:55

## 2022-07-06 RX ADMIN — CARVEDILOL 12.5 MG: 12.5 TABLET, FILM COATED ORAL at 08:51

## 2022-07-06 RX ADMIN — HYDROCHLOROTHIAZIDE 25 MG: 25 TABLET ORAL at 02:47

## 2022-07-06 RX ADMIN — SODIUM CHLORIDE, PRESERVATIVE FREE 10 ML: 5 INJECTION INTRAVENOUS at 21:37

## 2022-07-06 RX ADMIN — PRAVASTATIN SODIUM 40 MG: 20 TABLET ORAL at 21:37

## 2022-07-06 RX ADMIN — ASPIRIN 81 MG CHEWABLE TABLET 81 MG: 81 TABLET CHEWABLE at 02:47

## 2022-07-06 RX ADMIN — CARVEDILOL 12.5 MG: 12.5 TABLET, FILM COATED ORAL at 00:55

## 2022-07-06 NOTE — CONSULTS
Cardiology Initial Patient Referral Note    Cardiology referral request from Dony Castaneda NP for evaluation and management/treatment of elevated troponin     Date of  Admission: 7/5/2022  7:44 PM   Primary Care Physician:  Rissa, MD Deisy    Attending Cardiologist: Dr. Edin Lyons       -Chest pain, atypical with pleuritic component, worsened with coughing. -NSTEMI,  troponin peaked at 6,346 and down trending. ECG paced.   -Hypertensive urgency, Max /100 mmHg. Improved. -Acute on Chronic HFrEF. Pro-bnp elevated but with historically higher values. Does not appear decompensated at this time. -H/o Non Ischemic CMY. LVEF improved with GDMT. ·  55% on ECHO from 06/2018,  previously 15-20% in 11/2016. · EF 2019, 40-45%   -s/p AICD ([employer]). -Chronic Afib/flutter, on Apixaban as outpatient. -COPD  -HTN, on coreg, aldactone and lisinopril as outpatient. Non compliant with medications. -HLD   -H/o CVA, with residual right sided deficits. -HIV  -Active Tobacco Abuse, cessation advised. -EtOH dependence. -h/o medication non compliance. Primary cardiologist [facility]   Plan:       I saw, evaluated, interviewed and examined the patient personally. Patient was admitted with some back pain also some cough. Patient is poor historian. 2 ID consulted, patient said he had a chest pain however to me he does not complain of chest pain. Known history of cardiomyopathy hypertension, history of AICD  M he does not appear to have any fluid overload. Labs reviewed. Elevated troponin noted. EKG with intermittent paced rhythm and underlying A. Fib  Agree with holding apixaban   Aspirin, beta-blocker, statin  Discussed regarding management strategy which includes medical management vs. Ischemia evaluation ( non-invasive vs. Invasive). Risk, benefit and alternatives of each strategy discussed in detail.   Risk, benefit, complication of LHC and possible PCI ( including but not limited to bleeding, vascular trauma requiring surgery,  infection, heart failure, stroke, MI, emergent bypass surgery, severe allergic reactions, kidney failure, dialysis and death ) were discussed with patient and willing to proceed with procedure. Will be using moderate sedation. Called wife per patient request but unable to reach   By stating these are possible risks, this does not exclude the potential for additional risks not named here. 2    : Addendum:  Cardiac catheterization performed. 100% high OM occlusion was fixed with 2.5 x 26 mm RHONDA  Aspirin, Plavix for CAD and stent. Patient on apixaban as outpatient. Recommend to start apixaban tomorrow after discussion with the patient           -Will keep NPO for tentative ischemic workup pending echo results.   -Continued on heparin infusion, ASA and statin. · Continue to hold Apixaban pending procedures.   -Echo pending.   -Repeat ECG pending.   -Will d/c HCTZ to allow for GDMT medications. Continued on Coreg. Resume outpatient lisinopril.   -Encourage ICS. -cautious use of IVF given CMY. -Importance of medication compliance d/w patient.   -lifestyle modifications, including tobacco cessation, d/w patient.   -Patient is currently w/o CP and pressures have improved. Will d/c IV NTG infusion.   -Further recommendations pending hospital course, test results. History of Present Illness: This is a 73 y.o. male admitted for Dyspnea [R06.00]  Elevated troponin [R77.8]. Patient complains of: back pain, CP   Geovany Torres is a 73 y.o. male, pmhx as stated above, who we are seeing for elevated troponin. Patient states he came to the ER b/c of lower back pain that worsens with different positions. Patient reports mid sternal, non radiating CP for 1 week that worsens with coughing. He reports a productive cough, that has been worsening for the last 3-4 days. His back and CP do not occur simultaneously.  He states he has not taken his BP medications in over a week because he was unable to get an appt for refills. He denies SOB worsened from his baseline. Denies associated N/V/D, diaphoresis, dizziness, near syncope or syncope,  orthopnea, PND, LE edema, weight gain. Cardiac risk factors: smoking/ tobacco exposure, dyslipidemia, sedentary life style, male gender, hypertension  Review of Symptoms:  Except as stated above include:  Constitutional:  As per hPI   Respiratory:  As per HPI  Cardiovascular:  As per hPI   Gastrointestinal: negative  Genitourinary:  negative  Musculoskeletal:  Negative  Neurological:  Negative  Dermatological:  Negative  Endocrinological: Negative  Psychological:  Negative    A comprehensive review of systems was negative except for that written in the HPI. Past Medical History:     Past Medical History:   Diagnosis Date    AICD (automatic cardioverter/defibrillator) present     [facility] [address] [facility]     [facility]     AIDS? HIV    Cigarette smoker     [facility]EF [facility] with reduced [facility] fraction) [facility]     HIV [facility] immunodeficiency virus infection[facility]     Hypertension     Rectal bleeding     Stroke [facility])          Social History:     Social History     Socioeconomic History    Marital status:    Tobacco Use    Smoking status: Current Every Day Smoker     Packs/day: 0.50     Types: Cigarettes    Smokeless tobacco: Never Used   Substance and Sexual Activity    Alcohol use: Yes     Alcohol/week: 5.8 standard drinks     Types: 7 Standard drinks or equivalent per week     Comment: \"drink everyday but never alone\"     Drug use: Yes     Types: Marijuana    Sexual activity: Not Currently        Family History:   History reviewed. No pertinent family history.      Medications:   No Known Allergies     Current Facility-Administered Medications   Medication Dose Route Frequency    albuterol (PROVENTIL VENTOLIN) nebulizer solution 2.5 mg  2.5 mg Nebulization Q4H PRN    bisacodyL (DULCOLAX) tablet 5 mg  5 mg Oral DAILY PRN    carvediloL (COREG) tablet 12.5 mg  12.5 mg Oral BID WITH MEALS    emtricitabine-tenofovir alafen (DESCOVY) 200-25 mg tablet 1 Tablet  1 Tablet Oral DAILY    dolutegravir (TIVICAY) tablet 50 mg  50 mg Oral DAILY    [Held by provider] lisinopriL (PRINIVIL, ZESTRIL) tablet 5 mg  5 mg Oral DAILY    pyridoxine (vitamin B6) (VITAMIN B-6) tablet 50 mg  50 mg Oral DAILY    pravastatin (PRAVACHOL) tablet 40 mg  40 mg Oral QHS    sodium chloride (NS) flush 5-40 mL  5-40 mL IntraVENous Q8H    sodium chloride (NS) flush 5-40 mL  5-40 mL IntraVENous PRN    acetaminophen (TYLENOL) tablet 650 mg  650 mg Oral Q6H PRN    Or    acetaminophen (TYLENOL) suppository 650 mg  650 mg Rectal Q6H PRN    trimethoprim-sulfamethoxazole (BACTRIM DS, SEPTRA DS) 160-800 mg per tablet 1 Tablet  1 Tablet Oral DAILY    aspirin chewable tablet 81 mg  81 mg Oral DAILY    hydroCHLOROthiazide (HYDRODIURIL) tablet 25 mg  25 mg Oral DAILY    [Held by provider] nitroglycerin (TRIDIL) 400 mcg/ml infusion  5 mcg/min IntraVENous CONTINUOUS    heparin 25,000 units in  mL infusion  12-25 Units/kg/hr IntraVENous TITRATE         Physical Exam:     Visit Vitals  BP (!) 145/77   Pulse (!) 59   Temp 98 °F (36.7 °C)   Resp 18   Ht 6' 3.5\" (1.918 m)   Wt 83 kg (183 lb)   SpO2 96%   BMI 22.57 kg/m²       TELE:     BP Readings from Last 3 Encounters:   07/06/22 (!) 145/77   12/30/21 (!) 154/87   07/07/21 (!) 159/70     Pulse Readings from Last 3 Encounters:   07/06/22 (!) 59   12/30/21 72   07/07/21 60     Wt Readings from Last 3 Encounters:   07/06/22 83 kg (183 lb)   12/29/21 86.2 kg (190 lb)   07/06/21 86.2 kg (190 lb)       General:  alert, cooperative, no distress, appears stated age  Neck:  no JVD  Lungs:  clear to auscultation bilaterally  Heart:  regular rate and rhythm, S1, S2 normal, no murmur, click, rub or gallop  Abdomen:  abdomen is soft without significant tenderness, masses, organomegaly or guarding  Extremities: extremities normal, atraumatic, no cyanosis or edema  Skin: Warm and dry.  no hyperpigmentation, vitiligo, or suspicious lesions  Neuro: alert, oriented x3, affect appropriate  Psych: non focal     Data Review:     Recent Labs     07/06/22  0022 07/05/22 2010   WBC 8.4 8.6   HGB 13.8 13.2   HCT 42.6 40.6    262     Recent Labs     07/06/22  0022 07/05/22 2010    137   K 3.5 3.3*    104   CO2 28 26   * 106*   BUN 18 19*   CREA 0.92 0.86   CA 10.1 9.9   MG  --  1.8   ALB 2.9* 3.0*   ALT 21 22   INR  --  1.4*       Results for orders placed or performed during the hospital encounter of 07/05/22   EKG, 12 LEAD, INITIAL   Result Value Ref Range    Ventricular Rate 71 BPM    Atrial Rate 81 BPM    QRS Duration 188 ms    Q-T Interval 470 ms    QTC Calculation (Bezet) 510 ms    Calculated R Axis -73 degrees    Calculated T Axis 99 degrees    Diagnosis       Demand pacemaker, interpretation is based on intrinsic rhythm  Wide QRS rhythm with fusion complexes  Left axis deviation  Left ventricular hypertrophy with QRS widening and repolarization abnormality  Abnormal ECG  When compared with ECG of 28-DEC-2021 16:21,  Wide QRS rhythm has replaced Electronic ventricular pacemaker         All Cardiac Markers in the last 24 hours:  No results found for: CPK, CK, CKMMB, CKMB, RCK3, CKMBT, CKNDX, CKND1, SHIV, TROPT, TROIQ, NA, TROPT, TNIPOC, BNP, BNPP    Last Lipid:    Lab Results   Component Value Date/Time    Cholesterol, total 199 07/06/2022 07:30 AM    HDL Cholesterol 64 (H) 07/06/2022 07:30 AM    LDL, calculated 120 (H) 07/06/2022 07:30 AM    Triglyceride 75 07/06/2022 07:30 AM    CHOL/HDL Ratio 3.1 07/06/2022 07:30 AM       Cardiographics:     EKG Results     Procedure 720 Value Units Date/Time    EKG, 12 LEAD, INITIAL [[id number]] Collected: 07/05/22 2002    Order Status: Completed Updated: 07/06/22 7946     Ventricular Rate 71 BPM      Atrial Rate 81 BPM      QRS Duration 188 ms      Q-T Interval 470 ms      QTC Calculation (Bezet) 510 ms      Calculated R Axis -73 degrees      Calculated T Axis 99 degrees      Diagnosis --     Demand pacemaker, interpretation is based on intrinsic rhythm  Wide QRS rhythm with fusion complexes  Left axis deviation  Left ventricular hypertrophy with QRS widening and repolarization abnormality  Abnormal ECG  When compared with ECG of 28-DEC-2021 16:21,  Wide QRS rhythm has replaced Electronic ventricular pacemaker          12/28/21    ECHO ADULT FOLLOW-UP OR LIMITED 12/29/2021 12/29/2021    Interpretation Summary    Left Ventricle: Left ventricle size is normal. Increased wall thickness. Findings consistent with moderate concentric hypertrophy. See diagram for wall motion findings. Mildly reduced left ventricular systolic function with a visually estimated EF of 40 - 45%. Signed by: La Ho MD on 12/29/2021  2:16 PM            XR Results (most recent):  Results from Hospital Encounter encounter on 07/05/22    XR CHEST SNGL V    Narrative  Portable Frontal Chest.    CLINICAL HISTORY: Short of breath. TECHNIQUE: Single frontal view of the chest, obtained portably. COMPARISONS: 12/20/2021. FINDINGS:    Lungs clear. Generator over left upper chest wall. 4 left subclavian transvenous  leads. There is right atrial appendage lead, to right ventricular leads with an  ICD, and there is a coronary sinus lead. Cardiomediastinal silhouette stable. Vascularity normal. No effusions. Impression  No acute disease. Significant time spent in reviewing the case, multiple EMR databases, physician notes, reviewing pertinent labs and imaging studies  I spent significant amount of time for medical decision making and updated history, and other providers assessments as well. I personally agree with the findings as stated and the plan as documented.   I saw, examined, and evaluated this patient and performed the substantive portion of the encounter for > 50% of the time including extensive history, physical exam, assessment and plan    Shanthi Villa MD    Signed By: Anne Vogt PA-C     July 6, 2022

## 2022-07-06 NOTE — ROUTINE PROCESS
TRANSFER - OUT REPORT:    Verbal report given to Magalys Antunez RN(name) on Geovany Torres  being transferred to 2308(unit) for routine progression of care       Report consisted of patients Situation, Background, Assessment and   Recommendations(SBAR). Information from the following report(s) SBAR was reviewed with the receiving nurse. Lines:   Peripheral IV 07/05/22 Right Forearm (Active)   Site Assessment Clean, dry, & intact 07/06/22 0042   Phlebitis Assessment 0 07/06/22 0042   Infiltration Assessment 0 07/06/22 0042   Dressing Status Clean, dry, & intact 07/06/22 0042   Dressing Type Transparent 07/06/22 0042   Hub Color/Line Status Pink; Infusing 07/06/22 0042   Alcohol Cap Used Yes 07/06/22 0042       Peripheral IV 07/05/22 Right Antecubital (Active)        Opportunity for questions and clarification was provided. Patient transported with:   Registered Nurse: Theodore Baldwin.  Maris January

## 2022-07-06 NOTE — H&P
History and Physical    Patient: Cyndi Nicole               Sex: male          DOA: 7/5/2022       YOB: 1950      Age:  73 y.o.        LOS:  LOS: 1 day        HPI:     Cyndi Nicole is a 73 y.o. [place] with hypertension, nonischemic cardiomyopathy with systolic CHF s/p AICD, persistent A. Fib on anticoagulation, asymptomatic HIV, prostate cancer (?) and tobacco use who is now admitted with an NSTEMI. Patient tells me that he ran out of his medications roughly a week ago. He receives his care through the [address] [place] [address], however his PCM recently changed. Patient reports centralized chest pain for the past few days and lightheadedness. He also endorses shortness of breath, however this is his baseline. The reason why he came to the ER today was due to lower back pain without radiculopathy worsened by bending over and incontinence while sleeping at night. He spent a long time discussing with me marital issues at home. His family has been through a lot of stress with 4 recent deaths in the family, some of them being traumatic. He denies palpitations, nausea/vomiting, syncope, and AICD triggering. Still smoking half a pack a day. Per ER documentation, patient has been more fatigued and coughing more over the past 3 days. Also endorsed a productive cough. He was found to have a fairly severe NSTEMI in the ER as well as an elevated NT proBNP. Heparin drip was initiated. Patient is slow discussion and admits that he has been slower in conversation over the past year. He is still alert and oriented, but takes a while to answer questions. He does appear confused at times.     Initial vitals: T99.9, P 86, /95, RR 22, SPO2 98% on room air    ER Course:   -Chest x-ray single view -no acute disease  -DuoNeb x1  -Methylprednisolone 125 mg IV x1  -Heparin drip    Notable initial labs: WBC 8.6, hemoglobin 13.2, platelet 102, N:L 75:84, absolute lymphocytes 900, INR 1.4, potassium 3.3, glucose 107, BUN/creatinine 19/0.86, magnesium 1.8, T bili 1.3, albumin 3, ALT/AST 22/44, high-sensitivity troponin 2346, NT proBNP 2564, influenza and COVID negative      Past Medical History:   Diagnosis Date    AICD (automatic cardioverter/defibrillator) present     Atrial fibrillation, persistent (HCC)     Autoimmune disease ([facility]     AIDS? HIV    Cigarette smoker     HFrEF (heart failure with reduced ejection fraction) (HCC)     HIV (human immunodeficiency virus infection) ([facility]     Hypertension     Rectal bleeding     Stroke [facility])      Past Surgical History:   Procedure Laterality Date    HX ORTHOPAEDIC      back      History reviewed. No pertinent family history. Social History     Tobacco Use    Smoking status: Current Every Day Smoker     Packs/day: 0.50     Types: Cigarettes    Smokeless tobacco: Never Used   Substance Use Topics    Alcohol use: Yes     Alcohol/week: 5.8 standard drinks     Types: 7 Standard drinks or equivalent per week     Comment: \"drink everyday but never alone\"       Prior to Admission medications    Medication Sig Start Date End Date Taking? Authorizing Provider   lisinopriL (PRINIVIL, ZESTRIL) 5 mg tablet Take 1 Tablet by mouth daily. Indications: chronic heart failure, high blood pressure 12/30/21   Antonio Garcia MD   cholecalciferol (VITAMIN D3) (5000 Units /125 mcg) capsule Take 1 Capsule by mouth daily. Indications: prevention of vitamin D deficiency 12/30/21   Antonio Garcia MD   bisacodyL (DULCOLAX, BISACODYL,) 5 mg EC tablet Take 1 tablet by mouth twice a day as needed for constipation. 2/27/20   Jaylin Shaw PA-C   albuterol (PROVENTIL HFA) 90 mcg/actuation inhaler Take 2 Puffs by inhalation every four (4) hours as needed for Wheezing or Shortness of Breath. Indications: bronchospasm prevention 12/8/19   Bina Latham DO   apixaban (ELIQUIS) 5 mg tablet Take 1 Tab by mouth every twelve (12) hours.  6/14/18   Lieutenant Eric MD   carvedilol (COREG) 25 mg tablet Take 0.5 Tabs by mouth two (2) times daily (with meals). 18   Deidra Teresa MD   dolutegravir (TIVICAY) 50 mg tab tablet Take 1 Tab by mouth daily. 18   Deidra Teresa MD   emtricitabine-tenofovir alafen (DESCOVY) tablet Take 1 Tab by mouth daily. 18   Deidra Teresa MD   pravastatin (PRAVACHOL) 40 mg tablet Take 1 Tab by mouth nightly. 18   Deidra Teresa MD   pyridoxine, vitamin B6, (VITAMIN B-6) 50 mg tablet Take 1 Tab by mouth daily. 18   Deidra Teresa MD   acetaminophen (TYLENOL) 500 mg tablet Take 1 Tab by mouth every six (6) hours as needed. Indications: Pain 18   Deidra Teresa MD        No Known Allergies    Review of Systems:    Negative Unless BOLDED    Constitutional: Fever, chills,diaphoresis. HENT: Negative for congestion, rhinorrhea, sore throat and trouble swallowing. Eyes: Negative for visual disturbance. Respiratory: Cough,shortness of breath, wheezing. Cardiovascular: Chest pain, palpitations. Gastrointestinal: Abdominal pain, blood in stool, constipation, diarrhea, nausea and vomiting. Endocrine: Polyuria. Genitourinary: Difficulty urinating and dysuria. Incontinence while sleeping  Musculoskeletal: Arthralgias, myalgias, and neck stiffness. Lower back pain  Skin: Pallor, rash. Neurological: Dizziness, weakness, numbness and headaches. Hematological: Bruise/bleed easily   Psychiatric/Behavioral: Confusion, dysphoric mood, hallucinations  All other systems reviewed and are negative.     Physical Exam:      Vitals:    22 1950 22 2246 22 0000   BP: (!) 162/95 (!) 162/95    Pulse: 86 80 80   Resp: 22 15    Temp: 99.9 °F (37.7 °C)     SpO2: 98% 98%    Weight: 83.1 kg (183 lb 3.2 oz)     Height: 6' 3.5\" (1.918 m)        Temp (24hrs), Av.9 °F (37.7 °C), Min:99.9 °F (37.7 °C), Max:99.9 °F (37.7 °C)      General:   awake alert and oriented   Skin:   no rashes or skin lesions noted on limited exam, multiple melanotic nevi over face   HEENT:  Normocephalic, atraumatic, PERRL, EOMI, mild cataract visible over the left eye; no conjunctival hemmohage;  nasal and oral mucous are moist and without evidence of lesions. No thrush. Dentition decent. Lungs:   non-labored, bilaterally clear to auscultation- no crackles wheezes rales or rhonchi   Heart:  RRR, s1 and s2; no murmurs rubs or gallops, no edema, no JVD   Abdomen: soft, non-distended, active bowel sounds, Non-tender to palpation   Genitourinary:  deferred   Extremities:   no clubbing, cyanosis; no joint effusions or swelling; Grossly full ROM of all large joints to the upper and lower extremities; muscle mass appropriate for age   Neurologic:  No gross focal sensory abnormalities; 5/5 muscle strength to upper and lower extremities. Speech appropirate but slow. Cranial nerves grossly intact   Psychiatric:   appropriate and interactive. Possibly sad affect? Labs Reviewed:    Recent Results (from the past 24 hour(s))   CBC WITH AUTOMATED DIFF    Collection Time: 07/05/22  8:10 PM   Result Value Ref Range    WBC 8.6 4.6 - 13.2 K/uL    RBC 4.61 4.35 - 5.65 M/uL    HGB 13.2 13.0 - 16.0 g/dL    HCT 40.6 36.0 - 48.0 %    MCV 88.1 78.0 - 100.0 FL    MCH 28.6 24.0 - 34.0 PG    MCHC 32.5 31.0 - 37.0 g/dL    RDW 12.0 11.6 - 14.5 %    PLATELET 832 632 - 658 K/uL    MPV 12.6 (H) 9.2 - 11.8 FL    NRBC 0.0 0  WBC    ABSOLUTE NRBC 0.00 0.00 - 0.01 K/uL    NEUTROPHILS 72 40 - 73 %    LYMPHOCYTES 10 (L) 21 - 52 %    MONOCYTES 17 (H) 3 - 10 %    EOSINOPHILS 1 0 - 5 %    BASOPHILS 0 0 - 2 %    IMMATURE GRANULOCYTES 0 0.0 - 0.5 %    ABS. NEUTROPHILS 6.2 1.8 - 8.0 K/UL    ABS. LYMPHOCYTES 0.9 0.9 - 3.6 K/UL    ABS. MONOCYTES 1.4 (H) 0.05 - 1.2 K/UL    ABS. EOSINOPHILS 0.1 0.0 - 0.4 K/UL    ABS. BASOPHILS 0.0 0.0 - 0.1 K/UL    ABS. IMM.  GRANS. 0.0 0.00 - 0.04 K/UL    DF AUTOMATED     METABOLIC PANEL, COMPREHENSIVE    Collection Time: 07/05/22  8:10 PM   Result Value Ref Range    Sodium 137 136 - 145 mmol/L    Potassium 3.3 (L) 3.5 - 5.5 mmol/L    Chloride 104 100 - 111 mmol/L    CO2 26 21 - 32 mmol/L    Anion gap 7 3.0 - 18 mmol/L    Glucose 106 (H) 74 - 99 mg/dL    BUN 19 (H) 7.0 - 18 MG/DL    Creatinine 0.86 0.6 - 1.3 MG/DL    BUN/Creatinine ratio 22 (H) 12 - 20      GFR est AA >60 >60 ml/min/1.73m2    GFR est non-AA >60 >60 ml/min/1.73m2    Calcium 9.9 8.5 - 10.1 MG/DL    Bilirubin, total 1.3 (H) 0.2 - 1.0 MG/DL    ALT (SGPT) 22 16 - 61 U/L    AST (SGOT) 44 (H) 10 - 38 U/L    Alk. phosphatase 99 45 - 117 U/L    Protein, total 7.7 6.4 - 8.2 g/dL    Albumin 3.0 (L) 3.4 - 5.0 g/dL    Globulin 4.7 (H) 2.0 - 4.0 g/dL    A-G Ratio 0.6 (L) 0.8 - 1.7     NT-PRO BNP    Collection Time: 07/05/22  8:10 PM   Result Value Ref Range    NT pro-BNP 2,564 (H) 0 - 900 PG/ML   MAGNESIUM    Collection Time: 07/05/22  8:10 PM   Result Value Ref Range    Magnesium 1.8 1.6 - 2.6 mg/dL   TROPONIN-HIGH SENSITIVITY    Collection Time: 07/05/22  8:10 PM   Result Value Ref Range    Troponin-High Sensitivity 6,346 (HH) 0 - 78 ng/L   PROTHROMBIN TIME + INR    Collection Time: 07/05/22  8:10 PM   Result Value Ref Range    Prothrombin time 17.1 (H) 11.5 - 15.2 sec    INR 1.4 (H) 0.8 - 1.2     PTT    Collection Time: 07/05/22  8:10 PM   Result Value Ref Range    aPTT 37.8 (H) 23.0 - 36.4 SEC   COVID-19 WITH INFLUENZA A/B    Collection Time: 07/05/22  8:30 PM   Result Value Ref Range    SARS-CoV-2 by PCR Not detected NOTD      Influenza A by PCR Not detected NOTD      Influenza B by PCR Not detected NOTD          Imaging:  CT Results  (Last 48 hours)    None           CXR Results  (Last 48 hours)               07/05/22 2045  XR CHEST SNGL V Final result    Impression:      No acute disease. Narrative:  Portable Frontal Chest.       CLINICAL HISTORY: Short of breath. TECHNIQUE: Single frontal view of the chest, obtained portably. COMPARISONS: 12/20/2021. FINDINGS:        Lungs clear. Generator over left upper chest wall. 4 left subclavian transvenous   leads. There is right atrial appendage lead, to right ventricular leads with an   ICD, and there is a coronary sinus lead. Cardiomediastinal silhouette stable. Vascularity normal. No effusions. Assessment/Plan     NSTEMI -high-sensitivity troponin severely elevated at 6346 now downtrending to 5666 overnight; in the setting of roughly a week of ongoing chest pain; unable to view previous EKGs due to issues with the computer system overnight; suspect that patient has not been taking his medications    Acute on chronic systolic heart failure exacerbation? -Elevated NT proBNP, total bilirubin, and troponin; however, no evidence on physical exam besides his cough; x-ray shows no pleural edema or effusions; CTA chest shows no evidence of PEs, effusions, or congestion; may potentially be a right sided heart failure due to a recent RVMI? in which case I do not necessarily want to dry out patient too much    Atrial fibrillation - paced rhythm    Hypertension - uncontrolled, adding another oral agent     HIV, potentially AIDS? -our most recent CD4 count on record is 165 in [id number]; repeat CBC drawn for heparin drip overnight reveals absolute lymphocyte count of 400, I suspect that patient's CD4 count is fairly low; starting TMP-SMX for pneumocystis prophylaxis    Tobacco use -continues to smoke half pack a day    Slow speech w/ hx of strokes (multiple lacunar infarcts) - possibly consider a brain MRI in the near future? Joshua Negro ..once NSTEMI workup/treatment complete     Overnight incontinence in setting of prostate cancer? - patient cannot give me much history on this    Depression vs marital issues -patient laid off our conversation discussing his marital issues; he is upset with her about constantly telling him how he needs to quit smoking cigarettes; it also appeared that patient feels like he is a burden to her as she is dealing with a lot of recent traumatic deaths in her family    Medication compliance -patient tells me that he ran out of his medications, however his medications were located in a Ziploc bag behind his hospital bed with his cell phone and wallet; some meds were recently filled whereas others were not filled since February; there are also medications such as Lasix and spironolactone which were not in his chart in which she did not tell me about; unsure at this time what exactly patient is taking at home, he tells me his HIV medication has not changed recently    PLAN:  -Please call cardiology in the a.m.  -Please call infectious disease in the a.m.  -Heparin drip  -Nitroglycerin drip 5 mcg/min   -Repeat troponin in the a.m.  -Echocardiogram  -HCTZ 25mg daily starting now  -Septra DS daily   -CD4 count in the a.m.  -Aspirin 81 mg daily  -Continuing home (repotedly) carvedilol, Tivicay, Descovy, pravastatin, pyridoxine  -Lipid panel globin A1c in the a.m.  -Bladder checks for potential need of Dao cath    Activity: Bedrest with bedside commode  Diet: N.p.o. with sips and chips  Antibiotics: Septra DS  DVT prophylaxis: Heparin drip  CODE status: DNR/DNI, discussed with patient, he would like to pass naturally if the time comes    Disposition: Remain inpatient for cardiac evaluation for expected 2 nights or longer    Signed By: Jeffrey Adair MD   [facility]    July 6, 2022      Dragon voice recognition software was used for parts of this note. Unintended errors may have occurred.

## 2022-07-06 NOTE — PROGRESS NOTES
conducted an initial consultation and Spiritual Assessment for Nan Emmanuel, who is a 73 y.o.,male. Patients Primary Language is: [place]. According to the patients EMR Mosque Affiliation is: Mirian Heller. The reason the Patient came to the hospital is:   Patient Active Problem List    Diagnosis Date Noted    Dyspnea 07/05/2022    Joint pain 06/06/2018    Elevated troponin 06/06/2018    Right forearm cellulitis 06/06/2018    Chronic anticoagulation 06/06/2018    Defibrillator discharge 12/11/2016    Tobacco abuse 11/02/2016    SOB (shortness of breath) 11/02/2016    Alcohol use 11/02/2016    Hx of cocaine abuse ([facility] 11/02/2016    Atrial flutter ([facility] 11/02/2016    Congestive heart failure (CHF) ([facility] 11/01/2016    Syncope and collapse 09/12/2012    HIV (human immunodeficiency virus infection) ([facility] 09/12/2012    Pacemaker 09/12/2012    Essential hypertension, benign 09/12/2012    History of CVA (cerebrovascular accident) 09/12/2012    Noncompliance 09/12/2012        The  provided the following Interventions:  Initiated a relationship of care and support with patient in room 419 today. .   Listened empathically as patient briefly shared his account of his being here . Patient was asked about his having an advance directive but patient said no. Provided information about Spiritual Care Services. Offered prayer  on patients behalf. The following outcomes were achieved:  Patient shared limited information about his medical narrative and spiritual journey/beliefs. Patient processed feeling about current hospitalization. Patient expressed gratitude for pastoral care visit. Assessment:  Patient does not have any Roman Catholic/cultural needs that will affect patients preferences in health care. There are no further spiritual or Roman Catholic issues which require Spiritual Care Services interventions at this time.        Plan:  Chaplains will continue to follow and will provide pastoral care on an as needed/requested basis    . Yazmin 3   Board Certified [address] [place] [address]   [phone]

## 2022-07-06 NOTE — PROGRESS NOTES
Bedside and Verbal shift change report given to [address] [place] [address]  (oncoming nurse) by Julisa Goldman RN (offgoing nurse). Report included the following information TATIANA and Darius abnormal Labs.

## 2022-07-06 NOTE — CONSULTS
Infectious Disease Consultation Note        Reason:HiV/AIDS, evaluate for opportunistic infections    Current abx Prior abx   bactrim      Lines:       Assessment :    73 y.o.man with hypertension, nonischemic cardiomyopathy with systolic CHF s/p AICD, persistent A. Fib on anticoagulation,  HIV/AIDS, prostate cancer and tobacco use presented to [facility] on 7/5/22 with chest pain, fatigue.      Hospitalization at [facility] 12/2021 for acute COVID-19 infection    Status post 2 doses of COVID-19 vaccine 2022 (patient does not recollect the timing of vaccination)    Clinical presentation c/w probable non STEMI in a patient with HIV/AIDS     No definitive clinical or radiographic evidence to suggest bacterial/viral pneumonia, respiratory tract infection at this time    Recent CD4 count >200 12/2021 decreases likelihood of HIV associated opportunistic infections including pneumocystis     Elevated troponin-concern for acute coronary syndrome. Cardiology consult appreciated     HIV/AIDS-follows up with [facility]. Currenlty on emtricitabine/tenofovir/dolutegravir. Last CD4 count: 265 on 12/2021     Recommendations:     1.  Recommend emtricitabine/tenofovir/dolutegravir  2.  d/c bactrim since recent CD4 count >200  3. Follow-up cardiology recommendations  4. Anticoagulation per cardiology  5. F/u CD4 count. Patient will need appointment in [facility] for f/u of HIV    Thank you for consultation request. Above plan was discussed in details with patient. Please call me if any further questions or concerns. Will continue to participate in the care of this patient. HPI:    73 y.o.man with hypertension, nonischemic cardiomyopathy with systolic CHF s/p AICD, persistent A. Fib on anticoagulation,  HIV/AIDS, prostate cancer and tobacco use presented to [facility] on 7/5/22 with chest pain, fatigue.      Patient is known to me from prior inpatient consultation at [facility].    He receives his care through the [facility] system, however his PCM recently changed. He states that he is unable to get refills of his medication. He thought that he ran out of his HIV medication and has not been able to take it. He showed me the pill bottles ER with him. The pill bottles were half empty. He told me that he was likely taking those medications. His wife manages his medications. He takes all the pills in the back. Patient reports centralized chest pain for the past few days and lightheadedness. His family has been through a lot of stress with 4 recent deaths in the family, some of them being traumatic. Per ER documentation,\" patient has been more fatigued and coughing more over the past 3 days. Also endorsed a productive cough. \" He was found to have elevated troponin in the ED. Diagnosed to have NSTEMI in the ER as well as an elevated NT proBNP. Heparin drip was initiated. Cardiology consulted. I have been consulted for HIV management.     Patient denies any subjective fever or chills at home. He denies increasing cough, sputum production. He does not recollect the names of his HIV medication. Past Medical History:   Diagnosis Date    AICD (automatic cardioverter/defibrillator) present     [facility] fibrillation[facility] persistent [facility]     Autoimmune disease ([facility]     AIDS? HIV    Cigarette smoker     [facility]FrEF [facility] failure with reduced [facility] fraction) [facility]     HIV (human immunodeficiency virus infection) ([facility]     Hypertension     Rectal bleeding     Stroke [facility])        Past Surgical History:   Procedure Laterality Date    HX ORTHOPAEDIC      back       home Medication List    Details   lisinopriL (PRINIVIL, ZESTRIL) 5 mg tablet Take 1 Tablet by mouth daily. Indications: chronic heart failure, high blood pressure  Qty: 30 Tablet, Refills: 2      cholecalciferol (VITAMIN D3) (5000 Units /125 mcg) capsule Take 1 Capsule by mouth daily.  Indications: prevention of vitamin D deficiency  Qty: 30 Capsule, Refills: 0      bisacodyL (DULCOLAX, BISACODYL,) 5 mg EC tablet Take 1 tablet by mouth twice a day as needed for constipation. Qty: 20 Tab, Refills: 0      albuterol (PROVENTIL HFA) 90 mcg/actuation inhaler Take 2 Puffs by inhalation every four (4) hours as needed for Wheezing or Shortness of Breath. Indications: bronchospasm prevention  Qty: 1 Inhaler, Refills: 0      apixaban (ELIQUIS) 5 mg tablet Take 1 Tab by mouth every twelve (12) hours. Qty: 60 Tab, Refills: 0      carvedilol (COREG) 25 mg tablet Take 0.5 Tabs by mouth two (2) times daily (with meals). Qty: 30 Tab, Refills: 0      dolutegravir (TIVICAY) 50 mg tab tablet Take 1 Tab by mouth daily. Qty: 30 Tab, Refills: 0      emtricitabine-tenofovir alafen (DESCOVY) tablet Take 1 Tab by mouth daily. Qty: 30 Tab, Refills: 0      pravastatin (PRAVACHOL) 40 mg tablet Take 1 Tab by mouth nightly. Qty: 30 Tab, Refills: 0      pyridoxine, vitamin B6, (VITAMIN B-6) 50 mg tablet Take 1 Tab by mouth daily. Qty: 30 Tab, Refills: 0      acetaminophen (TYLENOL) 500 mg tablet Take 1 Tab by mouth every six (6) hours as needed.  Indications: Pain  Qty: 20 Tab, Refills: 0             Current Facility-Administered Medications   Medication Dose Route Frequency    albuterol (PROVENTIL VENTOLIN) nebulizer solution 2.5 mg  2.5 mg Nebulization Q4H PRN    bisacodyL (DULCOLAX) tablet 5 mg  5 mg Oral DAILY PRN    carvediloL (COREG) tablet 12.5 mg  12.5 mg Oral BID WITH MEALS    emtricitabine-tenofovir alafen (DESCOVY) 200-25 mg tablet 1 Tablet  1 Tablet Oral DAILY    dolutegravir (TIVICAY) tablet 50 mg  50 mg Oral DAILY    [Held by provider] lisinopriL (PRINIVIL, ZESTRIL) tablet 5 mg  5 mg Oral DAILY    pyridoxine (vitamin B6) (VITAMIN B-6) tablet 50 mg  50 mg Oral DAILY    pravastatin (PRAVACHOL) tablet 40 mg  40 mg Oral QHS    sodium chloride (NS) flush 5-40 mL  5-40 mL IntraVENous Q8H    sodium chloride (NS) flush 5-40 mL  5-40 mL IntraVENous PRN    acetaminophen (TYLENOL) tablet 650 mg  650 mg Oral Q6H PRN    Or    acetaminophen (TYLENOL) suppository 650 mg  650 mg Rectal Q6H PRN    trimethoprim-sulfamethoxazole (BACTRIM DS, SEPTRA DS) 160-800 mg per tablet 1 Tablet  1 Tablet Oral DAILY    aspirin chewable tablet 81 mg  81 mg Oral DAILY    hydroCHLOROthiazide (HYDRODIURIL) tablet 25 mg  25 mg Oral DAILY    [Held by provider] nitroglycerin (TRIDIL) 400 mcg/ml infusion  5 mcg/min IntraVENous CONTINUOUS    heparin 25,000 units in  mL infusion  12-25 Units/kg/hr IntraVENous TITRATE       Allergies: Patient has no known allergies. History reviewed. No pertinent family history. Social History     Socioeconomic History    Marital status:      Spouse name: Not on file    Number of children: Not on file    Years of education: Not on file    Highest education level: Not on file   Occupational History    Not on file   Tobacco Use    Smoking status: Current Every Day Smoker     Packs/day: 0.50     Types: Cigarettes    Smokeless tobacco: Never Used   Substance and Sexual Activity    Alcohol use: Yes     Alcohol/week: 5.8 standard drinks     Types: 7 Standard drinks or equivalent per week     Comment: \"drink everyday but never alone\"     Drug use: Yes     Types: Marijuana    Sexual activity: Not Currently   Other Topics Concern    Not on file   Social History Narrative    Not on file     Social Determinants of Health     Financial Resource Strain:     Difficulty of Paying Living Expenses: Not on file   Food Insecurity:     Worried About Running Out of Food in the Last Year: Not on file    Miki of Food in the Last Year: Not on file   Transportation Needs:     Lack of Transportation (Medical): Not on file    Lack of Transportation (Non-Medical):  Not on file   Physical Activity:     Days of Exercise per Week: Not on file    Minutes of Exercise per Session: Not on file   Stress:     Feeling of Stress : Not on file   Social Connections:     Frequency of Communication with Friends and Family: Not on file    Frequency of Social Gatherings with Friends and Family: Not on file    Attends Zoroastrianism Services: Not on file    Active Member of Clubs or Organizations: Not on file    Attends Club or Organization Meetings: Not on file    Marital Status: Not on file   Intimate Partner Violence:     Fear of Current or Ex-Partner: Not on file    Emotionally Abused: Not on file    Physically Abused: Not on file    Sexually Abused: Not on file   Housing Stability:     Unable to Pay for Housing in the Last Year: Not on file    Number of [place] in the Last Year: Not on file    Unstable Housing in the Last Year: Not on file     Social History     Tobacco Use   Smoking Status Current Every Day Smoker    Packs/day: 0.50    Types: Cigarettes   Smokeless Tobacco Never Used        Temp (24hrs), Av.8 °F (37.1 °C), Min:98 °F (36.7 °C), Max:99.9 °F (37.7 °C)    Visit Vitals  BP (!) 145/77   Pulse (!) 59   Temp 98 °F (36.7 °C)   Resp 18   Ht 6' 3.5\" (1.918 m)   Wt 83 kg (183 lb)   SpO2 96%   BMI 22.57 kg/m²       ROS: 12 point ROS obtained in details. Pertinent positives as mentioned in HPI,   otherwise negative    Physical Exam:    Vitals signs and nursing note reviewed. Constitutional:       General: He is not in acute distress.     Appearance: He is well-developed. HENT:      Head: Normocephalic. Eyes:      Conjunctiva/sclera: Conjunctivae normal.      Neck:      Musculoskeletal: Normal range of motion and neck supple. Cardiovascular:      Rate and Rhythm: Normal rate and regular rhythm on monitor  Chest:      Bilateral chest movements equal.   Abdominal:      General: There is no distension.      Palpations: Abdomen is soft.      Tenderness: There is no abdominal tenderness. There is no rebound. Musculoskeletal: Normal range of motion.         General: No tenderness. Skin:     General: Skin is warm and dry.      Findings: No rash.    Neurological:      Mental Status: He is alert and oriented to person, place, and time.      Cranial Nerves: No cranial nerve deficit.      Motor: No abnormal muscle tone.      Coordination: Coordination normal.   Psychiatric:         Behavior: Behavior normal.         Thought Content: Thought content normal.         Judgment: Judgment normal.          Labs: Results:   Chemistry Recent Labs     07/06/22 0022 07/05/22 2010   * 106*    137   K 3.5 3.3*    104   CO2 28 26   BUN 18 19*   CREA 0.92 0.86   CA 10.1 9.9   AGAP 5 7   BUCR 20 22*   AP 95 99   TP 8.3* 7.7   ALB 2.9* 3.0*   GLOB 5.4* 4.7*   AGRAT 0.5* 0.6*      CBC w/Diff Recent Labs     07/06/22 0022 07/05/22 2010   WBC 8.4 8.6   RBC 4.75 4.61   HGB 13.8 13.2   HCT 42.6 40.6    262   GRANS 92* 72   LYMPH 5* 10*   EOS 0 1      Microbiology No results for input(s): CULT in the last 72 hours. RADIOLOGY:    All available imaging studies/reports in [facility] for this admission were reviewed      Disclaimer: Sections of this note are dictated utilizing voice recognition software, which may have resulted in some phonetic based errors in grammar and contents. Even though attempts were made to correct all the mistakes, some may have been missed, and remained in the body of the document. If questions arise, please contact our department.     Dr. Bianca Griffith, Infectious Disease Specialist  [phone]  July 6, 2022  8:54 AM

## 2022-07-06 NOTE — PROGRESS NOTES
Comprehensive Nutrition Assessment    Type and Reason for Visit: Initial,Positive nutrition screen    Nutrition Recommendations/Plan:   1. Monitor diet tolerance and adequacy of meal intake once oral diet initiated. Malnutrition Assessment:  Malnutrition Status:  Insufficient data (07/06/22 1551)      Nutrition History and Allergies:   Past medical history of hypertension, nonischemic cardiomyopathy with systolic CHF s/p AICD, persistent A. Fib on anticoagulation, HIV/AIDS, prostate cancer and tobacco use presented to [facility] on 7/5/22 with chest pain, fatigue. Weight history per chart review: 195 lb (9/28/19), 190 lb (12/8/19), 190 lb (12/29/21), 183 lb (7/6/22) with 7 lb, 4 % weight loss. [facility]     Nutrition Assessment:    Patient NPO for tentative ischemic workup pending echo results. Nutrition Related Findings:    Last BM 7/4. Pertinent Medications: dulcolax, fentanyl, heparin, versed, nitroglycerin, pyridoxine Wound Type: None    Current Nutrition Intake & Therapies:  Average Meal Intake: NPO  Average Supplement Intake: NPO  DIET NPO Ice Chips, Sips of Water with Meds    Anthropometric Measures:  Height: 6' 3.5\" (191.8 cm)  Ideal Body Weight (IBW): 199 lbs (90 kg)  Admission Body Weight: 182 lb 15.7 oz  Current Body Wt:  83 kg (182 lb 15.7 oz), 92 % IBW.     Current BMI (kg/m2): 22.6  Usual Body Weight: 86.2 kg (190 lb)  % Weight Change (Calculated): -3.7  Weight Adjustment: No adjustment  BMI Category: Normal weight (BMI 22.0-24.9) age over 72    Estimated Daily Nutrient Needs:  Energy Requirements Based On: Formula  Weight Used for Energy Requirements: Current  Energy (kcal/day): 5410-0832  Weight Used for Protein Requirements: Current  Protein (g/day):   Method Used for Fluid Requirements: 1 ml/kcal  Fluid (ml/day): 7035-1768    Nutrition Diagnosis:   · Inadequate oral intake related to cardiac dysfunction as evidenced by NPO or clear liquid status due to medical condition      Nutrition Interventions:   Food and/or Nutrient Delivery: Start oral diet  Nutrition Education/Counseling: No recommendations at this time  Coordination of Nutrition Care: Continue to monitor while inpatient       Goals:  Goals: Meet at least 75% of estimated needs,by next RD assessment    Nutrition Monitoring and Evaluation:   Behavioral-Environmental Outcomes: None identified  Food/Nutrient Intake Outcomes: Diet advancement/tolerance,Food and nutrient intake  Physical Signs/Symptoms Outcomes: Chewing or swallowing,Meal time behavior,Hemodynamic status,Nutrition focused physical findings    Discharge Planning:     Too soon to determine    Ebbie Gloria, [address] [place] [address], [facility]  Contact: [phone]

## 2022-07-06 NOTE — PROGRESS NOTES
[facility] Hospitalist Group  Progress Note    Patient: Guanakito Smith Age: 73 y.o. : 1950 MR#: [id number] SSN: [id number]  Date: 2022     Subjective:     No further chest pain or shortness of breath    Seen status post cardiac cath    Assessment/Plan:     1. NSTEMI - cardiology following, await formal cardiac cath report? Stent placement  2. Hypertensive urgency -much improved, tx as per urology, continue Coreg, ? Sumption of lisinopril  3. Chronic atrial fibrillation / a flutter- on Eliquis as outpatient  4. HIV /AIDS -ID consulted, continue emtricitabine/tenofovir/dolutegravir per home regimen, Bactrim DC'd per ID, will need follow-up appointment at [facility] clinic  5. Chronic systolic diastolic heart failure exacerbation -   6. Dyspnea, POA - likely in setting of #1, CTA w/o evidence of PE  7. Tobacco abuse -continues to smoke 1/2 daily PTA  8. History of nonischemic cardiomyopathy status post AICD placement    Additional Notes:      Case discussed with:  [x]Patient  []Family  [x]Nursing  []Case Management  DVT Prophylaxis:  []Lovenox  []Hep drip  []SCDs  []Coumadin / Eliquis or Xarelto   []On Heparin gtt    Objective:     VS:   Visit Vitals  BP (!) 168/98 (BP 1 Location: Left upper arm, BP Patient Position: Reclining)   Pulse 70   Temp 97.3 °F (36.3 °C)   Resp 22   Ht 6' 3.5\" (1.918 m)   Wt 83 kg (183 lb)   SpO2 98%   BMI 22.57 kg/m²      Tmax/24hrs: Temp (24hrs), Av.5 °F (36.9 °C), Min:97.3 °F (36.3 °C), Max:99.9 °F (37.7 °C)      Intake/Output Summary (Last 24 hours) at 2022 1432  Last data filed at 2022 0042  Gross per 24 hour   Intake --   Output 400 ml   Net -400 ml     General:  Alert, NAD  HEENT: Oral mucosa moist  Cardiovascular:  RRR, Nl S1/S2  Pulmonary:  LSC throughout.  Normal resp effort  GI:  +BS in all four quadrants, soft, non-tender  Extremities:  No edema; 2+ dorsalis pedis pulses bilaterally; right wrist cath site resting clean dry and intact  Neuro: alert and oriented x 4    Labs / micro / imaging :    Recent Results (from the past 24 hour(s))   EKG, 12 LEAD, INITIAL    Collection Time: 07/05/22  8:02 PM   Result Value Ref Range    Ventricular Rate 71 BPM    Atrial Rate 81 BPM    QRS Duration 188 ms    Q-T Interval 470 ms    QTC Calculation (Bezet) 510 ms    Calculated R Axis -73 degrees    Calculated T Axis 99 degrees    Diagnosis       Demand pacemaker, interpretation is based on intrinsic rhythm  Wide QRS rhythm with fusion complexes  Left axis deviation  Left ventricular hypertrophy with QRS widening and repolarization abnormality  Abnormal ECG  When compared with ECG of 28-DEC-2021 16:21,  Wide QRS rhythm has replaced Electronic ventricular pacemaker     CBC WITH AUTOMATED DIFF    Collection Time: 07/05/22  8:10 PM   Result Value Ref Range    WBC 8.6 4.6 - 13.2 K/uL    RBC 4.61 4.35 - 5.65 M/uL    HGB 13.2 13.0 - 16.0 g/dL    HCT 40.6 36.0 - 48.0 %    MCV 88.1 78.0 - 100.0 FL    MCH 28.6 24.0 - 34.0 PG    MCHC 32.5 31.0 - 37.0 g/dL    RDW 12.0 11.6 - 14.5 %    PLATELET 106 767 - 668 K/uL    MPV 12.6 (H) 9.2 - 11.8 FL    NRBC 0.0 0  WBC    ABSOLUTE NRBC 0.00 0.00 - 0.01 K/uL    NEUTROPHILS 72 40 - 73 %    LYMPHOCYTES 10 (L) 21 - 52 %    MONOCYTES 17 (H) 3 - 10 %    EOSINOPHILS 1 0 - 5 %    BASOPHILS 0 0 - 2 %    IMMATURE GRANULOCYTES 0 0.0 - 0.5 %    ABS. NEUTROPHILS 6.2 1.8 - 8.0 K/UL    ABS. LYMPHOCYTES 0.9 0.9 - 3.6 K/UL    ABS. MONOCYTES 1.4 (H) 0.05 - 1.2 K/UL    ABS. EOSINOPHILS 0.1 0.0 - 0.4 K/UL    ABS. BASOPHILS 0.0 0.0 - 0.1 K/UL    ABS. IMM.  GRANS. 0.0 0.00 - 0.04 K/UL    DF AUTOMATED     METABOLIC PANEL, COMPREHENSIVE    Collection Time: 07/05/22  8:10 PM   Result Value Ref Range    Sodium 137 136 - 145 mmol/L    Potassium 3.3 (L) 3.5 - 5.5 mmol/L    Chloride 104 100 - 111 mmol/L    CO2 26 21 - 32 mmol/L    Anion gap 7 3.0 - 18 mmol/L    Glucose 106 (H) 74 - 99 mg/dL    BUN 19 (H) 7.0 - 18 MG/DL    Creatinine 0.86 0.6 - 1.3 MG/DL    BUN/Creatinine ratio 22 (H) 12 - 20      GFR est AA >60 >60 ml/min/1.73m2    GFR est non-AA >60 >60 ml/min/1.73m2    Calcium 9.9 8.5 - 10.1 MG/DL    Bilirubin, total 1.3 (H) 0.2 - 1.0 MG/DL    ALT (SGPT) 22 16 - 61 U/L    AST (SGOT) 44 (H) 10 - 38 U/L    Alk. phosphatase 99 45 - 117 U/L    Protein, total 7.7 6.4 - 8.2 g/dL    Albumin 3.0 (L) 3.4 - 5.0 g/dL    Globulin 4.7 (H) 2.0 - 4.0 g/dL    A-G Ratio 0.6 (L) 0.8 - 1.7     NT-PRO BNP    Collection Time: 07/05/22  8:10 PM   Result Value Ref Range    NT pro-BNP 2,564 (H) 0 - 900 PG/ML   MAGNESIUM    Collection Time: 07/05/22  8:10 PM   Result Value Ref Range    Magnesium 1.8 1.6 - 2.6 mg/dL   TROPONIN-HIGH SENSITIVITY    Collection Time: 07/05/22  8:10 PM   Result Value Ref Range    Troponin-High Sensitivity 6,346 (HH) 0 - 78 ng/L   PROTHROMBIN TIME + INR    Collection Time: 07/05/22  8:10 PM   Result Value Ref Range    Prothrombin time 17.1 (H) 11.5 - 15.2 sec    INR 1.4 (H) 0.8 - 1.2     PTT    Collection Time: 07/05/22  8:10 PM   Result Value Ref Range    aPTT 37.8 (H) 23.0 - 36.4 SEC   COVID-19 WITH INFLUENZA A/B    Collection Time: 07/05/22  8:30 PM   Result Value Ref Range    SARS-CoV-2 by PCR Not detected NOTD      Influenza A by PCR Not detected NOTD      Influenza B by PCR Not detected NOTD     CBC WITH AUTOMATED DIFF    Collection Time: 07/06/22 12:22 AM   Result Value Ref Range    WBC 8.4 4.6 - 13.2 K/uL    RBC 4.75 4.35 - 5.65 M/uL    HGB 13.8 13.0 - 16.0 g/dL    HCT 42.6 36.0 - 48.0 %    MCV 89.7 78.0 - 100.0 FL    MCH 29.1 24.0 - 34.0 PG    MCHC 32.4 31.0 - 37.0 g/dL    RDW 12.1 11.6 - 14.5 %    PLATELET 867 445 - 630 K/uL    MPV 12.2 (H) 9.2 - 11.8 FL    NRBC 0.0 0  WBC    ABSOLUTE NRBC 0.00 0.00 - 0.01 K/uL    NEUTROPHILS 92 (H) 40 - 73 %    LYMPHOCYTES 5 (L) 21 - 52 %    MONOCYTES 2 (L) 3 - 10 %    EOSINOPHILS 0 0 - 5 %    BASOPHILS 0 0 - 2 %    IMMATURE GRANULOCYTES 0 0.0 - 0.5 %    ABS. NEUTROPHILS 7.7 1.8 - 8.0 K/UL    ABS. LYMPHOCYTES 0.4 (L) 0.9 - 3.6 K/UL    ABS. MONOCYTES 0.2 0.05 - 1.2 K/UL    ABS. EOSINOPHILS 0.0 0.0 - 0.4 K/UL    ABS. BASOPHILS 0.0 0.0 - 0.1 K/UL    ABS. IMM. GRANS. 0.0 0.00 - 0.04 K/UL    DF AUTOMATED     PTT    Collection Time: 07/06/22 12:22 AM   Result Value Ref Range    aPTT 79.8 (H) 23.0 - 36.4 SEC   TROPONIN-HIGH SENSITIVITY    Collection Time: 07/06/22 12:22 AM   Result Value Ref Range    Troponin-High Sensitivity 5,666 (HH) 0 - 78 ng/L   METABOLIC PANEL, COMPREHENSIVE    Collection Time: 07/06/22 12:22 AM   Result Value Ref Range    Sodium 137 136 - 145 mmol/L    Potassium 3.5 3.5 - 5.5 mmol/L    Chloride 104 100 - 111 mmol/L    CO2 28 21 - 32 mmol/L    Anion gap 5 3.0 - 18 mmol/L    Glucose 111 (H) 74 - 99 mg/dL    BUN 18 7.0 - 18 MG/DL    Creatinine 0.92 0.6 - 1.3 MG/DL    BUN/Creatinine ratio 20 12 - 20      GFR est AA >60 >60 ml/min/1.73m2    GFR est non-AA >60 >60 ml/min/1.73m2    Calcium 10.1 8.5 - 10.1 MG/DL    Bilirubin, total 1.2 (H) 0.2 - 1.0 MG/DL    ALT (SGPT) 21 16 - 61 U/L    AST (SGOT) 39 (H) 10 - 38 U/L    Alk.  phosphatase 95 45 - 117 U/L    Protein, total 8.3 (H) 6.4 - 8.2 g/dL    Albumin 2.9 (L) 3.4 - 5.0 g/dL    Globulin 5.4 (H) 2.0 - 4.0 g/dL    A-G Ratio 0.5 (L) 0.8 - 1.7     TROPONIN-HIGH SENSITIVITY    Collection Time: 07/06/22  7:30 AM   Result Value Ref Range    Troponin-High Sensitivity 4,050 (HH) 0 - 78 ng/L   LIPID PANEL    Collection Time: 07/06/22  7:30 AM   Result Value Ref Range    LIPID PROFILE          Cholesterol, total 199 <200 MG/DL    Triglyceride 75 <150 MG/DL    HDL Cholesterol 64 (H) 40 - 60 MG/DL    LDL, calculated 120 (H) 0 - 100 MG/DL    VLDL, calculated 15 MG/DL    CHOL/HDL Ratio 3.1 0 - 5.0     HEMOGLOBIN A1C WITH EAG    Collection Time: 07/06/22  7:30 AM   Result Value Ref Range    Hemoglobin A1c 4.9 4.2 - 5.6 %    Est. average glucose 94 mg/dL   PTT    Collection Time: 07/06/22  7:30 AM   Result Value Ref Range    aPTT 37.0 (H) 23.0 - 36.4 SEC   ECHO ADULT FOLLOW-UP OR LIMITED    Collection Time: 07/06/22  8:28 AM   Result Value Ref Range    IVSd 1.6 (A) 0.6 - 1.0 cm    LVIDd 5.5 4.2 - 5.9 cm    LVIDs 4.5 cm    LVPWd 1.4 (A) 0.6 - 1.0 cm    TAPSE 1.5 (A) 1.7 cm    Fractional Shortening 2D 18 28 - 44 %    LVIDd Index 2.59 cm/m2    LVIDs Index 2.12 cm/m2    LV RWT Ratio 0.51     LV Mass 2D 373.1 (A) 88 - 224 g    LV Mass 2D Index 176.0 (A) 49 - 115 g/m2       Results     Procedure Component Value Units Date/Time    COVID-19 WITH INFLUENZA A/B [[id number]] Collected: 07/05/22 2030    Order Status: Completed Specimen: Nasopharyngeal Updated: 07/05/22 2104     SARS-CoV-2 by PCR Not detected        Comment: Not Detected results do not preclude SARS-CoV-2 infection and should not be used as the sole basis for patient management decisions. Results must be combined with clinical observations, patient history, and epidemiological information. Influenza A by PCR Not detected        Influenza B by PCR Not detected        Comment: Testing was performed using michael Iesha SARS-CoV-2 and Influenza A/B nucleic acid assay. This test is a multiplex Real-Time Reverse Transcriptase Polymerase Chain Reaction (RT-PCR) based in [employer] diagnostic test intended for the qualitative detection of nucleic acids from SARS-CoV-2, Influenza A, and Influenza B in nasopharyngeal for use under the FDA's Emergency Use Authorization (EAU) only. Fact sheet for Patients: FindDrives.pl  Fact sheet for Healthcare Providers: FindDrives.pl             XR CHEST SNGL V    Result Date: 7/5/2022  No acute disease. CTA CHEST W OR W WO CONT    Result Date: 7/6/2022  No evidence for pulmonary emboli. Dependent atelectasis but no acute pulmonary process. Bilateral adrenal nodules, previously characterized as adenomas.         Signed By: Bryan Santos NP     July 6, 2022

## 2022-07-06 NOTE — PROGRESS NOTES
Problem: Falls - Risk of  Goal: *Absence of Falls  Description: Document Marco Martinez Fall Risk and appropriate interventions in the flowsheet.   Outcome: Progressing Towards Goal  Note: Fall Risk Interventions:  Mobility Interventions: Utilize walker, cane, or other assistive device              Elimination Interventions: Call light in reach,Patient to call for help with toileting needs              Problem: Patient Education: Go to Patient Education Activity  Goal: Patient/Family Education  Outcome: Progressing Towards Goal     Problem: Pain  Goal: *Control of Pain  Outcome: Progressing Towards Goal     Problem: Gas Exchange - Impaired  Goal: *Absence of hypoxia  Outcome: Progressing Towards Goal     Problem: Patient Education: Go to Patient Education Activity  Goal: Patient/Family Education  Outcome: Progressing Towards Goal

## 2022-07-06 NOTE — ED PROVIDER NOTES
EMERGENCY DEPARTMENT HISTORY AND PHYSICAL EXAM    8:20 PM      Date: 7/5/2022  Patient Name: Donny Sunshine    History of Presenting Illness     Chief Complaint   Patient presents with    Shortness of Breath         History Provided By: Patient  Location/Duration/Severity/Modifying factors   Patient is a 82-year-old male with a history of asymptomatic HIV on ART, hypertension, rectal bleeding, stroke, COVID-19 approximately 7 months ago, smoking, occasional alcohol and marijuana use, the presents emergency department with complaint of increasing cough for the last 3 days and feeling fatigued below his baseline and sleeping all day yesterday. The patient's family is concerned because he seemed more short of breath and coughing more with productive cough for the last 2 days. The patient said he had decreased activity yesterday but has been sleeping fine. Patient is able to lay down in bed and does not not have any swelling. Patient denies any sick contacts. Patient denies any nausea, vomiting, or diarrhea. Patient goes to the [place] for his care and is not working at this time. Patient is a smoker, occasional drinker, and occasional marijuana user. PCP: Deisy Kwok MD    Current Facility-Administered Medications   Medication Dose Route Frequency Provider Last Rate Last Admin    heparin (porcine) 1,000 unit/mL injection 4,000 Units  4,000 Units IntraVENous ONCE Deanna Hutchinson MD        heparin 25,000 units in D5W 250 ml infusion  12-25 Units/kg/hr IntraVENous TITRATE Andreina Recio MD         Current Outpatient Medications   Medication Sig Dispense Refill    lisinopriL (PRINIVIL, ZESTRIL) 5 mg tablet Take 1 Tablet by mouth daily. Indications: chronic heart failure, high blood pressure 30 Tablet 2    cholecalciferol (VITAMIN D3) (5000 Units /125 mcg) capsule Take 1 Capsule by mouth daily.  Indications: prevention of vitamin D deficiency 30 Capsule 0    bisacodyL (DULCOLAX, BISACODYL,) 5 mg EC tablet Take 1 tablet by mouth twice a day as needed for constipation. 20 Tab 0    albuterol (PROVENTIL HFA) 90 mcg/actuation inhaler Take 2 Puffs by inhalation every four (4) hours as needed for Wheezing or Shortness of Breath. Indications: bronchospasm prevention 1 Inhaler 0    apixaban (ELIQUIS) 5 mg tablet Take 1 Tab by mouth every twelve (12) hours. 60 Tab 0    carvedilol (COREG) 25 mg tablet Take 0.5 Tabs by mouth two (2) times daily (with meals). 30 Tab 0    dolutegravir (TIVICAY) 50 mg tab tablet Take 1 Tab by mouth daily. 30 Tab 0    emtricitabine-tenofovir alafen (DESCOVY) tablet Take 1 Tab by mouth daily. 30 Tab 0    pravastatin (PRAVACHOL) 40 mg tablet Take 1 Tab by mouth nightly. 30 Tab 0    pyridoxine, vitamin B6, (VITAMIN B-6) 50 mg tablet Take 1 Tab by mouth daily. 30 Tab 0    acetaminophen (TYLENOL) 500 mg tablet Take 1 Tab by mouth every six (6) hours as needed. Indications: Pain 20 Tab 0       Past History     Past Medical History:  Past Medical History:   Diagnosis Date    Autoimmune disease ([facility] [address]     AIDS? HIV    Hypertension     Rectal bleeding     Stroke [facility])        Past Surgical History:  Past Surgical History:   Procedure Laterality Date    HX ORTHOPAEDIC      back       Family History:  History reviewed. No pertinent family history. Social History:  Social History     Tobacco Use    Smoking status: Current Every Day Smoker     Packs/day: 1.00     Types: Cigarettes    Smokeless tobacco: Never Used   Substance Use Topics    Alcohol use: Yes     Alcohol/week: 5.8 standard drinks     Types: 7 Standard drinks or equivalent per week     Comment: \"drink everyday but never alone\"     Drug use: Yes     Types: Marijuana       Allergies:  No Known Allergies      Review of Systems       Review of Systems   Constitutional: Positive for fatigue. Negative for activity change and fever. HENT: Negative for congestion and rhinorrhea. Eyes: Negative for visual disturbance. Respiratory: Positive for cough, shortness of breath and wheezing. Cardiovascular: Negative for chest pain and palpitations. Gastrointestinal: Negative for abdominal pain, diarrhea, nausea and vomiting. Genitourinary: Negative for dysuria and hematuria. Musculoskeletal: Negative for back pain. Skin: Negative for rash. Neurological: Negative for dizziness, weakness and light-headedness. All other systems reviewed and are negative. Physical Exam     Visit Vitals  BP (!) 162/95 (BP 1 Location: Right upper arm, BP Patient Position: At rest)   Pulse 86   Temp 99.9 °F (37.7 °C)   Resp 22   Ht 6' 3.5\" (1.918 m)   Wt 83.1 kg (183 lb 3.2 oz)   SpO2 98%   BMI 22.60 kg/m²         Physical Exam  Vitals and nursing note reviewed. Constitutional:       General: He is not in acute distress. Appearance: He is well-developed. Comments: Speech is clear, slow to verbally respond to questions however is alert and follows commands   HENT:      Head: Normocephalic and atraumatic. Right Ear: External ear normal.      Left Ear: External ear normal.      Nose: Nose normal.   Eyes:      General: No scleral icterus. Conjunctiva/sclera: Conjunctivae normal.      Pupils: Pupils are equal, round, and reactive to light. Neck:      Thyroid: No thyromegaly. Vascular: No JVD. Trachea: No tracheal deviation. Cardiovascular:      Rate and Rhythm: Normal rate and regular rhythm. Heart sounds: Normal heart sounds. No murmur heard. No friction rub. No gallop. Pulmonary:      Effort: Pulmonary effort is normal.      Breath sounds: Wheezing and rhonchi present. Chest:      Chest wall: No tenderness. Abdominal:      General: Bowel sounds are normal. There is no distension. Palpations: Abdomen is soft. Tenderness: There is no abdominal tenderness. There is no guarding or rebound. Musculoskeletal:         General: No tenderness. Normal range of motion.       Cervical back: Normal range of motion and neck supple. Lymphadenopathy:      Cervical: No cervical adenopathy. Skin:     General: Skin is warm and dry. Neurological:      Mental Status: He is alert and oriented to person, place, and time. Cranial Nerves: No cranial nerve deficit. Coordination: Coordination normal.      Comments: Mild global weakness, speech is clear, gait not observed   Psychiatric:         Behavior: Behavior normal.         Thought Content: Thought content normal.         Judgment: Judgment normal.      Comments: No family currently at bedside           Diagnostic Study Results     Labs -  Recent Results (from the past 12 hour(s))   CBC WITH AUTOMATED DIFF    Collection Time: 07/05/22  8:10 PM   Result Value Ref Range    WBC 8.6 4.6 - 13.2 K/uL    RBC 4.61 4.35 - 5.65 M/uL    HGB 13.2 13.0 - 16.0 g/dL    HCT 40.6 36.0 - 48.0 %    MCV 88.1 78.0 - 100.0 FL    MCH 28.6 24.0 - 34.0 PG    MCHC 32.5 31.0 - 37.0 g/dL    RDW 12.0 11.6 - 14.5 %    PLATELET 743 243 - 448 K/uL    MPV 12.6 (H) 9.2 - 11.8 FL    NRBC 0.0 0  WBC    ABSOLUTE NRBC 0.00 0.00 - 0.01 K/uL    NEUTROPHILS 72 40 - 73 %    LYMPHOCYTES 10 (L) 21 - 52 %    MONOCYTES 17 (H) 3 - 10 %    EOSINOPHILS 1 0 - 5 %    BASOPHILS 0 0 - 2 %    IMMATURE GRANULOCYTES 0 0.0 - 0.5 %    ABS. NEUTROPHILS 6.2 1.8 - 8.0 K/UL    ABS. LYMPHOCYTES 0.9 0.9 - 3.6 K/UL    ABS. MONOCYTES 1.4 (H) 0.05 - 1.2 K/UL    ABS. EOSINOPHILS 0.1 0.0 - 0.4 K/UL    ABS. BASOPHILS 0.0 0.0 - 0.1 K/UL    ABS. IMM.  GRANS. 0.0 0.00 - 0.04 K/UL    DF AUTOMATED     METABOLIC PANEL, COMPREHENSIVE    Collection Time: 07/05/22  8:10 PM   Result Value Ref Range    Sodium 137 136 - 145 mmol/L    Potassium 3.3 (L) 3.5 - 5.5 mmol/L    Chloride 104 100 - 111 mmol/L    CO2 26 21 - 32 mmol/L    Anion gap 7 3.0 - 18 mmol/L    Glucose 106 (H) 74 - 99 mg/dL    BUN 19 (H) 7.0 - 18 MG/DL    Creatinine 0.86 0.6 - 1.3 MG/DL    BUN/Creatinine ratio 22 (H) 12 - 20      GFR est AA >60 >60 ml/min/1.73m2    GFR est non-AA >60 >60 ml/min/1.73m2    Calcium 9.9 8.5 - 10.1 MG/DL    Bilirubin, total 1.3 (H) 0.2 - 1.0 MG/DL    ALT (SGPT) 22 16 - 61 U/L    AST (SGOT) 44 (H) 10 - 38 U/L    Alk. phosphatase 99 45 - 117 U/L    Protein, total 7.7 6.4 - 8.2 g/dL    Albumin 3.0 (L) 3.4 - 5.0 g/dL    Globulin 4.7 (H) 2.0 - 4.0 g/dL    A-G Ratio 0.6 (L) 0.8 - 1.7     NT-PRO BNP    Collection Time: 07/05/22  8:10 PM   Result Value Ref Range    NT pro-BNP 2,564 (H) 0 - 900 PG/ML   MAGNESIUM    Collection Time: 07/05/22  8:10 PM   Result Value Ref Range    Magnesium 1.8 1.6 - 2.6 mg/dL   TROPONIN-HIGH SENSITIVITY    Collection Time: 07/05/22  8:10 PM   Result Value Ref Range    Troponin-High Sensitivity 6,346 (HH) 0 - 78 ng/L   PROTHROMBIN TIME + INR    Collection Time: 07/05/22  8:10 PM   Result Value Ref Range    Prothrombin time 17.1 (H) 11.5 - 15.2 sec    INR 1.4 (H) 0.8 - 1.2     PTT    Collection Time: 07/05/22  8:10 PM   Result Value Ref Range    aPTT 37.8 (H) 23.0 - 36.4 SEC   COVID-19 WITH INFLUENZA A/B    Collection Time: 07/05/22  8:30 PM   Result Value Ref Range    SARS-CoV-2 by PCR Not detected NOTD      Influenza A by PCR Not detected NOTD      Influenza B by PCR Not detected NOTD         Radiologic Studies -   XR CHEST SNGL V   Final Result      No acute disease. Medical Decision Making   I am the first provider for this patient. I reviewed the vital signs, available nursing notes, past medical history, past surgical history, family history and social history. Vital Signs-Reviewed the patient's vital signs. EKG: Ventricular pacing, no STEMI, interpreted by me    Records Reviewed: Nursing Notes, Old Medical Records, Previous electrocardiograms, Previous Radiology Studies and Previous Laboratory Studies (Time of Review: 8:20 PM)    ED Course: Progress Notes, Reevaluation, and Consults:      The patient's troponin is remarkably elevated and he has not taken any of his medications today so we will start the patient on heparin, and plan admit the patient for further care. The patient takes oral anticoagulation for persistent atrial fibrillation. Bessie Huffman, DO 9:23 PM      Provider Notes (Medical Decision Making):   MDM  Number of Diagnoses or Management Options  Diagnosis management comments: Patient is a 68-year-old male with a history of HIV, hypertension, stroke, smoking, used inhalers in the past, COVID-19 6 months prior, presents emergency department with complaint of productive cough which is increasing for the last several days with fatigue. We will follow chest x-ray, cardiac labs, duo nebs, steroids, COVID testing, then reevaluate. Bessie Huffman, DO 8:35 PM        Procedures    Critical Care Time: Critical Care Time:  The services I provided to this patient were to treat and/or prevent clinically significant deterioration that could result in the failure of one or more body systems and/or organ systems due to elevated troponin requiring heparin infusion:    Services included the following:  -reviewing nursing notes and old charts  -vital sign assessments  -direct patient care  -medication orders and management  -interpreting and reviewing diagnostic studies/labs  -re-evaluations  -documentation time    Aggregate critical care time was 38 minutes, which includes only time during which I was engaged in work directly related to the patient's care as described above, whether I was at bedside or elsewhere in the Emergency Department. It did not include time spent performing other reported procedures or the services of residents, students, nurses, or advance practice providers. Bessie Huffman, DO 9:23 PM          Diagnosis     Clinical Impression:   1. Dyspnea, unspecified type    2. Elevated troponin    3.  Malaise and fatigue        Disposition: Admit     Follow-up Information    None          Patient's Medications   Start Taking    No medications on file   Continue Taking    ACETAMINOPHEN (TYLENOL) 500 MG TABLET    Take 1 Tab by mouth every six (6) hours as needed. Indications: Pain    ALBUTEROL (PROVENTIL HFA) 90 MCG/ACTUATION INHALER    Take 2 Puffs by inhalation every four (4) hours as needed for Wheezing or Shortness of Breath. Indications: bronchospasm prevention    APIXABAN (ELIQUIS) 5 MG TABLET    Take 1 Tab by mouth every twelve (12) hours. BISACODYL (DULCOLAX, BISACODYL,) 5 MG EC TABLET    Take 1 tablet by mouth twice a day as needed for constipation. CARVEDILOL (COREG) 25 MG TABLET    Take 0.5 Tabs by mouth two (2) times daily (with meals). CHOLECALCIFEROL (VITAMIN D3) (5000 UNITS /125 MCG) CAPSULE    Take 1 Capsule by mouth daily. Indications: prevention of vitamin D deficiency    DOLUTEGRAVIR (TIVICAY) 50 MG TAB TABLET    Take 1 Tab by mouth daily. EMTRICITABINE-TENOFOVIR ALAFEN (DESCOVY) TABLET    Take 1 Tab by mouth daily. LISINOPRIL (PRINIVIL, ZESTRIL) 5 MG TABLET    Take 1 Tablet by mouth daily. Indications: chronic heart failure, high blood pressure    PRAVASTATIN (PRAVACHOL) 40 MG TABLET    Take 1 Tab by mouth nightly. PYRIDOXINE, VITAMIN B6, (VITAMIN B-6) 50 MG TABLET    Take 1 Tab by mouth daily. These Medications have changed    No medications on file   Stop Taking    No medications on file     Disclaimer: Sections of this note are dictated using utilizing voice recognition software. Minor typographical errors may be present. If questions arise, please do not hesitate to contact me or call our department.

## 2022-07-07 VITALS
BODY MASS INDEX: 22.29 KG/M2 | RESPIRATION RATE: 24 BRPM | DIASTOLIC BLOOD PRESSURE: 86 MMHG | WEIGHT: 183 LBS | TEMPERATURE: 97.8 F | HEIGHT: 76 IN | HEART RATE: 62 BPM | SYSTOLIC BLOOD PRESSURE: 161 MMHG | OXYGEN SATURATION: 96 %

## 2022-07-07 LAB
ATRIAL RATE: 62 BPM
ATRIAL RATE: 81 BPM
BASOPHILS # BLD AUTO: 0 X10E3/UL (ref 0–0.2)
BASOPHILS # BLD: 0 K/UL (ref 0–0.1)
BASOPHILS NFR BLD AUTO: 0 %
BASOPHILS NFR BLD: 0 % (ref 0–2)
CALCULATED R AXIS, ECG10: -66 DEGREES
CALCULATED R AXIS, ECG10: -73 DEGREES
CALCULATED T AXIS, ECG11: 127 DEGREES
CALCULATED T AXIS, ECG11: 99 DEGREES
CD3+CD4+ CELLS # BLD: 112 /UL (ref 359–1519)
CD3+CD4+ CELLS NFR BLD: 28 % (ref 30.8–58.5)
DIAGNOSIS, 93000: NORMAL
DIAGNOSIS, 93000: NORMAL
DIFFERENTIAL METHOD BLD: ABNORMAL
EOSINOPHIL # BLD AUTO: 0 X10E3/UL (ref 0–0.4)
EOSINOPHIL # BLD: 0 K/UL (ref 0–0.4)
EOSINOPHIL NFR BLD AUTO: 0 %
EOSINOPHIL NFR BLD: 0 % (ref 0–5)
ERYTHROCYTE [DISTWIDTH] IN BLOOD BY AUTOMATED COUNT: 11.4 % (ref 11.6–15.4)
ERYTHROCYTE [DISTWIDTH] IN BLOOD BY AUTOMATED COUNT: 12 % (ref 11.6–14.5)
HCT VFR BLD AUTO: 39.2 % (ref 36–48)
HCT VFR BLD AUTO: 45.1 % (ref 37.5–51)
HGB BLD-MCNC: 12.7 G/DL (ref 13–16)
HGB BLD-MCNC: 13.8 G/DL (ref 13–17.7)
IMM GRANULOCYTES # BLD AUTO: 0 K/UL (ref 0–0.04)
IMM GRANULOCYTES # BLD: 0 X10E3/UL (ref 0–0.1)
IMM GRANULOCYTES NFR BLD AUTO: 0 % (ref 0–0.5)
IMM GRANULOCYTES NFR BLD: 0 %
LYMPHOCYTES # BLD AUTO: 0.4 X10E3/UL (ref 0.7–3.1)
LYMPHOCYTES # BLD: 0.9 K/UL (ref 0.9–3.6)
LYMPHOCYTES NFR BLD AUTO: 5 %
LYMPHOCYTES NFR BLD: 9 % (ref 21–52)
MCH RBC QN AUTO: 28 PG (ref 26.6–33)
MCH RBC QN AUTO: 29.3 PG (ref 24–34)
MCHC RBC AUTO-ENTMCNC: 30.6 G/DL (ref 31.5–35.7)
MCHC RBC AUTO-ENTMCNC: 32.4 G/DL (ref 31–37)
MCV RBC AUTO: 90.5 FL (ref 78–100)
MCV RBC AUTO: 92 FL (ref 79–97)
MONOCYTES # BLD AUTO: 0.2 X10E3/UL (ref 0.1–0.9)
MONOCYTES # BLD: 1.1 K/UL (ref 0.05–1.2)
MONOCYTES NFR BLD AUTO: 2 %
MONOCYTES NFR BLD: 12 % (ref 3–10)
NEUTROPHILS # BLD AUTO: 7.4 X10E3/UL (ref 1.4–7)
NEUTROPHILS NFR BLD AUTO: 93 %
NEUTS SEG # BLD: 7.6 K/UL (ref 1.8–8)
NEUTS SEG NFR BLD: 79 % (ref 40–73)
NRBC # BLD: 0 K/UL (ref 0–0.01)
NRBC BLD-RTO: 0 PER 100 WBC
P-R INTERVAL, ECG05: 224 MS
PLATELET # BLD AUTO: 272 X10E3/UL (ref 150–450)
PLATELET # BLD AUTO: 308 K/UL (ref 135–420)
PMV BLD AUTO: 12.4 FL (ref 9.2–11.8)
Q-T INTERVAL, ECG07: 470 MS
Q-T INTERVAL, ECG07: 490 MS
QRS DURATION, ECG06: 168 MS
QRS DURATION, ECG06: 188 MS
QTC CALCULATION (BEZET), ECG08: 497 MS
QTC CALCULATION (BEZET), ECG08: 510 MS
RBC # BLD AUTO: 4.33 M/UL (ref 4.35–5.65)
RBC # BLD AUTO: 4.92 X10E6/UL (ref 4.14–5.8)
VENTRICULAR RATE, ECG03: 62 BPM
VENTRICULAR RATE, ECG03: 71 BPM
WBC # BLD AUTO: 8 X10E3/UL (ref 3.4–10.8)
WBC # BLD AUTO: 9.7 K/UL (ref 4.6–13.2)

## 2022-07-07 PROCEDURE — 85025 COMPLETE CBC W/AUTO DIFF WBC: CPT

## 2022-07-07 PROCEDURE — APPNB30 APP NON BILLABLE TIME 0-30 MINS: Performed by: PHYSICIAN ASSISTANT

## 2022-07-07 PROCEDURE — 99232 SBSQ HOSP IP/OBS MODERATE 35: CPT | Performed by: INTERNAL MEDICINE

## 2022-07-07 PROCEDURE — 99239 HOSP IP/OBS DSCHRG MGMT >30: CPT | Performed by: STUDENT IN AN ORGANIZED HEALTH CARE EDUCATION/TRAINING PROGRAM

## 2022-07-07 PROCEDURE — 74011250637 HC RX REV CODE- 250/637: Performed by: PHYSICIAN ASSISTANT

## 2022-07-07 PROCEDURE — 74011250637 HC RX REV CODE- 250/637

## 2022-07-07 PROCEDURE — 93005 ELECTROCARDIOGRAM TRACING: CPT

## 2022-07-07 PROCEDURE — 36415 COLL VENOUS BLD VENIPUNCTURE: CPT

## 2022-07-07 PROCEDURE — 2709999900 HC NON-CHARGEABLE SUPPLY

## 2022-07-07 PROCEDURE — 74011250637 HC RX REV CODE- 250/637: Performed by: STUDENT IN AN ORGANIZED HEALTH CARE EDUCATION/TRAINING PROGRAM

## 2022-07-07 PROCEDURE — 74011000250 HC RX REV CODE- 250: Performed by: STUDENT IN AN ORGANIZED HEALTH CARE EDUCATION/TRAINING PROGRAM

## 2022-07-07 RX ORDER — LOSARTAN POTASSIUM 25 MG/1
25 TABLET ORAL DAILY
Status: DISCONTINUED | OUTPATIENT
Start: 2022-07-07 | End: 2022-07-07 | Stop reason: HOSPADM

## 2022-07-07 RX ORDER — CLOPIDOGREL BISULFATE 75 MG/1
75 TABLET ORAL DAILY
Qty: 30 TABLET | Refills: 0 | Status: SHIPPED | OUTPATIENT
Start: 2022-07-08

## 2022-07-07 RX ORDER — BUMETANIDE 0.5 MG/1
0.5 TABLET ORAL DAILY
Qty: 30 TABLET | Refills: 0 | Status: SHIPPED | OUTPATIENT
Start: 2022-07-08

## 2022-07-07 RX ORDER — CARVEDILOL 12.5 MG/1
12.5 TABLET ORAL 2 TIMES DAILY WITH MEALS
Qty: 60 TABLET | Refills: 0 | Status: SHIPPED | OUTPATIENT
Start: 2022-07-07

## 2022-07-07 RX ORDER — GUAIFENESIN 100 MG/5ML
81 LIQUID (ML) ORAL DAILY
Qty: 30 TABLET | Refills: 0 | Status: SHIPPED | OUTPATIENT
Start: 2022-07-08

## 2022-07-07 RX ORDER — BUMETANIDE 1 MG/1
0.5 TABLET ORAL DAILY
Status: DISCONTINUED | OUTPATIENT
Start: 2022-07-07 | End: 2022-07-07 | Stop reason: HOSPADM

## 2022-07-07 RX ORDER — CLOPIDOGREL BISULFATE 75 MG/1
75 TABLET ORAL DAILY
Status: DISCONTINUED | OUTPATIENT
Start: 2022-07-07 | End: 2022-07-07 | Stop reason: HOSPADM

## 2022-07-07 RX ORDER — LOSARTAN POTASSIUM 25 MG/1
25 TABLET ORAL DAILY
Qty: 30 TABLET | Refills: 0 | Status: SHIPPED | OUTPATIENT
Start: 2022-07-08

## 2022-07-07 RX ADMIN — LOSARTAN POTASSIUM 25 MG: 25 TABLET, FILM COATED ORAL at 10:19

## 2022-07-07 RX ADMIN — CLOPIDOGREL BISULFATE 75 MG: 75 TABLET ORAL at 10:20

## 2022-07-07 RX ADMIN — SODIUM CHLORIDE, PRESERVATIVE FREE 10 ML: 5 INJECTION INTRAVENOUS at 07:03

## 2022-07-07 RX ADMIN — DOLUTEGRAVIR SODIUM 50 MG: 50 TABLET, FILM COATED ORAL at 08:30

## 2022-07-07 RX ADMIN — PYRIDOXINE HCL TAB 50 MG 50 MG: 50 TAB at 08:29

## 2022-07-07 RX ADMIN — EMTRICITABINE AND TENOFOVIR ALAFENAMIDE 1 TABLET: 200; 25 TABLET ORAL at 08:30

## 2022-07-07 RX ADMIN — BUMETANIDE 0.5 MG: 1 TABLET ORAL at 10:19

## 2022-07-07 RX ADMIN — CARVEDILOL 12.5 MG: 12.5 TABLET, FILM COATED ORAL at 08:29

## 2022-07-07 RX ADMIN — APIXABAN 5 MG: 5 TABLET, FILM COATED ORAL at 10:19

## 2022-07-07 RX ADMIN — ASPIRIN 81 MG CHEWABLE TABLET 81 MG: 81 TABLET CHEWABLE at 08:29

## 2022-07-07 NOTE — PROGRESS NOTES
1945 Bedside and Verbal shift change  Received from Rani Lo RN (outgoing nurse), to LUCY Bianchi (oncoming)  Pt. Is AOX 4. IV SL, Pt. denies  pain at this time. Report included the following information SBAR, Kardex, Procedure Summary, Intake/Output, MAR, Recent Lab Results, and  Cardiac Rhythm @ paced. Will resume care and monitor Pt. Condition. Pt. Educated on call bell when in need of help and assistance. Pt. verbalized understanding. Bed alarm on.     Pt. Head to toe Assessment Done and documented. 2045  Pt. Served healthy choice and ginger ale.    2200  Pt. Made no complaints. Pt. Given suction per oral.    2300  Pt. Denies discomfort. 0000  Pt. Resting with eyes closed, easily awaken. 0200  Pt. Denies discomfort. 0400  Pt. Made no complaints. 0600  Pt. Able to rest and sleep in between care. Verbal and bedside Shift changed report given to Sherine Bell RN (oncoming RN) on Pt. Condition. Report consisted of patients Situation, History, Activities, intake/output,  Background, Assessment and Recommendations(SBAR). Information from the following report(s) Kardex, order Summary, Lab results and MAR was reviewed with the receiving nurse. Opportunity for questions and clarification was provided.

## 2022-07-07 NOTE — MED STUDENT NOTES
PROGRESS NOTE      Events over last  24 hours: taking PO  good, in no apparent distress following cath procedure 2022      ASSESSMENT:   Patient is 73 y.o. with diagnosis of : Active Problems:    Elevated troponin (2018)      Dyspnea (2022)        PLAN:  Case discussed with: alone   1. Start eliquis 5mg PO BID, bumetanide 0.5mg PO every day, plavix 75mg PO every day and losartan 25mg PO every day  2. Continue HIV medications  3. Continue other medications prescribed on 2022 as tolerated. 4. Look to D/C today following understanding of medication protocol. SUBJECTIVE:  Diagnosis/Chief Complaint: Shortness of Breath  Patient reports no pain today status post cardiac catheter procedure 2022. He reports lingering shortness of breath today, but it has improved overall. OBJECTIVE:         Temp (24hrs), Av.6 °F (36.4 °C), Min:97.2 °F (36.2 °C), Max:98.3 °F (36.8 °C)    Pulse: 62 bpm, SpO2: 93% BP: 159/92  Patient is sitting upright in bed in no apparent distress. EXAM: Heart:  regular rate and rhythm, S1, S2 normal, no murmur, click, rub or gallop and Skin  dry over the chin and upper neck, right wrist cath site is clean and without erythema    Reviewed: Medications, allergies, clinical lab test results and imaging results have been reviewed. Any abnormal findings have been addressed. *ATTENTION:  This note has been created by a medical student for educational purposes only. Please do not refer to the content of this note for clinical decision-making, billing, or other purposes. Please see attending physicians note to obtain clinical information on this patient. *

## 2022-07-07 NOTE — PROGRESS NOTES
Problem: Pressure Injury - Risk of  Goal: *Prevention of pressure injury  Description: Document Ba Scale and appropriate interventions in the flowsheet. Outcome: Progressing Towards Goal  Note: Pressure Injury Interventions:  Sensory Interventions: Assess changes in LOC    Moisture Interventions: Absorbent underpads,Apply protective barrier, creams and emollients,Maintain skin hydration (lotion/cream)    Activity Interventions: Pressure redistribution bed/mattress(bed type)    Mobility Interventions: HOB 30 degrees or less,Pressure redistribution bed/mattress (bed type),PT/OT evaluation,Turn and reposition approx.  every two hours(pillow and wedges)    Nutrition Interventions: Document food/fluid/supplement intake    Friction and Shear Interventions: HOB 30 degrees or less,Sit at 90-degree angle                Problem: Patient Education: Go to Patient Education Activity  Goal: Patient/Family Education  Outcome: Progressing Towards Goal

## 2022-07-07 NOTE — PROGRESS NOTES
Wound Prevention Checklist    Patient: Tiera Lara (77 y.o. male)  Date: 7/6/2022  Diagnosis: Dyspnea [R06.00]  Elevated troponin [R77.8] <principal problem not specified>    Precautions:         []  Heel prevention boots placed on patient    [x]  Patient turned q2h during shift  Pt. Able to turn self from left to right side. []  Lift team ordered    []  Patient on [facility]ibel bed/Specialty bed    []  Each Wound is documented during shift (Stage, Color, drainage, odor, measurements, and dressings)    []  Dual skin checks done at bedside during shift report with Kelli Morales RN.     Cain De Leon RN

## 2022-07-07 NOTE — PROGRESS NOTES
Cardiology Progress Note    Admit Date: 7/5/2022  Attending Cardiologist: Dr. Sho Ross   Patient seen and examined independently. Doing well post intervention. Cath site clean and dry. Patient understands directions for dual antiplatelet therapy. Agree with assessment and plan for the discharge later today. Racheal Tucker MD  Assessment:     -Chest pain, atypical with pleuritic component, worsened with coughing. -CAD, s/p PCI/RHONDA to OM1. · Triple tx for one month 7/7-8/7 with:Eliquis, ASA and Plavix. · On 8/8 would continue Eliquis and Plavix only. D/c ASA. · On 01/08/2023, would d/c Plavix and continue Eliquis only. -NSTEMI,  troponin peaked at 6,346 and down trending. ECG paced.   -Hypertensive urgency, Max /100 mmHg. Improved. -Acute on Chronic HFrEF. Pro-bnp elevated but with historically higher values. Does not appear decompensated at this time.   -Non Ischemic CMY. LVEF improved with GDMT. ·  55% on ECHO from 06/2018,  previously 15-20% in 11/2016. · EF 2019, 40-45%   · EF 30-35% (07/2022)  -s/p AICD ([employer]).    -Chronic Afib/flutter, on Apixaban as outpatient. -COPD  -HTN, on coreg, aldactone and lisinopril as outpatient. Non compliant with medications. -HLD   -H/o CVA, with residual right sided deficits.   -HIV  -Active Tobacco Abuse, cessation advised. -EtOH dependence. -h/o medication non compliance.           Primary cardiologist [facility]  Plan:     -Patient doing well s/p PCI yesterday. Importance of compliance with medications enforced with patient. Attempted to call wife to provide instructions regarding [address] and antiplatelet agents. · Triple tx for one month 7/7-8/7 Eliquis, ASA and Plavix. · On 8/8 d/c ASA only. Continue Eliquis and Plavix. · On 01/08/2023, d/c Plavix and continue Eliquis only. -Start Losartan, can consider starting Entresto at outpatient visit.   -Continue Coreg.  -Start maintenance diuretics.    -Resume Eliquis.    -Will plan for patient to follow up in the office in 2 weeks. No further recommendations from a CV standpoint. Subjective:     No new complaints. Chest and back pain resolved. Right wrist stable w/o hematoma.      Objective:      Patient Vitals for the past 8 hrs:   Temp Pulse Resp BP SpO2   07/07/22 0738 97.4 °F (36.3 °C) 62 16 (!) 159/92 93 %   07/07/22 0330 98.3 °F (36.8 °C) 73 20 136/67 95 %         Patient Vitals for the past 96 hrs:   Weight   07/06/22 0820 83 kg (183 lb)   07/05/22 1950 83.1 kg (183 lb 3.2 oz)       TELE: paced                Current Facility-Administered Medications   Medication Dose Route Frequency Last Admin    clopidogreL (PLAVIX) tablet 75 mg  75 mg Oral DAILY      albuterol (PROVENTIL VENTOLIN) nebulizer solution 2.5 mg  2.5 mg Nebulization Q4H PRN      bisacodyL (DULCOLAX) tablet 5 mg  5 mg Oral DAILY PRN      carvediloL (COREG) tablet 12.5 mg  12.5 mg Oral BID WITH MEALS 12.5 mg at 07/06/22 0851    emtricitabine-tenofovir alafen (DESCOVY) 200-25 mg tablet 1 Tablet  1 Tablet Oral DAILY 1 Tablet at 07/06/22 0855    dolutegravir (TIVICAY) tablet 50 mg  50 mg Oral DAILY 50 mg at 07/06/22 0854    [Held by provider] lisinopriL (PRINIVIL, ZESTRIL) tablet 5 mg  5 mg Oral DAILY      pyridoxine (vitamin B6) (VITAMIN B-6) tablet 50 mg  50 mg Oral DAILY 50 mg at 07/06/22 0851    pravastatin (PRAVACHOL) tablet 40 mg  40 mg Oral QHS 40 mg at 07/06/22 2137    sodium chloride (NS) flush 5-40 mL  5-40 mL IntraVENous Q8H 10 mL at 07/07/22 0703    sodium chloride (NS) flush 5-40 mL  5-40 mL IntraVENous PRN      acetaminophen (TYLENOL) tablet 650 mg  650 mg Oral Q6H PRN      Or    acetaminophen (TYLENOL) suppository 650 mg  650 mg Rectal Q6H PRN      aspirin chewable tablet 81 mg  81 mg Oral DAILY 81 mg at 07/06/22 0247    fentaNYL citrate (PF) injection    PRN 25 mcg at 07/06/22 1520    midazolam (VERSED) injection    PRN 1 mg at 07/06/22 1520    nitroglycerine compounded 400 mcg/mL injection     mcg at 07/06/22 1541    heparin (porcine) 1,000 unit/mL injection    PRN 1,000 Units at 07/06/22 1522    heparin (PF) 2 units/ml in NS infusion     mL at 07/06/22 1528    clopidogreL (PLAVIX) tablet     mg at 07/06/22 1550         Intake/Output Summary (Last 24 hours) at 7/7/2022 0810  Last data filed at 7/7/2022 1731  Gross per 24 hour   Intake 480 ml   Output 400 ml   Net 80 ml       Physical Exam:  General:  alert, cooperative, no distress, appears stated age  Neck:  no JVD  Lungs:  clear to auscultation bilaterally  Heart:  regular rate and rhythm, S1, S2 normal, no murmur, click, rub or gallop  Abdomen:  abdomen is soft without significant tenderness, masses, organomegaly or guarding  Extremities:  extremities normal, atraumatic, no cyanosis or edema; right wrist w/o hematoma, 3+ right radial pulse, cap refill < 2 seconds     Visit Vitals  BP (!) 159/92 (BP 1 Location: Left upper arm, BP Patient Position: At rest)   Pulse 62   Temp 97.4 °F (36.3 °C)   Resp 16   Ht 6' 3.5\" (1.918 m)   Wt 83 kg (183 lb)   SpO2 93%   BMI 22.57 kg/m²       Data Review:     Labs: Results:       Chemistry Recent Labs     07/06/22 0022 07/05/22 2010   * 106*    137   K 3.5 3.3*    104   CO2 28 26   BUN 18 19*   CREA 0.92 0.86   CA 10.1 9.9   MG  --  1.8   AGAP 5 7   BUCR 20 22*   AP 95 99   TP 8.3* 7.7   ALB 2.9* 3.0*   GLOB 5.4* 4.7*   AGRAT 0.5* 0.6*      CBC w/Diff Recent Labs     07/07/22  0400 07/06/22  0730 07/06/22 0022 07/05/22 2010 07/05/22 2010   WBC 9.7 8.0 8.4   < > 8.6   RBC 4.33*  --  4.75  --  4.61   HGB 12.7* 13.8 13.8   < > 13.2   HCT 39.2 45.1 42.6   < > 40.6    272 267   < > 262   GRANS 79* 93 92*   < > 72   LYMPH 9*  --  5*  --  10*   EOS 0 0 0   < > 1    < > = values in this interval not displayed.       Cardiac Enzymes No results found for: CPK, CK, CKMMB, CKMB, RCK3, CKMBT, CKNDX, CKND1, SHIV, TROPT, TROIQ, NA, TROPT, TNIPOC, BNP, BNPP   Coagulation Recent Labs 07/06/22 0730 07/06/22 0022 07/05/22 2010 07/05/22 2010   PTP  --   --   --  17.1*   INR  --   --   --  1.4*   APTT 37.0* 79.8*   < > 37.8*    < > = values in this interval not displayed.        Lipid Panel Lab Results   Component Value Date/Time    Cholesterol, total 199 07/06/2022 07:30 AM    HDL Cholesterol 64 (H) 07/06/2022 07:30 AM    LDL, calculated 120 (H) 07/06/2022 07:30 AM    VLDL, calculated 15 07/06/2022 07:30 AM    Triglyceride 75 07/06/2022 07:30 AM    CHOL/HDL Ratio 3.1 07/06/2022 07:30 AM      BNP No results found for: BNP, BNPP, XBNPT   Liver Enzymes Recent Labs     07/06/22 0022   TP 8.3*   ALB 2.9*   AP 95      Thyroid Studies Lab Results   Component Value Date/Time    TSH 0.52 12/30/2021 01:24 AM          Signed By: Gabriele Daly PA-C     July 7, 2022

## 2022-07-07 NOTE — PROGRESS NOTES
Pt's clinicals faxed to the [place]. Per pt, he will call the [place] to arrange for his pcp follow up.             Marilu Stark, BSN RN  Care Management  Pager: [phone]

## 2022-07-07 NOTE — PROGRESS NOTES
Problem: Pressure Injury - Risk of  Goal: *Prevention of pressure injury  Description: Document Ba Scale and appropriate interventions in the flowsheet. Outcome: Progressing Towards Goal  Note: Pressure Injury Interventions:  Sensory Interventions: Assess changes in LOC    Moisture Interventions: Maintain skin hydration (lotion/cream)    Activity Interventions: Pressure redistribution bed/mattress(bed type)    Mobility Interventions: HOB 30 degrees or less,Pressure redistribution bed/mattress (bed type)    Nutrition Interventions: Document food/fluid/supplement intake    Friction and Shear Interventions: Foam dressings/transparent film/skin sealants                Problem: Patient Education: Go to Patient Education Activity  Goal: Patient/Family Education  Outcome: Progressing Towards Goal     Problem: Falls - Risk of  Goal: *Absence of Falls  Description: Document Kylee Fall Risk and appropriate interventions in the flowsheet.   Outcome: Progressing Towards Goal  Note: Fall Risk Interventions:  Mobility Interventions: Bed/chair exit alarm         Medication Interventions: Evaluate medications/consider consulting pharmacy,Bed/chair exit alarm    Elimination Interventions: Patient to call for help with toileting needs              Problem: Patient Education: Go to Patient Education Activity  Goal: Patient/Family Education  Outcome: Progressing Towards Goal     Problem: Pain  Goal: *Control of Pain  Outcome: Progressing Towards Goal     Problem: Gas Exchange - Impaired  Goal: *Absence of hypoxia  Outcome: Progressing Towards Goal     Problem: Patient Education: Go to Patient Education Activity  Goal: Patient/Family Education  Outcome: Progressing Towards Goal     Problem: Nutrition Deficit  Goal: *Optimize nutritional status  Outcome: Progressing Towards Goal

## 2022-07-07 NOTE — PROGRESS NOTES
Problem: Pressure Injury - Risk of  Goal: *Prevention of pressure injury  Description: Document Ba Scale and appropriate interventions in the flowsheet. 7/7/2022 1220 by Lauren Drake RN  Outcome: Resolved/Met  Note: Pressure Injury Interventions:  Sensory Interventions: Assess changes in LOC,Chair cushion    Moisture Interventions: Absorbent underpads,Minimize layers    Activity Interventions: Assess need for specialty bed,Chair cushion,Increase time out of bed    Mobility Interventions: Assess need for specialty bed,Chair cushion    Nutrition Interventions: Offer support with meals,snacks and hydration    Friction and Shear Interventions: Apply protective barrier, creams and emollients             7/7/2022 1033 by Lauren Drake RN  Outcome: Progressing Towards Goal  Note: Pressure Injury Interventions:  Sensory Interventions: Assess changes in LOC    Moisture Interventions: Maintain skin hydration (lotion/cream)    Activity Interventions: Pressure redistribution bed/mattress(bed type)    Mobility Interventions: HOB 30 degrees or less,Pressure redistribution bed/mattress (bed type)    Nutrition Interventions: Document food/fluid/supplement intake    Friction and Shear Interventions: Foam dressings/transparent film/skin sealants                Problem: Patient Education: Go to Patient Education Activity  Goal: Patient/Family Education  7/7/2022 1220 by Lauren Drake RN  Outcome: Resolved/Met  7/7/2022 1033 by Lauren Drake RN  Outcome: Progressing Towards Goal     Problem: Falls - Risk of  Goal: *Absence of Falls  Description: Document Kylee Fall Risk and appropriate interventions in the flowsheet.   7/7/2022 1220 by Lauren Drake RN  Outcome: Resolved/Met  Note: Fall Risk Interventions:  Mobility Interventions: Assess mobility with egress test,Bed/chair exit alarm         Medication Interventions: Assess postural VS orthostatic hypotension,Bed/chair exit alarm    Elimination Interventions: Bed/chair exit alarm,Call light in reach           7/7/2022 1033 by Priya Whittaker RN  Outcome: Progressing Towards Goal  Note: Fall Risk Interventions:  Mobility Interventions: Bed/chair exit alarm         Medication Interventions: Evaluate medications/consider consulting pharmacy,Bed/chair exit alarm    Elimination Interventions: Patient to call for help with toileting needs              Problem: Patient Education: Go to Patient Education Activity  Goal: Patient/Family Education  7/7/2022 1220 by Priya Whittaker RN  Outcome: Resolved/Met  7/7/2022 1033 by Priya Whittaker RN  Outcome: Progressing Towards Goal     Problem: Pain  Goal: *Control of Pain  7/7/2022 1220 by Priya Whittaker RN  Outcome: Resolved/Met  7/7/2022 1033 by Priya Whittaker RN  Outcome: Progressing Towards Goal     Problem: Gas Exchange - Impaired  Goal: *Absence of hypoxia  7/7/2022 1220 by Priya Whittaker RN  Outcome: Resolved/Met  7/7/2022 1033 by Priya Whittaker RN  Outcome: Progressing Towards Goal     Problem: Patient Education: Go to Patient Education Activity  Goal: Patient/Family Education  7/7/2022 1220 by Priya Whittaker RN  Outcome: Resolved/Met  7/7/2022 1033 by Priya Whittaker RN  Outcome: Progressing Towards Goal     Problem: Nutrition Deficit  Goal: *Optimize nutritional status  7/7/2022 1220 by Priya Whittaker RN  Outcome: Resolved/Met  7/7/2022 1033 by Priya Whittaker RN  Outcome: Progressing Towards Goal

## 2022-07-07 NOTE — PROGRESS NOTES
Reason for Admission:  Dyspnea [R06.00]  Elevated troponin [R77.8]                 RUR Score:    13           Plan for utilizing home health:    No                       Likelihood of Readmission:   LOW                         Transition of Care Plan:              Initial assessment completed with patient. Cognitive status of patient: oriented to time, place, person and situation. Face sheet information confirmed:  yes. The patient designates his wife Leyda Franklin  to participate in his discharge plan and to receive any needed information. This patient lives in a single family home with wife. Patient is able to navigate 5 steps to get in the house  as needed. Prior to hospitalization, patient was considered to be independent with ADLs/IADLS : yes . Patient has a current ACP document on file: no      Healthcare Decision Maker:     Click here to complete [address] including selection of the Healthcare Decision Maker Relationship (ie \"Primary\")    The son will be available to transport patient home upon discharge. The patient already has [employer], W/C,medical equipment available in the home. Patient is not currently active with home health. Patient has stayed in a skilled nursing facility or rehab. Was  stay within last 60 days : no. This patient is on dialysis :no    Currently, the discharge plan is Home. The patient states that he can obtain his medications from the pharmacy, and take his medications as directed.     Patient's current insurance is [employer]  Pt stated his pcp is with the "[employer] Management Interventions  PCP Verified by CM: No (per pt, his pcp is with the [facility])  Transition of Care Consult (CM Consult): Discharge Planning  Support Systems: Spouse/Significant Other,Child(cheryl)  Confirm Follow Up Transport: Family  Discharge Location  Patient Expects to be Discharged to[de-identified] Home with family assistance        ANUPAMA Jett RN  Care Management  Pager: [phone]

## 2022-07-07 NOTE — DISCHARGE INSTRUCTIONS
Patient Education        ACE Inhibitors: Care Instructions  Your Care Instructions     ACE (angiotensin-converting enzyme) inhibitors lower blood pressure. They also treat heart failure and prevent heart attacks and strokes. They block an enzyme that makes blood vessels narrow. As a result, the blood vessels relax and widen. This lowers blood pressure. These medicines also put more water and salt into the urine. This lowers blood pressure too. These medicines are a good choice for people with diabetes. They don't affect blood sugar levels, and they may protect the kidneys. Examples include:  · Benazepril (Lotensin). · Lisinopril (Prinivil, Zestril). · Ramipril (Altace). Before you start taking this medicine, make sure your doctor knows if you take other medicines, especially water pills (diuretics) or potassium tablets. And tell your doctor if you use a salt substitute. You should not take an ACE inhibitor if you are pregnant or planning to become pregnant. You may need regular blood tests. Follow-up care is a key part of your treatment and safety. Be sure to make and go to all appointments, and call your doctor if you are having problems. It's also a good idea to know your test results and keep a list of the medicines you take. How can you care for yourself at home? · Take your medicines exactly as prescribed. Be sure to take your medicine every day. Call your doctor if you think you are having a problem with your medicine. · ACE inhibitors can cause a dry cough. If the cough is bad, talk to your doctor. You may need to try a different medicine. · ACE inhibitors can cause swelling of your lips, tongue, or face. If the swelling is severe, you may need treatment right away. Severe swelling can make it hard to breathe, but this is very rare. · Check with your doctor or pharmacist before you use any other medicines. This includes over-the-counter medicines.  Make sure your doctor knows all of the medicines, vitamins, herbal products, and supplements you take. Taking some medicines together can cause problems. When should you call for help? Call your doctor now or seek immediate medical care if:    · You have swelling of your lips, tongue, or face.     · You think you are having problems with your medicine. Watch closely for changes in your health, and be sure to contact your doctor if you have any problems. Where can you learn more? Go to http://www.gray.com/  Enter [id number] in the search box to learn more about \"ACE Inhibitors: Care Instructions. \"  Current as of: January 10, 2022               Content Version: 13.2  © 2006-2022 "[employer]". Care instructions adapted under license by [employer] (which disclaims liability or warranty for this information). If you have questions about a medical condition or this instruction, always ask your healthcare professional. [address] any warranty or liability for your use of this information. Patient Education        Taking Aspirin and Other Antiplatelets Safely: Care Instructions  Your Care Instructions     Aspirin and other antiplatelet medicines help prevent blood clots from forming. They can help some people lower their risk of a heart attack or stroke. But these medicines can also make you more likely to bleed. That's why it's important to talk to your doctor before you start taking aspirin every day. It's not right for everyone. And if you and your doctor decide these medicines are right for you, learn how to take them safely. If you take aspirin, be sure you know how to take it. Your doctor can tell you what dose to take and how often to take it. One low-dose aspirin is 81 milligrams (mg). But the dose for daily aspirin can range from 81 mg to 325 mg. If you take another antiplatelet, take it as prescribed. Follow-up care is a key part of your treatment and safety.  Be sure to make and go to all appointments, and call your doctor if you are having problems. It's also a good idea to know your test results and keep a list of the medicines you take. How can you care for yourself at home? · Before you start to take daily aspirin or some other antiplatelet, tell your doctor all the medicines, vitamins, herbal products, and supplements you take. · Tell your doctors, dentist, and pharmacist that you take an antiplatelet. · Take your medicine as your doctor directs. Make sure that you understand exactly what your doctor wants you to do. If another doctor says to stop taking the medicine for any reason, talk to the doctor who prescribed it before you stop. · Take your medicine at the same time every day. · Do not chew or crush the coated or time-release forms of your medicine. · If you miss a dose, don't take an extra dose to make up for it. · Ask your doctor whether you can drink alcohol. And ask how much you can drink. When you take an antiplatelet, drinking too much raises your risk for liver damage and stomach bleeding. · If you are pregnant, are breastfeeding, or plan to become pregnant, talk to your doctor about what medicines are safe. · Talk with your doctor before you take a pain medicine. Many pain medicines have aspirin. Too much aspirin can be harmful. · Wear medical alert jewelry. This lets others know that you take an antiplatelet. You can buy it at most drugstores. · Try to avoid injuries that might make you bleed. For example, be careful when you exercise and when you play sports. Make your home safe to reduce your risk of falling. When should you call for help? Call 911  anytime you think you may need emergency care. For example, call if:    · You have a sudden, severe headache that is different from past headaches. Call your doctor now or seek immediate medical care if:    · You have any abnormal bleeding, such as:  ? A nosebleed that you can't easily stop. ?  Bloody or black stools, or rectal bleeding. ? Bloody or pink urine.     · You feel dizzy or lightheaded or feel like you may faint. Watch closely for changes in your health, and be sure to contact your doctor if you have any problems. Where can you learn more? Go to http://www.gray.com/  Enter D100 in the search box to learn more about \"Taking Aspirin and Other Antiplatelets Safely: Care Instructions. \"  Current as of: January 10, 2022               Content Version: 13.2  © 6963-0022 [employer]. Care instructions adapted under license by [employer] (which disclaims liability or warranty for this information). If you have questions about a medical condition or this instruction, always ask your healthcare professional. [address] any warranty or liability for your use of this information.

## 2022-07-07 NOTE — PROGRESS NOTES
Received discharge prescriptions to [facility] will collect copay if any and deliver when ready.

## 2022-07-07 NOTE — ROUTINE PROCESS
[id number] [facility] IN REPORT:    Verbal report received from [facility] RN(name) on Ryan Phillips  being received from [facility](unit) for routine post - op      Report consisted of patients Situation, Background, Assessment and   Recommendations(SBAR). Information from the following report(s) SBAR, Kardex, Intake/Output, MAR, Recent Results and Cardiac Rhythm Paced with AICD was reviewed with the receiving nurse. Opportunity for questions and clarification was provided. 1850 Assessment completed upon patients arrival to unit and care assumed. Right wrist dressing dry and intact. Denies any pain. Call bell and telephone placed within reach. Urinal within reach. 1940 Bedside and Verbal shift change report given to [facility] (oncoming nurse). Report included the following information SBAR, Kardex, Intake/Output, MAR, Recent Results and Cardiac Rhythm Paced.

## 2022-07-07 NOTE — PROGRESS NOTES
Infectious Disease Consultation Note        Reason:HiV/AIDS, evaluate for opportunistic infections    Current abx Prior abx   bactrim      Lines:       Assessment :    73 y.o.man with hypertension, nonischemic cardiomyopathy with systolic CHF s/p AICD, persistent A. Fib on anticoagulation,  HIV/AIDS, prostate cancer and tobacco use presented to [facility] on 7/5/22 with chest pain, fatigue.      Hospitalization at [facility] 12/2021 for acute COVID-19 infection    Status post 2 doses of COVID-19 vaccine 2022 (patient does not recollect the timing of vaccination)    Clinical presentation c/w probable non STEMI in a patient with HIV/AIDS     No definitive clinical or radiographic evidence to suggest bacterial/viral pneumonia, respiratory tract infection at this time    Recent CD4 count >200 12/2021 decreases likelihood of HIV associated opportunistic infections including pneumocystis     Elevated troponin-due to non-STEMI. Cardiology consult appreciated. Status post cardiac cath 7/6/2022     HIV/AIDS-follows up with [facility] clinic. [place] on emtricitabine/tenofovir/dolutegravir. Last CD4 count: 265 on 12/2021.      Recommendations:     1.  Recommend emtricitabine/tenofovir/dolutegravir  2. Follow-up cardiology recommendations  3. Anticoagulation per cardiology  4. F/u CD4 count. Patient will need appointment in [facility] for f/u of HIV. Patient was advised regarding compliance for HIV medications to prevent HIV related complications in future    Above plan was discussed in details with patient. Please call me if any further questions or concerns. Will continue to participate in the care of this patient. HPI:    Feels better. In good spirits. Resolved back pain. Denies any chest pain.     Patient denies any subjective fever or chills at home. He denies increasing cough, sputum production. He does not recollect the names of his HIV medication.       Past Medical History:   Diagnosis Date    AICD (automatic cardioverter/defibrillator) present     [facility] fibrillation[facility] persistent ([facility]     [facility]     AIDS? HIV    Cigarette smoker     HFrEF (heart failure with reduced ejection fraction) (HCC)     HIV (human immunodeficiency virus infection) [facility]     Hypertension     Rectal bleeding     Stroke [facility])        Past Surgical History:   Procedure Laterality Date    HX ORTHOPAEDIC      back       home Medication List    Details   lisinopriL (PRINIVIL, ZESTRIL) 5 mg tablet Take 1 Tablet by mouth daily. Indications: chronic heart failure, high blood pressure  Qty: 30 Tablet, Refills: 2      cholecalciferol (VITAMIN D3) (5000 Units /125 mcg) capsule Take 1 Capsule by mouth daily. Indications: prevention of vitamin D deficiency  Qty: 30 Capsule, Refills: 0      bisacodyL (DULCOLAX, BISACODYL,) 5 mg EC tablet Take 1 tablet by mouth twice a day as needed for constipation. Qty: 20 Tab, Refills: 0      albuterol (PROVENTIL HFA) 90 mcg/actuation inhaler Take 2 Puffs by inhalation every four (4) hours as needed for Wheezing or Shortness of Breath. Indications: bronchospasm prevention  Qty: 1 Inhaler, Refills: 0      apixaban (ELIQUIS) 5 mg tablet Take 1 Tab by mouth every twelve (12) hours. Qty: 60 Tab, Refills: 0      carvedilol (COREG) 25 mg tablet Take 0.5 Tabs by mouth two (2) times daily (with meals). Qty: 30 Tab, Refills: 0      dolutegravir (TIVICAY) 50 mg tab tablet Take 1 Tab by mouth daily. Qty: 30 Tab, Refills: 0      emtricitabine-tenofovir alafen (DESCOVY) tablet Take 1 Tab by mouth daily. Qty: 30 Tab, Refills: 0      pravastatin (PRAVACHOL) 40 mg tablet Take 1 Tab by mouth nightly. Qty: 30 Tab, Refills: 0      pyridoxine, vitamin B6, (VITAMIN B-6) 50 mg tablet Take 1 Tab by mouth daily. Qty: 30 Tab, Refills: 0      acetaminophen (TYLENOL) 500 mg tablet Take 1 Tab by mouth every six (6) hours as needed.  Indications: Pain  Qty: 20 Tab, Refills: 0             Current Facility-Administered Medications   Medication Dose Route Frequency    clopidogreL (PLAVIX) tablet 75 mg  75 mg Oral DAILY    losartan (COZAAR) tablet 25 mg  25 mg Oral DAILY    bumetanide (BUMEX) tablet 0.5 mg  0.5 mg Oral DAILY    apixaban (ELIQUIS) tablet 5 mg  5 mg Oral BID    albuterol (PROVENTIL VENTOLIN) nebulizer solution 2.5 mg  2.5 mg Nebulization Q4H PRN    bisacodyL (DULCOLAX) tablet 5 mg  5 mg Oral DAILY PRN    carvediloL (COREG) tablet 12.5 mg  12.5 mg Oral BID WITH MEALS    emtricitabine-tenofovir alafen (DESCOVY) 200-25 mg tablet 1 Tablet  1 Tablet Oral DAILY    dolutegravir (TIVICAY) tablet 50 mg  50 mg Oral DAILY    pyridoxine (vitamin B6) (VITAMIN B-6) tablet 50 mg  50 mg Oral DAILY    pravastatin (PRAVACHOL) tablet 40 mg  40 mg Oral QHS    sodium chloride (NS) flush 5-40 mL  5-40 mL IntraVENous Q8H    sodium chloride (NS) flush 5-40 mL  5-40 mL IntraVENous PRN    acetaminophen (TYLENOL) tablet 650 mg  650 mg Oral Q6H PRN    Or    acetaminophen (TYLENOL) suppository 650 mg  650 mg Rectal Q6H PRN    aspirin chewable tablet 81 mg  81 mg Oral DAILY    fentaNYL citrate (PF) injection    PRN    midazolam (VERSED) injection    PRN    nitroglycerine compounded 400 mcg/mL injection    PRN    heparin (porcine) 1,000 unit/mL injection    PRN    heparin (PF) 2 units/ml in NS infusion    PRN     Current Outpatient Medications   Medication Sig    carvediloL (COREG) 12.5 mg tablet Take 1 Tablet by mouth two (2) times daily (with meals). Indications: heart failure with reduced ejection fraction due to dilated cardiomyopathy    [START ON 7/8/2022] aspirin 81 mg chewable tablet Take 1 Tablet by mouth daily.  [START ON 7/8/2022] bumetanide (BUMEX) 0.5 mg tablet Take 1 Tablet by mouth daily.  [START ON 7/8/2022] clopidogreL (PLAVIX) 75 mg tab Take 1 Tablet by mouth daily.  [START ON 7/8/2022] losartan (COZAAR) 25 mg tablet Take 1 Tablet by mouth daily.     cholecalciferol (VITAMIN D3) (5000 Units /125 mcg) capsule Take 1 Capsule by mouth daily. Indications: prevention of vitamin D deficiency    bisacodyL (DULCOLAX, BISACODYL,) 5 mg EC tablet Take 1 tablet by mouth twice a day as needed for constipation.  albuterol (PROVENTIL HFA) 90 mcg/actuation inhaler Take 2 Puffs by inhalation every four (4) hours as needed for Wheezing or Shortness of Breath. Indications: bronchospasm prevention    apixaban (ELIQUIS) 5 mg tablet Take 1 Tab by mouth every twelve (12) hours.  dolutegravir (TIVICAY) 50 mg tab tablet Take 1 Tab by mouth daily.  emtricitabine-tenofovir alafen (DESCOVY) tablet Take 1 Tab by mouth daily.  pravastatin (PRAVACHOL) 40 mg tablet Take 1 Tab by mouth nightly.  pyridoxine, vitamin B6, (VITAMIN B-6) 50 mg tablet Take 1 Tab by mouth daily.  acetaminophen (TYLENOL) 500 mg tablet Take 1 Tab by mouth every six (6) hours as needed. Indications: Pain       Allergies: Patient has no known allergies. History reviewed. No pertinent family history. Social History     Socioeconomic History    Marital status:      Spouse name: Not on file    Number of children: Not on file    Years of education: Not on file    Highest education level: Not on file   Occupational History    Not on file   Tobacco Use    Smoking status: Current Every Day Smoker     Packs/day: 0.50     Types: Cigarettes    Smokeless tobacco: Never Used   Substance and Sexual Activity    Alcohol use:  Yes     Alcohol/week: 5.8 standard drinks     Types: 7 Standard drinks or equivalent per week     Comment: \"drink everyday but never alone\"     Drug use: Yes     Types: Marijuana    Sexual activity: Not Currently   Other Topics Concern    Not on file   Social History Narrative    Not on file     Social Determinants of Health     Financial Resource Strain:     Difficulty of Paying Living Expenses: Not on file   Food Insecurity:     Worried About Running Out of Food in the Last Year: Not on file    Miki of Food in the Last Year: Not on file   Transportation Needs:     Lack of Transportation (Medical): Not on file    Lack of Transportation (Non-Medical): Not on file   Physical Activity:     Days of Exercise per Week: Not on file    Minutes of Exercise per Session: Not on file   Stress:     Feeling of Stress : Not on file   Social Connections:     Frequency of Communication with Friends and Family: Not on file    Frequency of Social Gatherings with Friends and Family: Not on file    Attends Jew Services: Not on file    Active Member of [address] [place] [address] [employer] or Organizations: Not on file    Attends Club or Organization Meetings: Not on file    Marital Status: Not on file   Intimate Partner Violence:     Fear of Current or Ex-Partner: Not on file    Emotionally Abused: Not on file    Physically Abused: Not on file    Sexually Abused: Not on file   Housing Stability:     Unable to Pay for Housing in the Last Year: Not on file    Number of [place] in the Last Year: Not on file    Unstable Housing in the Last Year: Not on file     Social History     Tobacco Use   Smoking Status Current Every Day Smoker    Packs/day: 0.50    Types: Cigarettes   Smokeless Tobacco Never Used        Temp (24hrs), Av.7 °F (36.5 °C), Min:97.2 °F (36.2 °C), Max:98.3 °F (36.8 °C)    Visit Vitals  BP (!) 161/86 (BP 1 Location: Left upper arm, BP Patient Position: At rest)   Pulse 62   Temp 97.8 °F (36.6 °C)   Resp 24   Ht 6' 3.5\" (1.918 m)   Wt 83 kg (183 lb)   SpO2 96%   BMI 22.57 kg/m²       ROS: 12 point ROS obtained in details. Pertinent positives as mentioned in HPI,   otherwise negative    Physical Exam:    Vitals signs and nursing note reviewed. Constitutional:       General: He is not in acute distress.     Appearance: He is well-developed. HENT:      Head: Normocephalic. Eyes:      Conjunctiva/sclera: Conjunctivae normal.      Neck:      Musculoskeletal: Normal range of motion and neck supple.    Cardiovascular:      Rate and Rhythm: Normal rate and regular rhythm on monitor  Chest:      Bilateral chest movements equal.   Abdominal:      General: There is no distension.      Palpations: Abdomen is soft.      Tenderness: There is no abdominal tenderness. There is no rebound. Musculoskeletal: Normal range of motion.         General: No tenderness. Skin:     General: Skin is warm and dry.      Findings: No rash. Neurological:      Mental Status: He is alert and oriented to person, place, and time.      Cranial Nerves: No cranial nerve deficit.      Motor: No abnormal muscle tone.      Coordination: Coordination normal.   Psychiatric:         Behavior: Behavior normal.         Thought Content: Thought content normal.         Judgment: Judgment normal.          Labs: Results:   Chemistry Recent Labs     07/06/22  0022 07/05/22 2010   * 106*    137   K 3.5 3.3*    104   CO2 28 26   BUN 18 19*   CREA 0.92 0.86   CA 10.1 9.9   AGAP 5 7   BUCR 20 22*   AP 95 99   TP 8.3* 7.7   ALB 2.9* 3.0*   GLOB 5.4* 4.7*   AGRAT 0.5* 0.6*      CBC w/Diff Recent Labs     07/07/22  0400 07/06/22  0730 07/06/22  0022 07/05/22 2010 07/05/22 2010   WBC 9.7 8.0 8.4   < > 8.6   RBC 4.33*  --  4.75  --  4.61   HGB 12.7* 13.8 13.8   < > 13.2   HCT 39.2 45.1 42.6   < > 40.6    272 267   < > 262   GRANS 79* 93 92*   < > 72   LYMPH 9*  --  5*  --  10*   EOS 0 0 0   < > 1    < > = values in this interval not displayed. Microbiology No results for input(s): CULT in the last 72 hours. RADIOLOGY:    All available imaging studies/reports in [facility] care for this admission were reviewed      Disclaimer: Sections of this note are dictated utilizing voice recognition software, which may have resulted in some phonetic based errors in grammar and contents. Even though attempts were made to correct all the mistakes, some may have been missed, and remained in the body of the document.  If questions arise, please contact our department.     Dr. Guy Redman, Infectious Disease Specialist  [phone]  July 7, 2022  8:54 AM

## 2022-07-11 ENCOUNTER — HOSPITAL ENCOUNTER (EMERGENCY)
Age: 72
Discharge: HOME | End: 2022-07-11
Attending: EMERGENCY MEDICINE
Payer: MEDICARE

## 2022-07-11 ENCOUNTER — APPOINTMENT (OUTPATIENT)
Dept: GENERAL RADIOLOGY | Age: 72
End: 2022-07-11
Attending: STUDENT IN AN ORGANIZED HEALTH CARE EDUCATION/TRAINING PROGRAM
Payer: MEDICARE

## 2022-07-11 VITALS
TEMPERATURE: 99.4 F | OXYGEN SATURATION: 100 % | DIASTOLIC BLOOD PRESSURE: 110 MMHG | HEART RATE: 77 BPM | RESPIRATION RATE: 20 BRPM | SYSTOLIC BLOOD PRESSURE: 124 MMHG

## 2022-07-11 DIAGNOSIS — I50.22 CHRONIC SYSTOLIC CONGESTIVE HEART FAILURE (HCC): ICD-10-CM

## 2022-07-11 DIAGNOSIS — R60.0 LOCALIZED EDEMA: Primary | ICD-10-CM

## 2022-07-11 LAB
ALBUMIN SERPL-MCNC: 3.1 G/DL (ref 3.4–5)
ALBUMIN/GLOB SERPL: 0.6 {RATIO} (ref 0.8–1.7)
ALP SERPL-CCNC: 102 U/L (ref 45–117)
ALT SERPL-CCNC: 23 U/L (ref 16–61)
ANION GAP SERPL CALC-SCNC: 5 MMOL/L (ref 3–18)
AST SERPL-CCNC: 20 U/L (ref 10–38)
BASOPHILS # BLD: 0 K/UL (ref 0–0.1)
BASOPHILS NFR BLD: 0 % (ref 0–2)
BILIRUB SERPL-MCNC: 0.5 MG/DL (ref 0.2–1)
BNP SERPL-MCNC: 1212 PG/ML (ref 0–900)
BUN SERPL-MCNC: 18 MG/DL (ref 7–18)
BUN/CREAT SERPL: 19 (ref 12–20)
CALCIUM SERPL-MCNC: 9.9 MG/DL (ref 8.5–10.1)
CHLORIDE SERPL-SCNC: 108 MMOL/L (ref 100–111)
CO2 SERPL-SCNC: 28 MMOL/L (ref 21–32)
CREAT SERPL-MCNC: 0.94 MG/DL (ref 0.6–1.3)
DIFFERENTIAL METHOD BLD: ABNORMAL
EOSINOPHIL # BLD: 0.2 K/UL (ref 0–0.4)
EOSINOPHIL NFR BLD: 2 % (ref 0–5)
ERYTHROCYTE [DISTWIDTH] IN BLOOD BY AUTOMATED COUNT: 12.5 % (ref 11.6–14.5)
ETHANOL SERPL-MCNC: <3 MG/DL (ref 0–3)
GLOBULIN SER CALC-MCNC: 5.6 G/DL (ref 2–4)
GLUCOSE SERPL-MCNC: 89 MG/DL (ref 74–99)
HCT VFR BLD AUTO: 41.9 % (ref 36–48)
HGB BLD-MCNC: 13.3 G/DL (ref 13–16)
IMM GRANULOCYTES # BLD AUTO: 0 K/UL (ref 0–0.04)
IMM GRANULOCYTES NFR BLD AUTO: 0 % (ref 0–0.5)
LYMPHOCYTES # BLD: 1.1 K/UL (ref 0.9–3.6)
LYMPHOCYTES NFR BLD: 13 % (ref 21–52)
MCH RBC QN AUTO: 29 PG (ref 24–34)
MCHC RBC AUTO-ENTMCNC: 31.7 G/DL (ref 31–37)
MCV RBC AUTO: 91.5 FL (ref 78–100)
MONOCYTES # BLD: 1.1 K/UL (ref 0.05–1.2)
MONOCYTES NFR BLD: 13 % (ref 3–10)
NEUTS SEG # BLD: 6.4 K/UL (ref 1.8–8)
NEUTS SEG NFR BLD: 72 % (ref 40–73)
NRBC # BLD: 0 K/UL (ref 0–0.01)
NRBC BLD-RTO: 0 PER 100 WBC
PLATELET # BLD AUTO: 369 K/UL (ref 135–420)
PMV BLD AUTO: 11.8 FL (ref 9.2–11.8)
POTASSIUM SERPL-SCNC: 3.6 MMOL/L (ref 3.5–5.5)
PROT SERPL-MCNC: 8.7 G/DL (ref 6.4–8.2)
RBC # BLD AUTO: 4.58 M/UL (ref 4.35–5.65)
SODIUM SERPL-SCNC: 141 MMOL/L (ref 136–145)
TROPONIN-HIGH SENSITIVITY: 1066 NG/L (ref 0–78)
WBC # BLD AUTO: 8.9 K/UL (ref 4.6–13.2)

## 2022-07-11 PROCEDURE — 83880 ASSAY OF NATRIURETIC PEPTIDE: CPT

## 2022-07-11 PROCEDURE — 71046 X-RAY EXAM CHEST 2 VIEWS: CPT

## 2022-07-11 PROCEDURE — 99285 EMERGENCY DEPT VISIT HI MDM: CPT

## 2022-07-11 PROCEDURE — 93005 ELECTROCARDIOGRAM TRACING: CPT

## 2022-07-11 PROCEDURE — 80053 COMPREHEN METABOLIC PANEL: CPT

## 2022-07-11 PROCEDURE — 84484 ASSAY OF TROPONIN QUANT: CPT

## 2022-07-11 PROCEDURE — 82077 ASSAY SPEC XCP UR&BREATH IA: CPT

## 2022-07-11 PROCEDURE — 85025 COMPLETE CBC W/AUTO DIFF WBC: CPT

## 2022-07-11 NOTE — ED PROVIDER NOTES
72-year-old male with a history of hypertension, A. fib, CHF, HIV, CVA, MI, prostate cancer, recently admitted last week due to chest pain with stent placement presented with a chief complaint of bilateral foot swelling that he noted earlier this morning. The patient denied any other associated symptoms or complaints at this time. Past Medical History:   Diagnosis Date    AICD (automatic cardioverter/defibrillator) present     [facility] persistent ([facility]     Autoimmune disease ([facility]     AIDS? HIV    Cigarette smoker     HFrEF (heart failure with reduced ejection fraction) (HCC)     HIV (human immunodeficiency virus infection) ([facility]     Hypertension     Rectal bleeding     Stroke [facility])        Past Surgical History:   Procedure Laterality Date    HX ORTHOPAEDIC      back         No family history on file. Social History     Socioeconomic History    Marital status:      Spouse name: Not on file    Number of children: Not on file    Years of education: Not on file    Highest education level: Not on file   Occupational History    Not on file   Tobacco Use    Smoking status: Current Every Day Smoker     Packs/day: 0.50     Types: Cigarettes    Smokeless tobacco: Never Used   Substance and Sexual Activity    Alcohol use: Yes     Alcohol/week: 5.8 standard drinks     Types: 7 Standard drinks or equivalent per week     Comment: \"drink everyday but never alone\"     Drug use: Yes     Types: Marijuana    Sexual activity: Not Currently   Other Topics Concern    Not on file   Social History Narrative    Not on file     Social Determinants of Health     Financial Resource Strain:     Difficulty of Paying Living Expenses: Not on file   Food Insecurity:     Worried About Running Out of Food in the Last Year: Not on file    Miki of Food in the Last Year: Not on file   Transportation Needs:     Lack of Transportation (Medical):  Not on file    Lack of Transportation (Non-Medical): Not on file   Physical Activity:     Days of Exercise per Week: Not on file    Minutes of Exercise per Session: Not on file   Stress:     Feeling of Stress : Not on file   Social Connections:     Frequency of Communication with Friends and Family: Not on file    Frequency of Social Gatherings with Friends and Family: Not on file    Attends [employer] Services: Not on file    Active Member of [address] [place] [address] or Organizations: Not on file    Attends Club or Organization Meetings: Not on file    Marital Status: Not on file   Intimate Partner Violence:     Fear of Current or Ex-Partner: Not on file    Emotionally Abused: Not on file    Physically Abused: Not on file    Sexually Abused: Not on file   Housing Stability:     Unable to Pay for Housing in the Last Year: Not on file    Number of [place] in the Last Year: Not on file    Unstable Housing in the Last Year: Not on file         ALLERGIES: Patient has no known allergies. Review of Systems   Constitutional: Negative for chills, diaphoresis and fever. HENT: Negative for congestion and sore throat. Eyes: Negative for visual disturbance. Respiratory: Negative for cough and shortness of breath. Cardiovascular: Positive for leg swelling. Negative for chest pain. Gastrointestinal: Negative for abdominal pain, diarrhea, nausea and vomiting. Genitourinary: Negative for difficulty urinating, dysuria, frequency and urgency. Musculoskeletal: Negative for arthralgias. Skin: Negative for rash. Neurological: Negative for weakness, light-headedness, numbness and headaches. Hematological: Negative for adenopathy. Psychiatric/Behavioral: Negative for confusion. Vitals:    07/11/22 1832   BP: (!) 124/110   Pulse: 77   Resp: 20   Temp: 99.4 °F (37.4 °C)   SpO2: 100%            Physical Exam  Constitutional:       General: He is not in acute distress. HENT:      Head: Normocephalic and atraumatic.       Nose: Nose normal. Mouth/Throat:      Mouth: Mucous membranes are moist.      Pharynx: Oropharynx is clear. No oropharyngeal exudate or posterior oropharyngeal erythema. Eyes:      Extraocular Movements: Extraocular movements intact. Pupils: Pupils are equal, round, and reactive to light. Cardiovascular:      Rate and Rhythm: Normal rate. Pulses: Normal pulses. Heart sounds: Murmur heard. Pulmonary:      Effort: Pulmonary effort is normal.      Breath sounds: Examination of the right-lower field reveals wheezing. Examination of the left-lower field reveals wheezing. Wheezing present. Chest:      Chest wall: Mass present. Abdominal:      Palpations: Abdomen is soft. Tenderness: There is no abdominal tenderness. Musculoskeletal:      Cervical back: Normal range of motion and neck supple. Right lower le+ Pitting Edema present. Left lower le+ Pitting Edema present. Right ankle: Swelling present. Left ankle: Swelling present. Right foot: Swelling present. Left foot: Swelling present. Comments: Bilateral +2 pitting edema feet and ankles   Lymphadenopathy:      Cervical: No cervical adenopathy. Skin:     General: Skin is warm and dry. Capillary Refill: Capillary refill takes less than 2 seconds. Findings: No rash. Neurological:      Mental Status: He is alert and oriented to person, place, and time. Psychiatric:         Mood and Affect: Mood normal.          MDM  Number of Diagnoses or Management Options  Chronic systolic congestive heart failure ([address]  Localized edema  Diagnosis management comments:     6:37 PM  Vital signs notable for blood pressure 124/110, otherwise within normal limits. Exam notable for bilateral lower extremity +2 pitting edema of foot and ankles. Exam also notable for AICD in place in left chest with notable murmur on cardiac auscultation as well as faint expiratory wheeze bilaterally.   Exam was otherwise unremarkable. Ordered EKG, chest x-ray, CBC, CMP, troponin, BNP, alcohol level    7:39 PM  Looking through patient's record it appears he was last seen on the seventh while he was inpatient by cardiology, Dr. Sotrm Adams, plans were for him to follow-up in 2 weeks with [facility]. Plan was for patient to be anticoagulated on Eliquis, aspirin, Plavix, continue Coreg, and start losartan at that time. Patient had a high-grade OM occlusion with stent placed. 10:32 PM  BNP elevated at 1212, however this is improved from BMP last week which was 2564  Troponin elevated at 1066 however, improved from last week of 4050  Other labs unremarkable  Chest x-ray no acute cardiopulmonary process. Looking back through patient's history from last week he had an echo performed on 6 July which demonstrated an ejection fraction of 30 to 35%  Final note from  endorsed that the patient was supposed to call the [address] [place] [address] to set up a follow-up appointment with his primary care provider. Discussed all this at length with the patient and I gave him the contact number for cardiologist, Dr. Storm Adams, with instructions to call and schedule a follow-up appointment. I also [facility] as was the plan per  on his discharge. I instructed the patient to continue taking his medications as prescribed. As patient is chest pain-free, with only complaint increased swelling he will need optimization of his medications it does not need admission for this at this time. He will need to follow-up with his cardiologist and his primary care provider. I will discharge him at this time with instructions to schedule follow-up with cardiology and his primary care provider and I gave him good return precautions for worsening or worrisome symptoms. He expressed understanding and was on board with the plan.        Amount and/or Complexity of Data Reviewed  Decide to obtain previous medical records or to obtain history from someone other than the patient: yes           Dr Ferna Homans, MD  [facility] EM PGY4

## 2022-07-12 LAB
ATRIAL RATE: 277 BPM
CALCULATED P AXIS, ECG09: -80 DEGREES
CALCULATED R AXIS, ECG10: -66 DEGREES
CALCULATED T AXIS, ECG11: 101 DEGREES
DIAGNOSIS, 93000: NORMAL
Q-T INTERVAL, ECG07: 448 MS
QRS DURATION, ECG06: 176 MS
QTC CALCULATION (BEZET), ECG08: 490 MS
VENTRICULAR RATE, ECG03: 72 BPM

## 2022-07-25 PROBLEM — I21.4 NSTEMI (NON-ST ELEVATED MYOCARDIAL INFARCTION) (HCC): Status: ACTIVE | Noted: 2022-07-25

## 2022-07-25 PROBLEM — I50.32 CHRONIC DIASTOLIC HEART FAILURE (HCC): Status: ACTIVE | Noted: 2022-07-25

## 2022-07-25 PROBLEM — I16.0 HYPERTENSIVE URGENCY: Status: ACTIVE | Noted: 2022-07-25

## 2022-07-25 NOTE — DISCHARGE SUMMARY
Discharge Summary    Patient: Trenton Tobar MRN: [id number]  CSN: [id number]    YOB: 1950  Age: 73 y.o. Sex: male    DOA: 7/5/2022 LOS:  LOS: 2 days   Discharge Date: 7/7/2022     Admission Diagnosis: Dyspnea [R06.00]  Elevated troponin [R77.8]    Discharge Diagnosis:    Hospital Problems  Date Reviewed: 6/6/2018            Codes Class Noted POA    NSTEMI (non-ST elevated myocardial infarction) ([facility] ICD-10-CM: I21.4  ICD-9-CM: 410.70  7/25/2022 Unknown        Hypertensive urgency ICD-10-CM: I16.0  ICD-9-CM: 401.9  7/25/2022 Unknown        Chronic diastolic heart failure ([facility] ICD-10-CM: I50.32  ICD-9-CM: 428.32  7/25/2022 Unknown        Dyspnea ICD-10-CM: R06.00  ICD-9-CM: 786.09  7/5/2022 Unknown        Asymptomatic HIV infection, with no history of HIV-related illness ([facility] ICD-10-CM: Z21  ICD-9-CM: V08  9/12/2012 Unknown           Discharge Condition: Stable  Discharge Disposition: home     PHYSICAL EXAM  Visit Vitals  BP (!) 161/86 (BP 1 Location: Left upper arm, BP Patient Position: At rest)   Pulse 62   Temp 97.8 °F (36.6 °C)   Resp 24   Ht 6' 3.5\" (1.918 m)   Wt 83 kg (183 lb)   SpO2 96%   BMI 22.57 kg/m²       General: Alert, cooperative, no acute distress    HEENT: NC, Atraumatic. PERRLA, EOMI. Anicteric sclerae. Lungs:  CTA Bilaterally. No Wheezing/Rhonchi/Rales. Heart:  Regular  rhythm,  No murmur, No Rubs, No Gallops  Abdomen: Soft, Non distended, Non tender. +Bowel sounds, no HSM  Extremities: No c/c/e  Psych:   Good insight. Not anxious or agitated. Neurologic:  CN 2-12 grossly intact, oriented X 3. No acute neurological                                 Deficits,     Hospital Course By Problem:   NSTEMI - cardiology following, await formal cardiac cath report? Stent placement  Hypertensive urgency - followed by cardiology. Placed on medications as listed.    Chronic atrial fibrillation / a flutter- on Eliquis as outpatient  HIV /AIDS -ID consulted, continue emtricitabine/tenofovir/dolutegravir per home regimen. Will need follow-up appointment at [place]. Chronic systolic diastolic heart failure exacerbation - not decompensated. Home regimen continued. Dyspnea, POA - likely in setting of #1, CTA w/o evidence of PE  Tobacco abuse -continues to smoke 1/2 daily PTA  History of nonischemic cardiomyopathy status post AICD placement    Consults:   Cardiology - Dr. Jorge A Murry  ID - Dr. Daxa Farias     Significant Diagnostic Studies:   Echo:   Left Ventricle Moderately reduced left ventricular systolic function with a visually estimated EF of 30 - 35%. Left ventricle size is normal. Increased wall thickness. Findings consistent with moderate concentric hypertrophy. Moderate global hypokinesis present. Grade I diastolic dysfunction with normal LAP. Right Ventricle Lead present in the right ventricle. TAPSE is abnormal. TAPSE is 1.5 cm. Right Atrium Lead present in the right atrium. Mitral Valve Trace regurgitation. Pulmonary Artery Pulmonary hypertension not present. IVC/Hepatic Veins IVC diameter is less than or equal to 21 mm and decreases greater than 50% during inspiration; therefore the estimated right atrial pressure is normal (~3 mmHg). Pericardium No pericardial effusion. Discharge Medications:     Discharge Medication List as of 7/7/2022  1:06 PM        START taking these medications    Details   aspirin 81 mg chewable tablet Take 1 Tablet by mouth daily. , Normal, Disp-30 Tablet, R-0      bumetanide (BUMEX) 0.5 mg tablet Take 1 Tablet by mouth daily. , Normal, Disp-30 Tablet, R-0      clopidogreL (PLAVIX) 75 mg tab Take 1 Tablet by mouth daily. , Normal, Disp-30 Tablet, R-0      losartan (COZAAR) 25 mg tablet Take 1 Tablet by mouth daily. , Normal, Disp-30 Tablet, R-0           CONTINUE these medications which have CHANGED    Details   carvediloL (COREG) 12.5 mg tablet Take 1 Tablet by mouth two (2) times daily (with meals).  Indications: heart failure with reduced ejection fraction due to dilated cardiomyopathy, Normal, Disp-60 Tablet, R-0           CONTINUE these medications which have NOT CHANGED    Details   cholecalciferol (VITAMIN D3) (5000 Units /125 mcg) capsule Take 1 Capsule by mouth daily. Indications: prevention of vitamin D deficiency, Print, Disp-30 Capsule, R-0      bisacodyL (DULCOLAX, BISACODYL,) 5 mg EC tablet Take 1 tablet by mouth twice a day as needed for constipation. , Print, Disp-20 Tab, R-0      albuterol (PROVENTIL HFA) 90 mcg/actuation inhaler Take 2 Puffs by inhalation every four (4) hours as needed for Wheezing or Shortness of Breath. Indications: bronchospasm prevention, Print, Disp-1 Inhaler, R-0      apixaban (ELIQUIS) 5 mg tablet Take 1 Tab by mouth every twelve (12) hours. , Print, Disp-60 Tab, R-0      dolutegravir (TIVICAY) 50 mg tab tablet Take 1 Tab by mouth daily. , Print, Disp-30 Tab, R-0      emtricitabine-tenofovir alafen (DESCOVY) tablet Take 1 Tab by mouth daily. , Print, Disp-30 Tab, R-0      pravastatin (PRAVACHOL) 40 mg tablet Take 1 Tab by mouth nightly. , Print, Disp-30 Tab, R-0      pyridoxine, vitamin B6, (VITAMIN B-6) 50 mg tablet Take 1 Tab by mouth daily. , Print, Disp-30 Tab, R-0      acetaminophen (TYLENOL) 500 mg tablet Take 1 Tab by mouth every six (6) hours as needed.  Indications: Pain, No Print, Disp-20 Tab, R-0           STOP taking these medications       lisinopriL (PRINIVIL, ZESTRIL) 5 mg tablet Comments:   Reason for Stopping:                  Activity: activity as tolerated    Diet: Cardiac Diet    Wound Care: None needed    Follow-up: with PCP, Other, MD Deisy in 7-10days    Minutes spent on discharge: >30 minutes spent coordinating this discharge (review instructions/follow-up, prescriptions, preparing report for sign off)    Refugio Moran DO

## 2022-08-15 NOTE — ED NOTES
Pt changed into new brief and wheeled to lobby with wife at side. Wife states a friend is on their way to pick them up. Bilateral Helical Rim Advancement Flap Text: The defect edges were debeveled with a #15 blade scalpel.  Given the location of the defect and the proximity to free margins (helical rim) a bilateral helical rim advancement flap was deemed most appropriate.  Using a sterile surgical marker, the appropriate advancement flaps were drawn incorporating the defect and placing the expected incisions between the helical rim and antihelix where possible.  The area thus outlined was incised through and through with a #15 scalpel blade.  With a skin hook and iris scissors, the flaps were gently and sharply undermined and freed up.

## 2022-08-23 ENCOUNTER — TRANSCRIBE ORDER (OUTPATIENT)
Dept: SCHEDULING | Age: 72
End: 2022-08-23

## 2022-08-23 DIAGNOSIS — Z12.9 CANCER SCREENING: ICD-10-CM

## 2022-08-23 DIAGNOSIS — F17.210 CIGARETTE SMOKER: Primary | ICD-10-CM

## 2022-08-24 PROBLEM — R79.89 ELEVATED TROPONIN: Status: RESOLVED | Noted: 2018-06-06 | Resolved: 2022-08-24

## 2022-10-11 ENCOUNTER — HOSPITAL ENCOUNTER (EMERGENCY)
Age: 72
Discharge: HOME | End: 2022-10-12
Attending: STUDENT IN AN ORGANIZED HEALTH CARE EDUCATION/TRAINING PROGRAM
Payer: MEDICARE

## 2022-10-11 VITALS
HEIGHT: 75 IN | RESPIRATION RATE: 18 BRPM | HEART RATE: 89 BPM | OXYGEN SATURATION: 97 % | WEIGHT: 183 LBS | BODY MASS INDEX: 22.75 KG/M2 | SYSTOLIC BLOOD PRESSURE: 160 MMHG | DIASTOLIC BLOOD PRESSURE: 93 MMHG | TEMPERATURE: 99 F

## 2022-10-11 DIAGNOSIS — K64.9 HEMORRHOIDS, UNSPECIFIED HEMORRHOID TYPE: Primary | ICD-10-CM

## 2022-10-11 LAB
ALBUMIN SERPL-MCNC: 3.1 G/DL (ref 3.4–5)
ALBUMIN/GLOB SERPL: 0.6 {RATIO} (ref 0.8–1.7)
ALP SERPL-CCNC: 113 U/L (ref 45–117)
ALT SERPL-CCNC: 20 U/L (ref 16–61)
ANION GAP SERPL CALC-SCNC: 8 MMOL/L (ref 3–18)
APTT PPP: 30 SEC (ref 23–36.4)
AST SERPL-CCNC: 16 U/L (ref 10–38)
BASOPHILS # BLD: 0.1 K/UL (ref 0–0.1)
BASOPHILS NFR BLD: 1 % (ref 0–2)
BILIRUB SERPL-MCNC: 0.7 MG/DL (ref 0.2–1)
BUN SERPL-MCNC: 17 MG/DL (ref 7–18)
BUN/CREAT SERPL: 18 (ref 12–20)
CALCIUM SERPL-MCNC: 10.3 MG/DL (ref 8.5–10.1)
CHLORIDE SERPL-SCNC: 105 MMOL/L (ref 100–111)
CO2 SERPL-SCNC: 30 MMOL/L (ref 21–32)
CREAT SERPL-MCNC: 0.93 MG/DL (ref 0.6–1.3)
DIFFERENTIAL METHOD BLD: ABNORMAL
EOSINOPHIL # BLD: 0.2 K/UL (ref 0–0.4)
EOSINOPHIL NFR BLD: 3 % (ref 0–5)
ERYTHROCYTE [DISTWIDTH] IN BLOOD BY AUTOMATED COUNT: 12.6 % (ref 11.6–14.5)
GLOBULIN SER CALC-MCNC: 4.9 G/DL (ref 2–4)
GLUCOSE SERPL-MCNC: 92 MG/DL (ref 74–99)
HCT VFR BLD AUTO: 38.7 % (ref 36–48)
HEMOCCULT STL QL: POSITIVE
HGB BLD-MCNC: 11.9 G/DL (ref 13–16)
IMM GRANULOCYTES # BLD AUTO: 0 K/UL (ref 0–0.04)
IMM GRANULOCYTES NFR BLD AUTO: 0 % (ref 0–0.5)
INR PPP: 1.1 (ref 0.8–1.2)
LYMPHOCYTES # BLD: 1.2 K/UL (ref 0.9–3.6)
LYMPHOCYTES NFR BLD: 16 % (ref 21–52)
MCH RBC QN AUTO: 27.3 PG (ref 24–34)
MCHC RBC AUTO-ENTMCNC: 30.7 G/DL (ref 31–37)
MCV RBC AUTO: 88.8 FL (ref 78–100)
MONOCYTES # BLD: 0.9 K/UL (ref 0.05–1.2)
MONOCYTES NFR BLD: 13 % (ref 3–10)
NEUTS SEG # BLD: 4.8 K/UL (ref 1.8–8)
NEUTS SEG NFR BLD: 67 % (ref 40–73)
NRBC # BLD: 0.02 K/UL (ref 0–0.01)
NRBC BLD-RTO: 0.3 PER 100 WBC
PLATELET # BLD AUTO: 316 K/UL (ref 135–420)
PMV BLD AUTO: 12 FL (ref 9.2–11.8)
POTASSIUM SERPL-SCNC: 3.6 MMOL/L (ref 3.5–5.5)
PROT SERPL-MCNC: 8 G/DL (ref 6.4–8.2)
PROTHROMBIN TIME: 14.8 SEC (ref 11.5–15.2)
RBC # BLD AUTO: 4.36 M/UL (ref 4.35–5.65)
SODIUM SERPL-SCNC: 143 MMOL/L (ref 136–145)
WBC # BLD AUTO: 7.2 K/UL (ref 4.6–13.2)

## 2022-10-11 PROCEDURE — 99283 EMERGENCY DEPT VISIT LOW MDM: CPT

## 2022-10-11 PROCEDURE — 85730 THROMBOPLASTIN TIME PARTIAL: CPT

## 2022-10-11 PROCEDURE — 85025 COMPLETE CBC W/AUTO DIFF WBC: CPT

## 2022-10-11 PROCEDURE — 80053 COMPREHEN METABOLIC PANEL: CPT

## 2022-10-11 PROCEDURE — 85610 PROTHROMBIN TIME: CPT

## 2022-10-11 PROCEDURE — 82272 OCCULT BLD FECES 1-3 TESTS: CPT

## 2022-10-11 PROCEDURE — 86900 BLOOD TYPING SEROLOGIC ABO: CPT

## 2022-10-11 RX ORDER — HYDROCORTISONE ACETATE 25 MG/1
25 SUPPOSITORY RECTAL EVERY 12 HOURS
Qty: 30 SUPPOSITORY | Refills: 0 | Status: SHIPPED | OUTPATIENT
Start: 2022-10-11

## 2022-10-11 RX ORDER — DOCUSATE SODIUM 100 MG/1
100 CAPSULE, LIQUID FILLED ORAL 2 TIMES DAILY
Qty: 30 CAPSULE | Refills: 0 | Status: SHIPPED | OUTPATIENT
Start: 2022-10-11

## 2022-10-11 RX ORDER — HYDROCORTISONE 25 MG/G
CREAM TOPICAL 4 TIMES DAILY
Qty: 30 G | Refills: 0 | Status: SHIPPED | OUTPATIENT
Start: 2022-10-11

## 2022-10-11 NOTE — ED TRIAGE NOTES
80 yo M to ED with bright red bleeding per rectum- reports he has hemorrhoids.  And  this has happened in the past

## 2022-10-11 NOTE — ED PROVIDER NOTES
EMERGENCY DEPARTMENT HISTORY AND PHYSICAL EXAM    Date: 10/11/2022  Patient Name: Kendal Mares    History of Presenting Illness     Chief Complaint   Patient presents with    Hemorrhoids         History Provided By: Patient    Chief Complaint: Hemorrhoids   Duration: 4 Days  Timing:  Acute  Location: Anal  Quality: Burning  Severity: Moderate  Modifying Factors: Sitting makes pain worse  Associated Symptoms:  Bright red rectal bleeding, burning pain      Additional History (Context): Kendal Mares is a 81 y.o. male with  HIV and   who presents with rectal bleeding x 4 days. Patient states right red blood was seen on tissue but not in toilet bowl. Denies perianal itching, mucus in stool, changes in bowel movements, F/N/V, night sweats, abdominal pain, constipation. Patient has a bowel movement 1-2 times a day. Patient is on bisacodyL as need for constipation. No other complaints reported. PCP: Rissa, MD Deisy    Current Outpatient Medications   Medication Sig Dispense Refill    hydrocortisone (Anusol-HC) 25 mg supp Insert 1 Suppository into rectum every twelve (12) hours. 30 Suppository 0    hydrocortisone (Anusol-HC) 2.5 % rectal cream Insert  into rectum four (4) times daily. 30 g 0    docusate sodium (Colace) 100 mg capsule Take 1 Capsule by mouth two (2) times a day. 30 Capsule 0    carvediloL (COREG) 12.5 mg tablet Take 1 Tablet by mouth two (2) times daily (with meals). Indications: heart failure with reduced ejection fraction due to dilated cardiomyopathy 60 Tablet 0    aspirin 81 mg chewable tablet Take 1 Tablet by mouth daily. 30 Tablet 0    bumetanide (BUMEX) 0.5 mg tablet Take 1 Tablet by mouth daily. 30 Tablet 0    clopidogreL (PLAVIX) 75 mg tab Take 1 Tablet by mouth daily. 30 Tablet 0    losartan (COZAAR) 25 mg tablet Take 1 Tablet by mouth daily. 30 Tablet 0    cholecalciferol (VITAMIN D3) (5000 Units /125 mcg) capsule Take 1 Capsule by mouth daily.  Indications: prevention of vitamin D deficiency 30 Capsule 0    bisacodyL (DULCOLAX, BISACODYL,) 5 mg EC tablet Take 1 tablet by mouth twice a day as needed for constipation. 20 Tab 0    albuterol (PROVENTIL HFA) 90 mcg/actuation inhaler Take 2 Puffs by inhalation every four (4) hours as needed for Wheezing or Shortness of Breath. Indications: bronchospasm prevention 1 Inhaler 0    apixaban (ELIQUIS) 5 mg tablet Take 1 Tab by mouth every twelve (12) hours. 60 Tab 0    dolutegravir (TIVICAY) 50 mg tab tablet Take 1 Tab by mouth daily. 30 Tab 0    emtricitabine-tenofovir alafen (DESCOVY) tablet Take 1 Tab by mouth daily. 30 Tab 0    pravastatin (PRAVACHOL) 40 mg tablet Take 1 Tab by mouth nightly. 30 Tab 0    pyridoxine, vitamin B6, (VITAMIN B-6) 50 mg tablet Take 1 Tab by mouth daily. 30 Tab 0    acetaminophen (TYLENOL) 500 mg tablet Take 1 Tab by mouth every six (6) hours as needed. Indications: Pain 20 Tab 0       Past History     Past Medical History:  Past Medical History:   Diagnosis Date    AICD (automatic cardioverter/defibrillator) present     Atrial fibrillation, persistent ([facility]     Autoimmune disease ([facility]     AIDS? HIV    Cigarette smoker     HFrEF (heart failure with reduced ejection fraction) (HCC)     HIV (human immunodeficiency virus infection) ([facility]     Hypertension     Pacemaker     Prostate cancer ([facility]     Rectal bleeding     Stroke [facility])        Past Surgical History:  Past Surgical History:   Procedure Laterality Date    HX ORTHOPAEDIC      back       Family History:  No family history on file. Social History:  Social History     Tobacco Use    Smoking status: Every Day     Packs/day: 0.50     Types: Cigarettes    Smokeless tobacco: Never   Substance Use Topics    Alcohol use:  Yes     Alcohol/week: 5.8 standard drinks     Types: 7 Standard drinks or equivalent per week     Comment: \"drink everyday but never alone\"     Drug use: Yes     Types: Marijuana       Allergies:  No Known Allergies      Review of Systems   Review of Systems Constitutional:  Negative for chills, fever and unexpected weight change. Respiratory:  Negative for chest tightness, shortness of breath and wheezing. Cardiovascular:  Negative for chest pain. Gastrointestinal:  Positive for blood in stool and rectal pain. Negative for abdominal pain, anal bleeding, constipation, diarrhea, nausea and vomiting. Genitourinary:  Negative for difficulty urinating and hematuria. Musculoskeletal:  Negative for back pain and joint swelling. All Other Systems Negative  Physical Exam     Vitals:    10/11/22 1824 10/11/22 2120   BP: (!) 160/93    Pulse: 89    Resp: 18    Temp: 99 °F (37.2 °C)    SpO2: 97%    Weight:  83 kg (183 lb)   Height:  6' 3\" (1.905 m)     Physical Exam  Vitals and nursing note reviewed. Constitutional:       Appearance: Normal appearance. He is normal weight. HENT:      Head: Normocephalic and atraumatic. Eyes:      Extraocular Movements: Extraocular movements intact. Pupils: Pupils are equal, round, and reactive to light. Cardiovascular:      Rate and Rhythm: Normal rate and regular rhythm. Pulmonary:      Effort: Pulmonary effort is normal.      Breath sounds: Normal breath sounds. Abdominal:      General: Bowel sounds are normal.      Palpations: Abdomen is soft. Genitourinary:     Rectum: Guaiac result positive. Comments: External slightly thrombosed hemorrhoids noted. Guaiac positive. No visible blood noted. Neurological:      General: No focal deficit present. Mental Status: He is alert and oriented to person, place, and time. Mental status is at baseline.    Psychiatric:         Mood and Affect: Mood normal.         Behavior: Behavior normal.              Diagnostic Study Results     Labs -     Recent Results (from the past 12 hour(s))   CBC WITH AUTOMATED DIFF    Collection Time: 10/11/22  8:30 PM   Result Value Ref Range    WBC 7.2 4.6 - 13.2 K/uL    RBC 4.36 4.35 - 5.65 M/uL    HGB 11.9 (L) 13.0 - 16.0 g/dL    HCT 38.7 36.0 - 48.0 %    MCV 88.8 78.0 - 100.0 FL    MCH 27.3 24.0 - 34.0 PG    MCHC 30.7 (L) 31.0 - 37.0 g/dL    RDW 12.6 11.6 - 14.5 %    PLATELET 141 512 - 620 K/uL    MPV 12.0 (H) 9.2 - 11.8 FL    NRBC 0.3 (H) 0  WBC    ABSOLUTE NRBC 0.02 (H) 0.00 - 0.01 K/uL    NEUTROPHILS 67 40 - 73 %    LYMPHOCYTES 16 (L) 21 - 52 %    MONOCYTES 13 (H) 3 - 10 %    EOSINOPHILS 3 0 - 5 %    BASOPHILS 1 0 - 2 %    IMMATURE GRANULOCYTES 0 0.0 - 0.5 %    ABS. NEUTROPHILS 4.8 1.8 - 8.0 K/UL    ABS. LYMPHOCYTES 1.2 0.9 - 3.6 K/UL    ABS. MONOCYTES 0.9 0.05 - 1.2 K/UL    ABS. EOSINOPHILS 0.2 0.0 - 0.4 K/UL    ABS. BASOPHILS 0.1 0.0 - 0.1 K/UL    ABS. IMM. GRANS. 0.0 0.00 - 0.04 K/UL    DF AUTOMATED     METABOLIC PANEL, COMPREHENSIVE    Collection Time: 10/11/22  8:30 PM   Result Value Ref Range    Sodium 143 136 - 145 mmol/L    Potassium 3.6 3.5 - 5.5 mmol/L    Chloride 105 100 - 111 mmol/L    CO2 30 21 - 32 mmol/L    Anion gap 8 3.0 - 18 mmol/L    Glucose 92 74 - 99 mg/dL    BUN 17 7.0 - 18 MG/DL    Creatinine 0.93 0.6 - 1.3 MG/DL    BUN/Creatinine ratio 18 12 - 20      eGFR >60 >60 ml/min/1.73m2    Calcium 10.3 (H) 8.5 - 10.1 MG/DL    Bilirubin, total 0.7 0.2 - 1.0 MG/DL    ALT (SGPT) 20 16 - 61 U/L    AST (SGOT) 16 10 - 38 U/L    Alk.  phosphatase 113 45 - 117 U/L    Protein, total 8.0 6.4 - 8.2 g/dL    Albumin 3.1 (L) 3.4 - 5.0 g/dL    Globulin 4.9 (H) 2.0 - 4.0 g/dL    A-G Ratio 0.6 (L) 0.8 - 1.7     PTT    Collection Time: 10/11/22  8:30 PM   Result Value Ref Range    aPTT 30.0 23.0 - 36.4 SEC   PROTHROMBIN TIME + INR    Collection Time: 10/11/22  8:30 PM   Result Value Ref Range    Prothrombin time 14.8 11.5 - 15.2 sec    INR 1.1 0.8 - 1.2     TYPE & SCREEN    Collection Time: 10/11/22  8:30 PM   Result Value Ref Range    Crossmatch Expiration 10/14/2022,2359     ABO/Rh(D) A POSITIVE     Antibody screen NEG    OCCULT BLOOD, STOOL    Collection Time: 10/11/22  9:50 PM   Result Value Ref Range    Occult blood, stool Positive (A) NEG         Radiologic Studies -   No orders to display     CT Results  (Last 48 hours)      None          CXR Results  (Last 48 hours)      None              Medical Decision Making   I am the first provider for this patient. I reviewed the vital signs, available nursing notes, past medical history, past surgical history, family history and social history. Vital Signs-Reviewed the patient's vital signs. Records Reviewed: Esther Samuels PA-C     Procedures:  Procedures    Provider Notes (Medical Decision Making): Impression:  hemorrhoids    Labs essentially unremarkable  No active bleeding noted on exam, pt is hemodynamically stable, will plan to d/c with anusol and colace, colorectal follow-up recommended. Pt agrees. Esther Samuels PA-C     MED RECONCILIATION:  No current facility-administered medications for this encounter. Current Outpatient Medications   Medication Sig    hydrocortisone (Anusol-HC) 25 mg supp Insert 1 Suppository into rectum every twelve (12) hours. hydrocortisone (Anusol-HC) 2.5 % rectal cream Insert  into rectum four (4) times daily. docusate sodium (Colace) 100 mg capsule Take 1 Capsule by mouth two (2) times a day. carvediloL (COREG) 12.5 mg tablet Take 1 Tablet by mouth two (2) times daily (with meals). Indications: heart failure with reduced ejection fraction due to dilated cardiomyopathy    aspirin 81 mg chewable tablet Take 1 Tablet by mouth daily. bumetanide (BUMEX) 0.5 mg tablet Take 1 Tablet by mouth daily. clopidogreL (PLAVIX) 75 mg tab Take 1 Tablet by mouth daily. losartan (COZAAR) 25 mg tablet Take 1 Tablet by mouth daily. cholecalciferol (VITAMIN D3) (5000 Units /125 mcg) capsule Take 1 Capsule by mouth daily. Indications: prevention of vitamin D deficiency    bisacodyL (DULCOLAX, BISACODYL,) 5 mg EC tablet Take 1 tablet by mouth twice a day as needed for constipation.     albuterol (PROVENTIL HFA) 90 mcg/actuation inhaler Take 2 Puffs by inhalation every four (4) hours as needed for Wheezing or Shortness of Breath. Indications: bronchospasm prevention    apixaban (ELIQUIS) 5 mg tablet Take 1 Tab by mouth every twelve (12) hours. dolutegravir (TIVICAY) 50 mg tab tablet Take 1 Tab by mouth daily. emtricitabine-tenofovir alafen (DESCOVY) tablet Take 1 Tab by mouth daily. pravastatin (PRAVACHOL) 40 mg tablet Take 1 Tab by mouth nightly. pyridoxine, vitamin B6, (VITAMIN B-6) 50 mg tablet Take 1 Tab by mouth daily. acetaminophen (TYLENOL) 500 mg tablet Take 1 Tab by mouth every six (6) hours as needed. Indications: Pain       Disposition:  D/c    DISCHARGE NOTE:   Patient is stable for discharge at this time. I have discussed all the findings from today's work up with the patient, including lab results and imaging. I have answered all questions. Rx for anusol and colace given. Rest and close follow-up with the colorectal surgeon recommended this week. Return to the ED immediately for any new or worsening symptoms. April Stacey Mays PA-C     Follow-up Information       Follow up With Specialties Details Why Contact Info    Dorian Schilder, MD Colon and Rectal Surgery In 1 week  [facility] [address] [id number] [phone]      [facility] EMERGENCY DEPT Emergency Medicine  As needed, If symptoms worsen [address] [place] [id number]  [phone]            Current Discharge Medication List        START taking these medications    Details   hydrocortisone (Anusol-HC) 25 mg supp Insert 1 Suppository into rectum every twelve (12) hours. Qty: 30 Suppository, Refills: 0  Start date: 10/11/2022      hydrocortisone (Anusol-HC) 2.5 % rectal cream Insert  into rectum four (4) times daily. Qty: 30 g, Refills: 0  Start date: 10/11/2022      docusate sodium (Colace) 100 mg capsule Take 1 Capsule by mouth two (2) times a day.   Qty: 30 Capsule, Refills: 0  Start date: 10/11/2022                 Diagnosis     Clinical Impression: 1. Hemorrhoids, unspecified hemorrhoid type

## 2022-10-12 LAB
ABO + RH BLD: NORMAL
BLOOD GROUP ANTIBODIES SERPL: NORMAL
SPECIMEN EXP DATE BLD: NORMAL

## 2022-10-12 NOTE — DISCHARGE INSTRUCTIONS
[employer] Activation    Thank you for requesting access to [employer]. Please follow the instructions below to securely access and download your online medical record. [employer] allows you to send messages to your doctor, view your test results, renew your prescriptions, schedule appointments, and more. How Do I Sign Up? In your internet browser, go to [email or web address]  Click on the First Time User? Click Here link in the Sign In box. You will be redirect to the New Member Sign Up page. Enter your [employer] Access Code exactly as it appears below. You will not need to use this code after youve completed the sign-up process. If you do not sign up before the expiration date, you must request a new code. [employer] Access Code: [email or web address]  Expires: 2022  6:32 AM (This is the date your [employer] access code will )    Enter the last four digits of your Social Security Number (xxxx) and Date of Birth (mm/dd/yyyy) as indicated and click Submit. You will be taken to the next sign-up page. Create a [employer] ID. This will be your [employer] login ID and cannot be changed, so think of one that is secure and easy to remember. Create a [employer] password. You can change your password at any time. Enter your Password Reset Question and Answer. This can be used at a later time if you forget your password. Enter your e-mail address. You will receive e-mail notification when new information is available in [address] Click Sign Up. You can now view and download portions of your medical record. Click the [place] link to download a portable copy of your medical information. Additional Information    If you have questions, please visit the Frequently Asked Questions section of the [employer] website at [email or web address] Remember, [employer] is NOT to be used for urgent needs. For medical emergencies, dial 911.

## 2024-03-23 ENCOUNTER — HOSPITAL ENCOUNTER (EMERGENCY)
Facility: HOSPITAL | Age: 74
Discharge: HOME | End: 2024-03-24
Attending: STUDENT IN AN ORGANIZED HEALTH CARE EDUCATION/TRAINING PROGRAM
Payer: MEDICARE

## 2024-03-23 ENCOUNTER — APPOINTMENT (OUTPATIENT)
Facility: HOSPITAL | Age: 74
End: 2024-03-23
Payer: MEDICARE

## 2024-03-23 DIAGNOSIS — J44.1 COPD EXACERBATION (HCC): Primary | ICD-10-CM

## 2024-03-23 LAB
ALBUMIN SERPL-MCNC: 2.7 G/DL (ref 3.4–5)
ALBUMIN/GLOB SERPL: 0.5 (ref 0.8–1.7)
ALP SERPL-CCNC: 141 U/L (ref 45–117)
ALT SERPL-CCNC: 18 U/L (ref 16–61)
ANION GAP SERPL CALC-SCNC: 6 MMOL/L (ref 3–18)
AST SERPL-CCNC: 22 U/L (ref 10–38)
BASOPHILS # BLD: 0 K/UL (ref 0–0.1)
BASOPHILS NFR BLD: 0 % (ref 0–2)
BILIRUB SERPL-MCNC: 1.4 MG/DL (ref 0.2–1)
BUN SERPL-MCNC: 29 MG/DL (ref 7–18)
BUN/CREAT SERPL: 29 (ref 12–20)
CALCIUM SERPL-MCNC: 10.3 MG/DL (ref 8.5–10.1)
CHLORIDE SERPL-SCNC: 103 MMOL/L (ref 100–111)
CO2 SERPL-SCNC: 31 MMOL/L (ref 21–32)
CREAT SERPL-MCNC: 1 MG/DL (ref 0.6–1.3)
DIFFERENTIAL METHOD BLD: ABNORMAL
EOSINOPHIL # BLD: 0.1 K/UL (ref 0–0.4)
EOSINOPHIL NFR BLD: 1 % (ref 0–5)
ERYTHROCYTE [DISTWIDTH] IN BLOOD BY AUTOMATED COUNT: 13.7 % (ref 11.6–14.5)
GLOBULIN SER CALC-MCNC: 5.2 G/DL (ref 2–4)
GLUCOSE SERPL-MCNC: 118 MG/DL (ref 74–99)
HCT VFR BLD AUTO: 36.6 % (ref 36–48)
HGB BLD-MCNC: 11.5 G/DL (ref 13–16)
IMM GRANULOCYTES # BLD AUTO: 0.1 K/UL (ref 0–0.04)
IMM GRANULOCYTES NFR BLD AUTO: 1 % (ref 0–0.5)
LACTATE SERPL-SCNC: 0.7 MMOL/L (ref 0.4–2)
LYMPHOCYTES # BLD: 1 K/UL (ref 0.9–3.6)
LYMPHOCYTES NFR BLD: 10 % (ref 21–52)
MAGNESIUM SERPL-MCNC: 2.1 MG/DL (ref 1.6–2.6)
MCH RBC QN AUTO: 27.6 PG (ref 24–34)
MCHC RBC AUTO-ENTMCNC: 31.4 G/DL (ref 31–37)
MCV RBC AUTO: 88 FL (ref 78–100)
MONOCYTES # BLD: 1.3 K/UL (ref 0.05–1.2)
MONOCYTES NFR BLD: 13 % (ref 3–10)
NEUTS SEG # BLD: 7.8 K/UL (ref 1.8–8)
NEUTS SEG NFR BLD: 76 % (ref 40–73)
NRBC # BLD: 0 K/UL (ref 0–0.01)
NRBC BLD-RTO: 0 PER 100 WBC
NT PRO BNP: 738 PG/ML (ref 0–900)
PLATELET # BLD AUTO: 356 K/UL (ref 135–420)
PMV BLD AUTO: 12.1 FL (ref 9.2–11.8)
POTASSIUM SERPL-SCNC: 3.3 MMOL/L (ref 3.5–5.5)
PROT SERPL-MCNC: 7.9 G/DL (ref 6.4–8.2)
RBC # BLD AUTO: 4.16 M/UL (ref 4.35–5.65)
SODIUM SERPL-SCNC: 140 MMOL/L (ref 136–145)
TROPONIN I SERPL HS-MCNC: 78 NG/L (ref 0–78)
WBC # BLD AUTO: 10.3 K/UL (ref 4.6–13.2)

## 2024-03-23 PROCEDURE — 71045 X-RAY EXAM CHEST 1 VIEW: CPT

## 2024-03-23 PROCEDURE — 83880 ASSAY OF NATRIURETIC PEPTIDE: CPT

## 2024-03-23 PROCEDURE — 96366 THER/PROPH/DIAG IV INF ADDON: CPT

## 2024-03-23 PROCEDURE — 83735 ASSAY OF MAGNESIUM: CPT

## 2024-03-23 PROCEDURE — 2580000003 HC RX 258: Performed by: STUDENT IN AN ORGANIZED HEALTH CARE EDUCATION/TRAINING PROGRAM

## 2024-03-23 PROCEDURE — 93005 ELECTROCARDIOGRAM TRACING: CPT | Performed by: STUDENT IN AN ORGANIZED HEALTH CARE EDUCATION/TRAINING PROGRAM

## 2024-03-23 PROCEDURE — 96375 TX/PRO/DX INJ NEW DRUG ADDON: CPT

## 2024-03-23 PROCEDURE — 6370000000 HC RX 637 (ALT 250 FOR IP): Performed by: STUDENT IN AN ORGANIZED HEALTH CARE EDUCATION/TRAINING PROGRAM

## 2024-03-23 PROCEDURE — 83605 ASSAY OF LACTIC ACID: CPT

## 2024-03-23 PROCEDURE — 99285 EMERGENCY DEPT VISIT HI MDM: CPT

## 2024-03-23 PROCEDURE — 85025 COMPLETE CBC W/AUTO DIFF WBC: CPT

## 2024-03-23 PROCEDURE — 80053 COMPREHEN METABOLIC PANEL: CPT

## 2024-03-23 PROCEDURE — 6360000002 HC RX W HCPCS: Performed by: STUDENT IN AN ORGANIZED HEALTH CARE EDUCATION/TRAINING PROGRAM

## 2024-03-23 PROCEDURE — 84484 ASSAY OF TROPONIN QUANT: CPT

## 2024-03-23 PROCEDURE — 96365 THER/PROPH/DIAG IV INF INIT: CPT

## 2024-03-23 RX ORDER — FUROSEMIDE 10 MG/ML
40 INJECTION INTRAMUSCULAR; INTRAVENOUS
Status: COMPLETED | OUTPATIENT
Start: 2024-03-23 | End: 2024-03-23

## 2024-03-23 RX ORDER — IPRATROPIUM BROMIDE AND ALBUTEROL SULFATE 2.5; .5 MG/3ML; MG/3ML
2 SOLUTION RESPIRATORY (INHALATION)
Status: COMPLETED | OUTPATIENT
Start: 2024-03-23 | End: 2024-03-23

## 2024-03-23 RX ORDER — AZITHROMYCIN 250 MG/1
250 TABLET, FILM COATED ORAL DAILY
Qty: 4 TABLET | Refills: 0 | Status: SHIPPED | OUTPATIENT
Start: 2024-03-23 | End: 2024-03-27

## 2024-03-23 RX ORDER — AZITHROMYCIN 250 MG/1
500 TABLET, FILM COATED ORAL
Status: COMPLETED | OUTPATIENT
Start: 2024-03-23 | End: 2024-03-23

## 2024-03-23 RX ORDER — DEXAMETHASONE SODIUM PHOSPHATE 4 MG/ML
10 INJECTION, SOLUTION INTRA-ARTICULAR; INTRALESIONAL; INTRAMUSCULAR; INTRAVENOUS; SOFT TISSUE
Status: COMPLETED | OUTPATIENT
Start: 2024-03-23 | End: 2024-03-23

## 2024-03-23 RX ORDER — DEXAMETHASONE 6 MG/1
12 TABLET ORAL ONCE
Qty: 2 TABLET | Refills: 0 | Status: SHIPPED | OUTPATIENT
Start: 2024-03-23 | End: 2024-03-23

## 2024-03-23 RX ORDER — MAGNESIUM SULFATE IN WATER 40 MG/ML
2000 INJECTION, SOLUTION INTRAVENOUS
Status: COMPLETED | OUTPATIENT
Start: 2024-03-23 | End: 2024-03-24

## 2024-03-23 RX ORDER — ALBUTEROL SULFATE 90 UG/1
2 AEROSOL, METERED RESPIRATORY (INHALATION) 4 TIMES DAILY PRN
Qty: 18 G | Refills: 1 | Status: SHIPPED | OUTPATIENT
Start: 2024-03-23

## 2024-03-23 RX ADMIN — AZITHROMYCIN DIHYDRATE 500 MG: 250 TABLET ORAL at 21:30

## 2024-03-23 RX ADMIN — DEXAMETHASONE SODIUM PHOSPHATE 10 MG: 4 INJECTION INTRA-ARTICULAR; INTRALESIONAL; INTRAMUSCULAR; INTRAVENOUS; SOFT TISSUE at 20:16

## 2024-03-23 RX ADMIN — MAGNESIUM SULFATE HEPTAHYDRATE 2000 MG: 40 INJECTION, SOLUTION INTRAVENOUS at 20:17

## 2024-03-23 RX ADMIN — FUROSEMIDE 40 MG: 10 INJECTION, SOLUTION INTRAMUSCULAR; INTRAVENOUS at 20:16

## 2024-03-23 RX ADMIN — WATER 1000 MG: 1 INJECTION INTRAMUSCULAR; INTRAVENOUS; SUBCUTANEOUS at 21:33

## 2024-03-23 RX ADMIN — IPRATROPIUM BROMIDE AND ALBUTEROL SULFATE 2 DOSE: .5; 3 SOLUTION RESPIRATORY (INHALATION) at 20:17

## 2024-03-23 NOTE — ED PROVIDER NOTES
Take 500 mg by mouth every 6 hours as needed    ALBUTEROL SULFATE HFA (PROVENTIL;VENTOLIN;PROAIR) 108 (90 BASE) MCG/ACT INHALER    Inhale 2 puffs into the lungs every 4 hours as needed    APIXABAN (ELIQUIS) 5 MG TABS TABLET    Take 5 mg by mouth in the morning and 5 mg in the evening.    ASPIRIN 81 MG CHEWABLE TABLET    Take 81 mg by mouth daily    BISACODYL (DULCOLAX) 5 MG EC TABLET    Take 1 tablet by mouth twice a day as needed for constipation.    BUMETANIDE (BUMEX) 0.5 MG TABLET    Take 0.5 mg by mouth daily    CARVEDILOL (COREG) 12.5 MG TABLET    Take 12.5 mg by mouth 2 times daily (with meals)    CLOPIDOGREL (PLAVIX) 75 MG TABLET    Take 75 mg by mouth daily    DOCUSATE (COLACE, DULCOLAX) 100 MG CAPS    Take 100 mg by mouth 2 times daily    DOLUTEGRAVIR SODIUM (TIVICAY) 50 MG TABLET    Take 50 mg by mouth daily    EMTRICITABINE-TENOFOVIR ALAFENAMIDE (DESCOVY) 200-25 MG TABS TABLET    Take 1 tablet by mouth daily    HYDROCORTISONE (ANUSOL-HC) 25 MG SUPPOSITORY    Place 25 mg rectally in the morning and 25 mg in the evening.    HYDROCORTISONE 2.5 % CREAM    Place rectally 4 times daily    LOSARTAN (COZAAR) 25 MG TABLET    Take 25 mg by mouth daily    PRAVASTATIN (PRAVACHOL) 40 MG TABLET    Take 40 mg by mouth    PYRIDOXINE (B-6) 50 MG TABLET    Take 50 mg by mouth daily    VITAMIN D (CHOLECALCIFEROL) 125 MCG (5000 UT) CAPS CAPSULE    Take 5,000 Units by mouth daily       ALLERGIES     Patient has no allergy information on record.    FAMILY HISTORY     No family history on file.       SOCIAL HISTORY       Social History     Socioeconomic History    Marital status:    Tobacco Use    Smoking status: Every Day     Current packs/day: 0.50     Types: Cigarettes    Smokeless tobacco: Never   Substance and Sexual Activity    Alcohol use: Yes     Alcohol/week: 5.8 standard drinks of alcohol    Drug use: Yes     Types: Marijuana (Weed)       SCREENINGS                                                 PHYSICAL EXAM

## 2024-03-23 NOTE — ED TRIAGE NOTES
Pt arrived in ER complaining of SOB. Hx of lung cancer and prostate cancer. Pt currently on hospice per family and EMS. Pt and EMS state pt is a DNR/DNI.

## 2024-03-24 VITALS
OXYGEN SATURATION: 96 % | HEART RATE: 61 BPM | RESPIRATION RATE: 29 BRPM | BODY MASS INDEX: 23.62 KG/M2 | DIASTOLIC BLOOD PRESSURE: 60 MMHG | TEMPERATURE: 99.2 F | WEIGHT: 189 LBS | SYSTOLIC BLOOD PRESSURE: 98 MMHG

## 2024-03-24 LAB
EKG ATRIAL RATE: 62 BPM
EKG DIAGNOSIS: NORMAL
EKG Q-T INTERVAL: 486 MS
EKG QRS DURATION: 196 MS
EKG QTC CALCULATION (BAZETT): 493 MS
EKG R AXIS: -66 DEGREES
EKG T AXIS: 101 DEGREES
EKG VENTRICULAR RATE: 62 BPM

## 2024-03-24 PROCEDURE — 93010 ELECTROCARDIOGRAM REPORT: CPT | Performed by: INTERNAL MEDICINE

## 2024-03-24 NOTE — DISCHARGE INSTRUCTIONS
Use inhalers as needed for shortness of breath.  A repeat dose of dexamethasone, which is a steroid was sent to your pharmacy to be used in 48 to 72 hours if continued difficulty breathing.  Although not I do not see any true signs of pneumonia on your chest x-ray, the fact that you are having worsening cough could be concerning for early infection therefore you will be sent home with antibiotics.

## 2024-09-06 NOTE — ED PROVIDER NOTES
EMERGENCY DEPARTMENT HISTORY AND PHYSICAL EXAM    2:56 PM      Date: 7/29/2020  Patient Name: Carlotta Bingham    History of Presenting Illness     Chief Complaint   Patient presents with    Chest Pain         History Provided By: Patient  Location/Duration/Severity/Modifying factors   03E with PMH significant for CHF (EF 55%), COPD, HIV, and atrial fibrillation currently with ventricular pacemaker, presenting with 4 days of dull, right-sided chest pain. Pain well localized to right pectoral region with some radiation to back. Denies SOB, radiation to arms/neck, diaphoresis, N/V associated with pain. Pain occurs both at rest and when up and around and patient concerned that it has not gone away. Currently patient states that he is pain free. Reports compliance with HIV medications and states that his last CD4 count was \"undetectable. \" He is otherwise feeling well with no fevers, cough, recent illness, weakness/fatigue, or any other new symptoms. PCP: Deisy Kwok MD    Current Outpatient Medications   Medication Sig Dispense Refill    bisacodyL (DULCOLAX, BISACODYL,) 5 mg EC tablet Take 1 tablet by mouth twice a day as needed for constipation. 20 Tab 0    albuterol (PROVENTIL HFA) 90 mcg/actuation inhaler Take 2 Puffs by inhalation every four (4) hours as needed for Wheezing or Shortness of Breath. Indications: bronchospasm prevention 1 Inhaler 0    ondansetron (ZOFRAN ODT) 8 mg disintegrating tablet Take 1 Tab by mouth every eight (8) hours as needed for Nausea for up to 12 doses. 12 Tab 0    amLODIPine (NORVASC) 10 mg tablet Take 1 Tab by mouth daily. 30 Tab 0    apixaban (ELIQUIS) 5 mg tablet Take 1 Tab by mouth every twelve (12) hours. 60 Tab 0    budesonide-formoterol (SYMBICORT) 160-4.5 mcg/actuation HFAA Take 2 Puffs by inhalation two (2) times a day. 1 Inhaler 1    carvedilol (COREG) 25 mg tablet Take 0.5 Tabs by mouth two (2) times daily (with meals).  30 Tab 0    dapsone 100 mg tablet Take 1 Tab by mouth daily. 30 Tab 0    dolutegravir (TIVICAY) 50 mg tab tablet Take 1 Tab by mouth daily. 30 Tab 0    emtricitabine-tenofovir alafen (DESCOVY) tablet Take 1 Tab by mouth daily. 30 Tab 0    folic acid (FOLVITE) 1 mg tablet Take 1 Tab by mouth daily. 30 Tab 0    magnesium oxide 400 mg cap Take 400 mg by mouth daily. 30 Cap 0    pravastatin (PRAVACHOL) 40 mg tablet Take 1 Tab by mouth nightly. 30 Tab 0    pyridoxine, vitamin B6, (VITAMIN B-6) 50 mg tablet Take 1 Tab by mouth daily. 30 Tab 0    diclofenac (VOLTAREN) 1 % gel Apply 2 g to affected area three (3) times daily as needed for Pain. Apply dose (2 gm or 4 gm) to each location. If treatment site is the hands, patients should wait at least one (1) hour to wash their hands. APPLY TO right wrist 100 g 0    acetaminophen (TYLENOL) 500 mg tablet Take 1 Tab by mouth every six (6) hours as needed. Indications: Pain 20 Tab 0    amLODIPine (NORVASC) 5 mg tablet Take 5 mg by mouth two (2) times a day. Past History     Past Medical History:  Past Medical History:   Diagnosis Date    Autoimmune disease ([facility] [address]     AIDS? HIV    Hypertension     Rectal bleeding     Stroke [facility])        Past Surgical History:  Past Surgical History:   Procedure Laterality Date    HX ORTHOPAEDIC      back       Family History:  History reviewed. No pertinent family history. Social History:  Social History     Tobacco Use    Smoking status: Current Every Day Smoker     Packs/day: 1.00     Types: Cigarettes    Smokeless tobacco: Never Used   Substance Use Topics    Alcohol use: Yes     Alcohol/week: 5.8 standard drinks     Types: 7 Standard drinks or equivalent per week     Comment: \"drink everyday but never alone\"     Drug use: Yes     Types: Marijuana       Allergies:  No Known Allergies      Review of Systems       Review of Systems   Constitutional: Negative for activity change, appetite change, chills, diaphoresis, fatigue and fever.    HENT: Negative for congestion. Respiratory: Positive for cough. Negative for apnea, choking, chest tightness, shortness of breath, wheezing and stridor. Mild dry cough at baseline 2/2 COPD, not on home O2. Cardiovascular: Positive for chest pain. Negative for palpitations and leg swelling. Gastrointestinal: Negative for abdominal distention, abdominal pain, constipation, diarrhea, nausea and vomiting. Musculoskeletal: Negative. Allergic/Immunologic: Positive for immunocompromised state. Neurological: Negative for light-headedness and headaches. Psychiatric/Behavioral: Negative. Physical Exam     Visit Vitals  BP (!) 152/107   Pulse 71   Temp 98.5 °F (36.9 °C)   Resp 21   SpO2 93%         Physical Exam  Vitals signs reviewed. Constitutional:       General: He is not in acute distress. Appearance: He is well-developed and normal weight. He is not ill-appearing, toxic-appearing or diaphoretic. HENT:      Head: Normocephalic and atraumatic. Eyes:      Extraocular Movements: Extraocular movements intact. Neck:      Musculoskeletal: Normal range of motion. Cardiovascular:      Rate and Rhythm: Normal rate. Rhythm irregular. Pulses: Normal pulses. Heart sounds: Heart sounds are distant. No murmur. No systolic murmur. No friction rub. No gallop. Comments: paced  Pulmonary:      Effort: Pulmonary effort is normal.      Breath sounds: Normal breath sounds. No decreased breath sounds, wheezing, rhonchi or rales. Chest:      Chest wall: No mass, deformity, tenderness or edema. Abdominal:      General: Bowel sounds are normal. There is no abdominal bruit. Palpations: Abdomen is soft. There is no fluid wave or mass. Musculoskeletal: Normal range of motion. Right lower leg: He exhibits no tenderness. No edema. Left lower leg: He exhibits no tenderness. No edema. Skin:     General: Skin is warm and dry. Neurological:      General: No focal deficit present. Mental Status: He is alert and oriented to person, place, and time. Psychiatric:         Mood and Affect: Mood normal.         Behavior: Behavior normal.           Diagnostic Study Results     Labs -  Recent Results (from the past 12 hour(s))   CBC WITH AUTOMATED DIFF    Collection Time: 07/29/20  3:05 PM   Result Value Ref Range    WBC 6.6 4.6 - 13.2 K/uL    RBC 4.36 (L) 4.70 - 5.50 M/uL    HGB 12.8 (L) 13.0 - 16.0 g/dL    HCT 39.7 36.0 - 48.0 %    MCV 91.1 74.0 - 97.0 FL    MCH 29.4 24.0 - 34.0 PG    MCHC 32.2 31.0 - 37.0 g/dL    RDW 12.6 11.6 - 14.5 %    PLATELET 430 206 - 149 K/uL    MPV 12.3 (H) 9.2 - 11.8 FL    NEUTROPHILS 70 40 - 73 %    LYMPHOCYTES 15 (L) 21 - 52 %    MONOCYTES 13 (H) 3 - 10 %    EOSINOPHILS 2 0 - 5 %    BASOPHILS 0 0 - 2 %    ABS. NEUTROPHILS 4.6 1.8 - 8.0 K/UL    ABS. LYMPHOCYTES 1.0 0.9 - 3.6 K/UL    ABS. MONOCYTES 0.9 0.05 - 1.2 K/UL    ABS. EOSINOPHILS 0.2 0.0 - 0.4 K/UL    ABS. BASOPHILS 0.0 0.0 - 0.1 K/UL    DF AUTOMATED     METABOLIC PANEL, COMPREHENSIVE    Collection Time: 07/29/20  3:05 PM   Result Value Ref Range    Sodium 144 136 - 145 mmol/L    Potassium 3.8 3.5 - 5.5 mmol/L    Chloride 112 (H) 100 - 111 mmol/L    CO2 30 21 - 32 mmol/L    Anion gap 2 (L) 3.0 - 18 mmol/L    Glucose 99 74 - 99 mg/dL    BUN 16 7.0 - 18 MG/DL    Creatinine 1.01 0.6 - 1.3 MG/DL    BUN/Creatinine ratio 16 12 - 20      GFR est AA >60 >60 ml/min/1.73m2    GFR est non-AA >60 >60 ml/min/1.73m2    Calcium 10.0 8.5 - 10.1 MG/DL    Bilirubin, total 0.7 0.2 - 1.0 MG/DL    ALT (SGPT) 28 16 - 61 U/L    AST (SGOT) 15 10 - 38 U/L    Alk.  phosphatase 135 (H) 45 - 117 U/L    Protein, total 7.4 6.4 - 8.2 g/dL    Albumin 3.4 3.4 - 5.0 g/dL    Globulin 4.0 2.0 - 4.0 g/dL    A-G Ratio 0.9 0.8 - 1.7     CARDIAC PANEL,(CK, CKMB & TROPONIN)    Collection Time: 07/29/20  3:05 PM   Result Value Ref Range    CK - MB 1.6 <3.6 ng/ml    CK-MB Index 2.7 0.0 - 4.0 %    CK 59 39 - 308 U/L    Troponin-I, QT 0.04 0.0 - 0.045 NG/ML Radiologic Studies -   XR CHEST PORT   Final Result   IMPRESSION:      Stable mild cardiomegaly. Atherosclerosis. Medical Decision Making   I am the first provider for this patient. I reviewed the vital signs, available nursing notes, past medical history, past surgical history, family history and social history. Vital Signs-Reviewed the patient's vital signs. EKG: ECG reviewed. Interpretation: Ventricular pacing at rate of 60bpm with presence of atrial flutter. ECG similar to previous ECGs. No new/abnormal ST elevations or depressions. Records Reviewed: Old Medical Records (Time of Review: 2:56 PM)    ED Course: Progress Notes, Reevaluation, and Consults:         Provider Notes (Medical Decision Making):   MDM  Number of Diagnoses or Management Options  Atypical chest pain:   Diagnosis management comments: 69M with 4 days of right sided dull chest pain, now resolved. Given his sx of pain radiating to the back, have considered aortic dissection, however would not expect his pain to have resolved and patient is overall, very well-appearing with no murmurs, pulse deficit or neuro deficit. CXR with normal mediastinum and H&H c/w previous values, so dissection less likely. Patient with multiple risk factors for ACS. Since pain has been persistent for 4 days, would expect troponin to be positive in the setting of ACS. Troponin today is 0.04, however, this is lower than majority of previous values. Patient initially with SPO2 of 93% but most recent 2 values 99% which is normal. Of note, patient followed by cardiology at the [place] and states that he has an appointment on Monday (AUG 3). Feel patient is safe for d/c with return precautions and f/u with his cardiologist at scheduled appointment. Diagnosis     Clinical Impression:   1.  Atypical chest pain        Disposition: discharged home    Follow-up Information     Follow up With Specialties Details Why Contact Info      On 8/3/2020 Patient to f/u with his Cardiologist at the [place] next week. Patient's Medications   Start Taking    No medications on file   Continue Taking    ACETAMINOPHEN (TYLENOL) 500 MG TABLET    Take 1 Tab by mouth every six (6) hours as needed. Indications: Pain    ALBUTEROL (PROVENTIL HFA) 90 MCG/ACTUATION INHALER    Take 2 Puffs by inhalation every four (4) hours as needed for Wheezing or Shortness of Breath. Indications: bronchospasm prevention    AMLODIPINE (NORVASC) 10 MG TABLET    Take 1 Tab by mouth daily. AMLODIPINE (NORVASC) 5 MG TABLET    Take 5 mg by mouth two (2) times a day. APIXABAN (ELIQUIS) 5 MG TABLET    Take 1 Tab by mouth every twelve (12) hours. BISACODYL (DULCOLAX, BISACODYL,) 5 MG EC TABLET    Take 1 tablet by mouth twice a day as needed for constipation. BUDESONIDE-FORMOTEROL (SYMBICORT) 160-4.5 MCG/ACTUATION HFAA    Take 2 Puffs by inhalation two (2) times a day. CARVEDILOL (COREG) 25 MG TABLET    Take 0.5 Tabs by mouth two (2) times daily (with meals). DAPSONE 100 MG TABLET    Take 1 Tab by mouth daily. DICLOFENAC (VOLTAREN) 1 % GEL    Apply 2 g to affected area three (3) times daily as needed for Pain. Apply dose (2 gm or 4 gm) to each location. If treatment site is the hands, patients should wait at least one (1) hour to wash their hands. APPLY TO right wrist    DOLUTEGRAVIR (TIVICAY) 50 MG TAB TABLET    Take 1 Tab by mouth daily. EMTRICITABINE-TENOFOVIR ALAFEN (DESCOVY) TABLET    Take 1 Tab by mouth daily. FOLIC ACID (FOLVITE) 1 MG TABLET    Take 1 Tab by mouth daily. MAGNESIUM OXIDE 400 MG CAP    Take 400 mg by mouth daily. ONDANSETRON (ZOFRAN ODT) 8 MG DISINTEGRATING TABLET    Take 1 Tab by mouth every eight (8) hours as needed for Nausea for up to 12 doses. PRAVASTATIN (PRAVACHOL) 40 MG TABLET    Take 1 Tab by mouth nightly. PYRIDOXINE, VITAMIN B6, (VITAMIN B-6) 50 MG TABLET    Take 1 Tab by mouth daily.    These Medications have changed No medications on file   Stop Taking    No medications on file     Disclaimer: Sections of this note are dictated using utilizing voice recognition software. Minor typographical errors may be present. If questions arise, please do not hesitate to contact me or call our department. stabilization device/sterile occulsive dressing/tape

## (undated) DEVICE — HI-TORQUE PILOT 50 GUIDE WIRE .014 STRAIGHT TIP 3.0 CM X 300 CM: Brand: HI-TORQUE PILOT

## (undated) DEVICE — CATH BLLN DIL 2.0X12MM RX -- EUPHORA

## (undated) DEVICE — PRESSURE MONITORING SET: Brand: TRUWAVE

## (undated) DEVICE — Z DUPLICATE USE 2103554 VALVE HEMOSTATIC BLEEDBK CTRL COPILOT

## (undated) DEVICE — SURGICAL PROCEDURE KIT LT HRT CUST

## (undated) DEVICE — DEVICE INFL W ACCS + HEMSTAS VLV INSRT TOOL AND TORQ BASIX

## (undated) DEVICE — RADIFOCUS OPTITORQUE ANGIOGRAPHIC CATHETER: Brand: OPTITORQUE

## (undated) DEVICE — HI-TORQUE VERSACORE FLOPPY GUIDE WIRE SYSTEM 260 CM: Brand: HI-TORQUE VERSACORE

## (undated) DEVICE — CATH GUID COR EB35 5FR 100CM -- LAUNCHER

## (undated) DEVICE — CATH BLLN ANGIO 2.25X20MM SC EUPHORA RX

## (undated) DEVICE — GLIDESHEATH SLENDER STAINLESS STEEL KIT: Brand: GLIDESHEATH SLENDER

## (undated) DEVICE — CATH ANGI BLLN DIL 2.75X20MM -- NC EUPHORA

## (undated) DEVICE — SET ANGIO L6.5IN L BOR 3 W STPCOCK SPIK TBNG

## (undated) DEVICE — GUIDEWIRE VASC L190CM DIA0.014IN ELASTINITE NIT HYDRPHLC